# Patient Record
Sex: FEMALE | Race: WHITE | NOT HISPANIC OR LATINO | Employment: OTHER | URBAN - METROPOLITAN AREA
[De-identification: names, ages, dates, MRNs, and addresses within clinical notes are randomized per-mention and may not be internally consistent; named-entity substitution may affect disease eponyms.]

---

## 2023-03-31 PROBLEM — C50.919 MALIGNANT NEOPLASM OF BREAST (MULTI): Status: ACTIVE | Noted: 2023-03-31

## 2023-03-31 PROBLEM — R00.0 TACHYCARDIA: Status: ACTIVE | Noted: 2023-03-31

## 2023-03-31 PROBLEM — H72.91 PERFORATION OF RIGHT TYMPANIC MEMBRANE: Status: ACTIVE | Noted: 2023-03-31

## 2023-03-31 PROBLEM — I10 ESSENTIAL HYPERTENSION: Status: ACTIVE | Noted: 2023-03-31

## 2023-03-31 PROBLEM — F41.1 GENERALIZED ANXIETY DISORDER: Status: ACTIVE | Noted: 2023-03-31

## 2023-03-31 PROBLEM — I73.9 INTERMITTENT CLAUDICATION (CMS-HCC): Status: ACTIVE | Noted: 2023-03-31

## 2023-03-31 PROBLEM — I77.1: Status: ACTIVE | Noted: 2023-03-31

## 2023-03-31 PROBLEM — Z99.81 DEPENDENCE ON SUPPLEMENTAL OXYGEN: Status: ACTIVE | Noted: 2023-03-31

## 2023-03-31 PROBLEM — I25.10 CORONARY ARTERY CALCIFICATION SEEN ON CAT SCAN: Status: ACTIVE | Noted: 2023-03-31

## 2023-03-31 PROBLEM — J44.9 COPD (CHRONIC OBSTRUCTIVE PULMONARY DISEASE) (MULTI): Status: ACTIVE | Noted: 2023-03-31

## 2023-03-31 PROBLEM — R53.83 FATIGUE: Status: RESOLVED | Noted: 2023-03-31 | Resolved: 2023-03-31

## 2023-03-31 PROBLEM — I70.0 AORTOILIAC STENOSIS (CMS-HCC): Status: ACTIVE | Noted: 2023-03-31

## 2023-03-31 PROBLEM — I73.9 PAD (PERIPHERAL ARTERY DISEASE) (CMS-HCC): Status: ACTIVE | Noted: 2023-03-31

## 2023-03-31 PROBLEM — R42 VERTIGO: Status: ACTIVE | Noted: 2023-03-31

## 2023-03-31 PROBLEM — R09.02 HYPOXIA: Status: ACTIVE | Noted: 2023-03-31

## 2023-03-31 PROBLEM — I10 SEVERE UNCONTROLLED HYPERTENSION: Status: ACTIVE | Noted: 2023-03-31

## 2023-03-31 PROBLEM — E78.5 DYSLIPIDEMIA: Status: ACTIVE | Noted: 2023-03-31

## 2023-04-26 LAB
ALANINE AMINOTRANSFERASE (SGPT) (U/L) IN SER/PLAS: 15 U/L (ref 7–45)
ALBUMIN (G/DL) IN SER/PLAS: 4.1 G/DL (ref 3.4–5)
ALKALINE PHOSPHATASE (U/L) IN SER/PLAS: 95 U/L (ref 33–136)
ANION GAP IN SER/PLAS: 9 MMOL/L (ref 10–20)
ASPARTATE AMINOTRANSFERASE (SGOT) (U/L) IN SER/PLAS: 18 U/L (ref 9–39)
BILIRUBIN TOTAL (MG/DL) IN SER/PLAS: 0.4 MG/DL (ref 0–1.2)
CALCIUM (MG/DL) IN SER/PLAS: 10.6 MG/DL (ref 8.6–10.3)
CARBON DIOXIDE, TOTAL (MMOL/L) IN SER/PLAS: 26 MMOL/L (ref 21–32)
CHLORIDE (MMOL/L) IN SER/PLAS: 106 MMOL/L (ref 98–107)
CHOLESTEROL (MG/DL) IN SER/PLAS: 287 MG/DL (ref 0–199)
CHOLESTEROL IN HDL (MG/DL) IN SER/PLAS: 63.9 MG/DL
CHOLESTEROL/HDL RATIO: 4.5
CREATININE (MG/DL) IN SER/PLAS: 0.85 MG/DL (ref 0.5–1.05)
ERYTHROCYTE DISTRIBUTION WIDTH (RATIO) BY AUTOMATED COUNT: 13.7 % (ref 11.5–14.5)
ERYTHROCYTE MEAN CORPUSCULAR HEMOGLOBIN CONCENTRATION (G/DL) BY AUTOMATED: 32.7 G/DL (ref 32–36)
ERYTHROCYTE MEAN CORPUSCULAR VOLUME (FL) BY AUTOMATED COUNT: 89 FL (ref 80–100)
ERYTHROCYTES (10*6/UL) IN BLOOD BY AUTOMATED COUNT: 5.43 X10E12/L (ref 4–5.2)
GFR FEMALE: 72 ML/MIN/1.73M2
GLUCOSE (MG/DL) IN SER/PLAS: 83 MG/DL (ref 74–99)
HEMATOCRIT (%) IN BLOOD BY AUTOMATED COUNT: 48.3 % (ref 36–46)
HEMOGLOBIN (G/DL) IN BLOOD: 15.8 G/DL (ref 12–16)
LDL: 201 MG/DL (ref 0–99)
LEUKOCYTES (10*3/UL) IN BLOOD BY AUTOMATED COUNT: 7.3 X10E9/L (ref 4.4–11.3)
MAGNESIUM (MG/DL) IN SER/PLAS: 2.25 MG/DL (ref 1.6–2.4)
PLATELETS (10*3/UL) IN BLOOD AUTOMATED COUNT: 194 X10E9/L (ref 150–450)
POTASSIUM (MMOL/L) IN SER/PLAS: 4.2 MMOL/L (ref 3.5–5.3)
PROTEIN TOTAL: 6.7 G/DL (ref 6.4–8.2)
SODIUM (MMOL/L) IN SER/PLAS: 137 MMOL/L (ref 136–145)
TRIGLYCERIDE (MG/DL) IN SER/PLAS: 110 MG/DL (ref 0–149)
UREA NITROGEN (MG/DL) IN SER/PLAS: 17 MG/DL (ref 6–23)
VLDL: 22 MG/DL (ref 0–40)

## 2023-09-21 PROBLEM — Z79.82 ASPIRIN LONG-TERM USE: Status: ACTIVE | Noted: 2023-09-21

## 2023-09-21 PROBLEM — R06.02 SHORTNESS OF BREATH ON EXERTION: Status: ACTIVE | Noted: 2023-09-21

## 2023-09-21 PROBLEM — I73.9 PVD (PERIPHERAL VASCULAR DISEASE) (CMS-HCC): Status: ACTIVE | Noted: 2023-09-21

## 2023-09-21 PROBLEM — R07.9 CHEST PAIN: Status: ACTIVE | Noted: 2023-09-21

## 2023-09-21 PROBLEM — E78.2 MIXED HYPERLIPIDEMIA: Status: ACTIVE | Noted: 2023-09-21

## 2023-09-21 PROBLEM — H91.90 HEARING LOSS: Status: ACTIVE | Noted: 2023-09-21

## 2023-09-21 RX ORDER — EZETIMIBE 10 MG/1
10 TABLET ORAL DAILY
COMMUNITY

## 2023-10-15 PROBLEM — Z72.0 TOBACCO USE: Status: ACTIVE | Noted: 2023-10-15

## 2023-10-15 PROBLEM — R00.2 PALPITATIONS: Status: ACTIVE | Noted: 2023-10-15

## 2024-05-30 ENCOUNTER — APPOINTMENT (OUTPATIENT)
Dept: RADIOLOGY | Facility: HOSPITAL | Age: 74
End: 2024-05-30
Payer: MEDICARE

## 2024-05-30 ENCOUNTER — HOSPITAL ENCOUNTER (EMERGENCY)
Facility: HOSPITAL | Age: 74
Discharge: HOME | End: 2024-05-30
Attending: EMERGENCY MEDICINE
Payer: MEDICARE

## 2024-05-30 ENCOUNTER — APPOINTMENT (OUTPATIENT)
Dept: CARDIOLOGY | Facility: HOSPITAL | Age: 74
End: 2024-05-30
Payer: MEDICARE

## 2024-05-30 VITALS
RESPIRATION RATE: 20 BRPM | BODY MASS INDEX: 22.15 KG/M2 | HEART RATE: 74 BPM | WEIGHT: 125 LBS | TEMPERATURE: 98.6 F | DIASTOLIC BLOOD PRESSURE: 80 MMHG | SYSTOLIC BLOOD PRESSURE: 159 MMHG | HEIGHT: 63 IN | OXYGEN SATURATION: 98 %

## 2024-05-30 DIAGNOSIS — M79.605 LEFT LEG PAIN: ICD-10-CM

## 2024-05-30 DIAGNOSIS — R07.9 CHEST PAIN, UNSPECIFIED TYPE: Primary | ICD-10-CM

## 2024-05-30 LAB
ALBUMIN SERPL BCP-MCNC: 4.5 G/DL (ref 3.4–5)
ALP SERPL-CCNC: 95 U/L (ref 33–136)
ALT SERPL W P-5'-P-CCNC: 12 U/L (ref 7–45)
ANION GAP SERPL CALC-SCNC: 9 MMOL/L (ref 10–20)
AST SERPL W P-5'-P-CCNC: 16 U/L (ref 9–39)
BASOPHILS # BLD AUTO: 0.05 X10*3/UL (ref 0–0.1)
BASOPHILS NFR BLD AUTO: 0.6 %
BILIRUB SERPL-MCNC: 0.3 MG/DL (ref 0–1.2)
BUN SERPL-MCNC: 30 MG/DL (ref 6–23)
CALCIUM SERPL-MCNC: 10.8 MG/DL (ref 8.6–10.3)
CARDIAC TROPONIN I PNL SERPL HS: 7 NG/L (ref 0–13)
CARDIAC TROPONIN I PNL SERPL HS: 8 NG/L (ref 0–13)
CHLORIDE SERPL-SCNC: 106 MMOL/L (ref 98–107)
CO2 SERPL-SCNC: 27 MMOL/L (ref 21–32)
CREAT SERPL-MCNC: 0.96 MG/DL (ref 0.5–1.05)
D DIMER PPP FEU-MCNC: 1201 NG/ML FEU
EGFRCR SERPLBLD CKD-EPI 2021: 62 ML/MIN/1.73M*2
EOSINOPHIL # BLD AUTO: 0.26 X10*3/UL (ref 0–0.4)
EOSINOPHIL NFR BLD AUTO: 3.4 %
ERYTHROCYTE [DISTWIDTH] IN BLOOD BY AUTOMATED COUNT: 13.5 % (ref 11.5–14.5)
GLUCOSE SERPL-MCNC: 88 MG/DL (ref 74–99)
HCT VFR BLD AUTO: 48.7 % (ref 36–46)
HGB BLD-MCNC: 16.4 G/DL (ref 12–16)
IMM GRANULOCYTES # BLD AUTO: 0.01 X10*3/UL (ref 0–0.5)
IMM GRANULOCYTES NFR BLD AUTO: 0.1 % (ref 0–0.9)
LYMPHOCYTES # BLD AUTO: 3.04 X10*3/UL (ref 0.8–3)
LYMPHOCYTES NFR BLD AUTO: 39.3 %
MCH RBC QN AUTO: 29.4 PG (ref 26–34)
MCHC RBC AUTO-ENTMCNC: 33.7 G/DL (ref 32–36)
MCV RBC AUTO: 87 FL (ref 80–100)
MONOCYTES # BLD AUTO: 0.49 X10*3/UL (ref 0.05–0.8)
MONOCYTES NFR BLD AUTO: 6.3 %
NEUTROPHILS # BLD AUTO: 3.89 X10*3/UL (ref 1.6–5.5)
NEUTROPHILS NFR BLD AUTO: 50.3 %
NRBC BLD-RTO: 0 /100 WBCS (ref 0–0)
PLATELET # BLD AUTO: 204 X10*3/UL (ref 150–450)
POTASSIUM SERPL-SCNC: 4.3 MMOL/L (ref 3.5–5.3)
PROT SERPL-MCNC: 7.1 G/DL (ref 6.4–8.2)
RBC # BLD AUTO: 5.57 X10*6/UL (ref 4–5.2)
SODIUM SERPL-SCNC: 138 MMOL/L (ref 136–145)
WBC # BLD AUTO: 7.7 X10*3/UL (ref 4.4–11.3)

## 2024-05-30 PROCEDURE — 93005 ELECTROCARDIOGRAM TRACING: CPT

## 2024-05-30 PROCEDURE — 84484 ASSAY OF TROPONIN QUANT: CPT | Performed by: EMERGENCY MEDICINE

## 2024-05-30 PROCEDURE — 93971 EXTREMITY STUDY: CPT

## 2024-05-30 PROCEDURE — 2500000004 HC RX 250 GENERAL PHARMACY W/ HCPCS (ALT 636 FOR OP/ED): Performed by: EMERGENCY MEDICINE

## 2024-05-30 PROCEDURE — 71045 X-RAY EXAM CHEST 1 VIEW: CPT | Performed by: RADIOLOGY

## 2024-05-30 PROCEDURE — 71045 X-RAY EXAM CHEST 1 VIEW: CPT

## 2024-05-30 PROCEDURE — 71275 CT ANGIOGRAPHY CHEST: CPT

## 2024-05-30 PROCEDURE — 71275 CT ANGIOGRAPHY CHEST: CPT | Performed by: RADIOLOGY

## 2024-05-30 PROCEDURE — 93971 EXTREMITY STUDY: CPT | Performed by: RADIOLOGY

## 2024-05-30 PROCEDURE — 96360 HYDRATION IV INFUSION INIT: CPT | Mod: 59

## 2024-05-30 PROCEDURE — 99285 EMERGENCY DEPT VISIT HI MDM: CPT | Mod: 25

## 2024-05-30 PROCEDURE — 84075 ASSAY ALKALINE PHOSPHATASE: CPT | Performed by: EMERGENCY MEDICINE

## 2024-05-30 PROCEDURE — 2550000001 HC RX 255 CONTRASTS: Performed by: EMERGENCY MEDICINE

## 2024-05-30 PROCEDURE — 96361 HYDRATE IV INFUSION ADD-ON: CPT

## 2024-05-30 PROCEDURE — 36415 COLL VENOUS BLD VENIPUNCTURE: CPT | Performed by: EMERGENCY MEDICINE

## 2024-05-30 PROCEDURE — 85379 FIBRIN DEGRADATION QUANT: CPT | Performed by: EMERGENCY MEDICINE

## 2024-05-30 PROCEDURE — 85025 COMPLETE CBC W/AUTO DIFF WBC: CPT | Performed by: EMERGENCY MEDICINE

## 2024-05-30 RX ORDER — SODIUM CHLORIDE 9 MG/ML
100 INJECTION, SOLUTION INTRAVENOUS CONTINUOUS
Status: DISCONTINUED | OUTPATIENT
Start: 2024-05-30 | End: 2024-05-30 | Stop reason: HOSPADM

## 2024-05-30 RX ORDER — SODIUM CHLORIDE 9 MG/ML
3 INJECTION, SOLUTION INTRAMUSCULAR; INTRAVENOUS; SUBCUTANEOUS AS NEEDED
Status: DISCONTINUED | OUTPATIENT
Start: 2024-05-30 | End: 2024-05-30 | Stop reason: HOSPADM

## 2024-05-30 RX ADMIN — SODIUM CHLORIDE 100 ML/HR: 9 INJECTION, SOLUTION INTRAVENOUS at 12:26

## 2024-05-30 RX ADMIN — IOHEXOL 75 ML: 350 INJECTION, SOLUTION INTRAVENOUS at 13:17

## 2024-05-30 ASSESSMENT — PAIN DESCRIPTION - PAIN TYPE: TYPE: ACUTE PAIN

## 2024-05-30 ASSESSMENT — LIFESTYLE VARIABLES
TOTAL SCORE: 0
HAVE YOU EVER FELT YOU SHOULD CUT DOWN ON YOUR DRINKING: NO
EVER HAD A DRINK FIRST THING IN THE MORNING TO STEADY YOUR NERVES TO GET RID OF A HANGOVER: NO
HAVE PEOPLE ANNOYED YOU BY CRITICIZING YOUR DRINKING: NO
EVER FELT BAD OR GUILTY ABOUT YOUR DRINKING: NO

## 2024-05-30 ASSESSMENT — PAIN SCALES - GENERAL
PAINLEVEL_OUTOF10: 0 - NO PAIN
PAINLEVEL_OUTOF10: 0 - NO PAIN
PAINLEVEL_OUTOF10: 7
PAINLEVEL_OUTOF10: 0 - NO PAIN

## 2024-05-30 ASSESSMENT — PAIN - FUNCTIONAL ASSESSMENT: PAIN_FUNCTIONAL_ASSESSMENT: 0-10

## 2024-05-30 ASSESSMENT — PAIN DESCRIPTION - DESCRIPTORS: DESCRIPTORS: ACHING

## 2024-05-30 ASSESSMENT — PAIN DESCRIPTION - LOCATION: LOCATION: CHEST

## 2024-05-30 NOTE — ED PROVIDER NOTES
HPI   Chief Complaint   Patient presents with   • chest pain/ HTN       Patient presents with chest pain, left leg pain.  She has had this chest pain for a couple of days.  She describes a constant pain in her chest.  It does not radiate.  No shortness of breath associated with this.  She also has pain in her left thigh.  She has a history of multiple stents in her legs.  She is on Plavix and has been compliant with this.  She is been taking all of her medications at home.  She denies any recent fevers or cough.  She states that she had a small heart attack about 15 years ago but has no stents in her heart.  She denies any falls or trauma.  She denies any trouble ambulating.                          [place] Coma Scale Score: 15                  Patient History   Past Medical History:   Diagnosis Date   • Fatigue 2023   • Personal history of malignant neoplasm of other parts of uterus     History of cancer of uterus   • Personal history of other diseases of the circulatory system     History of hypertension   • Personal history of other endocrine, nutritional and metabolic disease     History of hyperlipidemia   • Personal history of other specified conditions     History of vertigo     Past Surgical History:   Procedure Laterality Date   • ADENOIDECTOMY  2017    Adenoidectomy   • BREAST BIOPSY  2017    Biopsy Breast Open   •  SECTION, CLASSIC  2017     Section   • CHOLECYSTECTOMY  10/12/2017    Cholecystectomy   • CT AORTA AND BILATERAL ILIOFEMORAL RUNOFF ANGIOGRAM W AND/OR WO IV CONTRAST  3/9/2022    CT AORTA AND BILATERAL ILIOFEMORAL RUNOFF ANGIOGRAM W AND/OR WO IV CONTRAST 3/9/2022 POR ANCILLARY LEGACY   • MASTECTOMY  2017    Breast Surgery Mastectomy   • SEPTOPLASTY  2017    Septoplasty   • TONSILLECTOMY  2017    Tonsillectomy   • TOTAL ABDOMINAL HYSTERECTOMY  2017    Total Abdominal Hysterectomy     Family History   Problem Relation Name Age of  Onset   • Breast cancer Mother     • Hypertension Mother     • Hyperlipidemia Mother     • Esophageal cancer Mother     • Other (Peripheral artery disease) Mother     • Coronary artery disease Father       Social History     Tobacco Use   • Smoking status: Not on file   • Smokeless tobacco: Not on file   Substance Use Topics   • Alcohol use: Not on file   • Drug use: Not on file       Physical Exam   ED Triage Vitals   Temperature Heart Rate Respirations BP   05/30/24 1133 05/30/24 1220 05/30/24 1133 05/30/24 1133   36.6 °C (97.8 °F) 84 20 (!) 193/73      Pulse Ox Temp Source Heart Rate Source Patient Position   05/30/24 1133 05/30/24 1133 05/30/24 1133 05/30/24 1133   97 % Temporal Monitor Sitting      BP Location FiO2 (%)     05/30/24 1133 05/30/24 1220     Left arm 21 %       Physical Exam  Vitals and nursing note reviewed.   Constitutional:       Appearance: Normal appearance.   HENT:      Head: Normocephalic and atraumatic.      Mouth/Throat:      Mouth: Mucous membranes are moist.   Eyes:      Extraocular Movements: Extraocular movements intact.      Pupils: Pupils are equal, round, and reactive to light.   Cardiovascular:      Rate and Rhythm: Normal rate and regular rhythm.      Heart sounds: No murmur heard.  Pulmonary:      Effort: Pulmonary effort is normal. No respiratory distress.      Breath sounds: Normal breath sounds.   Abdominal:      General: There is no distension.      Palpations: Abdomen is soft.      Tenderness: There is no abdominal tenderness.   Musculoskeletal:         General: Tenderness (Left thigh region) present. No deformity. Normal range of motion.      Cervical back: Neck supple.      Right lower leg: No edema.      Left lower leg: No edema.      Comments: DP pulses are palpable on the left leg.  Minimal on the right leg but both legs are equal temperature.  No cyanosis.   Skin:     General: Skin is warm and dry.      Capillary Refill: Capillary refill takes less than 2 seconds.       Findings: No lesion or rash.   Neurological:      General: No focal deficit present.      Mental Status: She is alert and oriented to person, place, and time.      Sensory: No sensory deficit.      Motor: No weakness.   Psychiatric:         Behavior: Behavior normal.       Labs Reviewed   CBC WITH AUTO DIFFERENTIAL - Abnormal       Result Value    WBC 7.7      nRBC 0.0      RBC 5.57 (*)     Hemoglobin 16.4 (*)     Hematocrit 48.7 (*)     MCV 87      MCH 29.4      MCHC 33.7      RDW 13.5      Platelets 204      Neutrophils % 50.3      Immature Granulocytes %, Automated 0.1      Lymphocytes % 39.3      Monocytes % 6.3      Eosinophils % 3.4      Basophils % 0.6      Neutrophils Absolute 3.89      Immature Granulocytes Absolute, Automated 0.01      Lymphocytes Absolute 3.04 (*)     Monocytes Absolute 0.49      Eosinophils Absolute 0.26      Basophils Absolute 0.05     TROPONIN SERIES- (INITIAL, 1 HR)    Narrative:     The following orders were created for panel order Troponin I Series, High Sensitivity (0, 1 HR).  Procedure                               Abnormality         Status                     ---------                               -----------         ------                     Troponin I, High Sensiti...[[id number]]                      In process                 Troponin, High Sensitivi...[[id number]]                                                   Please view results for these tests on the individual orders.   COMPREHENSIVE METABOLIC PANEL   SERIAL TROPONIN-INITIAL   SERIAL TROPONIN, 1 HOUR   D-DIMER, NON VTE     XR chest 1 view    (Results Pending)   Lower extremity venous duplex left    (Results Pending)     ED Course & MDM   Diagnoses as of 05/30/24 1437   Chest pain, unspecified type   Left leg pain       Medical Decision Making  Differentials include ACS, musculoskeletal, DVT, arterial occlusion.  Imaging studies, if performed, were independently reviewed and interpreted by myself and confirmed by  radiologist. EKG(s), if performed, were interpreted by myself. Patient had an EKG that was sinus rhythm at a rate of 97.  No acute ST changes.  QTc 447, AR interval 200.  Repeat EKG is also sinus rhythm at 86.  Again no ischemic changes.  QTc 412, AR interval 219.  Laboratory studies were obtained and were unremarkable except for D-dimer of 1201.  Troponin is 7 with a repeat of 8.  Chest x-ray shows nothing acute.  Duplex ultrasound of the left lower extremity shows no evidence of DVT.  CTA of the chest was performed due to the elevated D-dimer.  Shows worsening emphysematous changes.  There is no pneumonia or pulmonary emboli noted.  She does have a lung nodule which the patient was notified of.  I see no signs of acute coronary syndrome.  No signs of DVT or PE.  This could be musculoskeletal in nature.  At this time patient feels improved and would like to go home.  I do not see any acute causes for her pain or need for any admission at this time.  Patient will be discharged to continue her home medications and she will follow-up with her physicians.        Procedure  Procedures     Saul Qiu MD  05/30/24 8924

## 2024-06-01 LAB
ATRIAL RATE: 87 BPM
ATRIAL RATE: 97 BPM
P AXIS: 27 DEGREES
P AXIS: 64 DEGREES
PR INTERVAL: 200 MS
PR INTERVAL: 219 MS
Q ONSET: 249 MS
Q ONSET: 252 MS
QRS COUNT: 14 BEATS
QRS COUNT: 16 BEATS
QRS DURATION: 90 MS
QRS DURATION: 92 MS
QT INTERVAL: 344 MS
QT INTERVAL: 352 MS
QTC CALCULATION(BAZETT): 412 MS
QTC CALCULATION(BAZETT): 447 MS
QTC FREDERICIA: 388 MS
QTC FREDERICIA: 413 MS
R AXIS: -19 DEGREES
R AXIS: -2 DEGREES
T AXIS: 78 DEGREES
T AXIS: 91 DEGREES
T OFFSET: 424 MS
T OFFSET: 425 MS
VENTRICULAR RATE: 86 BPM
VENTRICULAR RATE: 97 BPM

## 2024-12-10 ENCOUNTER — TELEPHONE (OUTPATIENT)
Dept: CARDIOLOGY | Facility: HOSPITAL | Age: 74
End: 2024-12-10
Payer: MEDICARE

## 2024-12-10 PROBLEM — R07.9 CHEST PAIN ON EXERTION: Status: ACTIVE | Noted: 2024-12-10

## 2024-12-10 PROBLEM — H91.93 BILATERAL HEARING LOSS: Status: ACTIVE | Noted: 2024-12-10

## 2024-12-10 PROBLEM — R05.9 COUGH: Status: ACTIVE | Noted: 2024-12-10

## 2024-12-10 PROBLEM — R63.0 DECREASE IN APPETITE: Status: ACTIVE | Noted: 2024-12-10

## 2024-12-10 PROBLEM — H72.90 PERFORATION OF TYMPANIC MEMBRANE: Status: ACTIVE | Noted: 2023-03-31

## 2024-12-10 PROBLEM — R06.89 DECREASED BREATH SOUNDS: Status: ACTIVE | Noted: 2024-12-10

## 2024-12-10 PROBLEM — U07.1 DISEASE DUE TO SEVERE ACUTE RESPIRATORY SYNDROME CORONAVIRUS 2 (SARS-COV-2): Status: ACTIVE | Noted: 2024-12-10

## 2024-12-10 PROBLEM — Z86.79 HISTORY OF HYPERTENSION: Status: ACTIVE | Noted: 2024-12-10

## 2024-12-10 PROBLEM — E78.00 PURE HYPERCHOLESTEROLEMIA: Status: ACTIVE | Noted: 2024-12-10

## 2024-12-10 PROBLEM — H90.8 MIXED CONDUCTIVE AND SENSORINEURAL HEARING LOSS: Status: ACTIVE | Noted: 2023-03-20

## 2024-12-10 PROBLEM — I25.10 CALCIFICATION OF CORONARY ARTERY: Status: ACTIVE | Noted: 2022-09-30

## 2024-12-10 PROBLEM — R06.09 DYSPNEA ON EXERTION: Status: ACTIVE | Noted: 2023-09-21

## 2024-12-10 PROBLEM — I10 PRIMARY HYPERTENSION: Status: ACTIVE | Noted: 2022-09-30

## 2024-12-10 PROBLEM — Z86.39 HISTORY OF ELEVATED LIPIDS: Status: ACTIVE | Noted: 2024-12-10

## 2024-12-10 PROBLEM — H90.3 ASYMMETRICAL SENSORINEURAL HEARING LOSS: Status: ACTIVE | Noted: 2024-12-10

## 2024-12-10 PROBLEM — Z85.42 HISTORY OF MALIGNANT NEOPLASM OF UTERINE BODY: Status: ACTIVE | Noted: 2024-12-10

## 2024-12-10 PROBLEM — C55 MALIGNANT NEOPLASM OF UTERUS, PART UNSPECIFIED (MULTI): Status: ACTIVE | Noted: 2023-03-20

## 2024-12-10 PROBLEM — J44.9 CHRONIC OBSTRUCTIVE PULMONARY DISEASE (MULTI): Status: ACTIVE | Noted: 2022-04-06

## 2024-12-10 PROBLEM — Z85.3 HISTORY OF MALIGNANT NEOPLASM OF BREAST: Status: ACTIVE | Noted: 2023-03-20

## 2024-12-10 PROBLEM — R79.81 LOW OXYGEN SATURATION: Status: ACTIVE | Noted: 2022-04-06

## 2024-12-10 PROBLEM — J20.9 ACUTE BRONCHITIS: Status: ACTIVE | Noted: 2024-12-10

## 2024-12-10 PROBLEM — M79.604 PAIN OF RIGHT LOWER EXTREMITY: Status: ACTIVE | Noted: 2024-12-10

## 2024-12-10 PROBLEM — F17.210 CIGARETTE SMOKER: Status: ACTIVE | Noted: 2022-09-19

## 2024-12-10 PROBLEM — R53.1 WEAKNESS: Status: ACTIVE | Noted: 2023-03-31

## 2024-12-10 RX ORDER — ATORVASTATIN CALCIUM 40 MG/1
40 TABLET, FILM COATED ORAL DAILY
COMMUNITY
End: 2024-12-11 | Stop reason: WASHOUT

## 2024-12-10 RX ORDER — WARFARIN SODIUM 5 MG/1
5 TABLET ORAL
COMMUNITY
End: 2024-12-11 | Stop reason: WASHOUT

## 2024-12-10 NOTE — TELEPHONE ENCOUNTER
Spoke with patients daughter regarding scheduled appt in January. She has some concerns of a high heart rate and SOB. Triaged with BP and got patient scheduled to come in and see JV tomorrow 12/11/24

## 2024-12-11 ENCOUNTER — OFFICE VISIT (OUTPATIENT)
Dept: CARDIOLOGY | Facility: HOSPITAL | Age: 74
End: 2024-12-11
Payer: MEDICARE

## 2024-12-11 VITALS
SYSTOLIC BLOOD PRESSURE: 142 MMHG | DIASTOLIC BLOOD PRESSURE: 62 MMHG | WEIGHT: 132 LBS | HEART RATE: 70 BPM | BODY MASS INDEX: 23.39 KG/M2 | HEIGHT: 63 IN | OXYGEN SATURATION: 100 %

## 2024-12-11 DIAGNOSIS — Z86.79 HISTORY OF HYPERTENSION: ICD-10-CM

## 2024-12-11 DIAGNOSIS — I73.9 PAD (PERIPHERAL ARTERY DISEASE) (CMS-HCC): ICD-10-CM

## 2024-12-11 DIAGNOSIS — R06.09 DYSPNEA ON EXERTION: Primary | ICD-10-CM

## 2024-12-11 LAB
ATRIAL RATE: 98 BPM
P AXIS: 63 DEGREES
P OFFSET: 183 MS
P ONSET: 131 MS
PR INTERVAL: 186 MS
Q ONSET: 224 MS
QRS COUNT: 16 BEATS
QRS DURATION: 80 MS
QT INTERVAL: 344 MS
QTC CALCULATION(BAZETT): 439 MS
QTC FREDERICIA: 405 MS
R AXIS: 15 DEGREES
T AXIS: 90 DEGREES
T OFFSET: 396 MS
VENTRICULAR RATE: 98 BPM

## 2024-12-11 PROCEDURE — 3077F SYST BP >= 140 MM HG: CPT | Performed by: PHYSICIAN ASSISTANT

## 2024-12-11 PROCEDURE — 1160F RVW MEDS BY RX/DR IN RCRD: CPT | Performed by: PHYSICIAN ASSISTANT

## 2024-12-11 PROCEDURE — 3078F DIAST BP <80 MM HG: CPT | Performed by: PHYSICIAN ASSISTANT

## 2024-12-11 PROCEDURE — 93005 ELECTROCARDIOGRAM TRACING: CPT | Performed by: PHYSICIAN ASSISTANT

## 2024-12-11 PROCEDURE — 1159F MED LIST DOCD IN RCRD: CPT | Performed by: PHYSICIAN ASSISTANT

## 2024-12-11 PROCEDURE — 99214 OFFICE O/P EST MOD 30 MIN: CPT | Performed by: PHYSICIAN ASSISTANT

## 2024-12-11 PROCEDURE — 93010 ELECTROCARDIOGRAM REPORT: CPT | Performed by: INTERNAL MEDICINE

## 2024-12-11 PROCEDURE — 4004F PT TOBACCO SCREEN RCVD TLK: CPT | Performed by: PHYSICIAN ASSISTANT

## 2024-12-11 RX ORDER — AMINOPHYLLINE 25 MG/ML
125 INJECTION, SOLUTION INTRAVENOUS ONCE AS NEEDED
OUTPATIENT
Start: 2024-12-11

## 2024-12-11 RX ORDER — ROSUVASTATIN CALCIUM 40 MG/1
40 TABLET, COATED ORAL DAILY
Qty: 90 TABLET | Refills: 3 | Status: SHIPPED | OUTPATIENT
Start: 2024-12-11 | End: 2025-12-11

## 2024-12-11 RX ORDER — LISINOPRIL AND HYDROCHLOROTHIAZIDE 12.5; 2 MG/1; MG/1
1 TABLET ORAL 2 TIMES DAILY
Qty: 180 TABLET | Refills: 3 | Status: SHIPPED | OUTPATIENT
Start: 2024-12-11 | End: 2025-12-11

## 2024-12-11 RX ORDER — ALBUTEROL SULFATE 90 UG/1
2 INHALANT RESPIRATORY (INHALATION) EVERY 4 HOURS PRN
Qty: 18 G | Refills: 1 | Status: SHIPPED | OUTPATIENT
Start: 2024-12-11 | End: 2025-02-09

## 2024-12-11 RX ORDER — ASPIRIN 81 MG/1
81 TABLET ORAL DAILY
Qty: 90 TABLET | Refills: 3 | Status: SHIPPED | OUTPATIENT
Start: 2024-12-11 | End: 2025-12-11

## 2024-12-11 RX ORDER — REGADENOSON 0.08 MG/ML
0.4 INJECTION, SOLUTION INTRAVENOUS
OUTPATIENT
Start: 2024-12-11

## 2024-12-11 NOTE — PATIENT INSTRUCTIONS
Please restart/continue your current medications.  If you have any change in your cardiorespiratory status please call the office right away.  Cedric will see you back after the testing.

## 2024-12-11 NOTE — PROGRESS NOTES
Cardiology Follow Up  Chief Complaint:   Here with complaints of SOB     History Of Present Illness:    This is a 72-year-old female here for follow-up regarding her history of coronary artery disease as seen on cardiac catheterization done in October 2017, peripheral vascular disease followed by vascular surgery, hypertension, dyslipidemia, COPD/tobacco use, and complaints of palpitations/dizziness. The patient was last evaluated by me in October 2022 which time we reviewed results of her nuclear stress, echocardiogram, and cardiac monitor. Patient was asked to follow-up in 6 months and sooner if necessary. ECG done today showed sinus rhythm with a heart rate of 94 bpm, 1st degree AV block and possible anteroseptal infarct age undetermined. The patient reports persistence of dizziness and dimming of vision which lasts for about 2-3 minutes. She denies associated chest pain, shortness of breath, syncope or palpitations. She states that she takes all of her medications as prescribed. During my exam, she was resting comfortably on the exam table.   12-11-24: This is a 74-year-old female patient who presents with her daughter for overdue follow-up.  Patient's last visit April 2023.  Patient has stopped all of her medications and been lost to follow-up but now having multiple issues including fatigue chest pain shortness of breath leg pain.  She is previously known to Dr. Figueredo and has stents in her legs.  Again she has stopped all of her medications.  Last lipid profile that we see she was very high total and LDL cholesterols.  She unfortunately continues to smoke fairly heavily and continues to work full-time at [employer].  Patient has had 2 events in the last week where she thought she might need to call 911 or have daughter take her to the ER and she is strongly cautioned that if she has further episodes like this for longer lasting episodes to call 911 or go to the emergency department.  Her heart catheterization in  "2017 showed 40% narrowings to the LAD circumflex and RCA.  Patient has not had follow-up with the PCP either.              Last Recorded Vitals:  Vitals:    24 1256   BP: 142/62   BP Location: Right arm   Patient Position: Sitting   BP Cuff Size: Adult   Pulse: 70   SpO2: 100%   Weight: 59.9 kg (132 lb)   Height: 1.6 m (5' 3\")       Past Medical History:  She has a past medical history of Fatigue (2023), Personal history of malignant neoplasm of other parts of uterus, Personal history of other diseases of the circulatory system, Personal history of other endocrine, nutritional and metabolic disease, and Personal history of other specified conditions.    Past Surgical History:  She has a past surgical history that includes Mastectomy (2017); Total abdominal hysterectomy (2017); Breast biopsy (2017);  section, classic (2017); Septoplasty (2017); Adenoidectomy (2017); Tonsillectomy (2017); Cholecystectomy (10/12/2017); and CT angio aorta and bilateral iliofemoral runoff including without contrast if performed (3/9/2022).      Social History:  She has no history on file for tobacco use, alcohol use, and drug use.    Family History:  Family History   Problem Relation Name Age of Onset    Breast cancer Mother      Hypertension Mother      Hyperlipidemia Mother      Esophageal cancer Mother      Other (Peripheral artery disease) Mother      Coronary artery disease Father          Allergies:  Codeine    Outpatient Medications:  Current Outpatient Medications   Medication Instructions    albuterol (Ventolin HFA) 90 mcg/actuation inhaler 2 puffs, Every 4 hours PRN    aspirin 81 mg EC tablet 1 tablet, Daily    atorvastatin (LIPITOR) 40 mg, Daily    budesonide-formoteroL (Symbicort) 160-4.5 mcg/actuation inhaler 2 puffs, 2 times daily    clopidogrel (Plavix) 75 mg tablet 1 tablet, Daily    ezetimibe (ZETIA) 10 mg, Daily    lisinopriL-hydrochlorothiazide 20-12.5 mg " "tablet 1 tablet, 2 times daily    meclizine (ANTIVERT) 25 mg, 3 times daily PRN    rosuvastatin (Crestor) 40 mg tablet 1 tablet, Daily    warfarin (COUMADIN) 5 mg, Daily RT     ROS   Physical Exam      Last Labs:  CBC -  Lab Results   Component Value Date    WBC 7.7 05/30/2024    HGB 16.4 (H) 05/30/2024    HCT 48.7 (H) 05/30/2024    MCV 87 05/30/2024     05/30/2024       CMP -  Lab Results   Component Value Date    CALCIUM 10.8 (H) 05/30/2024    PROT 7.1 05/30/2024    ALBUMIN 4.5 05/30/2024    AST 16 05/30/2024    ALT 12 05/30/2024    ALKPHOS 95 05/30/2024    BILITOT 0.3 05/30/2024       LIPID PANEL -   Lab Results   Component Value Date    CHOL 287 (H) 04/26/2023    TRIG 110 04/26/2023    HDL 63.9 04/26/2023    CHHDL 4.5 04/26/2023    LDLF 201 (H) 04/26/2023    VLDL 22 04/26/2023       RENAL FUNCTION PANEL -   Lab Results   Component Value Date    GLUCOSE 88 05/30/2024     05/30/2024    K 4.3 05/30/2024     05/30/2024    CO2 27 05/30/2024    ANIONGAP 9 (L) 05/30/2024    BUN 30 (H) 05/30/2024    CREATININE 0.96 05/30/2024    CALCIUM 10.8 (H) 05/30/2024    ALBUMIN 4.5 05/30/2024        No results found for: \"BNP\", \"HGBA1C\"      Echo:  2022-- CONCLUSIONS:   1. Left ventricular systolic function is normal with a 60-65% estimated ejection fraction.   2. Spectral Doppler shows a restrictive pattern of left ventricular diastolic filling.    Cath: 2017--CONCLUSIONS:   1. Left main: no significant CAD.   2. LAD: 30% mid-vessel disease.   3. LCx: luminal irregularities.   4. RCA: 40% diffuse prox-mid disease.   5. LVEDP 17mmHg, no aortic stenosis, EF 60-65%, 1+ MR.   6. 80% ostial right common iliac stenosis, 100% proximal left common iliac stenosis.    Stress Test:  Nuclear Stress Test 09/30/2022--IMPRESSION:  1. Normal stress myocardial perfusion imaging in response to  pharmacologic stress.  2. Well-maintained left ventricular function.         No recent results to review    Assessment/Plan   Problem " List Items Addressed This Visit             ICD-10-CM       Cardiac and Vasculature    PAD (peripheral artery disease) (CMS-[facility]) I73.9    Dyspnea on exertion - Primary R06.09    Relevant Orders    ECG 12 lead (Clinic Performed)    History of hypertension Z86.79    Relevant Orders    ECG 12 lead (Clinic Performed)   History of coronary artery disease--EKG today shows sinus rhythm with no acute ST-T wave changes when compared to her last EKG in the system.  At this time I am concerned that she could have progression of coronary disease.  Will resume aspirin, rosuvastatin and her lisinopril hydrochlorothiazide as her blood pressures are elevated.  Will arrange for echocardiogram and stress test.  Patient again is strongly cautioned if further episodes or worsening episodes lasting longer to call 911 and get to the hospital and she verbalizes understanding.  Shortness of breath--see above.  She continues to smoke.  Will refill her albuterol only she was also on a Symbicort inhaler but at least she will have the albuterol inhaler should she have wheezing or shortness of breath.  Peripheral arterial disease--see #1 and again is having worsening leg symptoms so we will get her reestablished with Dr. Serrato.  Hypertension--daughter states that approximately 1 year ago she had a syncopal episode we do not know what her blood pressures were but again she stopped all of her medications.  Will resume her lisinopril hydrochlorothiazide.  Previously LV systolic function was noted to be normal.  Overall patient has been lost to follow-up and had stopped taking all of her medications.  At this time we will refill albuterol, aspirin, rosuvastatin and her lisinopril hydrochlorothiazide.  Will arrange for echo and stress test to soon as possible but they also understand that if she has recurrent symptoms or worsening symptoms to immediately go to the hospital and again she verbalized understanding.  Again there is nothing acute on her  EKG today.  Will also get her reestablished with vascular surgery and I will see her back to reassess blood pressures and go over all the testing with the understanding that if there is any abnormality noted to the stress test or the echocardiogram she will likely need repeat heart catheterization.  Patient and daughter verbalized that they will do their best to be compliant with medications and further follow-up appointments.    Addendum:  asked to provide surgical risk assessment as she has pelvic mass and may need surgery.  After review of this note/history and recent testing (negative stress test for ischemia/echo with EF 38%) her RCRI score is only 1 but with other significant co-morbidities (functional decline, COPD and ongoing tobacco use and significant PAD) she should be considered at higher but not prohibitive risk for concern regarding MACE.  I am seeing the patient back on 1-31-25 and will review with cardiologist to update patient tomorrow.      Harish Lazaro PA-C  12/11/2024  1:04 PM

## 2024-12-24 ENCOUNTER — HOSPITAL ENCOUNTER (EMERGENCY)
Facility: HOSPITAL | Age: 74
Discharge: HOME | End: 2024-12-25
Attending: STUDENT IN AN ORGANIZED HEALTH CARE EDUCATION/TRAINING PROGRAM
Payer: MEDICARE

## 2024-12-24 ENCOUNTER — APPOINTMENT (OUTPATIENT)
Dept: RADIOLOGY | Facility: HOSPITAL | Age: 74
End: 2024-12-24
Payer: MEDICARE

## 2024-12-24 ENCOUNTER — APPOINTMENT (OUTPATIENT)
Dept: CARDIOLOGY | Facility: HOSPITAL | Age: 74
End: 2024-12-24
Payer: MEDICARE

## 2024-12-24 DIAGNOSIS — I10 PRIMARY HYPERTENSION: ICD-10-CM

## 2024-12-24 DIAGNOSIS — J96.02 ACUTE RESPIRATORY FAILURE WITH HYPERCAPNIA (MULTI): ICD-10-CM

## 2024-12-24 DIAGNOSIS — J18.9 PNEUMONIA OF BOTH LOWER LOBES DUE TO INFECTIOUS ORGANISM: ICD-10-CM

## 2024-12-24 DIAGNOSIS — J44.1 COPD EXACERBATION (MULTI): Primary | ICD-10-CM

## 2024-12-24 DIAGNOSIS — R79.89 ELEVATED BRAIN NATRIURETIC PEPTIDE (BNP) LEVEL: ICD-10-CM

## 2024-12-24 LAB
ALBUMIN SERPL BCP-MCNC: 4.3 G/DL (ref 3.4–5)
ALP SERPL-CCNC: 93 U/L (ref 33–136)
ALT SERPL W P-5'-P-CCNC: 12 U/L (ref 7–45)
ANION GAP SERPL CALC-SCNC: 11 MMOL/L (ref 10–20)
AST SERPL W P-5'-P-CCNC: 17 U/L (ref 9–39)
BASE EXCESS BLDV CALC-SCNC: -3.3 MMOL/L (ref -2–3)
BASE EXCESS BLDV CALC-SCNC: -3.4 MMOL/L (ref -2–3)
BASOPHILS # BLD AUTO: 0.08 X10*3/UL (ref 0–0.1)
BASOPHILS NFR BLD AUTO: 1 %
BILIRUB SERPL-MCNC: 0.5 MG/DL (ref 0–1.2)
BNP SERPL-MCNC: 692 PG/ML (ref 0–99)
BODY TEMPERATURE: 37 DEGREES CELSIUS
BODY TEMPERATURE: 37 DEGREES CELSIUS
BUN SERPL-MCNC: 24 MG/DL (ref 6–23)
CALCIUM SERPL-MCNC: 10.3 MG/DL (ref 8.6–10.3)
CARDIAC TROPONIN I PNL SERPL HS: 16 NG/L (ref 0–13)
CARDIAC TROPONIN I PNL SERPL HS: 19 NG/L (ref 0–13)
CHLORIDE SERPL-SCNC: 105 MMOL/L (ref 98–107)
CO2 SERPL-SCNC: 26 MMOL/L (ref 21–32)
CREAT SERPL-MCNC: 0.92 MG/DL (ref 0.5–1.05)
EGFRCR SERPLBLD CKD-EPI 2021: 65 ML/MIN/1.73M*2
EOSINOPHIL # BLD AUTO: 0.25 X10*3/UL (ref 0–0.4)
EOSINOPHIL NFR BLD AUTO: 3.2 %
ERYTHROCYTE [DISTWIDTH] IN BLOOD BY AUTOMATED COUNT: 13.8 % (ref 11.5–14.5)
FLUAV RNA RESP QL NAA+PROBE: NOT DETECTED
FLUBV RNA RESP QL NAA+PROBE: NOT DETECTED
GLUCOSE SERPL-MCNC: 208 MG/DL (ref 74–99)
HCO3 BLDV-SCNC: 23.6 MMOL/L (ref 22–26)
HCO3 BLDV-SCNC: 26.4 MMOL/L (ref 22–26)
HCT VFR BLD AUTO: 49.4 % (ref 36–46)
HGB BLD-MCNC: 16.1 G/DL (ref 12–16)
IMM GRANULOCYTES # BLD AUTO: 0.01 X10*3/UL (ref 0–0.5)
IMM GRANULOCYTES NFR BLD AUTO: 0.1 % (ref 0–0.9)
INHALED O2 CONCENTRATION: 36 %
INHALED O2 CONCENTRATION: 40 %
LACTATE SERPL-SCNC: 1.5 MMOL/L (ref 0.4–2)
LYMPHOCYTES # BLD AUTO: 4.31 X10*3/UL (ref 0.8–3)
LYMPHOCYTES NFR BLD AUTO: 54.7 %
MAGNESIUM SERPL-MCNC: 2.12 MG/DL (ref 1.6–2.4)
MCH RBC QN AUTO: 29 PG (ref 26–34)
MCHC RBC AUTO-ENTMCNC: 32.6 G/DL (ref 32–36)
MCV RBC AUTO: 89 FL (ref 80–100)
MONOCYTES # BLD AUTO: 0.48 X10*3/UL (ref 0.05–0.8)
MONOCYTES NFR BLD AUTO: 6.1 %
NEUTROPHILS # BLD AUTO: 2.75 X10*3/UL (ref 1.6–5.5)
NEUTROPHILS NFR BLD AUTO: 34.9 %
NRBC BLD-RTO: 0 /100 WBCS (ref 0–0)
OXYHGB MFR BLDV: 52.8 % (ref 45–75)
OXYHGB MFR BLDV: 78.2 % (ref 45–75)
PCO2 BLDV: 49 MM HG (ref 41–51)
PCO2 BLDV: 69 MM HG (ref 41–51)
PH BLDV: 7.19 PH (ref 7.33–7.43)
PH BLDV: 7.29 PH (ref 7.33–7.43)
PLATELET # BLD AUTO: 234 X10*3/UL (ref 150–450)
PO2 BLDV: 36 MM HG (ref 35–45)
PO2 BLDV: 53 MM HG (ref 35–45)
POTASSIUM SERPL-SCNC: 3.4 MMOL/L (ref 3.5–5.3)
PROT SERPL-MCNC: 7.1 G/DL (ref 6.4–8.2)
RBC # BLD AUTO: 5.55 X10*6/UL (ref 4–5.2)
SAO2 % BLDV: 55 % (ref 45–75)
SAO2 % BLDV: 81 % (ref 45–75)
SARS-COV-2 RNA RESP QL NAA+PROBE: NOT DETECTED
SODIUM SERPL-SCNC: 139 MMOL/L (ref 136–145)
WBC # BLD AUTO: 7.9 X10*3/UL (ref 4.4–11.3)

## 2024-12-24 PROCEDURE — 83735 ASSAY OF MAGNESIUM: CPT | Performed by: STUDENT IN AN ORGANIZED HEALTH CARE EDUCATION/TRAINING PROGRAM

## 2024-12-24 PROCEDURE — 87636 SARSCOV2 & INF A&B AMP PRB: CPT | Performed by: STUDENT IN AN ORGANIZED HEALTH CARE EDUCATION/TRAINING PROGRAM

## 2024-12-24 PROCEDURE — 71045 X-RAY EXAM CHEST 1 VIEW: CPT | Performed by: RADIOLOGY

## 2024-12-24 PROCEDURE — 93005 ELECTROCARDIOGRAM TRACING: CPT

## 2024-12-24 PROCEDURE — 96365 THER/PROPH/DIAG IV INF INIT: CPT

## 2024-12-24 PROCEDURE — 36415 COLL VENOUS BLD VENIPUNCTURE: CPT | Performed by: STUDENT IN AN ORGANIZED HEALTH CARE EDUCATION/TRAINING PROGRAM

## 2024-12-24 PROCEDURE — 71045 X-RAY EXAM CHEST 1 VIEW: CPT

## 2024-12-24 PROCEDURE — 84484 ASSAY OF TROPONIN QUANT: CPT | Performed by: STUDENT IN AN ORGANIZED HEALTH CARE EDUCATION/TRAINING PROGRAM

## 2024-12-24 PROCEDURE — 83605 ASSAY OF LACTIC ACID: CPT | Performed by: STUDENT IN AN ORGANIZED HEALTH CARE EDUCATION/TRAINING PROGRAM

## 2024-12-24 PROCEDURE — 2500000004 HC RX 250 GENERAL PHARMACY W/ HCPCS (ALT 636 FOR OP/ED): Performed by: STUDENT IN AN ORGANIZED HEALTH CARE EDUCATION/TRAINING PROGRAM

## 2024-12-24 PROCEDURE — 99285 EMERGENCY DEPT VISIT HI MDM: CPT | Performed by: STUDENT IN AN ORGANIZED HEALTH CARE EDUCATION/TRAINING PROGRAM

## 2024-12-24 PROCEDURE — 2500000002 HC RX 250 W HCPCS SELF ADMINISTERED DRUGS (ALT 637 FOR MEDICARE OP, ALT 636 FOR OP/ED): Performed by: STUDENT IN AN ORGANIZED HEALTH CARE EDUCATION/TRAINING PROGRAM

## 2024-12-24 PROCEDURE — 85025 COMPLETE CBC W/AUTO DIFF WBC: CPT | Performed by: STUDENT IN AN ORGANIZED HEALTH CARE EDUCATION/TRAINING PROGRAM

## 2024-12-24 PROCEDURE — 96375 TX/PRO/DX INJ NEW DRUG ADDON: CPT

## 2024-12-24 PROCEDURE — 94660 CPAP INITIATION&MGMT: CPT

## 2024-12-24 PROCEDURE — 2500000005 HC RX 250 GENERAL PHARMACY W/O HCPCS: Performed by: STUDENT IN AN ORGANIZED HEALTH CARE EDUCATION/TRAINING PROGRAM

## 2024-12-24 PROCEDURE — 83880 ASSAY OF NATRIURETIC PEPTIDE: CPT | Performed by: STUDENT IN AN ORGANIZED HEALTH CARE EDUCATION/TRAINING PROGRAM

## 2024-12-24 PROCEDURE — 87040 BLOOD CULTURE FOR BACTERIA: CPT | Mod: PORLAB | Performed by: STUDENT IN AN ORGANIZED HEALTH CARE EDUCATION/TRAINING PROGRAM

## 2024-12-24 PROCEDURE — 80053 COMPREHEN METABOLIC PANEL: CPT | Performed by: STUDENT IN AN ORGANIZED HEALTH CARE EDUCATION/TRAINING PROGRAM

## 2024-12-24 PROCEDURE — 82805 BLOOD GASES W/O2 SATURATION: CPT | Performed by: STUDENT IN AN ORGANIZED HEALTH CARE EDUCATION/TRAINING PROGRAM

## 2024-12-24 PROCEDURE — 82805 BLOOD GASES W/O2 SATURATION: CPT | Performed by: EMERGENCY MEDICINE

## 2024-12-24 PROCEDURE — 94640 AIRWAY INHALATION TREATMENT: CPT | Mod: 59

## 2024-12-24 RX ORDER — AMOXICILLIN AND CLAVULANATE POTASSIUM 875; 125 MG/1; MG/1
1 TABLET, FILM COATED ORAL EVERY 12 HOURS
Qty: 14 TABLET | Refills: 0 | Status: SHIPPED | OUTPATIENT
Start: 2024-12-24 | End: 2024-12-31

## 2024-12-24 RX ORDER — PREDNISONE 20 MG/1
60 TABLET ORAL DAILY
Qty: 12 TABLET | Refills: 0 | Status: SHIPPED | OUTPATIENT
Start: 2024-12-24 | End: 2024-12-28

## 2024-12-24 RX ORDER — CEFTRIAXONE 2 G/50ML
2 INJECTION, SOLUTION INTRAVENOUS ONCE
Status: COMPLETED | OUTPATIENT
Start: 2024-12-24 | End: 2024-12-24

## 2024-12-24 RX ORDER — AZITHROMYCIN 250 MG/1
250 TABLET, FILM COATED ORAL DAILY
Qty: 4 TABLET | Refills: 0 | Status: SHIPPED | OUTPATIENT
Start: 2024-12-24 | End: 2024-12-28

## 2024-12-24 RX ORDER — IPRATROPIUM BROMIDE AND ALBUTEROL SULFATE 2.5; .5 MG/3ML; MG/3ML
3 SOLUTION RESPIRATORY (INHALATION) ONCE
Status: COMPLETED | OUTPATIENT
Start: 2024-12-24 | End: 2024-12-24

## 2024-12-24 RX ORDER — METOPROLOL TARTRATE 1 MG/ML
5 INJECTION, SOLUTION INTRAVENOUS ONCE
Status: COMPLETED | OUTPATIENT
Start: 2024-12-24 | End: 2024-12-24

## 2024-12-24 RX ADMIN — CEFTRIAXONE SODIUM 2 G: 2 INJECTION, SOLUTION INTRAVENOUS at 20:25

## 2024-12-24 RX ADMIN — AZITHROMYCIN MONOHYDRATE 500 MG: 500 INJECTION, POWDER, LYOPHILIZED, FOR SOLUTION INTRAVENOUS at 20:20

## 2024-12-24 RX ADMIN — METOPROLOL TARTRATE 5 MG: 5 INJECTION INTRAVENOUS at 20:17

## 2024-12-24 RX ADMIN — Medication 40 PERCENT: at 20:50

## 2024-12-24 RX ADMIN — IPRATROPIUM BROMIDE AND ALBUTEROL SULFATE 3 ML: 2.5; .5 SOLUTION RESPIRATORY (INHALATION) at 20:14

## 2024-12-24 ASSESSMENT — COLUMBIA-SUICIDE SEVERITY RATING SCALE - C-SSRS
6. HAVE YOU EVER DONE ANYTHING, STARTED TO DO ANYTHING, OR PREPARED TO DO ANYTHING TO END YOUR LIFE?: NO
2. HAVE YOU ACTUALLY HAD ANY THOUGHTS OF KILLING YOURSELF?: NO
1. IN THE PAST MONTH, HAVE YOU WISHED YOU WERE DEAD OR WISHED YOU COULD GO TO SLEEP AND NOT WAKE UP?: NO

## 2024-12-24 ASSESSMENT — PAIN DESCRIPTION - LOCATION: LOCATION: CHEST

## 2024-12-24 ASSESSMENT — PAIN - FUNCTIONAL ASSESSMENT: PAIN_FUNCTIONAL_ASSESSMENT: 0-10

## 2024-12-24 ASSESSMENT — PAIN SCALES - GENERAL: PAINLEVEL_OUTOF10: 8

## 2024-12-24 ASSESSMENT — PAIN DESCRIPTION - PAIN TYPE: TYPE: ACUTE PAIN

## 2024-12-25 VITALS
DIASTOLIC BLOOD PRESSURE: 63 MMHG | SYSTOLIC BLOOD PRESSURE: 143 MMHG | RESPIRATION RATE: 20 BRPM | HEIGHT: 66 IN | BODY MASS INDEX: 20.89 KG/M2 | WEIGHT: 130 LBS | OXYGEN SATURATION: 99 % | HEART RATE: 100 BPM

## 2024-12-25 NOTE — PROGRESS NOTES
Patient care signed out to me by Dr. Melgoza.  Plan to follow-up chest x-ray and need for likely admission.    MDM/ED Course  ED Course as of 12/24/24 2352   Tue Dec 24, 2024   2030 POCT pH, Venous(!!): 7.19 [JG]   2030 POCT pCO2, Venous(!): 69  On BiPap [JG]   2047 EKG, interpreted me: Sinus rhythm, rate 89 bpm.  Normal axis.  Low voltage QRS.  No acute ST elevation or depression.  QTc 497.  No evidence of STEMI this time. [JG]   2058 Patient tolerating BiPap well, resting comfortably. [JG]   2333 BLOOD GAS VENOUS(!)  Repeat venous blood gas shows improved pH to 7.29, normal CO2 [JH]   2337 IMPRESSION:  Pulmonary vascular congestion and edema and asymmetric opacities in the right greater than left lower lungs compatible with superimposed pneumonia. Short-term progress imaging recommended to assure resolution and exclude other underlying pathology.   [JH]   2348 Recommended admission to the patient.  Because of the Christmas holiday patient does not want to be admitted to the hospital.  I explained the risks and benefits.  She is adamant about not going home.  Given this will discharge home with prescriptions for Augmentin and azithromycin for her pneumonia as well as prednisone.  Patient to wear her home O2 as well.  Patient given strict instructions to return.    Discussed findings and diagnosis with the patient, follow-up and return to ED precautions given, patient voiced understanding, agrees with plan, questions answered, patient was discharged in stable condition. [JH]      ED Course User Index  [JG] Randa Melgoza MD  [] João Macias MD         Diagnoses as of 12/24/24 2352   COPD exacerbation (Multi)   Acute respiratory failure with hypercapnia (Multi)   Primary hypertension   Pneumonia of both lower lobes due to infectious organism   Elevated brain natriuretic peptide (BNP) level

## 2024-12-25 NOTE — ED TRIAGE NOTES
Pt to ED via EMS c/o difficulty breathing. Pt talking in one word sentences. Pt states started approx 3-4 days. Productive cough with yellow sputum. Pt has harsh cough with audible crackles.    21-May-2017

## 2024-12-25 NOTE — ED PROVIDER NOTES
HPI   Chief Complaint   Patient presents with    Shortness of Breath       HPI  74-year-old female with history of uterine cancer s/p JASS, hypertension, aortoiliac stenosis s/p stent placement in bilateral lower extremities COPD not on home O2 presents with worsening shortness of breath and chest tightness over the past few days.  Reports she has had a cough productive of yellow mucus worsening over the past day.  Patient was given 2 DuoNeb treatments and Solu-Medrol by EMS prior to arrival.  Placed on 2 L O2 prior to arrival.  She denies fevers, chills, abdominal pain, nausea, vomiting, diarrhea, dysuria, flank pain, neck or back pain, history of DVT or PE.      Patient History   Past Medical History:   Diagnosis Date    Fatigue 2023    Personal history of malignant neoplasm of other parts of uterus     History of cancer of uterus    Personal history of other diseases of the circulatory system     History of hypertension    Personal history of other endocrine, nutritional and metabolic disease     History of hyperlipidemia    Personal history of other specified conditions     History of vertigo     Past Surgical History:   Procedure Laterality Date    ADENOIDECTOMY  2017    Adenoidectomy    BREAST BIOPSY  2017    Biopsy Breast Open     SECTION, CLASSIC  2017     Section    CHOLECYSTECTOMY  10/12/2017    Cholecystectomy    CT ANGIO AORTA AND BILATERAL ILIOFEMORAL RUN OFF INCLUDING WITHOUT CONTRAST IF PERFORMED  3/9/2022    CT AORTA AND BILATERAL ILIOFEMORAL RUNOFF ANGIOGRAM W AND/OR WO IV CONTRAST 3/9/2022 POR ANCILLARY LEGACY    MASTECTOMY  2017    Breast Surgery Mastectomy    SEPTOPLASTY  2017    Septoplasty    TONSILLECTOMY  2017    Tonsillectomy    TOTAL ABDOMINAL HYSTERECTOMY  2017    Total Abdominal Hysterectomy     Family History   Problem Relation Name Age of Onset    Breast cancer Mother      Hypertension Mother      Hyperlipidemia Mother       Esophageal cancer Mother      Other (Peripheral artery disease) Mother      Coronary artery disease Father       Social History     Tobacco Use    Smoking status: Every Day     Current packs/day: 1.00     Types: Cigarettes    Smokeless tobacco: Never   Vaping Use    Vaping status: Never Used   Substance Use Topics    Alcohol use: Not Currently    Drug use: Never       Physical Exam   ED Triage Vitals [12/24/24 1926]   Temp Heart Rate Respirations BP   -- (!) 133 (!) 22 (!) 207/118      Pulse Ox Temp src Heart Rate Source Patient Position   96 % -- Monitor --      BP Location FiO2 (%)     -- --       Physical Exam  Eyes:      Pupils: Pupils are equal, round, and reactive to light.   Cardiovascular:      Rate and Rhythm: Regular rhythm. Tachycardia present.   Pulmonary:      Effort: Tachypnea present.      Breath sounds: Examination of the right-middle field reveals wheezing and rales. Examination of the left-middle field reveals wheezing and rales. Examination of the right-lower field reveals wheezing and rales. Examination of the left-lower field reveals wheezing and rales. Decreased breath sounds, wheezing and rales present. No rhonchi.   Abdominal:      Palpations: Abdomen is soft.      Tenderness: There is no abdominal tenderness. There is no guarding or rebound.   Musculoskeletal:         General: Normal range of motion.      Cervical back: Normal range of motion.      Right lower leg: No edema.      Left lower leg: No edema.   Skin:     General: Skin is warm and dry.   Neurological:      General: No focal deficit present.      Mental Status: She is alert.           ED Course & MDM   ED Course as of 12/24/24 2114 Tue Dec 24, 2024   2030 POCT pH, Venous(!!): 7.19 [JG]   2030 POCT pCO2, Venous(!): 69  On BiPap [JG]   2047 EKG, interpreted me: Sinus rhythm, rate 89 bpm.  Normal axis.  Low voltage QRS.  No acute ST elevation or depression.  QTc 497.  No evidence of STEMI this time. [JG]   2058 Patient tolerating  BiPap well, resting comfortably. [JG]      ED Course User Index  [JG] Randa Melgoza MD         Diagnoses as of 12/24/24 2114   COPD exacerbation (Multi)   Acute respiratory failure with hypercapnia (Multi)   Primary hypertension                 No data recorded     [place] Coma Scale Score: 15 (12/24/24 1945 : Benigno Barr, RN)                     Medical Decision Making  74-year-old female with history of uterine cancer s/p JASS, hypertension, aortoiliac stenosis s/p stent placement in bilateral lower extremities COPD not on home O2 presents with worsening shortness of breath and chest tightness over the past few days.  Reports she has had a cough productive of yellow mucus worsening over the past day.  Patient was given 2 DuoNeb treatments and Solu-Medrol by EMS prior to arrival.  Placed on 4 L O2 prior to arrival.  Patient initially hypertensive with systolic of 207, tachycardic to 133 with respirations 22.  Vital signs of subsequently improved.  Initial pH 7.19 with pCO2 69.  Patient placed on BiPAP, which she tolerated well.  Elevation in BNP at 692.  No peripheral edema.  On auscultation, decreased breath sounds bilaterally with significant inspiratory and end expiratory wheezes with mild rales at bilateral lung bases.  Patient meeting septic history of for COPD exacerbation, started on ceftriaxone and azithromycin.  Given DuoNeb.  Significant improvement in respiratory status after BiPAP and nebulizer treatments.  Imaging pending.  Repeat troponin pending.  Patient signed out to Dr. Macias pending imaging and remaining lab workup as well as disposition.    Procedure  Procedures     Randa Melgoza MD  12/24/24 2120

## 2024-12-27 ENCOUNTER — TELEPHONE (OUTPATIENT)
Dept: CARDIOLOGY | Facility: HOSPITAL | Age: 74
End: 2024-12-27
Payer: MEDICARE

## 2024-12-27 NOTE — TELEPHONE ENCOUNTER
12/27 - Pt daughter Randa called office with c/o pt not feeling well, SOB, fatigue, loss of appetite and would like her to be seen sooner by Cedric Lazaro. Pt presented to ER via EMS on 12/24 with c/o difficulty breathing and was discharged later that evening with instructions to use her breathing machine at home - scheduled for echo/stress test 1/8. Per daughter Randa, pt has not felt better since then and they are concerned that 1/8 is too long to wait. Advised that pt go directly to ER if SOB should worsen or if she should develop any other concerning symptoms - will route message to care team per daughter request and add to cancel list for sooner appt- pt daughter verbalized understanding and agreeable to plan.

## 2024-12-29 LAB
BACTERIA BLD CULT: NORMAL
BACTERIA BLD CULT: NORMAL

## 2024-12-30 ENCOUNTER — TELEPHONE (OUTPATIENT)
Dept: CARDIOLOGY | Facility: HOSPITAL | Age: 74
End: 2024-12-30
Payer: MEDICARE

## 2024-12-30 LAB
ATRIAL RATE: 99 BPM
P AXIS: 48 DEGREES
PR INTERVAL: 211 MS
Q ONSET: 253 MS
QRS COUNT: 16 BEATS
QRS DURATION: 90 MS
QT INTERVAL: 387 MS
QTC CALCULATION(BAZETT): 497 MS
QTC FREDERICIA: 457 MS
R AXIS: -2 DEGREES
T AXIS: 127 DEGREES
T OFFSET: 446 MS
VENTRICULAR RATE: 99 BPM

## 2024-12-30 NOTE — TELEPHONE ENCOUNTER
12/30/24  1017  Nurse reviewed chart.    Returned call to daughter of patient who called in to report patient still with SOB; especially at night and fatigue.    Reviewed with daughter patient's underlying health conditions that may be contributing to her SOB such as COPD, pneumonia, and increased BNP which signifies extra fluid the heart has to pump.    Recommended to daughter that patient watch her salt intake and try sleeping upright in recliner; also instructed daughter that if conditions worsens and she is concerned patient is deteriorating to bring patient to ED.  Nurse also reported he would look into moving up follow up with ALBINA Lazaro.    Daughter verbalized understanding.    Appt with ALBINA Lazaro moved up one week.

## 2024-12-31 ENCOUNTER — APPOINTMENT (OUTPATIENT)
Dept: PRIMARY CARE | Facility: CLINIC | Age: 74
End: 2024-12-31
Payer: MEDICARE

## 2025-01-02 ENCOUNTER — APPOINTMENT (OUTPATIENT)
Dept: CARDIOLOGY | Facility: HOSPITAL | Age: 75
End: 2025-01-02
Payer: MEDICARE

## 2025-01-02 ENCOUNTER — OFFICE VISIT (OUTPATIENT)
Dept: URGENT CARE | Age: 75
End: 2025-01-02
Payer: MEDICARE

## 2025-01-02 VITALS
HEART RATE: 104 BPM | DIASTOLIC BLOOD PRESSURE: 74 MMHG | SYSTOLIC BLOOD PRESSURE: 132 MMHG | TEMPERATURE: 98.6 F | OXYGEN SATURATION: 93 %

## 2025-01-02 DIAGNOSIS — B37.0 THRUSH: ICD-10-CM

## 2025-01-02 DIAGNOSIS — R52 BURNING PAIN: ICD-10-CM

## 2025-01-02 DIAGNOSIS — N30.00 ACUTE CYSTITIS WITHOUT HEMATURIA: Primary | ICD-10-CM

## 2025-01-02 LAB
POC BILIRUBIN, URINE: NEGATIVE
POC BLOOD, URINE: NEGATIVE
POC GLUCOSE, URINE: NEGATIVE MG/DL
POC KETONES, URINE: NEGATIVE MG/DL
POC LEUKOCYTES, URINE: ABNORMAL
POC NITRITE,URINE: NEGATIVE
POC PH, URINE: 6 PH
POC PROTEIN, URINE: ABNORMAL MG/DL
POC SPECIFIC GRAVITY, URINE: >=1.03
POC UROBILINOGEN, URINE: 0.2 EU/DL

## 2025-01-02 PROCEDURE — 87086 URINE CULTURE/COLONY COUNT: CPT

## 2025-01-02 RX ORDER — NITROFURANTOIN 25; 75 MG/1; MG/1
100 CAPSULE ORAL 2 TIMES DAILY
Qty: 14 CAPSULE | Refills: 0 | Status: SHIPPED | OUTPATIENT
Start: 2025-01-02 | End: 2025-01-09

## 2025-01-02 RX ORDER — NYSTATIN 100000 [USP'U]/ML
10 SUSPENSION ORAL 2 TIMES DAILY
Qty: 473 ML | Refills: 0 | Status: SHIPPED | OUTPATIENT
Start: 2025-01-02 | End: 2025-01-12

## 2025-01-02 ASSESSMENT — ENCOUNTER SYMPTOMS
NAUSEA: 0
DIARRHEA: 0
ABDOMINAL DISTENTION: 0
BACK PAIN: 0
VOMITING: 0
FEVER: 0
HEMATURIA: 0
CHILLS: 0
FACIAL SWELLING: 0
FLANK PAIN: 0
SHORTNESS OF BREATH: 0
DYSURIA: 1
SORE THROAT: 0

## 2025-01-02 NOTE — PROGRESS NOTES
Subjective   Patient ID: Yadira Davis is a 74 y.o. female. They present today with a chief complaint of Urinary Problem (Burning. ).    History of Present Illness  Patient presents with over 2 weeks of urinary urgency, abdominal cramping and burning. Denies hematuria. Claims right flank pain. Denies n/v/d. Denies fever, chills, sweats  Patient also explains that she feels some tongue discomfort. Explains that she was recently on 2 antibiotic and started on new inhaler for recent pneumonia and new diagnosis of copd. Patient is on oxygen at home.           Past Medical History  Allergies as of 2025 - Reviewed 2024   Allergen Reaction Noted    Codeine Nausea Only, Other, and Nausea/vomiting 2015       (Not in a hospital admission)       Past Medical History:   Diagnosis Date    Fatigue 2023    Personal history of malignant neoplasm of other parts of uterus     History of cancer of uterus    Personal history of other diseases of the circulatory system     History of hypertension    Personal history of other endocrine, nutritional and metabolic disease     History of hyperlipidemia    Personal history of other specified conditions     History of vertigo       Past Surgical History:   Procedure Laterality Date    ADENOIDECTOMY  2017    Adenoidectomy    BREAST BIOPSY  2017    Biopsy Breast Open     SECTION, CLASSIC  2017     Section    CHOLECYSTECTOMY  10/12/2017    Cholecystectomy    CT ANGIO AORTA AND BILATERAL ILIOFEMORAL RUN OFF INCLUDING WITHOUT CONTRAST IF PERFORMED  3/9/2022    CT AORTA AND BILATERAL ILIOFEMORAL RUNOFF ANGIOGRAM W AND/OR WO IV CONTRAST 3/9/2022 POR ANCILLARY LEGACY    MASTECTOMY  2017    Breast Surgery Mastectomy    SEPTOPLASTY  2017    Septoplasty    TONSILLECTOMY  2017    Tonsillectomy    TOTAL ABDOMINAL HYSTERECTOMY  2017    Total Abdominal Hysterectomy        reports that she has been smoking cigarettes. She has  never used smokeless tobacco. She reports that she does not currently use alcohol. She reports that she does not use drugs.    Review of Systems  Review of Systems   Constitutional:  Negative for chills and fever.   HENT:  Negative for congestion, facial swelling and sore throat.    Respiratory:  Negative for shortness of breath.    Cardiovascular:  Negative for chest pain.   Gastrointestinal:  Negative for abdominal distention, diarrhea, nausea and vomiting.   Genitourinary:  Positive for dysuria and urgency. Negative for flank pain, hematuria, vaginal bleeding, vaginal discharge and vaginal pain.   Musculoskeletal:  Negative for back pain.                                  Objective    Vitals:    01/02/25 1759   BP: 132/74   Pulse: 104   Temp: 37 °C (98.6 °F)   SpO2: 93%     No LMP recorded. Patient is postmenopausal.    Physical Exam  Constitutional:       General: She is not in acute distress.     Appearance: She is not toxic-appearing.   HENT:      Mouth/Throat:      Tongue: No lesions.      Comments: Erythematous tongue with some white patching noted.   Abdominal:      General: Abdomen is flat. There is no distension.      Palpations: Abdomen is soft.      Tenderness: There is abdominal tenderness in the suprapubic area. There is no right CVA tenderness, left CVA tenderness, guarding or rebound.   Neurological:      Mental Status: She is alert.         Procedures    Point of Care Test & Imaging Results from this visit  Results for orders placed or performed in visit on 01/02/25   POCT UA Automated manually resulted   Result Value Ref Range    POC Glucose, Urine NEGATIVE NEGATIVE mg/dl    POC Bilirubin, Urine NEGATIVE NEGATIVE    POC Ketones, Urine NEGATIVE NEGATIVE mg/dl    POC Specific Gravity, Urine >=1.030 1.005 - 1.035    POC Blood, Urine NEGATIVE NEGATIVE    POC PH, Urine 6.0 No Reference Range Established PH    POC Protein, Urine TRACE (A) NEGATIVE mg/dl    POC Urobilinogen, Urine 0.2 0.2, 1.0 EU/DL     Poc Nitrite, Urine NEGATIVE NEGATIVE    POC Leukocytes, Urine TRACE (A) NEGATIVE      No results found.    Diagnostic study results (if any) were reviewed by Giuliana Griffith PA-C.    Assessment/Plan   Allergies, medications, history, and pertinent labs/EKGs/Imaging reviewed by Giuliana Griffith PA-C.     Medical Decision Making  MDM- History, examination, and urine dipstick result are consistent with uncomplicated UTI without evidence of pyelonephritis or sepsis. Patient will be given antibiotic therapy and cultures pending. Signs and symptoms are also consistent with thrush following antibiotic and inhaler use.  Plan is for topical antifungal medication, nystatin swish and spit. Supportive measures otherwise. Return to clinic or present to ED if symptoms change or worsen. Otherwise follow with PCP. Patient verbalized understanding and agrees with plan.       Orders and Diagnoses  Diagnoses and all orders for this visit:  Acute cystitis without hematuria  -     nitrofurantoin, macrocrystal-monohydrate, (Macrobid) 100 mg capsule; Take 1 capsule (100 mg) by mouth 2 times a day for 7 days.  -     Urine Culture  Burning pain  -     POCT UA Automated manually resulted  Thrush  -     nystatin (Mycostatin) 100,000 unit/mL suspension; Take 10 mL (1,000,000 Units) by mouth 2 times a day for 10 days.      Medical Admin Record      Patient disposition: Home    Electronically signed by Giuliana Griffith PA-C  6:40 PM

## 2025-01-02 NOTE — PATIENT INSTRUCTIONS
If you develop nausea, vomiting, back pain and fever go to ER    Drink plenty of fluids    Results will be in mychart from culture    Follow up with pcp.

## 2025-01-04 LAB — BACTERIA UR CULT: NO GROWTH

## 2025-01-07 ENCOUNTER — APPOINTMENT (OUTPATIENT)
Dept: RADIOLOGY | Facility: HOSPITAL | Age: 75
End: 2025-01-07
Payer: MEDICARE

## 2025-01-07 ENCOUNTER — HOSPITAL ENCOUNTER (EMERGENCY)
Facility: HOSPITAL | Age: 75
Discharge: HOME | End: 2025-01-07
Attending: STUDENT IN AN ORGANIZED HEALTH CARE EDUCATION/TRAINING PROGRAM
Payer: MEDICARE

## 2025-01-07 VITALS
SYSTOLIC BLOOD PRESSURE: 98 MMHG | HEART RATE: 76 BPM | WEIGHT: 125 LBS | BODY MASS INDEX: 22.15 KG/M2 | OXYGEN SATURATION: 97 % | TEMPERATURE: 97.8 F | HEIGHT: 63 IN | DIASTOLIC BLOOD PRESSURE: 55 MMHG | RESPIRATION RATE: 16 BRPM

## 2025-01-07 DIAGNOSIS — N83.8 OVARIAN MASS, RIGHT: ICD-10-CM

## 2025-01-07 DIAGNOSIS — K57.92 DIVERTICULITIS: Primary | ICD-10-CM

## 2025-01-07 LAB
ALBUMIN SERPL BCP-MCNC: 3.8 G/DL (ref 3.4–5)
ALP SERPL-CCNC: 97 U/L (ref 33–136)
ALT SERPL W P-5'-P-CCNC: 39 U/L (ref 7–45)
ANION GAP SERPL CALC-SCNC: 10 MMOL/L (ref 10–20)
APPEARANCE UR: ABNORMAL
AST SERPL W P-5'-P-CCNC: 26 U/L (ref 9–39)
BASOPHILS # BLD AUTO: 0.05 X10*3/UL (ref 0–0.1)
BASOPHILS NFR BLD AUTO: 0.5 %
BILIRUB SERPL-MCNC: 0.4 MG/DL (ref 0–1.2)
BILIRUB UR STRIP.AUTO-MCNC: NEGATIVE MG/DL
BUN SERPL-MCNC: 20 MG/DL (ref 6–23)
CALCIUM SERPL-MCNC: 10.3 MG/DL (ref 8.6–10.3)
CANCER AG125 SERPL-ACNC: 26.5 U/ML (ref 0–30.2)
CHLORIDE SERPL-SCNC: 107 MMOL/L (ref 98–107)
CO2 SERPL-SCNC: 24 MMOL/L (ref 21–32)
COLOR UR: YELLOW
CREAT SERPL-MCNC: 0.72 MG/DL (ref 0.5–1.05)
EGFRCR SERPLBLD CKD-EPI 2021: 87 ML/MIN/1.73M*2
EOSINOPHIL # BLD AUTO: 0.32 X10*3/UL (ref 0–0.4)
EOSINOPHIL NFR BLD AUTO: 3.3 %
ERYTHROCYTE [DISTWIDTH] IN BLOOD BY AUTOMATED COUNT: 13.6 % (ref 11.5–14.5)
GLUCOSE SERPL-MCNC: 95 MG/DL (ref 74–99)
GLUCOSE UR STRIP.AUTO-MCNC: NORMAL MG/DL
HCT VFR BLD AUTO: 46 % (ref 36–46)
HGB BLD-MCNC: 15.4 G/DL (ref 12–16)
HOLD SPECIMEN: NORMAL
IMM GRANULOCYTES # BLD AUTO: 0.03 X10*3/UL (ref 0–0.5)
IMM GRANULOCYTES NFR BLD AUTO: 0.3 % (ref 0–0.9)
KETONES UR STRIP.AUTO-MCNC: NEGATIVE MG/DL
LACTATE SERPL-SCNC: 0.7 MMOL/L (ref 0.4–2)
LEUKOCYTE ESTERASE UR QL STRIP.AUTO: NEGATIVE
LIPASE SERPL-CCNC: 17 U/L (ref 9–82)
LYMPHOCYTES # BLD AUTO: 2.29 X10*3/UL (ref 0.8–3)
LYMPHOCYTES NFR BLD AUTO: 23.7 %
MCH RBC QN AUTO: 29.1 PG (ref 26–34)
MCHC RBC AUTO-ENTMCNC: 33.5 G/DL (ref 32–36)
MCV RBC AUTO: 87 FL (ref 80–100)
MONOCYTES # BLD AUTO: 0.47 X10*3/UL (ref 0.05–0.8)
MONOCYTES NFR BLD AUTO: 4.9 %
MUCOUS THREADS #/AREA URNS AUTO: ABNORMAL /LPF
NEUTROPHILS # BLD AUTO: 6.51 X10*3/UL (ref 1.6–5.5)
NEUTROPHILS NFR BLD AUTO: 67.3 %
NITRITE UR QL STRIP.AUTO: NEGATIVE
NRBC BLD-RTO: 0 /100 WBCS (ref 0–0)
PH UR STRIP.AUTO: 5.5 [PH]
PLATELET # BLD AUTO: 209 X10*3/UL (ref 150–450)
POTASSIUM SERPL-SCNC: 4 MMOL/L (ref 3.5–5.3)
PROT SERPL-MCNC: 6.4 G/DL (ref 6.4–8.2)
PROT UR STRIP.AUTO-MCNC: ABNORMAL MG/DL
RBC # BLD AUTO: 5.29 X10*6/UL (ref 4–5.2)
RBC # UR STRIP.AUTO: ABNORMAL /UL
RBC #/AREA URNS AUTO: ABNORMAL /HPF
SODIUM SERPL-SCNC: 137 MMOL/L (ref 136–145)
SP GR UR STRIP.AUTO: 1.03
SQUAMOUS #/AREA URNS AUTO: ABNORMAL /HPF
UROBILINOGEN UR STRIP.AUTO-MCNC: ABNORMAL MG/DL
WBC # BLD AUTO: 9.7 X10*3/UL (ref 4.4–11.3)
WBC #/AREA URNS AUTO: ABNORMAL /HPF

## 2025-01-07 PROCEDURE — 2500000004 HC RX 250 GENERAL PHARMACY W/ HCPCS (ALT 636 FOR OP/ED)

## 2025-01-07 PROCEDURE — 36415 COLL VENOUS BLD VENIPUNCTURE: CPT | Performed by: PHYSICIAN ASSISTANT

## 2025-01-07 PROCEDURE — 99285 EMERGENCY DEPT VISIT HI MDM: CPT | Mod: 25 | Performed by: STUDENT IN AN ORGANIZED HEALTH CARE EDUCATION/TRAINING PROGRAM

## 2025-01-07 PROCEDURE — 80053 COMPREHEN METABOLIC PANEL: CPT | Performed by: PHYSICIAN ASSISTANT

## 2025-01-07 PROCEDURE — 83605 ASSAY OF LACTIC ACID: CPT | Performed by: PHYSICIAN ASSISTANT

## 2025-01-07 PROCEDURE — 74177 CT ABD & PELVIS W/CONTRAST: CPT | Mod: FOREIGN READ | Performed by: RADIOLOGY

## 2025-01-07 PROCEDURE — 2500000004 HC RX 250 GENERAL PHARMACY W/ HCPCS (ALT 636 FOR OP/ED): Performed by: PHYSICIAN ASSISTANT

## 2025-01-07 PROCEDURE — 85025 COMPLETE CBC W/AUTO DIFF WBC: CPT | Performed by: PHYSICIAN ASSISTANT

## 2025-01-07 PROCEDURE — 96374 THER/PROPH/DIAG INJ IV PUSH: CPT | Mod: 59

## 2025-01-07 PROCEDURE — 83690 ASSAY OF LIPASE: CPT | Performed by: PHYSICIAN ASSISTANT

## 2025-01-07 PROCEDURE — 2550000001 HC RX 255 CONTRASTS: Performed by: PHYSICIAN ASSISTANT

## 2025-01-07 PROCEDURE — 96375 TX/PRO/DX INJ NEW DRUG ADDON: CPT

## 2025-01-07 PROCEDURE — 86304 IMMUNOASSAY TUMOR CA 125: CPT | Mod: PORLAB | Performed by: PHYSICIAN ASSISTANT

## 2025-01-07 PROCEDURE — 76857 US EXAM PELVIC LIMITED: CPT

## 2025-01-07 PROCEDURE — 74177 CT ABD & PELVIS W/CONTRAST: CPT

## 2025-01-07 PROCEDURE — 76856 US EXAM PELVIC COMPLETE: CPT | Mod: FOREIGN READ | Performed by: RADIOLOGY

## 2025-01-07 PROCEDURE — 81001 URINALYSIS AUTO W/SCOPE: CPT | Performed by: PHYSICIAN ASSISTANT

## 2025-01-07 RX ORDER — METRONIDAZOLE 500 MG/1
500 TABLET ORAL 3 TIMES DAILY
Qty: 21 TABLET | Refills: 0 | Status: SHIPPED | OUTPATIENT
Start: 2025-01-07 | End: 2025-01-14

## 2025-01-07 RX ORDER — ONDANSETRON HYDROCHLORIDE 2 MG/ML
INJECTION, SOLUTION INTRAVENOUS
Status: COMPLETED
Start: 2025-01-07 | End: 2025-01-07

## 2025-01-07 RX ORDER — MORPHINE SULFATE 4 MG/ML
4 INJECTION INTRAVENOUS ONCE
Status: COMPLETED | OUTPATIENT
Start: 2025-01-07 | End: 2025-01-07

## 2025-01-07 RX ORDER — CIPROFLOXACIN 500 MG/1
500 TABLET ORAL 2 TIMES DAILY
Qty: 14 TABLET | Refills: 0 | Status: SHIPPED | OUTPATIENT
Start: 2025-01-07 | End: 2025-01-14

## 2025-01-07 RX ORDER — ONDANSETRON HYDROCHLORIDE 2 MG/ML
4 INJECTION, SOLUTION INTRAVENOUS ONCE
Status: COMPLETED | OUTPATIENT
Start: 2025-01-07 | End: 2025-01-07

## 2025-01-07 RX ORDER — OXYCODONE AND ACETAMINOPHEN 5; 325 MG/1; MG/1
1 TABLET ORAL EVERY 6 HOURS PRN
Qty: 10 TABLET | Refills: 0 | Status: SHIPPED | OUTPATIENT
Start: 2025-01-07 | End: 2025-01-10

## 2025-01-07 RX ADMIN — IOHEXOL 75 ML: 350 INJECTION, SOLUTION INTRAVENOUS at 13:09

## 2025-01-07 RX ADMIN — ONDANSETRON 4 MG: 2 INJECTION INTRAMUSCULAR; INTRAVENOUS at 13:02

## 2025-01-07 RX ADMIN — ONDANSETRON HYDROCHLORIDE 4 MG: 2 INJECTION, SOLUTION INTRAVENOUS at 13:02

## 2025-01-07 RX ADMIN — MORPHINE SULFATE 4 MG: 4 INJECTION INTRAVENOUS at 11:57

## 2025-01-07 ASSESSMENT — PAIN DESCRIPTION - PAIN TYPE: TYPE: ACUTE PAIN

## 2025-01-07 ASSESSMENT — PAIN SCALES - GENERAL
PAINLEVEL_OUTOF10: 7
PAINLEVEL_OUTOF10: 6

## 2025-01-07 ASSESSMENT — COLUMBIA-SUICIDE SEVERITY RATING SCALE - C-SSRS
6. HAVE YOU EVER DONE ANYTHING, STARTED TO DO ANYTHING, OR PREPARED TO DO ANYTHING TO END YOUR LIFE?: NO
1. IN THE PAST MONTH, HAVE YOU WISHED YOU WERE DEAD OR WISHED YOU COULD GO TO SLEEP AND NOT WAKE UP?: NO
2. HAVE YOU ACTUALLY HAD ANY THOUGHTS OF KILLING YOURSELF?: NO

## 2025-01-07 ASSESSMENT — PAIN DESCRIPTION - DESCRIPTORS
DESCRIPTORS: STABBING;SHARP
DESCRIPTORS: CRAMPING

## 2025-01-07 ASSESSMENT — PAIN DESCRIPTION - LOCATION
LOCATION: ABDOMEN
LOCATION: ABDOMEN

## 2025-01-07 ASSESSMENT — LIFESTYLE VARIABLES
HAVE YOU EVER FELT YOU SHOULD CUT DOWN ON YOUR DRINKING: NO
HAVE PEOPLE ANNOYED YOU BY CRITICIZING YOUR DRINKING: NO
EVER FELT BAD OR GUILTY ABOUT YOUR DRINKING: NO
TOTAL SCORE: 0
EVER HAD A DRINK FIRST THING IN THE MORNING TO STEADY YOUR NERVES TO GET RID OF A HANGOVER: NO

## 2025-01-07 ASSESSMENT — PAIN DESCRIPTION - ORIENTATION
ORIENTATION: UPPER
ORIENTATION: LOWER

## 2025-01-07 ASSESSMENT — PAIN - FUNCTIONAL ASSESSMENT
PAIN_FUNCTIONAL_ASSESSMENT: 0-10
PAIN_FUNCTIONAL_ASSESSMENT: 0-10

## 2025-01-07 NOTE — ED PROVIDER NOTES
EMERGENCY MEDICINE EVALUATION NOTE    History of Present Illness     Chief Complaint:   Chief Complaint   Patient presents with    Abdominal Pain       HPI: Yadira Davis is a 74 y.o. female presents with a chief complaint of abdominal pain.  Patient ports recent abdominal pain now for about 3-4 days.  She states that the symptoms have gotten worse where she is going to the urgent care 2 days ago.  She states that while urgent care they told her she might potentially have UTI as there was white blood cells in her urine but they called her yesterday and told her to stop taking antibiotics secondary to her culture coming back negative.  At this time patient states he still having cramping lower abdominal pain but no change in her bowel habits.  Patient denies any nausea or vomiting.  Patient reports she has had multiple abdominal surgeries which included a hysterectomy, appendectomy, as well as cholecystectomy.  Patient denies any current urinary symptoms dysuria or frequency.    Previous History     Past Medical History:   Diagnosis Date    Fatigue 2023    Personal history of malignant neoplasm of other parts of uterus     History of cancer of uterus    Personal history of other diseases of the circulatory system     History of hypertension    Personal history of other endocrine, nutritional and metabolic disease     History of hyperlipidemia    Personal history of other specified conditions     History of vertigo     Past Surgical History:   Procedure Laterality Date    ADENOIDECTOMY  2017    Adenoidectomy    BREAST BIOPSY  2017    Biopsy Breast Open     SECTION, CLASSIC  2017     Section    CHOLECYSTECTOMY  10/12/2017    Cholecystectomy    CT ANGIO AORTA AND BILATERAL ILIOFEMORAL RUN OFF INCLUDING WITHOUT CONTRAST IF PERFORMED  3/9/2022    CT AORTA AND BILATERAL ILIOFEMORAL RUNOFF ANGIOGRAM W AND/OR WO IV CONTRAST 3/9/2022 POR ANCILLARY LEGACY    MASTECTOMY  2017     Breast Surgery Mastectomy    SEPTOPLASTY  09/12/2017    Septoplasty    TONSILLECTOMY  09/12/2017    Tonsillectomy    TOTAL ABDOMINAL HYSTERECTOMY  09/12/2017    Total Abdominal Hysterectomy     Social History     Tobacco Use    Smoking status: Every Day     Current packs/day: 1.00     Types: Cigarettes    Smokeless tobacco: Never   Vaping Use    Vaping status: Never Used   Substance Use Topics    Alcohol use: Not Currently    Drug use: Never     Family History   Problem Relation Name Age of Onset    Breast cancer Mother      Hypertension Mother      Hyperlipidemia Mother      Esophageal cancer Mother      Other (Peripheral artery disease) Mother      Coronary artery disease Father       Allergies   Allergen Reactions    Codeine Nausea Only, Other and Nausea/vomiting     Vomiting     Current Outpatient Medications   Medication Instructions    albuterol (Ventolin HFA) 90 mcg/actuation inhaler 2 puffs, inhalation, Every 4 hours PRN    aspirin 81 mg, oral, Daily    ciprofloxacin (CIPRO) 500 mg, oral, 2 times daily    lisinopriL-hydrochlorothiazide 20-12.5 mg tablet 1 tablet, oral, 2 times daily    metroNIDAZOLE (FLAGYL) 500 mg, oral, 3 times daily    nitrofurantoin, macrocrystal-monohydrate, (Macrobid) 100 mg capsule 100 mg, oral, 2 times daily    nystatin (MYCOSTATIN) 1,000,000 Units, oral, 2 times daily    oxyCODONE-acetaminophen (Percocet) 5-325 mg tablet 1 tablet, oral, Every 6 hours PRN    rosuvastatin (CRESTOR) 40 mg, oral, Daily       Physical Exam     Appearance: Alert, oriented , cooperative     Skin: Intact,  dry skin, no lesions, rash, petechiae or purpura.      Eyes: PERRLA, EOMs intact,  Conjunctiva pink      ENT: Hearing grossly intact. Pharynx clear     Neck: Supple. Trachea at midline.      Pulmonary: Clear bilaterally. No rales, rhonchi or wheezing. No accessory muscle use or stridor.     Cardiac: Normal rate and rhythm without murmur     Abdomen: Soft, active bowel sounds. Diffuse lower abdominal pain       Musculoskeletal: Full range of motion.      Neurological:Cranial nerves II through XII are grossly intact, normal sensation, no weakness, no focal findings identified.     Results     Labs Reviewed   CBC WITH AUTO DIFFERENTIAL - Abnormal       Result Value    WBC 9.7      nRBC 0.0      RBC 5.29 (*)     Hemoglobin 15.4      Hematocrit 46.0      MCV 87      MCH 29.1      MCHC 33.5      RDW 13.6      Platelets 209      Neutrophils % 67.3      Immature Granulocytes %, Automated 0.3      Lymphocytes % 23.7      Monocytes % 4.9      Eosinophils % 3.3      Basophils % 0.5      Neutrophils Absolute 6.51 (*)     Immature Granulocytes Absolute, Automated 0.03      Lymphocytes Absolute 2.29      Monocytes Absolute 0.47      Eosinophils Absolute 0.32      Basophils Absolute 0.05     URINALYSIS WITH REFLEX CULTURE AND MICROSCOPIC - Abnormal    Color, Urine Yellow      Appearance, Urine Turbid (*)     Specific Gravity, Urine 1.029      pH, Urine 5.5      Protein, Urine 30 (1+) (*)     Glucose, Urine Normal      Blood, Urine 0.06 (1+) (*)     Ketones, Urine NEGATIVE      Bilirubin, Urine NEGATIVE      Urobilinogen, Urine 2 (1+) (*)     Nitrite, Urine NEGATIVE      Leukocyte Esterase, Urine NEGATIVE     URINALYSIS MICROSCOPIC WITH REFLEX CULTURE - Abnormal    WBC, Urine 1-5      RBC, Urine 11-20 (*)     Squamous Epithelial Cells, Urine 10-25 (FEW)      Mucus, Urine 2+     COMPREHENSIVE METABOLIC PANEL - Normal    Glucose 95      Sodium 137      Potassium 4.0      Chloride 107      Bicarbonate 24      Anion Gap 10      Urea Nitrogen 20      Creatinine 0.72      eGFR 87      Calcium 10.3      Albumin 3.8      Alkaline Phosphatase 97      Total Protein 6.4      AST 26      Bilirubin, Total 0.4      ALT 39     LIPASE - Normal    Lipase 17      Narrative:     Venipuncture immediately after or during the administration of Metamizole may lead to falsely low results. Testing should be performed immediately prior to Metamizole dosing.    LACTATE - Normal    Lactate 0.7      Narrative:     Venipuncture immediately after or during the administration of Metamizole may lead to falsely low results. Testing should be performed immediately prior to Metamizole dosing.   URINALYSIS WITH REFLEX CULTURE AND MICROSCOPIC    Narrative:     The following orders were created for panel order Urinalysis with Reflex Culture and Microscopic.  Procedure                               Abnormality         Status                     ---------                               -----------         ------                     Urinalysis with Reflex C...[[id number]]  Abnormal            Final result               Extra Urine Gray Tube[[id number]]                            In process                   Please view results for these tests on the individual orders.   EXTRA URINE GRAY TUBE   CANCER ANTIGEN 125     US pelvis limited   Final Result   *Multiseptated cystic right ovarian mass with internal   vascularity measures 6.1 x 6.3 x 6.4 cm. This is concerning for   malignancy. Surgical consultation is recommended.   *Nonvisualization of the left ovary.   *Surgically absent uterus.   Signed by Angus Levine MD      CT abdomen pelvis w IV contrast   Final Result   1. Findings compatible with acute uncomplicated diverticulitis   involving the proximal to mid sigmoid colon. No extraluminal gas or   abscess.   2. Complex cystic versus possible necrotic right adnexal mass   measuring 6.5 cm x 6 cm x 6.7 cm. Findings concerning for a possible   cystic ovarian neoplasm. Recommend further evaluation with pelvic   ultrasound and correlation with  levels.   3. Areas of nodularity noted along the omentum measuring up to   approximately 7 mm to the right of midline. Findings concerning for   possible peritoneal carcinomatosis.   4. Small left pleural effusion with minimal left basilar atelectasis.   5. Mild hepatic steatosis.   Signed by Chauncey Hazel MD            ED Course & Medical  "Decision Making     Medications   morphine injection 4 mg (4 mg intravenous Given 1/7/25 1157)   ondansetron (Zofran) injection 4 mg (4 mg intravenous Given 1/7/25 1302)   iohexol (OMNIPaque) 350 mg iodine/mL solution 75 mL (75 mL intravenous Given 1/7/25 1309)     Heart Rate:  []   Temperature:  [36.6 °C (97.8 °F)-36.6 °C (97.9 °F)]   Respirations:  [16]   BP: ()/(55-75)   Height:  [160 cm (5' 3\")]   Weight:  [56.7 kg (125 lb)]   Pulse Ox:  [95 %-97 %]    ED Course as of 01/07/25 1542   Tue Jan 07, 2025   4370 Updated patient on the results of the CT scan and the reason for the ultrasound.  Informed her that there is a mass present likely on the right ovary and potential spread to the omentum with diverticulitis present.  Patient's plan care going forward.  Patient will receive the ultrasound we wait for the results.  Patient likely be treated with antibiotics as well as pain medication at home but we will speak to OB about the mass and refer to [facility] clinic. [CJ]   7265 Discussed case with the on-call OB/GYN provider Dr. Braswell.  She agrees that patient can be discharged home to follow-up as an outpatient.  She does request we add on a  level for follow-up appointment.  Will update patient on plan of care. [CJ]   0038 Updated the patient on the discussion with Dr. Braswell.  She is in agreement the plan of care of discharge.  I did inform her that there is concern that the mass is assess vascularity so that is primary concern for possible cancer.  Biopsy will need to be made to confirm.  I also discussed with her that there is some inflammation present on the omentum which is concern for potential metastasis..  Patient did have diverticulitis present on CT imaging as well which is likely the cause of her pain.  Patient be given antibiotics for this as well as pain medication for home.  She was encouraged to follow-up with primary care provider in 1 to 2 days or return here immediately with any " worsening symptoms.  Patient be given referral to Dr. Braswell for follow-up. [CJ]      ED Course User Index  [CJ] Real De Los Santos PA-C         Diagnoses as of 01/07/25 1542   Diverticulitis   Ovarian mass, right       Procedures   Procedures    Diagnosis     1. Diverticulitis    2. Ovarian mass, right        Disposition   Discharged    ED Prescriptions       Medication Sig Dispense Start Date End Date Auth. Provider    ciprofloxacin (Cipro) 500 mg tablet Take 1 tablet (500 mg) by mouth 2 times a day for 7 days. 14 tablet 1/7/2025 1/14/2025 Real De Los Santos PA-C    metroNIDAZOLE (Flagyl) 500 mg tablet Take 1 tablet (500 mg) by mouth 3 times a day for 7 days. 21 tablet 1/7/2025 1/14/2025 Real De Los Santos PA-C    oxyCODONE-acetaminophen (Percocet) 5-325 mg tablet Take 1 tablet by mouth every 6 hours if needed for severe pain (7 - 10) for up to 3 days. 10 tablet 1/7/2025 1/10/2025 Real De Los Santos PA-C            Disclaimer: This note was dictated by speech recognition. Minor errors in transcription may be present. Please call if questions.       Real De Los Santos PA-C  01/07/25 1543

## 2025-01-07 NOTE — ED TRIAGE NOTES
Pt. Arrived to the ED via Private car for c/o lower abdominal pain Pt went to [place] urgent care two days ago and they told her to go to the ED if she has any worsening symptoms. Pt. States that her abdominal pain is worsening. Pt denies any burning or pain with urination. Denies any nausea or vomiting. Pt. States that she has felt more constipated.

## 2025-01-08 ENCOUNTER — HOSPITAL ENCOUNTER (OUTPATIENT)
Dept: RADIOLOGY | Facility: HOSPITAL | Age: 75
Discharge: HOME | End: 2025-01-08
Payer: MEDICARE

## 2025-01-08 ENCOUNTER — TELEPHONE (OUTPATIENT)
Dept: OBSTETRICS AND GYNECOLOGY | Facility: CLINIC | Age: 75
End: 2025-01-08

## 2025-01-08 ENCOUNTER — HOSPITAL ENCOUNTER (OUTPATIENT)
Dept: CARDIOLOGY | Facility: HOSPITAL | Age: 75
Discharge: HOME | End: 2025-01-08
Payer: MEDICARE

## 2025-01-08 DIAGNOSIS — R06.09 DYSPNEA ON EXERTION: ICD-10-CM

## 2025-01-08 PROBLEM — N94.89 ADNEXAL MASS: Status: ACTIVE | Noted: 2025-01-08

## 2025-01-08 LAB
AORTIC VALVE MEAN GRADIENT: 4 MMHG
AORTIC VALVE PEAK VELOCITY: 1.26 M/S
AV PEAK GRADIENT: 6 MMHG
EJECTION FRACTION: 38 %
GLOBAL LONGITUDINAL STRAIN: 9.4 %
LEFT ATRIUM VOLUME AREA LENGTH INDEX BSA: 24 ML/M2
LEFT VENTRICLE INTERNAL DIMENSION DIASTOLE: 5.05 CM (ref 3.5–6)
LEFT VENTRICULAR OUTFLOW TRACT DIAMETER: 1.9 CM
MITRAL VALVE E/A RATIO: 1.01
MITRAL VALVE E/E' RATIO: 11.64
RIGHT VENTRICLE FREE WALL PEAK S': 10.99 CM/S
TRICUSPID ANNULAR PLANE SYSTOLIC EXCURSION: 2.1 CM

## 2025-01-08 PROCEDURE — 78452 HT MUSCLE IMAGE SPECT MULT: CPT

## 2025-01-08 PROCEDURE — 78452 HT MUSCLE IMAGE SPECT MULT: CPT | Performed by: INTERNAL MEDICINE

## 2025-01-08 PROCEDURE — A9502 TC99M TETROFOSMIN: HCPCS | Performed by: INTERNAL MEDICINE

## 2025-01-08 PROCEDURE — 2500000004 HC RX 250 GENERAL PHARMACY W/ HCPCS (ALT 636 FOR OP/ED): Performed by: PHYSICIAN ASSISTANT

## 2025-01-08 PROCEDURE — 93018 CV STRESS TEST I&R ONLY: CPT | Performed by: INTERNAL MEDICINE

## 2025-01-08 PROCEDURE — 3430000001 HC RX 343 DIAGNOSTIC RADIOPHARMACEUTICALS: Performed by: INTERNAL MEDICINE

## 2025-01-08 PROCEDURE — 93016 CV STRESS TEST SUPVJ ONLY: CPT | Performed by: INTERNAL MEDICINE

## 2025-01-08 PROCEDURE — 93306 TTE W/DOPPLER COMPLETE: CPT

## 2025-01-08 PROCEDURE — 93306 TTE W/DOPPLER COMPLETE: CPT | Performed by: INTERNAL MEDICINE

## 2025-01-08 PROCEDURE — 93017 CV STRESS TEST TRACING ONLY: CPT

## 2025-01-08 RX ORDER — REGADENOSON 0.08 MG/ML
0.4 INJECTION, SOLUTION INTRAVENOUS
Status: COMPLETED | OUTPATIENT
Start: 2025-01-08 | End: 2025-01-08

## 2025-01-08 RX ADMIN — TETROFOSMIN 10 MILLICURIE: 0.23 INJECTION, POWDER, LYOPHILIZED, FOR SOLUTION INTRAVENOUS at 11:10

## 2025-01-08 RX ADMIN — REGADENOSON 0.4 MG: 0.08 INJECTION, SOLUTION INTRAVENOUS at 12:16

## 2025-01-08 RX ADMIN — TETROFOSMIN 30 MILLICURIE: 0.23 INJECTION, POWDER, LYOPHILIZED, FOR SOLUTION INTRAVENOUS at 12:15

## 2025-01-08 NOTE — TELEPHONE ENCOUNTER
Patient's daughter called stating her mother (Yadira) was seen in the ER yesterday. They advised her to schedule an appointment with Dr. Braswell as they found a mass on her ovaries. Please advise on where to schedule patient.

## 2025-01-08 NOTE — TELEPHONE ENCOUNTER
Per Dr. Braswell needs gyn onc referral.  Discussed with daughter Randa, she is agreeable with plan.  Appointment made, daughter informed

## 2025-01-09 DIAGNOSIS — E78.5 DYSLIPIDEMIA: Primary | ICD-10-CM

## 2025-01-09 NOTE — RESULT ENCOUNTER NOTE
Stress testing reviewed status post office visit.  Again her echocardiogram was abnormal.  The stress test does confirm the LV dysfunction but no areas of ischemia were seen on the stress test.  Previously she was noted to have nonobstructive CAD.  Since being on the lisinopril hydrochlorothiazide blood pressures are better and although she has some degree of shortness of breath she continues to smoke heavily but she has not had chest pain or further syncopal events.  Of note recently in the ER due to abdominal pain and was found to have a greater than 6 cm ovarian mass with findings concerning for malignancy.  They are scheduled with gynecologic oncologist next week.  At this time this seems like a more pressing issue and we will defer on any further cardiac testing until we have a better idea of what is going on with her abdominal issues and the daughter will call me back with that information.

## 2025-01-14 ENCOUNTER — APPOINTMENT (OUTPATIENT)
Dept: CARDIOLOGY | Facility: HOSPITAL | Age: 75
End: 2025-01-14
Payer: MEDICARE

## 2025-01-22 ENCOUNTER — OFFICE VISIT (OUTPATIENT)
Dept: VASCULAR SURGERY | Facility: HOSPITAL | Age: 75
End: 2025-01-22
Payer: MEDICARE

## 2025-01-22 ENCOUNTER — APPOINTMENT (OUTPATIENT)
Dept: CARDIOLOGY | Facility: HOSPITAL | Age: 75
End: 2025-01-22
Payer: MEDICARE

## 2025-01-22 VITALS
BODY MASS INDEX: 22.5 KG/M2 | WEIGHT: 127 LBS | SYSTOLIC BLOOD PRESSURE: 116 MMHG | DIASTOLIC BLOOD PRESSURE: 73 MMHG | HEART RATE: 94 BPM

## 2025-01-22 DIAGNOSIS — I73.9 PAD (PERIPHERAL ARTERY DISEASE) (CMS-HCC): Primary | ICD-10-CM

## 2025-01-22 DIAGNOSIS — R06.09 DYSPNEA ON EXERTION: ICD-10-CM

## 2025-01-22 PROCEDURE — 1159F MED LIST DOCD IN RCRD: CPT | Performed by: PHYSICIAN ASSISTANT

## 2025-01-22 PROCEDURE — 3078F DIAST BP <80 MM HG: CPT | Performed by: PHYSICIAN ASSISTANT

## 2025-01-22 PROCEDURE — 99214 OFFICE O/P EST MOD 30 MIN: CPT | Performed by: PHYSICIAN ASSISTANT

## 2025-01-22 PROCEDURE — 3074F SYST BP LT 130 MM HG: CPT | Performed by: PHYSICIAN ASSISTANT

## 2025-01-22 ASSESSMENT — ENCOUNTER SYMPTOMS
ABDOMINAL DISTENTION: 1
FEVER: 0
CHILLS: 0
DIZZINESS: 0
FACIAL ASYMMETRY: 0
NECK PAIN: 0
SLEEP DISTURBANCE: 0
ABDOMINAL PAIN: 1
SEIZURES: 0
COLOR CHANGE: 0
ARTHRALGIAS: 0
CONFUSION: 0
SHORTNESS OF BREATH: 0
DIARRHEA: 0
FATIGUE: 0
NAUSEA: 0
WEAKNESS: 0
SPEECH DIFFICULTY: 0
WOUND: 0
COUGH: 0
NUMBNESS: 0
TROUBLE SWALLOWING: 0
VOMITING: 0
WHEEZING: 0
DIFFICULTY URINATING: 0
LIGHT-HEADEDNESS: 0
SORE THROAT: 0
ADENOPATHY: 0
PALPITATIONS: 0
CONSTIPATION: 0
JOINT SWELLING: 0
HEADACHES: 0

## 2025-01-22 NOTE — PROGRESS NOTES
Subjective   Patient ID: Yadira Davis is a 74 y.o. female who presents for Follow-up (PAD).  HPI  74 year old female history of CAD, HTN, HLD, copd, smoker, presents for follow up visit for PAD. History of bilateral iliac artery stenting. Patient complains of bilateral lower extremity claudication, worsening over the past 1 year. Able to ambulate to the mailbox before calf pain begins. Improves with rest. No rest pain no wounds or ulcerations. Most recent vascular testing 2023 with PVR mild arterial disease RLE arterial duplex with Right SFA occlusion, minial disease LLE >50% stenosis Left SFA. Patient currently undergoing workup of ovarian mass/cyst, has appointment with oncology tomorrow, also undergoing cardiac workup for abnormal echo though put on hold until oncology follow up and workup of ovarian mass. Presents with daughter at today's visit. Still continues to smoked 1ppd.   Review of Systems   Constitutional:  Negative for chills, fatigue and fever.   HENT:  Negative for congestion, sore throat and trouble swallowing.    Eyes:  Negative for visual disturbance.   Respiratory:  Negative for cough, shortness of breath and wheezing.    Cardiovascular:  Negative for chest pain, palpitations and leg swelling.        Short distance lower extremity claudication   Gastrointestinal:  Positive for abdominal distention and abdominal pain. Negative for constipation, diarrhea, nausea and vomiting.   Endocrine: Negative for cold intolerance and heat intolerance.   Genitourinary:  Negative for difficulty urinating.   Musculoskeletal:  Negative for arthralgias, joint swelling and neck pain.   Skin:  Negative for color change and wound.   Neurological:  Negative for dizziness, seizures, syncope, facial asymmetry, speech difficulty, weakness, light-headedness, numbness and headaches.   Hematological:  Negative for adenopathy.   Psychiatric/Behavioral:  Negative for behavioral problems, confusion and sleep disturbance.       Vitals:    01/22/25 0858   BP: 116/73   Pulse: 94   Weight: 57.6 kg (127 lb)      Objective   Physical Exam  Constitutional:       Appearance: Normal appearance.   HENT:      Head: Normocephalic.      Right Ear: External ear normal.      Left Ear: External ear normal.      Nose: Nose normal.      Mouth/Throat:      Mouth: Mucous membranes are moist.   Eyes:      Extraocular Movements: Extraocular movements intact.   Neck:      Vascular: No carotid bruit.   Cardiovascular:      Rate and Rhythm: Normal rate and regular rhythm.      Pulses: Decreased pulses.   Pulmonary:      Effort: Pulmonary effort is normal. No respiratory distress.      Breath sounds: Normal breath sounds.   Abdominal:      Palpations: Abdomen is soft.      Tenderness: There is no abdominal tenderness.   Musculoskeletal:         General: No swelling or tenderness.      Cervical back: Normal range of motion and neck supple.      Right lower leg: No edema.      Left lower leg: No edema.   Skin:     General: Skin is warm and dry.      Capillary Refill: Capillary refill takes less than 2 seconds.      Findings: No lesion.   Neurological:      General: No focal deficit present.      Mental Status: She is alert and oriented to person, place, and time. Mental status is at baseline.   Psychiatric:         Mood and Affect: Mood normal.         Behavior: Behavior normal.         Thought Content: Thought content normal.         Judgment: Judgment normal.         Assessment/Plan   Problem List Items Addressed This Visit             ICD-10-CM    PAD (peripheral artery disease) (CMS-HCC) - Primary I73.9    Relevant Orders    Vascular US PVR Without Exercise    Vascular US lower extremity arterial duplex bilateral    Dyspnea on exertion R06.09   74 year old female history of CAD, HTN, HLD, copd, smoker, presents for follow up visit for PAD. History of bilateral iliac artery stenting.   Short distance claudication. No rest pain no wounds or ulcerations. Most  recent vascular testing 2023 with PVR mild arterial disease RLE arterial duplex with Right SFA occlusion, minial disease LLE >50% stenosis Left SFA.   -repeat PVR/CARIDAD, bilateral arterial duplex, will likely need CTA aorta with bilateral run off  -Patient currently undergoing workup of ovarian mass/cyst suspicious for malignancy, this is the more pressing issue at this time  -will follow up after testing and GYN/oncology workup  -also seeing cardiology for abnormal echo though put on hold until oncology follow up and workup of ovarian mass.   -smoking cessation discussed and encouraged  -call with new or worsening symptoms  -call with questions or concerns          Samara Hay PA-C 01/22/25 10:14 AM

## 2025-01-23 ENCOUNTER — OFFICE VISIT (OUTPATIENT)
Dept: GYNECOLOGIC ONCOLOGY | Facility: HOSPITAL | Age: 75
End: 2025-01-23
Payer: MEDICARE

## 2025-01-23 VITALS
DIASTOLIC BLOOD PRESSURE: 78 MMHG | HEART RATE: 77 BPM | BODY MASS INDEX: 22.32 KG/M2 | SYSTOLIC BLOOD PRESSURE: 126 MMHG | WEIGHT: 126 LBS

## 2025-01-23 DIAGNOSIS — I25.10 CORONARY ARTERY DISEASE INVOLVING NATIVE CORONARY ARTERY WITHOUT ANGINA PECTORIS, UNSPECIFIED WHETHER NATIVE OR TRANSPLANTED HEART: ICD-10-CM

## 2025-01-23 DIAGNOSIS — R10.84 GENERALIZED ABDOMINAL PAIN: ICD-10-CM

## 2025-01-23 DIAGNOSIS — R97.8 OTHER ABNORMAL TUMOR MARKERS: ICD-10-CM

## 2025-01-23 DIAGNOSIS — N83.8 OVARIAN MASS, RIGHT: Primary | ICD-10-CM

## 2025-01-23 DIAGNOSIS — J44.9 CHRONIC OBSTRUCTIVE PULMONARY DISEASE, UNSPECIFIED COPD TYPE (MULTI): ICD-10-CM

## 2025-01-23 PROCEDURE — 1159F MED LIST DOCD IN RCRD: CPT | Performed by: STUDENT IN AN ORGANIZED HEALTH CARE EDUCATION/TRAINING PROGRAM

## 2025-01-23 PROCEDURE — 99205 OFFICE O/P NEW HI 60 MIN: CPT | Performed by: STUDENT IN AN ORGANIZED HEALTH CARE EDUCATION/TRAINING PROGRAM

## 2025-01-23 PROCEDURE — 3078F DIAST BP <80 MM HG: CPT | Performed by: STUDENT IN AN ORGANIZED HEALTH CARE EDUCATION/TRAINING PROGRAM

## 2025-01-23 PROCEDURE — 1160F RVW MEDS BY RX/DR IN RCRD: CPT | Performed by: STUDENT IN AN ORGANIZED HEALTH CARE EDUCATION/TRAINING PROGRAM

## 2025-01-23 PROCEDURE — 1125F AMNT PAIN NOTED PAIN PRSNT: CPT | Performed by: STUDENT IN AN ORGANIZED HEALTH CARE EDUCATION/TRAINING PROGRAM

## 2025-01-23 PROCEDURE — 99215 OFFICE O/P EST HI 40 MIN: CPT | Performed by: STUDENT IN AN ORGANIZED HEALTH CARE EDUCATION/TRAINING PROGRAM

## 2025-01-23 PROCEDURE — 3074F SYST BP LT 130 MM HG: CPT | Performed by: STUDENT IN AN ORGANIZED HEALTH CARE EDUCATION/TRAINING PROGRAM

## 2025-01-23 RX ORDER — OXYCODONE HYDROCHLORIDE 5 MG/1
5 TABLET ORAL EVERY 6 HOURS PRN
Qty: 20 TABLET | Refills: 0 | Status: SHIPPED | OUTPATIENT
Start: 2025-01-23 | End: 2025-01-30

## 2025-01-23 ASSESSMENT — PAIN SCALES - GENERAL: PAINLEVEL_OUTOF10: 7

## 2025-01-23 NOTE — PROGRESS NOTES
Gynecologic Oncology Initial Consultation  [facility]    Patient ID: Yadira Davis, 74 y.o.  Referring Physician: Real De Los Santos PA-C  [id number]  [address]  [place] [phone]  Primary Care Provider: No Assigned PCP Generic Provider, MD      Reason for Consultation: pelvic mass  Subjective    HPI 75 y/o F with complex medical history including HTN, HLD, nonobstructive CAD, HFrEF (EF 38% on 1/8/25), PAD s/p bilateral iliac artery stenting, COPD with chronic home oxygen use 3L overnight and tobacco use who is seen in consultation for a pelvic mass. Report of acute onset abdominal pain. Poorly localized. Constant. Treatment with tylenol tried without much relief. Denies NV and early satiety. Decreased appetite. She was seen in the ER on 12/24. Work up with CT revealed necrotic right adnexal mass measuring 6.5 cm x 6 cm x 6.7 cm as well as nodularity along the omentum measuring up to 7 mm. Pelvic US confirmed R ovarian mass with internal vascularity within the solid component. Ca125 was obtained and normal at 26. She was discharged home with outpatient follow up.     She was given PO antibiotics for diverticulitis. Minimal improvement in symptoms. Pain is ongoing. Strongly desires surgery. Denies fevers/chills, N/V, CP, SOB, abdominal pain, dysuria, hematuria, constipation, diarrhea.    A comprehensive review of systems was performed and otherwise negative.    Objective      Past Medical History:   Diagnosis Date    Breast cancer (Multi)     R and L breast s/p surgery, chemotherapy and RT    COPD (chronic obstructive pulmonary disease) (Multi)     Fatigue 03/31/2023    HLD (hyperlipidemia)     HTN (hypertension)     Personal history of malignant neoplasm of other parts of uterus     History of cancer of uterus versus precancer        Past Surgical History:   Procedure Laterality Date    ADENOIDECTOMY  09/12/2017    Adenoidectomy    APPENDECTOMY      abdominal    BREAST BIOPSY  09/12/2017    Biopsy Breast Open      SECTION, CLASSIC  2017     Section    CHOLECYSTECTOMY  10/12/2017    Cholecystectomy LS    CT ANGIO AORTA AND BILATERAL ILIOFEMORAL RUN OFF INCLUDING WITHOUT CONTRAST IF PERFORMED  2022    CT AORTA AND BILATERAL ILIOFEMORAL RUNOFF ANGIOGRAM W AND/OR WO IV CONTRAST 3/9/2022 POR ANCILLARY LEGACY    MASTECTOMY  2017    Breast Surgery Mastectomy    SEPTOPLASTY  2017    Septoplasty    TONSILLECTOMY  2017    Tonsillectomy    TOTAL ABDOMINAL HYSTERECTOMY  2017    Total Abdominal Hysterectomy reportedly due to uterine cancer though diagnosed at a young age        Family History   Problem Relation Name Age of Onset    Breast cancer Mother      Hypertension Mother      Hyperlipidemia Mother      Esophageal cancer Mother      Other (Peripheral artery disease) Mother      Coronary artery disease Father       Otherwise, denies history of endometrial, ovarian, breast, prostate, or colorectal cancers. She declines genetic testing. Has not previously completed genetic testing.     OBGYN Hx:  - ; C/S x3  - Menopause unknown; denies HRT use  - Last Pap unknown    Screening:  - Last Mammogram: unknown  - Last Colonoscopy: unknown    Social Hx:  Yadira Davis  reports that she has been smoking cigarettes. She started smoking about 55 years ago. She has a 82.5 pack-year smoking history. She has never used smokeless tobacco.  She  reports that she does not currently use alcohol.  She  reports no history of drug use.  No desire to quit smoking.   Lives at home alone with family nearby. . Currently on leave. Works in retail.     Physical Exam  BSA: 1.59 meters squared  /78 (BP Location: Right arm, Patient Position: Sitting, BP Cuff Size: Adult)   Pulse 77   Wt 57.2 kg (126 lb)   BMI 22.32 kg/m²   General:   alert and oriented, in no acute distress   Heart: regular rate and rhythm, S1, S2 normal, no murmur, click, rub or gallop   Lungs: wheezes and diffuse,  expiratory   Abdomen: soft, nondistended, and tenderness mild and no rebound or guarding in the lower abdomen   Vulva: normal   Vagina: normal mucosa, cuff intact without lesions or masses   Cervix: absent   Uterus: absent   Adnexa: No masses palpated   Rectal: deferred   Lymph Nodes:  Cervical, supraclavicular, and axillary nodes normal.   Extremities: warm, well-perfused without cyanosis, clubbing or edema   Skin: Normal   Lines/Access:      Pathology:  N/A    Imaging:  CT AP 1/7/25  IMPRESSION:  1. Findings compatible with acute uncomplicated diverticulitis  involving the proximal to mid sigmoid colon. No extraluminal gas or  abscess.  2. Complex cystic versus possible necrotic right adnexal mass  measuring 6.5 cm x 6 cm x 6.7 cm. Findings concerning for a possible  cystic ovarian neoplasm. Recommend further evaluation with pelvic  ultrasound and correlation with  levels.  3. Areas of nodularity noted along the omentum measuring up to  approximately 7 mm to the right of midline. Findings concerning for  possible peritoneal carcinomatosis.  4. Small left pleural effusion with minimal left basilar atelectasis.  5. Mild hepatic steatosis.      Performance Status:  Asymptomatic    Assessment/Plan    74 y.o. with complex pelvic mass, suspect ovarian in origin though normal tumor markers cannot rule out malignancy though could be torsion  Comorbidities: HTN, HLD, nonobstructive CAD, HFrEF (EF 38% on 1/8/25), PAD s/p bilateral iliac artery stenting, COPD noncompliant with inhalers with chronic home oxygen use 3L overnight and tobacco use     Diagnoses and all orders for this visit:  Ovarian mass, right  Generalized abdominal pain  Chronic obstructive pulmonary disease, unspecified COPD type (Multi)  Coronary artery disease     # Pelvic mass  - We discussed the possible etiologies of a pelvic mass including benign, borderline, and malignant.   - Imaging was reviewed and discussed with the patient  - Tumor markers  normal  but will be obtain (CA 19-9, CEA)  - Pelvic MRI ordered  - MDTB review  - Discussed biopsy as an alternative to surgery if surgery cannot be safely considered, will discuss with pulmonology and cardiology     # Acute on chronic pain   - Oxycodone 5 mg PRN q6 PO to the pharmacy     # COPD on home O2  - Encourage compliance with inhaler   - Referral to pulmonology for pre-operative optimization, establish     # CAD, HFrEF  - Follows with cardiology, will need optimization and clearance if surgery can be considered     # Tobacco use disorder  - Cessation counseling complete, pre contemplative phase  - Declines patch, gum, lozenge     RTC results review    Jaki Treviño MD MPH

## 2025-01-24 NOTE — PROGRESS NOTES
Patient: Yadira Davis    [id number]  : 1950 -- AGE 74 y.o.    Provider: Tanvi Allison CMA     Location [facility]   Service Date: 2025              [facility]  New Visit Note      HISTORY OF PRESENT ILLNESS     The patient's referring provider is: Jaki Treviño MD MPH    HISTORY OF PRESENT ILLNESS   Yadira Davis is a 74 y.o. female who presents to a [facility] for an evaluation with concerns of No chief complaint on file.. I have independently interviewed and examined the patient in the office and reviewed available records.    Current History    On today's visit, the patient reports ***    HPI:  At baseline,  has dyspnea on exertion, but none at rest. Symptoms started many years ago, but has mostly been stable.  Currently sits for most of the day, works inside the house, but does not carry loads and do strenuous exercise. They are active in her everyday life and carries loads and does strenuous exercise. He is only troubled by breathlessness except on strenuous exercise (mMRC 0).  They are s short of breath when hurrying on level ground or walking up a slight hill (mMRC 1).  They are  have  stop for breath when walking at her own pace on level ground at about 15 minutes (mMRC 2).  Has to stop for breath after walking about 100 meters or a few minutes (mMRC 3).  Too breathless to leave the house or breathless when dressing and undressing (mMRC 4).     CAT score is . Relates/Denies orthopnea, pnd, or cory.  Has gained/lost X pounds in the last X months. Weight has been mostly stable.  Relates/Denies  chronic cough, wheezing, and clear, green, blood streaks, but no sputum. No night cough. No hemoptysis. No fever or shivering chills. Has a runny nose, and a tingling sensation in the back of his throat. Has no runny nose, or a tingling sensation in the back of his throat. Also complains of heartburn. Denies chest  pain or heartburn. [place] score (ESS) is .    Previous pulmonary history:  no history of recurrent infections, or lung disease as a child.  No previous lung hx, never on oxygen or inhaler therapy.    previously was told they have  .  currently is on no supplemental oxygen.  Never been to pulmonary rehab. Does not recall having AECOPD requiring antibiotics or prednisone.    Inhalers/nebulized medications:     Hospitalization History: Not been hospitalized over the last year for breathing related problem.    Sleep history:  Denies snoring, apnea, feeling tired during the day or taking naps during the day.   Complains of snoring, apnea, feeling tired during the day, and taking naps during the day.   STOP-BANG score of     ALLERGIES AND MEDICATIONS     ALLERGIES  Allergies   Allergen Reactions    Codeine Nausea Only, Other and Nausea/vomiting     Vomiting       MEDICATIONS  Current Outpatient Medications   Medication Sig Dispense Refill    albuterol (Ventolin HFA) 90 mcg/actuation inhaler Inhale 2 puffs every 4 hours if needed for shortness of breath. 18 g 1    aspirin 81 mg EC tablet Take 1 tablet (81 mg) by mouth once daily. 90 tablet 3    lisinopriL-hydrochlorothiazide 20-12.5 mg tablet Take 1 tablet by mouth 2 times a day. 180 tablet 3    oxyCODONE (Roxicodone) 5 mg immediate release tablet Take 1 tablet (5 mg) by mouth every 6 hours if needed for severe pain (7 - 10) for up to 7 days. 20 tablet 0    rosuvastatin (Crestor) 40 mg tablet Take 1 tablet (40 mg) by mouth once daily. 90 tablet 3     No current facility-administered medications for this visit.         PAST HISTORY     PAST MEDICAL HISTORY  She  has a past medical history of Breast cancer (Multi), COPD (chronic obstructive pulmonary disease) (Multi), Fatigue (03/31/2023), HLD (hyperlipidemia), HTN (hypertension), and Personal history of malignant neoplasm of other parts of uterus.    PAST SURGICAL HISTORY  Past Surgical History:   Procedure Laterality Date     ADENOIDECTOMY  2017    Adenoidectomy    APPENDECTOMY      abdominal    BREAST BIOPSY  2017    Biopsy Breast Open     SECTION, CLASSIC  2017     Section    CHOLECYSTECTOMY  10/12/2017    Cholecystectomy LS    CT ANGIO AORTA AND BILATERAL ILIOFEMORAL RUN OFF INCLUDING WITHOUT CONTRAST IF PERFORMED  2022    CT AORTA AND BILATERAL ILIOFEMORAL RUNOFF ANGIOGRAM W AND/OR WO IV CONTRAST 3/9/2022 POR ANCILLARY LEGACY    MASTECTOMY  2017    Breast Surgery Mastectomy    SEPTOPLASTY  2017    Septoplasty    TONSILLECTOMY  2017    Tonsillectomy    TOTAL ABDOMINAL HYSTERECTOMY  2017    Total Abdominal Hysterectomy reportedly due to uterine cancer though diagnosed at a young age       IMMUNIZATION HISTORY  Immunization History   Administered Date(s) Administered    COVID-19, mRNA, LNP-S, PF, 30 mcg/0.3 mL dose 05/15/2021, 2021    Flu vaccine, quadrivalent, high-dose, preservative free, age 65y+ (FLUZONE) 2020, 12/15/2020    Pneumococcal polysaccharide vaccine, 23-valent, age 2 years and older (PNEUMOVAX 23) 2022       SOCIAL HISTORY  She  reports that she has been smoking cigarettes. She started smoking about 55 years ago. She has a 82.5 pack-year smoking history. She has never used smokeless tobacco. She reports that she does not currently use alcohol. She reports that she does not use drugs. She Patient {SOCIAL HISTORY (Optional):53748}    OCCUPATIONAL/ENVIRONMENTAL HISTORY  Previously worked as: ***  Currently works as: ***  {DOES/DOES NOT EC:59422:::1} have known exposure to asbestos, silica, beryllium or inhaled metals.  {DOES/DOES NOT EC:72100:::1} have exposure to birds or exotic animals.    FAMILY HISTORY  Family History   Problem Relation Name Age of Onset    Breast cancer Mother      Hypertension Mother      Hyperlipidemia Mother      Esophageal cancer Mother      Other (Peripheral artery disease) Mother      Coronary artery disease  Father       {DOES/DOES NOT EC:63487:::1} have a family history of pulmonary disease.  {DOES/DOES NOT EC:73487:::1} have a family history of cancer.  {DOES/DOES NOT EC:31405:::1} have a family history of autoimmune disorders.    RESULTS/DATA     Pulmonary Function Test Results     No testing done     Chest Radiograph     XR chest 1 view 12/24/2024    Narrative  Interpreted By:  Juan M Robins,  STUDY:  XR CHEST 1 VIEW;  12/24/2024 8:49 pm    INDICATION:  Signs/Symptoms:SOB, productive cough, COPD, r/o infiltrate, r/o pulm  edema or pleural effusion.    COMPARISON:  5/30/2024    ACCESSION NUMBER(S):  [id number]    ORDERING CLINICIAN:  LONNIE KEITH    FINDINGS:  The cardiac silhouette is stable in size. Atherosclerotic  calcification of the thoracic aorta. There is pulmonary vascular  congestive change and edema and asymmetric opacity in the right and  left lower lungs compatible with superimposed pneumonia. There are  likely bilateral pleural effusions. No pneumothorax.    Impression  Pulmonary vascular congestion and edema and asymmetric opacities in  the right greater than left lower lungs compatible with superimposed  pneumonia. Short-term progress imaging recommended to assure  resolution and exclude other underlying pathology.    MACRO:  None    Signed by: Juan M Robins 12/24/2024 9:18 PM  Dictation workstation:   [id number]      Chest CT Scan     CT angio chest for pulmonary embolism 05/30/2024    Narrative  Interpreted By:  Hipolito Davis,  STUDY:  CT ANGIO CHEST FOR PULMONARY EMBOLISM;  5/30/2024 1:29 pm    INDICATION:  Signs/Symptoms:chest pain.    COMPARISON:  04/06/2022    ACCESSION NUMBER(S):  [id number]    ORDERING CLINICIAN:  HUGH PISANO    TECHNIQUE:  Helical data acquisition of the chest was obtained with 75 mL  Omnipaque 350. Images were reformatted in coronal and sagittal  planes. Axial and coronal MIP images were created and reviewed.    FINDINGS:  POTENTIAL LIMITATIONS OF THE STUDY:  None    HEART AND VESSELS:  No discrete filling defects within the main pulmonary artery or its  branches.    Main pulmonary artery and its branches are normal in caliber.    The thoracic aorta is of normal course and caliber with marked  vascular calcifications.    Dense coronary artery calcifications are seen.The study is not  optimized for evaluation of coronary arteries.    The cardiac chambers are not enlarged.    No evidence of pericardial effusion.    MEDIASTINUM AND SURY, LOWER NECK AND AXILLA:  Bulky thyromegaly with substernal extension. Peripherally calcified  nodule seen within the substernal component.. Appearance is similar  to the prior.    No evidence of thoracic lymphadenopathy by CT criteria.    Esophagus appears within normal limits as seen.    LUNGS AND AIRWAYS:  The trachea and central airways are patent. No endobronchial lesion.    Worsening emphysematous changes bilaterally. 5 mm nodule within the  left upper lobe on image number 67 representing increase in size from  the prior study for which the lesion was barely perceivable. 6 mm  pleural-based nodule within the right lower lobe on image number 156  additional tiny nodule seen bilaterally.    UPPER ABDOMEN:  The visualized subdiaphragmatic structures demonstrate no remarkable  findings.    CHEST WALL AND OSSEOUS STRUCTURES:  There are no suspicious osseous lesions. Status post right  mastectomy. Surgical clips seen about the left axilla and inferior  breast.    Impression  1.  No pulmonary emboli or confluent airspace disease.  2. Bulky thyromegaly. Ultrasound may be offered for further  evaluation.  3. Worsening emphysematous changes bilaterally. Bilateral  subcentimeter nodules. Interval enlargement of the left upper lobe  nodule. *Follow-up recommendations are according to the revised 2017  Fleischner Society Criteria (Doreen H, et al. Guidelines for  Management of Incidental Pulmonary Nodules Detected on CT Images:  From the  "Fleischner Society 2017. Radiology. Doi:  10.1148/radiol.7575903833)    SOLID NODULES:    Multiple solid nodules <6 mm  - Low-risk patients: no routine follow-up required  - High-risk patients: optional CT at 12 months    Multiple solid nodules >6 mm  - Low-risk patients: CT at 3-6 months, then consider CT at 18-24  months - High-risk patients: CT at 3-6 months, then if persistent, CT  at 18-24 months    When multiple nodules are present, the dominant/most suspicious  nodule should guide further individualized management.    Measurements are determined by the average of short and long-axis,  rounded to the nearest whole millimeter. Guidelines recommend  considering \"high-risk\" has an estimated risk of cancer greater than  5% - please assess clinically.        MACRO:  None    Signed by: Hipolito Davis 5/30/2024 1:45 PM  Dictation workstation:   [id number]      Echocardiogram     TRANSTHORACIC ECHOCARDIOGRAM REPORT 1/8/2025     Patient Name:       VLADIMIR CROWELL     Reading Physician:    [id number] Ike Brooks DO  Study Date:         1/8/2025            Ordering Provider:    [id number] POONAM LYNNE  MRN/PID:            [id number]            Fellow:  Accession#:         [id number]        Nurse:  Date of Birth/Age:  1950 / 74      Sonographer:          Priti Betancur RDCS                      years  Gender Assigned at  F                   Additional Staff:  Birth:  Height:             160.02 cm           Admit Date:           1/8/2025  Weight:             58.06 kg            Admission Status:     Outpatient  BSA / BMI:          1.60 m2 / 22.67     Department Location:  [facility]                      kg/m2                                     Lab  Blood Pressure: 156 /60 mmHg     Study Type:    TRANSTHORACIC ECHO (TTE) COMPLETE  Diagnosis/ICD: Other forms of dyspnea-R06.09  Indication:    " Dyspnea  CPT Codes:     Echo Complete w Full Doppler-[id number]     Patient History:  Pertinent History: HTN and Hyperlipidemia.     Study Detail: The following Echo studies were performed: 2D, M-Mode, Doppler,                color flow and Strain.        PHYSICIAN INTERPRETATION:  Left Ventricle: The left ventricular systolic function is moderately decreased, with a calculated ejection fraction of 38% by auto EF. There is global hypokinesis of the left ventricle with minor regional variations. The left ventricular cavity size is normal. There is normal septal and normal posterior left ventricular wall thickness. Left Ventricular Global Longitudinal Strain - 9.4 %. Spectral Doppler shows a Grade I (impaired relaxation pattern) of left ventricular diastolic filling with normal left atrial filling pressure.  Left Atrium: The left atrium is normal in size.  Right Ventricle: The right ventricle is normal in size. There is normal right ventricular global systolic function.  Right Atrium: The right atrium is normal in size.  Aortic Valve: The aortic valve is trileaflet. There is no evidence of aortic valve regurgitation. The peak instantaneous gradient of the aortic valve is 6 mmHg. The mean gradient of the aortic valve is 4 mmHg.  Mitral Valve: The mitral valve is normal in structure. There is mild mitral annular calcification. There is mild mitral valve regurgitation. The mitral regurgitant orifice area is 4 mm2. The mitral regurgitant volume is 5.77 ml.  Tricuspid Valve: The tricuspid valve is structurally normal. There is trace tricuspid regurgitation.  Pulmonic Valve: The pulmonic valve is structurally normal. There is no indication of pulmonic valve regurgitation.  Pericardium: No pericardial effusion noted.  Aorta: The aortic root is normal.        CONCLUSIONS:   1. The left ventricular systolic function is moderately decreased, with a calculated ejection fraction of 38% by auto EF.   2. There is global hypokinesis of  the left ventricle with minor regional variations.   3. Spectral Doppler shows a Grade I (impaired relaxation pattern) of left ventricular diastolic filling with normal left atrial filling pressure.   4. There is normal right ventricular global systolic function.   5. Left Ventricular Global Longitudinal Strain - 9.4 %.     QUANTITATIVE DATA SUMMARY:     2D MEASUREMENTS:           Normal Ranges:  IVSd:            0.79 cm   (0.6-1.1cm)  LVPWd:           0.63 cm   (0.6-1.1cm)  LVIDd:           5.05 cm   (3.9-5.9cm)  LVIDs:           4.09 cm  LV Mass Index:   74.4 g/m2  LV % FS          19.1 %        LA VOLUME:                    Normal Ranges:  LA Vol A4C:        31.4 ml    (22+/-6mL/m2)  LA Vol A2C:        45.0 ml  LA Vol BP:         38.4 ml  LA Vol Index A4C:  19.7ml/m2  LA Vol Index A2C:  28.2 ml/m2  LA Vol Index BP:   24.0 ml/m2  LA Area A4C:       13.6 cm2  LA Area A2C:       16.6 cm2  LA Major Axis A4C: 5.0 cm  LA Major Axis A2C: 5.2 cm  LA Volume Index:   24.0 ml/m2  LA Vol A4C:        29.2 ml  LA Vol A2C:        43.1 ml  LA Vol Index BSA:  22.6 ml/m2        RA VOLUME BY A/L METHOD:         Normal Ranges:  RA Area A4C:             8.8 cm2        AORTA MEASUREMENTS:         Normal Ranges:  Ao Sinus, d:        2.90 cm (2.1-3.5cm)  Ao STJ, d:          2.40 cm (1.7-3.4cm)  Asc Ao, d:          2.40 cm (2.1-3.4cm)        LV SYSTOLIC FUNCTION BY 2D PLANIMETRY (MOD):                                         Normal Ranges:  EF-Auto:                          38 %  LV EF Reported:                   38 %  Global Longitudinal Strain (GLS): 9 %        LV DIASTOLIC FUNCTION:           Normal Ranges:  MV Peak E:             0.91 m/s  (0.7-1.2 m/s)  MV Peak A:             0.90 m/s  (0.42-0.7 m/s)  E/A Ratio:             1.01      (1.0-2.2)  MV e'                  0.078 m/s (>8.0)  MV lateral e'          0.11 m/s  MV medial e'           0.05 m/s  E/e' Ratio:            11.64     (<8.0)        MITRAL VALVE:         Normal  Ranges:  MV DT:        65 msec (150-240msec)        MITRAL INSUFFICIENCY:             Normal Ranges:  PISA Radius:          0.4 cm  MR VTI:               144.67 cm  MR Vmax:              532.87 cm/s  MR Alias Miguel Ángel:         27.3 cm/s  MR Volume:            5.77 ml  MR Flow Rt:           21.27 ml/s  MR EROA:              4 mm2        AORTIC VALVE:           Normal Ranges:  AoV Vmax:      1.26 m/s (<=1.7m/s)  AoV Peak P.4 mmHg (<20mmHg)  AoV Mean PG:   3.8 mmHg (1.7-11.5mmHg)  AoV VTI:       26.40 cm (18-25cm)  LVOT Diameter: 1.90 cm  (1.8-2.4cm)        RIGHT VENTRICLE:  RV Basal 3.24 cm  RV Mid   2.30 cm  RV Major 5.7 cm  TAPSE:   21.2 mm  RV s'    0.11 m/s        TRICUSPID VALVE/RVSP:         Normal Ranges:  IVC Diam:             1.39 cm  TV e'                 0.1 m/s        AORTA:  Asc Ao Diam 2.40 cm        [id number] Ike Brooks DO  Electronically signed on 2025 at 11:21:57 AM         REVIEW OF SYSTEMS     REVIEW OF SYSTEMS  Review of Systems      PHYSICAL EXAM     VITAL SIGNS: There were no vitals taken for this visit.     CURRENT WEIGHT: [unfilled]  BMI: [unfilled]  PREVIOUS WEIGHTS:  Wt Readings from Last 3 Encounters:   25 57.2 kg (126 lb)   25 57.6 kg (127 lb)   25 56.7 kg (125 lb)       Physical Exam    ASSESSMENT/PLAN     Ms. Davis is a 74 y.o. female and  has a past medical history of Breast cancer (Multi), COPD (chronic obstructive pulmonary disease) (Multi), Fatigue (2023), HLD (hyperlipidemia), HTN (hypertension), and Personal history of malignant neoplasm of other parts of uterus. She was referred to the [facility] for evaluation of ***    Problem List and Orders  {Assess/PlanSmartLinks:16911}    Assessment and Plan / Recommendations:  Problem List Items Addressed This Visit    None                    Thank you for visiting the Pulmonary clinic today!   Your breathing medications:   Tests:   Referrals:   Education in the clinic:   Vaccines:    Records   Return in    Return to clinic after *** weeks and after PFTs, CT scan complete  or sooner if needed   Oly Cronin CNP  My office number is [phone] -     Call to schedule  for radiology - CT scans/PFTs etc at  [phone]  General scheduling  [phone]     Best way to get a hold of me is to call my office --> Please do not send me follow my health messages  Any test results will be discussed at next visit -- please make sure to make a follow up appt after testing.

## 2025-01-28 ENCOUNTER — HOSPITAL ENCOUNTER (OUTPATIENT)
Dept: RADIOLOGY | Facility: CLINIC | Age: 75
Discharge: HOME | End: 2025-01-28
Payer: MEDICARE

## 2025-01-28 DIAGNOSIS — N83.8 OVARIAN MASS, RIGHT: ICD-10-CM

## 2025-01-28 LAB
CHOLEST SERPL-MCNC: 157 MG/DL
CHOLEST/HDLC SERPL: 3.9 (CALC)
HDLC SERPL-MCNC: 40 MG/DL
LDLC SERPL CALC-MCNC: 90 MG/DL (CALC)
NONHDLC SERPL-MCNC: 117 MG/DL (CALC)
TRIGL SERPL-MCNC: 168 MG/DL

## 2025-01-28 PROCEDURE — A9575 INJ GADOTERATE MEGLUMI 0.1ML: HCPCS | Performed by: STUDENT IN AN ORGANIZED HEALTH CARE EDUCATION/TRAINING PROGRAM

## 2025-01-28 PROCEDURE — 2550000001 HC RX 255 CONTRASTS: Performed by: STUDENT IN AN ORGANIZED HEALTH CARE EDUCATION/TRAINING PROGRAM

## 2025-01-28 PROCEDURE — 72197 MRI PELVIS W/O & W/DYE: CPT

## 2025-01-28 RX ORDER — GADOTERATE MEGLUMINE 376.9 MG/ML
0.2 INJECTION INTRAVENOUS
Status: COMPLETED | OUTPATIENT
Start: 2025-01-28 | End: 2025-01-28

## 2025-01-28 RX ADMIN — GADOTERATE MEGLUMINE 12 ML: 376.9 INJECTION INTRAVENOUS at 16:18

## 2025-01-30 ENCOUNTER — TELEMEDICINE (OUTPATIENT)
Dept: GYNECOLOGIC ONCOLOGY | Facility: HOSPITAL | Age: 75
End: 2025-01-30
Payer: MEDICARE

## 2025-01-30 DIAGNOSIS — R10.84 GENERALIZED ABDOMINAL PAIN: ICD-10-CM

## 2025-01-30 DIAGNOSIS — J44.9 CHRONIC OBSTRUCTIVE PULMONARY DISEASE, UNSPECIFIED COPD TYPE (MULTI): ICD-10-CM

## 2025-01-30 DIAGNOSIS — N83.8 OVARIAN MASS, RIGHT: Primary | ICD-10-CM

## 2025-01-30 DIAGNOSIS — F17.200 TOBACCO USE DISORDER: ICD-10-CM

## 2025-01-30 NOTE — PROGRESS NOTES
Patient ID: Yadira Davis is a 74 y.o. female.  Referring Physician: No referring provider defined for this encounter.  Primary Care Provider: No Assigned PCP Generic Provider, MD    Virtual or Telephone Consent    A telephone visit (audio only) between the patient (at the originating site) and the provider (at the distant site) was utilized to provide this telehealth service.   Verbal consent was requested and obtained from Yadira Davis on this date, 01/30/25 for a telehealth visit.     Subjective    Here on follow up to discuss pelvic MRI. Pain is ongoing. She is cutting back on smoking - down to 1 ppd. Intermittent nausea without emesis, voiding without difficulty.     Objective    BSA: There is no height or weight on file to calculate BSA.  There were no vitals taken for this visit.     Physical Exam  Constitutional:       General: She is not in acute distress.    Pelvic MRI 1/28/25  IMPRESSION:  1. Complex cystic and solid mass likely of ovarian origin.  Differential diagnosis includes an ovarian neoplasm such as mucinous  cystadenoma/cystadenocarcinoma and endometrioid carcinoma given the  patient's history of uterine cancer. Surgical consultation is  recommended. No lymphadenopathy.  2. The mass abuts distal loops of small bowel and the appendix as  well as the right external iliac vasculature and right ureter without  definite evidence of involvement as described above.    Performance Status:  Symptomatic; fully ambulatory    Assessment/Plan   75 y/o complex/solid pelvic mass, normal  here on follow up for imaging review  Diagnoses and all orders for this visit:  Ovarian mass, right  -     Tumor Board Request; Future  Chronic obstructive pulmonary disease, unspecified COPD type (Multi)  Generalized abdominal pain  Tobacco use disorder    # Pelvic mass  - We discussed the possible etiologies of a pelvic mass including benign, borderline, and malignant.   - Imaging was reviewed and discussed with the  patient, pelvic MRI with mild interval increase in size of the adnexal mass in close proximity to the appendix, small intestine and iliac vessels but no definitive invasion  -  normal,  and CEA ordered (per patient collected on Monday but no result for unclear reason)  - MDTB review  - Discussed surgery is the only way to know if this is cancer or not, but will need clearance from both pulmonology and cardiology before surgery can be scheduled  - Discussed biopsy as an alternative to surgery      # Acute on chronic pain   - continue Oxycodone 5 mg PRN q6 PO      # COPD on home O2  - Encourage compliance with inhaler   - Referral to pulmonology for pre-operative optimization, scheduled 2/4 and will need clearance for surgery before scheduling     # CAD, HFrEF  - Follows with cardiology, will need optimization and clearance for surgery before scheduling     # Tobacco use disorder  - Cessation counseling complete, active phase down to 1 ppd  - Declines patch, gum, lozenge     RTC 1 week for TB review, surgery planning    Jaki Treviño MD MPH    I spent 15 minutes virtually or in a telephone with this patient and/or family. More than 50% of the time was spent in counseling and/or coordination of care.

## 2025-01-30 NOTE — PROGRESS NOTES
Cardiology Follow Up  Chief Complaint:   Preoperative risk assessment.       History Of Present Illness:    This is a 72-year-old female here for follow-up regarding her history of coronary artery disease as seen on cardiac catheterization done in October 2017, peripheral vascular disease followed by vascular surgery, hypertension, dyslipidemia, COPD/tobacco use, and complaints of palpitations/dizziness. The patient was last evaluated by me in October 2022 which time we reviewed results of her nuclear stress, echocardiogram, and cardiac monitor. Patient was asked to follow-up in 6 months and sooner if necessary. ECG done today showed sinus rhythm with a heart rate of 94 bpm, 1st degree AV block and possible anteroseptal infarct age undetermined. The patient reports persistence of dizziness and dimming of vision which lasts for about 2-3 minutes. She denies associated chest pain, shortness of breath, syncope or palpitations. She states that she takes all of her medications as prescribed. During my exam, she was resting comfortably on the exam table.   12-11-24: This is a 74-year-old female patient who presents with her daughter for overdue follow-up.  Patient's last visit April 2023.  Patient has stopped all of her medications and been lost to follow-up but now having multiple issues including fatigue chest pain shortness of breath leg pain.  She is previously known to Dr. Figueredo and has stents in her legs.  Again she has stopped all of her medications.  Last lipid profile that we see she was very high total and LDL cholesterols.  She unfortunately continues to smoke fairly heavily and continues to work full-time at [employer].  Patient has had 2 events in the last week where she thought she might need to call 911 or have daughter take her to the ER and she is strongly cautioned that if she has further episodes like this for longer lasting episodes to call 911 or go to the emergency department.  Her heart catheterization  "in 2017 showed 40% narrowings to the LAD circumflex and RCA.  Patient has not had follow-up with the PCP either.  25:  RCRI 3 patient presents today for preoperative cardiovascular risk assessment.  She is here with her daughter.  I saw this patient in early December.  At that time she had had some functional decline and wanted to update her cardiac testing including echocardiogram that did show some LV dysfunction and date of stress test as she had had a heart catheterization remotely showing mild nonobstructive disease.  Stress testing showed no evidence of ischemia.  She has an expanding pelvic mass and needs surgery.  It is not clear whether this would be laparoscopic versus open laparotomy.  Based on the potential that this could be laparotomy along with coronary disease and LV dysfunction her RCRI score is 3 but along with other comorbidities including some degree of underlying lung disease and peripheral arterial disease she is considered higher risk.  Patient and daughter verbalized understanding of this risk but do want to proceed for definitive diagnosis of this mass.  No further cardiac testing would be necessary at this time.  Because of the LV dysfunction we will add low-dose beta-blockers to her regimen.              Last Recorded Vitals:  Vitals:    25 1431   BP: 140/60   BP Location: Right arm   Patient Position: Sitting   BP Cuff Size: Adult   Pulse: 89   SpO2: 97%   Weight: 57.6 kg (127 lb)   Height: 1.6 m (5' 3\")         Past Medical History:  She has a past medical history of Breast cancer (Multi), COPD (chronic obstructive pulmonary disease) (Multi), Fatigue (2023), HLD (hyperlipidemia), HTN (hypertension), and Personal history of malignant neoplasm of other parts of uterus.    Past Surgical History:  She has a past surgical history that includes Mastectomy (2017); Total abdominal hysterectomy (2017); Breast biopsy (2017);  section, classic " (09/12/2017); Septoplasty (09/12/2017); Adenoidectomy (09/12/2017); Tonsillectomy (09/12/2017); Cholecystectomy (10/12/2017); CT angio aorta and bilateral iliofemoral runoff including without contrast if performed (03/09/2022); and Appendectomy.      Social History:  She reports that she has been smoking cigarettes. She started smoking about 55 years ago. She has a 82.5 pack-year smoking history. She has never used smokeless tobacco. She reports that she does not currently use alcohol. She reports that she does not use drugs.    Family History:  Family History   Problem Relation Name Age of Onset    Breast cancer Mother      Hypertension Mother      Hyperlipidemia Mother      Esophageal cancer Mother      Other (Peripheral artery disease) Mother      Coronary artery disease Father          Allergies:  Codeine    Outpatient Medications:  Current Outpatient Medications   Medication Instructions    albuterol (Ventolin HFA) 90 mcg/actuation inhaler 2 puffs, inhalation, Every 4 hours PRN    aspirin 81 mg, oral, Daily    lisinopriL-hydrochlorothiazide 20-12.5 mg tablet 1 tablet, oral, 2 times daily    oxyCODONE (ROXICODONE) 5 mg, oral, Every 6 hours PRN    rosuvastatin (CRESTOR) 40 mg, oral, Daily     Review of Systems   Constitutional: Negative for fever and malaise/fatigue.   Cardiovascular:  Negative for chest pain, orthopnea and palpitations.   Respiratory:  Negative for shortness of breath and wheezing.         Cut back on smoking   Skin:  Negative for itching and rash.   Gastrointestinal:  Negative for abdominal pain, diarrhea, nausea and vomiting.   Genitourinary:  Negative for dysuria.   Neurological:  Negative for weakness.      Physical Exam  Constitutional:       General: She is not in acute distress.     Appearance: Normal appearance.   HENT:      Mouth/Throat:      Mouth: Mucous membranes are moist.   Neck:      Comments: No JVD  Cardiovascular:      Rate and Rhythm: Normal rate and regular rhythm.      Heart  sounds: Normal heart sounds.      Comments: EKG SR  Pulmonary:      Effort: Pulmonary effort is normal.      Breath sounds: Normal breath sounds.   Abdominal:      General: Bowel sounds are normal.      Palpations: Abdomen is soft.      Tenderness: There is no abdominal tenderness.   Musculoskeletal:      Right lower leg: No edema.      Left lower leg: No edema.   Skin:     General: Skin is warm and dry.   Neurological:      Mental Status: She is alert and oriented to person, place, and time.   Psychiatric:         Behavior: Behavior is cooperative.           Last Labs:  CBC -  Lab Results   Component Value Date    WBC 9.7 01/07/2025    HGB 15.4 01/07/2025    HCT 46.0 01/07/2025    MCV 87 01/07/2025     01/07/2025       CMP -  Lab Results   Component Value Date    CALCIUM 10.3 01/07/2025    PROT 6.4 01/07/2025    ALBUMIN 3.8 01/07/2025    AST 26 01/07/2025    ALT 39 01/07/2025    ALKPHOS 97 01/07/2025    BILITOT 0.4 01/07/2025       LIPID PANEL -   Lab Results   Component Value Date    CHOL 157 01/27/2025    TRIG 168 (H) 01/27/2025    HDL 40 (L) 01/27/2025    CHHDL 3.9 01/27/2025    LDLF 201 (H) 04/26/2023    VLDL 22 04/26/2023    NHDL 117 01/27/2025       RENAL FUNCTION PANEL -   Lab Results   Component Value Date    GLUCOSE 95 01/07/2025     01/07/2025    K 4.0 01/07/2025     01/07/2025    CO2 24 01/07/2025    ANIONGAP 10 01/07/2025    BUN 20 01/07/2025    CREATININE 0.72 01/07/2025    CALCIUM 10.3 01/07/2025    ALBUMIN 3.8 01/07/2025        Lab Results   Component Value Date     (H) 12/24/2024         Echo:  2022-- CONCLUSIONS:   1. Left ventricular systolic function is normal with a 60-65% estimated ejection fraction.   2. Spectral Doppler shows a restrictive pattern of left ventricular diastolic filling.    1/25:  CONCLUSIONS:   1. The left ventricular systolic function is moderately decreased, with a calculated ejection fraction of 38% by auto EF.   2. There is global hypokinesis of  the left ventricle with minor regional variations.   3. Spectral Doppler shows a Grade I (impaired relaxation pattern) of left ventricular diastolic filling with normal left atrial filling pressure.   4. There is normal right ventricular global systolic function.   5. Left Ventricular Global Longitudinal Strain - 9.4 %.    Cath: 2017--CONCLUSIONS:   1. Left main: no significant CAD.   2. LAD: 30% mid-vessel disease.   3. LCx: luminal irregularities.   4. RCA: 40% diffuse prox-mid disease.   5. LVEDP 17mmHg, no aortic stenosis, EF 60-65%, 1+ MR.   6. 80% ostial right common iliac stenosis, 100% proximal left common iliac stenosis.    Stress Test:  Nuclear Stress Test 09/30/2022--IMPRESSION:  1. Normal stress myocardial perfusion imaging in response to  pharmacologic stress.  2. Well-maintained left ventricular function.    1/25:  IMPRESSION:  1. Normal stress myocardial perfusion imaging in response to  pharmacologic stress. Dilated left ventricle with moderate left  ventricular systolic function on post stress gated imaging.  2. Low-dose non gated CT images done for attenuation correction shows  severe coronary artery calcification, severe calcification of the  ascending as well as descending thoracic aorta. .         No recent results to review    Assessment/Plan   Problem List Items Addressed This Visit    None  Visit Diagnoses         Codes    Pre-operative clearance    -  Primary Z01.818    Relevant Orders    ECG 12 lead (Clinic Performed)        Preoperative cardiovascular risk assessment--as per HPI.  Score is 3.  This indicates that she is at higher risk for major adverse cardiac events including MI, dysrhythmia, stroke or death.  Both patient and daughter verbalized understanding of higher risk but also want to proceed for purposes of appropriate diagnoses of the pelvic mass.  Per history of coronary artery disease--EKG today shows sinus rhythm with no acute ST-T wave changes.  Again recent stress testing  showed no evidence of ischemia.  She is on aspirin and statin with recent LDL cholesterol at 90.  Total cholesterol 157.    Shortness of breath/mild LV dysfunction--see above.  She continues to smoke but has cut back.  Will add low-dose carvedilol to her lisinopril hydrochlorothiazide which she has tolerated with no further hypotensive episodes.  On the day of her procedure she is advised to take her lisinopril hydrochlorothiazide and carvedilol with a couple sips of water.    Peripheral arterial disease--see #1 and again is having worsening leg symptoms so we will get her reestablished with Dr. Serrato.  Hypertension--patient tolerating the lisinopril hydrochlorothiazide and again we will be adding low-dose carvedilol to her regimen and monitor blood pressures.    Pelvic mass--see #1  Overall patient is doing fairly well from a cardiac standpoint although she still has some degree of shortness of breath but does not appear to be in CHF.  This is likely related to her underlying COPD.  I did review the case in its entirety with Dr. Brooks who agrees with the risk assessment that she is at higher risk.  No further cardiac testing is necessary at this time and we will send appropriate information to the gynecologic oncologist.    Harish Lazaro PA-C  1/31/2025  2:50 PM

## 2025-01-31 ENCOUNTER — OFFICE VISIT (OUTPATIENT)
Dept: CARDIOLOGY | Facility: HOSPITAL | Age: 75
End: 2025-01-31
Payer: MEDICARE

## 2025-01-31 VITALS
WEIGHT: 127 LBS | HEIGHT: 63 IN | HEART RATE: 89 BPM | DIASTOLIC BLOOD PRESSURE: 60 MMHG | BODY MASS INDEX: 22.5 KG/M2 | OXYGEN SATURATION: 97 % | SYSTOLIC BLOOD PRESSURE: 140 MMHG

## 2025-01-31 DIAGNOSIS — Z01.818 PRE-OPERATIVE CLEARANCE: Primary | ICD-10-CM

## 2025-01-31 LAB
ATRIAL RATE: 85 BPM
P AXIS: 59 DEGREES
P OFFSET: 167 MS
P ONSET: 119 MS
PR INTERVAL: 210 MS
Q ONSET: 224 MS
QRS COUNT: 14 BEATS
QRS DURATION: 80 MS
QT INTERVAL: 350 MS
QTC CALCULATION(BAZETT): 416 MS
QTC FREDERICIA: 393 MS
R AXIS: -4 DEGREES
T AXIS: 106 DEGREES
T OFFSET: 399 MS
VENTRICULAR RATE: 85 BPM

## 2025-01-31 PROCEDURE — 1159F MED LIST DOCD IN RCRD: CPT | Performed by: PHYSICIAN ASSISTANT

## 2025-01-31 PROCEDURE — 3078F DIAST BP <80 MM HG: CPT | Performed by: PHYSICIAN ASSISTANT

## 2025-01-31 PROCEDURE — 93010 ELECTROCARDIOGRAM REPORT: CPT | Performed by: INTERNAL MEDICINE

## 2025-01-31 PROCEDURE — 99214 OFFICE O/P EST MOD 30 MIN: CPT | Performed by: PHYSICIAN ASSISTANT

## 2025-01-31 PROCEDURE — 3077F SYST BP >= 140 MM HG: CPT | Performed by: PHYSICIAN ASSISTANT

## 2025-01-31 PROCEDURE — 93005 ELECTROCARDIOGRAM TRACING: CPT | Performed by: PHYSICIAN ASSISTANT

## 2025-01-31 RX ORDER — CARVEDILOL 3.12 MG/1
3.12 TABLET ORAL
Qty: 180 TABLET | Refills: 3 | Status: SHIPPED | OUTPATIENT
Start: 2025-01-31 | End: 2026-01-31

## 2025-01-31 ASSESSMENT — ENCOUNTER SYMPTOMS
SHORTNESS OF BREATH: 0
DYSURIA: 0
ORTHOPNEA: 0
DIARRHEA: 0
ABDOMINAL PAIN: 0
NAUSEA: 0
FEVER: 0
PALPITATIONS: 0
VOMITING: 0
WEAKNESS: 0
WHEEZING: 0

## 2025-01-31 NOTE — PATIENT INSTRUCTIONS
On the day of your procedure when they tell you not to eat or drink anything I do want you to take the carvedilol and the lisinopril with just a few sips of water.  Please continue your current medications.  If you have any change in your cardiorespiratory status please call the office right away.  We will arrange for your yearly visit with your cardiologist.

## 2025-02-04 ENCOUNTER — APPOINTMENT (OUTPATIENT)
Facility: CLINIC | Age: 75
End: 2025-02-04
Payer: MEDICARE

## 2025-02-04 ENCOUNTER — TELEPHONE (OUTPATIENT)
Dept: CARDIOLOGY | Facility: HOSPITAL | Age: 75
End: 2025-02-04

## 2025-02-04 VITALS
RESPIRATION RATE: 18 BRPM | OXYGEN SATURATION: 94 % | BODY MASS INDEX: 22.06 KG/M2 | HEIGHT: 64 IN | WEIGHT: 129.2 LBS | SYSTOLIC BLOOD PRESSURE: 132 MMHG | HEART RATE: 78 BPM | DIASTOLIC BLOOD PRESSURE: 82 MMHG | TEMPERATURE: 97.1 F

## 2025-02-04 DIAGNOSIS — Z72.0 TOBACCO ABUSE DISORDER: Primary | ICD-10-CM

## 2025-02-04 DIAGNOSIS — Z01.811 PREOP PULMONARY/RESPIRATORY EXAM: ICD-10-CM

## 2025-02-04 DIAGNOSIS — R91.1 PULMONARY NODULE: ICD-10-CM

## 2025-02-04 DIAGNOSIS — J44.9 CHRONIC OBSTRUCTIVE PULMONARY DISEASE, UNSPECIFIED COPD TYPE (MULTI): ICD-10-CM

## 2025-02-04 PROCEDURE — 3075F SYST BP GE 130 - 139MM HG: CPT | Performed by: NURSE PRACTITIONER

## 2025-02-04 PROCEDURE — 3079F DIAST BP 80-89 MM HG: CPT | Performed by: NURSE PRACTITIONER

## 2025-02-04 PROCEDURE — 99205 OFFICE O/P NEW HI 60 MIN: CPT | Performed by: NURSE PRACTITIONER

## 2025-02-04 PROCEDURE — 1159F MED LIST DOCD IN RCRD: CPT | Performed by: NURSE PRACTITIONER

## 2025-02-04 RX ORDER — ALBUTEROL SULFATE 90 UG/1
1 INHALANT RESPIRATORY (INHALATION) ONCE
OUTPATIENT
Start: 2025-02-04

## 2025-02-04 RX ORDER — ALBUTEROL SULFATE 0.83 MG/ML
3 SOLUTION RESPIRATORY (INHALATION) ONCE
OUTPATIENT
Start: 2025-02-04 | End: 2025-02-04

## 2025-02-04 ASSESSMENT — COLUMBIA-SUICIDE SEVERITY RATING SCALE - C-SSRS
2. HAVE YOU ACTUALLY HAD ANY THOUGHTS OF KILLING YOURSELF?: NO
6. HAVE YOU EVER DONE ANYTHING, STARTED TO DO ANYTHING, OR PREPARED TO DO ANYTHING TO END YOUR LIFE?: NO
1. IN THE PAST MONTH, HAVE YOU WISHED YOU WERE DEAD OR WISHED YOU COULD GO TO SLEEP AND NOT WAKE UP?: NO

## 2025-02-04 ASSESSMENT — COPD QUESTIONNAIRES
QUESTION5_HOMEACTIVITIES: 2
QUESTION4_WALKINCLINE: 2
QUESTION8_ENERGYLEVEL: 5 - I HAVE NO ENERGY AT ALL
QUESTION3_CHESTTIGHTNESS: 1
QUESTION2_CHESTPHLEGM: 1
QUESTION1_COUGHFREQUENCY: 3
QUESTION7_SLEEPQUALITY: 2
QUESTION6_LEAVINGHOUSE: 1
CAT_TOTALSCORE: 17

## 2025-02-04 ASSESSMENT — ENCOUNTER SYMPTOMS
DEPRESSION: 0
OCCASIONAL FEELINGS OF UNSTEADINESS: 0
ABDOMINAL PAIN: 1
SHORTNESS OF BREATH: 1
LOSS OF SENSATION IN FEET: 1

## 2025-02-04 ASSESSMENT — PATIENT HEALTH QUESTIONNAIRE - PHQ9
1. LITTLE INTEREST OR PLEASURE IN DOING THINGS: NOT AT ALL
2. FEELING DOWN, DEPRESSED OR HOPELESS: NOT AT ALL
SUM OF ALL RESPONSES TO PHQ9 QUESTIONS 1 AND 2: 0

## 2025-02-04 NOTE — PATIENT INSTRUCTIONS
Pulm nodule:   CT chest in 5/2024 -  5 mm nodule within the ANCA increasing 6mm pleural-based nodule within the right lower lobe   - repeat CT chest noncontrast since been 9 months       COPD MMRC 2-3  Last exacerbation 12/24 two episodes of exacerbation in 12 months  - add laba/lama - anoro 1 puff once a day  - albuterol hfa 2 puffs or albuterol nebs every 4-6 hours as needed  - Obtain pulmonary function test, FENO and 6 minute walk test   Smoking cessation encouraged - >5 min education - tapering currently at 1/2 ppd      # Preoperative evaluation:  Patient is at increased risk of postoperative pulmonary complications given  age >50 years, chronic obstructive pulmonary disease, current cigarette use, smoking in the last 8 weeks,   However this increased risk should not preclude the surgery.    - PFTs with pending    - To minimize the risk for complications we recommend the following: incentive spirometry to be started before surgery, smoking cessation for at least 8 weeks before surgery,  Use of CPAP machine , early ambulation, minimize sedation, DVT prophylaxis if appropriate per surgical team.   -  can call pulmonary consult while inpatient if needed     Preop risk assessment:  --- ARISCAT score 44  pts = Intermediete risk - 13.3  % risk of in- hospital post-op pulmonary complications.   --- Per arozullah respiratory failure index - Estimated risk probability for postoperative respiratory failure 5 %.       ASA I   - A normal healthy patient   - Healthy, non-smoking, no or minimal alcohol use Healthy (no acute or chronic disease)  - normal BMI percentile for age    ASA II   - A patient with mild systemic disease   - Mild diseases only without substantive functional limitations.   - Current smoker, mild lung disease       Thank you for visiting the Pulmonary clinic today!       Return to clinic after 4-6 weeks and after PFTs or sooner if needed   Oly Cronin CNP  My office number is [phone] -      Call to schedule  for radiology - CT scans/PFTs etc at  [phone]  General scheduling  [phone]     Best way to get a hold of me is to call my office --> Please do not send me follow my health messages  Any test results will be discussed at next visit -- please make sure to make a follow up appt after testing.

## 2025-02-04 NOTE — TELEPHONE ENCOUNTER
I refaxed the work forms for patient again and received a confirmation fax.  I called and spoke with Daughter to let her know that the forms were refaxed again to [employer].

## 2025-02-06 NOTE — TUMOR BOARD NOTE
Gynecologic Oncology Tumor Board 2025         Yadira Davis  74 y.o.    1950  MRN [id number]    Provider: Jaki Treviño MD  Disease Site/Stage: Ovarian  Pathology: NA  Prior Therapy:  NA  Impression: Pelvic mass of uncertain behavior    We reviewed previous medical and familial history, history of present illness, and recent lab results along with all available histopathologic and imaging studies. The tumor board considered available treatment options and made the following recommendations.    Recommendations:     Surgical management pending medical clearance     Clinical Trial Consideration: None Available    National site-specific guidelines were discussed with respect to the case. It is recognized that there may be additional factors not discussed in the Review Board discussion that can influence management decisions, and alternative management options which fall within the standard of care. Accordingly the final treatment disposition will be determined by the patient, in the context of an informed discussion with their providers. The actual prescribed management or treatment plan may or may not be consistent with the Review Board recommendations.

## 2025-02-07 ENCOUNTER — APPOINTMENT (OUTPATIENT)
Dept: HEMATOLOGY/ONCOLOGY | Facility: HOSPITAL | Age: 75
End: 2025-02-07
Payer: MEDICARE

## 2025-02-07 ENCOUNTER — TELEMEDICINE (OUTPATIENT)
Dept: GYNECOLOGIC ONCOLOGY | Facility: HOSPITAL | Age: 75
End: 2025-02-07
Payer: MEDICARE

## 2025-02-07 ENCOUNTER — PREP FOR PROCEDURE (OUTPATIENT)
Dept: OPERATING ROOM | Facility: HOSPITAL | Age: 75
End: 2025-02-07

## 2025-02-07 DIAGNOSIS — J44.9 CHRONIC OBSTRUCTIVE PULMONARY DISEASE, UNSPECIFIED COPD TYPE (MULTI): ICD-10-CM

## 2025-02-07 DIAGNOSIS — N83.8 OVARIAN MASS, RIGHT: Primary | ICD-10-CM

## 2025-02-07 DIAGNOSIS — N94.89 ADNEXAL MASS: Primary | ICD-10-CM

## 2025-02-07 DIAGNOSIS — I25.10 CORONARY ARTERY DISEASE INVOLVING NATIVE CORONARY ARTERY WITHOUT ANGINA PECTORIS, UNSPECIFIED WHETHER NATIVE OR TRANSPLANTED HEART: ICD-10-CM

## 2025-02-07 RX ORDER — CELECOXIB 200 MG/1
400 CAPSULE ORAL ONCE
OUTPATIENT
Start: 2025-02-07 | End: 2025-02-07

## 2025-02-07 RX ORDER — ACETAMINOPHEN 325 MG/1
975 TABLET ORAL ONCE
OUTPATIENT
Start: 2025-02-07 | End: 2025-02-07

## 2025-02-07 RX ORDER — HEPARIN SODIUM 5000 [USP'U]/ML
5000 INJECTION, SOLUTION INTRAVENOUS; SUBCUTANEOUS ONCE
OUTPATIENT
Start: 2025-02-07 | End: 2025-02-07

## 2025-02-07 RX ORDER — GABAPENTIN 600 MG/1
600 TABLET ORAL ONCE
OUTPATIENT
Start: 2025-02-07 | End: 2025-02-07

## 2025-02-07 NOTE — PROGRESS NOTES
Patient ID: Yadira Davis is a 74 y.o. female.  Referring Physician: No referring provider defined for this encounter.  Primary Care Provider: No Assigned PCP Generic Provider, MD    Virtual or Telephone Consent    A telephone visit (audio only) between the patient (at the originating site) and the provider (at the distant site) was utilized to provide this telehealth service.   Verbal consent was requested and obtained from Yadira Davis on this date, 02/07/25 for a telehealth visit.     Subjective    Here on follow up to discuss TB recommendation and surgical planning. Pain is ongoing though improved. Constipation treated with miralax and suppository with relief. Denies vaginal bleeding.     Objective    BSA: There is no height or weight on file to calculate BSA.  There were no vitals taken for this visit.     Physical Exam  Constitutional:       General: She is not in acute distress.    Pelvic MRI 1/28/25  IMPRESSION:  1. Complex cystic and solid mass likely of ovarian origin.  Differential diagnosis includes an ovarian neoplasm such as mucinous  cystadenoma/cystadenocarcinoma and endometrioid carcinoma given the  patient's history of uterine cancer. Surgical consultation is  recommended. No lymphadenopathy.  2. The mass abuts distal loops of small bowel and the appendix as  well as the right external iliac vasculature and right ureter without  definite evidence of involvement as described above.    Performance Status:  Symptomatic; fully ambulatory    Assessment/Plan   75 y/o complex/solid enlarging pelvic mass, normal  here on follow up for  TB recommendation  Diagnoses and all orders for this visit:  Ovarian mass, right  Chronic obstructive pulmonary disease, unspecified COPD type (Multi)  Generalized abdominal pain  Tobacco use disorder    # Pelvic mass  - We discussed the possible etiologies of a pelvic mass including benign, borderline, and malignant.   - Imaging was reviewed and discussed with the  patient, pelvic MRI with mild interval increase in size of the adnexal mass in close proximity to the appendix, small intestine and iliac vessels but no definitive invasion  -  normal,  and CEA ordered (per patient collected on Monday but no result for unclear reason)  - MDTB review, surgery with XL excision of pelvic mass  - Discussed surgery is the only way to know if this is cancer or not, but will need clearance from both pulmonology and cardiology before surgery can be scheduled  - Plan for XL removal of pelvic mass, possible staging any indicated procedures   - PAT needed   - Consent to be signed DOS     # Acute on chronic pain   - continue Oxycodone 5 mg PRN q6 PO      # COPD on home O2  - Encourage compliance with inhaler   - s/p pulmonology referral, optimized     # CAD, HFrEF  - Follows with cardiology, optimized     # Tobacco use disorder  - Cessation counseling complete, active phase down to 1/2 ppd  - Declines patch, gum, lozenge     Jaki Treviño MD MPH    I spent 15 minutes virtually or in a telephone with this patient and/or family. More than 50% of the time was spent in counseling and/or coordination of care.

## 2025-02-10 ENCOUNTER — TELEPHONE (OUTPATIENT)
Dept: GYNECOLOGIC ONCOLOGY | Facility: HOSPITAL | Age: 75
End: 2025-02-10
Payer: MEDICARE

## 2025-02-10 DIAGNOSIS — R10.2 PELVIC PAIN IN FEMALE: ICD-10-CM

## 2025-02-10 DIAGNOSIS — N83.8 OVARIAN MASS, RIGHT: Primary | ICD-10-CM

## 2025-02-10 RX ORDER — OXYCODONE HYDROCHLORIDE 5 MG/1
5 TABLET ORAL EVERY 6 HOURS PRN
Qty: 20 TABLET | Refills: 0 | Status: ON HOLD | OUTPATIENT
Start: 2025-02-10 | End: 2025-02-17

## 2025-02-10 NOTE — TELEPHONE ENCOUNTER
Phoned patient in follow up to [facility] message sent requesting oxycodone refill.   Spoke with patients daughter who states patient takes 1 tablet of oxycodone as needed for abdominal pain.   Daughter states patient there are some days that she does not take oxycodone and other days when she will take 1-2 tabs/day based on pain she is experiencing.     Daughter states oxycodone is effective in relieving abdominal pain.    Oxycodone refill request routed to Dr. Treviño and Dr. Burdick.

## 2025-02-16 ENCOUNTER — HOSPITAL ENCOUNTER (INPATIENT)
Facility: HOSPITAL | Age: 75
DRG: 291 | End: 2025-02-16
Attending: EMERGENCY MEDICINE | Admitting: INTERNAL MEDICINE
Payer: MEDICARE

## 2025-02-16 ENCOUNTER — APPOINTMENT (OUTPATIENT)
Dept: CARDIOLOGY | Facility: HOSPITAL | Age: 75
DRG: 291 | End: 2025-02-16
Payer: MEDICARE

## 2025-02-16 ENCOUNTER — APPOINTMENT (OUTPATIENT)
Dept: RADIOLOGY | Facility: HOSPITAL | Age: 75
DRG: 291 | End: 2025-02-16
Payer: MEDICARE

## 2025-02-16 VITALS
RESPIRATION RATE: 18 BRPM | SYSTOLIC BLOOD PRESSURE: 107 MMHG | WEIGHT: 135.4 LBS | HEIGHT: 65 IN | TEMPERATURE: 97.9 F | BODY MASS INDEX: 22.56 KG/M2 | HEART RATE: 81 BPM | DIASTOLIC BLOOD PRESSURE: 60 MMHG | OXYGEN SATURATION: 96 %

## 2025-02-16 DIAGNOSIS — R10.2 PELVIC PAIN IN FEMALE: ICD-10-CM

## 2025-02-16 DIAGNOSIS — J44.1 COPD EXACERBATION (MULTI): Primary | ICD-10-CM

## 2025-02-16 DIAGNOSIS — K59.00 CONSTIPATION, UNSPECIFIED CONSTIPATION TYPE: ICD-10-CM

## 2025-02-16 DIAGNOSIS — N83.8 OVARIAN MASS, RIGHT: ICD-10-CM

## 2025-02-16 DIAGNOSIS — R06.09 DYSPNEA ON EXERTION: ICD-10-CM

## 2025-02-16 DIAGNOSIS — R50.9 FEVER, UNSPECIFIED FEVER CAUSE: ICD-10-CM

## 2025-02-16 DIAGNOSIS — I50.9 ACUTE HEART FAILURE, UNSPECIFIED HEART FAILURE TYPE: ICD-10-CM

## 2025-02-16 LAB
ALBUMIN SERPL BCP-MCNC: 4.1 G/DL (ref 3.4–5)
ALP SERPL-CCNC: 77 U/L (ref 33–136)
ALT SERPL W P-5'-P-CCNC: 9 U/L (ref 7–45)
ANION GAP BLDV CALCULATED.4IONS-SCNC: 5 MMOL/L (ref 10–25)
ANION GAP BLDV CALCULATED.4IONS-SCNC: 7 MMOL/L (ref 10–25)
ANION GAP SERPL CALC-SCNC: 11 MMOL/L (ref 10–20)
AST SERPL W P-5'-P-CCNC: 14 U/L (ref 9–39)
BASE EXCESS BLDV CALC-SCNC: -1.5 MMOL/L (ref -2–3)
BASE EXCESS BLDV CALC-SCNC: -3.8 MMOL/L (ref -2–3)
BASE EXCESS BLDV CALC-SCNC: 1.8 MMOL/L (ref -2–3)
BASOPHILS # BLD AUTO: 0.05 X10*3/UL (ref 0–0.1)
BASOPHILS NFR BLD AUTO: 0.6 %
BILIRUB SERPL-MCNC: 0.4 MG/DL (ref 0–1.2)
BNP SERPL-MCNC: 1008 PG/ML (ref 0–99)
BODY TEMPERATURE: 37 DEGREES CELSIUS
BUN SERPL-MCNC: 21 MG/DL (ref 6–23)
CA-I BLDV-SCNC: 1.41 MMOL/L (ref 1.1–1.33)
CA-I BLDV-SCNC: 1.42 MMOL/L (ref 1.1–1.33)
CALCIUM SERPL-MCNC: 10.1 MG/DL (ref 8.6–10.3)
CARDIAC TROPONIN I PNL SERPL HS: 19 NG/L (ref 0–13)
CARDIAC TROPONIN I PNL SERPL HS: 29 NG/L (ref 0–13)
CARDIAC TROPONIN I PNL SERPL HS: 51 NG/L (ref 0–13)
CARDIAC TROPONIN I PNL SERPL HS: 53 NG/L (ref 0–13)
CHLORIDE BLDV-SCNC: 105 MMOL/L (ref 98–107)
CHLORIDE BLDV-SCNC: 106 MMOL/L (ref 98–107)
CHLORIDE SERPL-SCNC: 106 MMOL/L (ref 98–107)
CO2 SERPL-SCNC: 23 MMOL/L (ref 21–32)
CREAT SERPL-MCNC: 1.02 MG/DL (ref 0.5–1.05)
EGFRCR SERPLBLD CKD-EPI 2021: 57 ML/MIN/1.73M*2
EOSINOPHIL # BLD AUTO: 0.35 X10*3/UL (ref 0–0.4)
EOSINOPHIL NFR BLD AUTO: 4.5 %
ERYTHROCYTE [DISTWIDTH] IN BLOOD BY AUTOMATED COUNT: 13.2 % (ref 11.5–14.5)
FLUAV RNA RESP QL NAA+PROBE: NOT DETECTED
FLUBV RNA RESP QL NAA+PROBE: NOT DETECTED
GLUCOSE BLDV-MCNC: 267 MG/DL (ref 74–99)
GLUCOSE BLDV-MCNC: 78 MG/DL (ref 74–99)
GLUCOSE SERPL-MCNC: 247 MG/DL (ref 74–99)
HCO3 BLDV-SCNC: 26.3 MMOL/L (ref 22–26)
HCO3 BLDV-SCNC: 27.2 MMOL/L (ref 22–26)
HCO3 BLDV-SCNC: 28.6 MMOL/L (ref 22–26)
HCT VFR BLD AUTO: 46 % (ref 36–46)
HCT VFR BLD EST: 42 % (ref 36–46)
HCT VFR BLD EST: 45 % (ref 36–46)
HGB BLD-MCNC: 14.7 G/DL (ref 12–16)
HGB BLDV-MCNC: 14 G/DL (ref 12–16)
HGB BLDV-MCNC: 14.9 G/DL (ref 12–16)
IMM GRANULOCYTES # BLD AUTO: 0.01 X10*3/UL (ref 0–0.5)
IMM GRANULOCYTES NFR BLD AUTO: 0.1 % (ref 0–0.9)
INHALED O2 CONCENTRATION: 100 %
INHALED O2 CONCENTRATION: 50 %
INHALED O2 CONCENTRATION: 50 %
LACTATE BLDV-SCNC: 0.8 MMOL/L (ref 0.4–2)
LACTATE BLDV-SCNC: 1.5 MMOL/L (ref 0.4–2)
LYMPHOCYTES # BLD AUTO: 3.43 X10*3/UL (ref 0.8–3)
LYMPHOCYTES NFR BLD AUTO: 44.4 %
MAGNESIUM SERPL-MCNC: 1.94 MG/DL (ref 1.6–2.4)
MCH RBC QN AUTO: 28.4 PG (ref 26–34)
MCHC RBC AUTO-ENTMCNC: 32 G/DL (ref 32–36)
MCV RBC AUTO: 89 FL (ref 80–100)
MONOCYTES # BLD AUTO: 0.3 X10*3/UL (ref 0.05–0.8)
MONOCYTES NFR BLD AUTO: 3.9 %
NEUTROPHILS # BLD AUTO: 3.59 X10*3/UL (ref 1.6–5.5)
NEUTROPHILS NFR BLD AUTO: 46.5 %
NRBC BLD-RTO: 0 /100 WBCS (ref 0–0)
OXYHGB MFR BLDV: 30.1 % (ref 45–75)
OXYHGB MFR BLDV: 60.2 % (ref 45–75)
OXYHGB MFR BLDV: 69.2 % (ref 45–75)
PCO2 BLDV: 53 MM HG (ref 41–51)
PCO2 BLDV: 56 MM HG (ref 41–51)
PCO2 BLDV: 78 MM HG (ref 41–51)
PH BLDV: 7.15 PH (ref 7.33–7.43)
PH BLDV: 7.28 PH (ref 7.33–7.43)
PH BLDV: 7.34 PH (ref 7.33–7.43)
PLATELET # BLD AUTO: 184 X10*3/UL (ref 150–450)
PO2 BLDV: 27 MM HG (ref 35–45)
PO2 BLDV: 38 MM HG (ref 35–45)
PO2 BLDV: 46 MM HG (ref 35–45)
POTASSIUM BLDV-SCNC: 4 MMOL/L (ref 3.5–5.3)
POTASSIUM BLDV-SCNC: 4.3 MMOL/L (ref 3.5–5.3)
POTASSIUM SERPL-SCNC: 4.3 MMOL/L (ref 3.5–5.3)
PROT SERPL-MCNC: 6.4 G/DL (ref 6.4–8.2)
RBC # BLD AUTO: 5.17 X10*6/UL (ref 4–5.2)
RSV RNA RESP QL NAA+PROBE: NOT DETECTED
SAO2 % BLDV: 31 % (ref 45–75)
SAO2 % BLDV: 61 % (ref 45–75)
SAO2 % BLDV: 71 % (ref 45–75)
SARS-COV-2 RNA RESP QL NAA+PROBE: NOT DETECTED
SODIUM BLDV-SCNC: 133 MMOL/L (ref 136–145)
SODIUM BLDV-SCNC: 135 MMOL/L (ref 136–145)
SODIUM SERPL-SCNC: 136 MMOL/L (ref 136–145)
WBC # BLD AUTO: 7.7 X10*3/UL (ref 4.4–11.3)

## 2025-02-16 PROCEDURE — 99285 EMERGENCY DEPT VISIT HI MDM: CPT | Performed by: EMERGENCY MEDICINE

## 2025-02-16 PROCEDURE — 5A09357 ASSISTANCE WITH RESPIRATORY VENTILATION, LESS THAN 24 CONSECUTIVE HOURS, CONTINUOUS POSITIVE AIRWAY PRESSURE: ICD-10-PCS | Performed by: EMERGENCY MEDICINE

## 2025-02-16 PROCEDURE — 2500000002 HC RX 250 W HCPCS SELF ADMINISTERED DRUGS (ALT 637 FOR MEDICARE OP, ALT 636 FOR OP/ED): Performed by: INTERNAL MEDICINE

## 2025-02-16 PROCEDURE — 2500000002 HC RX 250 W HCPCS SELF ADMINISTERED DRUGS (ALT 637 FOR MEDICARE OP, ALT 636 FOR OP/ED): Performed by: NURSE PRACTITIONER

## 2025-02-16 PROCEDURE — 93005 ELECTROCARDIOGRAM TRACING: CPT

## 2025-02-16 PROCEDURE — 84484 ASSAY OF TROPONIN QUANT: CPT | Performed by: NURSE PRACTITIONER

## 2025-02-16 PROCEDURE — 99222 1ST HOSP IP/OBS MODERATE 55: CPT | Performed by: NURSE PRACTITIONER

## 2025-02-16 PROCEDURE — 94660 CPAP INITIATION&MGMT: CPT

## 2025-02-16 PROCEDURE — 84484 ASSAY OF TROPONIN QUANT: CPT | Performed by: EMERGENCY MEDICINE

## 2025-02-16 PROCEDURE — 82435 ASSAY OF BLOOD CHLORIDE: CPT | Performed by: EMERGENCY MEDICINE

## 2025-02-16 PROCEDURE — 2500000002 HC RX 250 W HCPCS SELF ADMINISTERED DRUGS (ALT 637 FOR MEDICARE OP, ALT 636 FOR OP/ED): Performed by: EMERGENCY MEDICINE

## 2025-02-16 PROCEDURE — 1200000002 HC GENERAL ROOM WITH TELEMETRY DAILY

## 2025-02-16 PROCEDURE — 83735 ASSAY OF MAGNESIUM: CPT | Performed by: EMERGENCY MEDICINE

## 2025-02-16 PROCEDURE — 82805 BLOOD GASES W/O2 SATURATION: CPT | Performed by: EMERGENCY MEDICINE

## 2025-02-16 PROCEDURE — 87634 RSV DNA/RNA AMP PROBE: CPT | Performed by: NURSE PRACTITIONER

## 2025-02-16 PROCEDURE — 83880 ASSAY OF NATRIURETIC PEPTIDE: CPT | Performed by: EMERGENCY MEDICINE

## 2025-02-16 PROCEDURE — 96375 TX/PRO/DX INJ NEW DRUG ADDON: CPT

## 2025-02-16 PROCEDURE — 36415 COLL VENOUS BLD VENIPUNCTURE: CPT | Performed by: EMERGENCY MEDICINE

## 2025-02-16 PROCEDURE — 94640 AIRWAY INHALATION TREATMENT: CPT

## 2025-02-16 PROCEDURE — 87636 SARSCOV2 & INF A&B AMP PRB: CPT | Performed by: EMERGENCY MEDICINE

## 2025-02-16 PROCEDURE — 85025 COMPLETE CBC W/AUTO DIFF WBC: CPT | Performed by: EMERGENCY MEDICINE

## 2025-02-16 PROCEDURE — 94640 AIRWAY INHALATION TREATMENT: CPT | Mod: 59

## 2025-02-16 PROCEDURE — 2500000004 HC RX 250 GENERAL PHARMACY W/ HCPCS (ALT 636 FOR OP/ED): Performed by: EMERGENCY MEDICINE

## 2025-02-16 PROCEDURE — 2500000004 HC RX 250 GENERAL PHARMACY W/ HCPCS (ALT 636 FOR OP/ED): Performed by: NURSE PRACTITIONER

## 2025-02-16 PROCEDURE — 71045 X-RAY EXAM CHEST 1 VIEW: CPT

## 2025-02-16 PROCEDURE — 71045 X-RAY EXAM CHEST 1 VIEW: CPT | Performed by: RADIOLOGY

## 2025-02-16 PROCEDURE — 2500000001 HC RX 250 WO HCPCS SELF ADMINISTERED DRUGS (ALT 637 FOR MEDICARE OP): Performed by: NURSE PRACTITIONER

## 2025-02-16 PROCEDURE — 99291 CRITICAL CARE FIRST HOUR: CPT | Performed by: EMERGENCY MEDICINE

## 2025-02-16 PROCEDURE — 96365 THER/PROPH/DIAG IV INF INIT: CPT

## 2025-02-16 RX ORDER — IPRATROPIUM BROMIDE AND ALBUTEROL SULFATE 2.5; .5 MG/3ML; MG/3ML
3 SOLUTION RESPIRATORY (INHALATION)
Status: DISCONTINUED | OUTPATIENT
Start: 2025-02-16 | End: 2025-02-18 | Stop reason: HOSPADM

## 2025-02-16 RX ORDER — ROSUVASTATIN CALCIUM 20 MG/1
40 TABLET, COATED ORAL NIGHTLY
Status: DISCONTINUED | OUTPATIENT
Start: 2025-02-16 | End: 2025-02-18 | Stop reason: HOSPADM

## 2025-02-16 RX ORDER — FORMOTEROL FUMARATE 20 UG/2ML
20 SOLUTION RESPIRATORY (INHALATION)
Status: DISCONTINUED | OUTPATIENT
Start: 2025-02-16 | End: 2025-02-18 | Stop reason: HOSPADM

## 2025-02-16 RX ORDER — HEPARIN SODIUM 5000 [USP'U]/ML
5000 INJECTION, SOLUTION INTRAVENOUS; SUBCUTANEOUS EVERY 8 HOURS SCHEDULED
Status: DISCONTINUED | OUTPATIENT
Start: 2025-02-16 | End: 2025-02-18 | Stop reason: HOSPADM

## 2025-02-16 RX ORDER — FUROSEMIDE 10 MG/ML
40 INJECTION INTRAMUSCULAR; INTRAVENOUS ONCE
Status: COMPLETED | OUTPATIENT
Start: 2025-02-16 | End: 2025-02-16

## 2025-02-16 RX ORDER — LISINOPRIL 20 MG/1
20 TABLET ORAL DAILY
Status: DISCONTINUED | OUTPATIENT
Start: 2025-02-16 | End: 2025-02-18

## 2025-02-16 RX ORDER — IPRATROPIUM BROMIDE AND ALBUTEROL SULFATE 2.5; .5 MG/3ML; MG/3ML
3 SOLUTION RESPIRATORY (INHALATION) ONCE
Status: COMPLETED | OUTPATIENT
Start: 2025-02-16 | End: 2025-02-16

## 2025-02-16 RX ORDER — CARVEDILOL 3.12 MG/1
3.12 TABLET ORAL 2 TIMES DAILY
Status: DISCONTINUED | OUTPATIENT
Start: 2025-02-16 | End: 2025-02-18 | Stop reason: HOSPADM

## 2025-02-16 RX ORDER — SENNOSIDES 8.6 MG/1
2 TABLET ORAL 2 TIMES DAILY
Status: DISCONTINUED | OUTPATIENT
Start: 2025-02-16 | End: 2025-02-18 | Stop reason: HOSPADM

## 2025-02-16 RX ORDER — GUAIFENESIN 600 MG/1
600 TABLET, EXTENDED RELEASE ORAL 2 TIMES DAILY
Status: DISCONTINUED | OUTPATIENT
Start: 2025-02-16 | End: 2025-02-18 | Stop reason: HOSPADM

## 2025-02-16 RX ORDER — FUROSEMIDE 10 MG/ML
40 INJECTION INTRAMUSCULAR; INTRAVENOUS DAILY
Status: DISCONTINUED | OUTPATIENT
Start: 2025-02-17 | End: 2025-02-17

## 2025-02-16 RX ORDER — MAGNESIUM SULFATE HEPTAHYDRATE 40 MG/ML
2 INJECTION, SOLUTION INTRAVENOUS ONCE
Status: COMPLETED | OUTPATIENT
Start: 2025-02-16 | End: 2025-02-16

## 2025-02-16 RX ORDER — IPRATROPIUM BROMIDE AND ALBUTEROL SULFATE 2.5; .5 MG/3ML; MG/3ML
3 SOLUTION RESPIRATORY (INHALATION)
Status: DISCONTINUED | OUTPATIENT
Start: 2025-02-16 | End: 2025-02-16

## 2025-02-16 RX ORDER — ASPIRIN 81 MG/1
81 TABLET ORAL DAILY
Status: DISCONTINUED | OUTPATIENT
Start: 2025-02-16 | End: 2025-02-18 | Stop reason: HOSPADM

## 2025-02-16 RX ORDER — ACETAMINOPHEN 325 MG/1
650 TABLET ORAL EVERY 4 HOURS PRN
Status: DISCONTINUED | OUTPATIENT
Start: 2025-02-16 | End: 2025-02-18 | Stop reason: HOSPADM

## 2025-02-16 RX ADMIN — TIOTROPIUM BROMIDE INHALATION SPRAY 2 PUFF: 3.12 SPRAY, METERED RESPIRATORY (INHALATION) at 12:36

## 2025-02-16 RX ADMIN — ASPIRIN 81 MG: 81 TABLET, COATED ORAL at 10:02

## 2025-02-16 RX ADMIN — ROSUVASTATIN 40 MG: 20 TABLET, FILM COATED ORAL at 20:56

## 2025-02-16 RX ADMIN — MAGNESIUM SULFATE HEPTAHYDRATE 2 G: 40 INJECTION, SOLUTION INTRAVENOUS at 02:58

## 2025-02-16 RX ADMIN — GUAIFENESIN 600 MG: 600 TABLET ORAL at 10:02

## 2025-02-16 RX ADMIN — IPRATROPIUM BROMIDE AND ALBUTEROL SULFATE 3 ML: 2.5; .5 SOLUTION RESPIRATORY (INHALATION) at 20:05

## 2025-02-16 RX ADMIN — FORMOTEROL FUMARATE DIHYDRATE 20 MCG: 20 SOLUTION RESPIRATORY (INHALATION) at 20:07

## 2025-02-16 RX ADMIN — IPRATROPIUM BROMIDE AND ALBUTEROL SULFATE 3 ML: 2.5; .5 SOLUTION RESPIRATORY (INHALATION) at 11:44

## 2025-02-16 RX ADMIN — FUROSEMIDE 40 MG: 10 INJECTION, SOLUTION INTRAMUSCULAR; INTRAVENOUS at 05:36

## 2025-02-16 RX ADMIN — GUAIFENESIN 600 MG: 600 TABLET ORAL at 20:56

## 2025-02-16 RX ADMIN — HEPARIN SODIUM 5000 UNITS: 5000 INJECTION, SOLUTION INTRAVENOUS; SUBCUTANEOUS at 10:18

## 2025-02-16 RX ADMIN — LISINOPRIL 20 MG: 20 TABLET ORAL at 10:02

## 2025-02-16 RX ADMIN — SENNOSIDES 17.2 MG: 8.6 TABLET ORAL at 20:56

## 2025-02-16 RX ADMIN — HEPARIN SODIUM 5000 UNITS: 5000 INJECTION, SOLUTION INTRAVENOUS; SUBCUTANEOUS at 15:39

## 2025-02-16 RX ADMIN — CARVEDILOL 3.12 MG: 3.12 TABLET, FILM COATED ORAL at 10:02

## 2025-02-16 RX ADMIN — IPRATROPIUM BROMIDE AND ALBUTEROL SULFATE 3 ML: 2.5; .5 SOLUTION RESPIRATORY (INHALATION) at 02:59

## 2025-02-16 RX ADMIN — HEPARIN SODIUM 5000 UNITS: 5000 INJECTION, SOLUTION INTRAVENOUS; SUBCUTANEOUS at 21:04

## 2025-02-16 RX ADMIN — SENNOSIDES 17.2 MG: 8.6 TABLET ORAL at 10:02

## 2025-02-16 RX ADMIN — IPRATROPIUM BROMIDE AND ALBUTEROL SULFATE 3 ML: 2.5; .5 SOLUTION RESPIRATORY (INHALATION) at 03:04

## 2025-02-16 SDOH — SOCIAL STABILITY: SOCIAL INSECURITY: WITHIN THE LAST YEAR, HAVE YOU BEEN AFRAID OF YOUR PARTNER OR EX-PARTNER?: NO

## 2025-02-16 SDOH — ECONOMIC STABILITY: FOOD INSECURITY: WITHIN THE PAST 12 MONTHS, THE FOOD YOU BOUGHT JUST DIDN'T LAST AND YOU DIDN'T HAVE MONEY TO GET MORE.: NEVER TRUE

## 2025-02-16 SDOH — SOCIAL STABILITY: SOCIAL INSECURITY: HAVE YOU HAD ANY THOUGHTS OF HARMING ANYONE ELSE?: NO

## 2025-02-16 SDOH — SOCIAL STABILITY: SOCIAL INSECURITY
WITHIN THE LAST YEAR, HAVE YOU BEEN RAPED OR FORCED TO HAVE ANY KIND OF SEXUAL ACTIVITY BY YOUR PARTNER OR EX-PARTNER?: NO

## 2025-02-16 SDOH — ECONOMIC STABILITY: HOUSING INSECURITY: IN THE LAST 12 MONTHS, WAS THERE A TIME WHEN YOU WERE NOT ABLE TO PAY THE MORTGAGE OR RENT ON TIME?: NO

## 2025-02-16 SDOH — ECONOMIC STABILITY: INCOME INSECURITY: IN THE PAST 12 MONTHS HAS THE ELECTRIC, GAS, OIL, OR WATER COMPANY THREATENED TO SHUT OFF SERVICES IN YOUR HOME?: NO

## 2025-02-16 SDOH — ECONOMIC STABILITY: HOUSING INSECURITY: AT ANY TIME IN THE PAST 12 MONTHS, WERE YOU HOMELESS OR LIVING IN A SHELTER (INCLUDING NOW)?: NO

## 2025-02-16 SDOH — SOCIAL STABILITY: SOCIAL INSECURITY: ABUSE: ADULT

## 2025-02-16 SDOH — SOCIAL STABILITY: SOCIAL INSECURITY: HAVE YOU HAD THOUGHTS OF HARMING ANYONE ELSE?: NO

## 2025-02-16 SDOH — SOCIAL STABILITY: SOCIAL INSECURITY
WITHIN THE LAST YEAR, HAVE YOU BEEN KICKED, HIT, SLAPPED, OR OTHERWISE PHYSICALLY HURT BY YOUR PARTNER OR EX-PARTNER?: NO

## 2025-02-16 SDOH — ECONOMIC STABILITY: TRANSPORTATION INSECURITY: IN THE PAST 12 MONTHS, HAS LACK OF TRANSPORTATION KEPT YOU FROM MEDICAL APPOINTMENTS OR FROM GETTING MEDICATIONS?: NO

## 2025-02-16 SDOH — SOCIAL STABILITY: SOCIAL INSECURITY: WERE YOU ABLE TO COMPLETE ALL THE BEHAVIORAL HEALTH SCREENINGS?: YES

## 2025-02-16 SDOH — ECONOMIC STABILITY: FOOD INSECURITY: WITHIN THE PAST 12 MONTHS, YOU WORRIED THAT YOUR FOOD WOULD RUN OUT BEFORE YOU GOT THE MONEY TO BUY MORE.: NEVER TRUE

## 2025-02-16 SDOH — ECONOMIC STABILITY: FOOD INSECURITY: HOW HARD IS IT FOR YOU TO PAY FOR THE VERY BASICS LIKE FOOD, HOUSING, MEDICAL CARE, AND HEATING?: NOT VERY HARD

## 2025-02-16 SDOH — SOCIAL STABILITY: SOCIAL INSECURITY: WITHIN THE LAST YEAR, HAVE YOU BEEN HUMILIATED OR EMOTIONALLY ABUSED IN OTHER WAYS BY YOUR PARTNER OR EX-PARTNER?: NO

## 2025-02-16 SDOH — SOCIAL STABILITY: SOCIAL INSECURITY: DOES ANYONE TRY TO KEEP YOU FROM HAVING/CONTACTING OTHER FRIENDS OR DOING THINGS OUTSIDE YOUR HOME?: NO

## 2025-02-16 SDOH — SOCIAL STABILITY: SOCIAL INSECURITY: ARE THERE ANY APPARENT SIGNS OF INJURIES/BEHAVIORS THAT COULD BE RELATED TO ABUSE/NEGLECT?: NO

## 2025-02-16 SDOH — SOCIAL STABILITY: SOCIAL INSECURITY: HAS ANYONE EVER THREATENED TO HURT YOUR FAMILY OR YOUR PETS?: NO

## 2025-02-16 SDOH — SOCIAL STABILITY: SOCIAL INSECURITY: DO YOU FEEL ANYONE HAS EXPLOITED OR TAKEN ADVANTAGE OF YOU FINANCIALLY OR OF YOUR PERSONAL PROPERTY?: NO

## 2025-02-16 SDOH — SOCIAL STABILITY: SOCIAL INSECURITY: DO YOU FEEL UNSAFE GOING BACK TO THE PLACE WHERE YOU ARE LIVING?: NO

## 2025-02-16 SDOH — SOCIAL STABILITY: SOCIAL INSECURITY: ARE YOU OR HAVE YOU BEEN THREATENED OR ABUSED PHYSICALLY, EMOTIONALLY, OR SEXUALLY BY ANYONE?: NO

## 2025-02-16 SDOH — ECONOMIC STABILITY: HOUSING INSECURITY: IN THE PAST 12 MONTHS, HOW MANY TIMES HAVE YOU MOVED WHERE YOU WERE LIVING?: 0

## 2025-02-16 ASSESSMENT — COGNITIVE AND FUNCTIONAL STATUS - GENERAL
CLIMB 3 TO 5 STEPS WITH RAILING: A LITTLE
DAILY ACTIVITIY SCORE: 24
MOVING TO AND FROM BED TO CHAIR: A LITTLE
DRESSING REGULAR LOWER BODY CLOTHING: A LITTLE
DAILY ACTIVITIY SCORE: 24
DRESSING REGULAR UPPER BODY CLOTHING: A LITTLE
DAILY ACTIVITIY SCORE: 18
MOVING FROM LYING ON BACK TO SITTING ON SIDE OF FLAT BED WITH BEDRAILS: A LITTLE
TOILETING: A LITTLE
PERSONAL GROOMING: A LITTLE
STANDING UP FROM CHAIR USING ARMS: A LITTLE
MOBILITY SCORE: 24
TURNING FROM BACK TO SIDE WHILE IN FLAT BAD: A LITTLE
WALKING IN HOSPITAL ROOM: A LITTLE
PATIENT BASELINE BEDBOUND: NO
EATING MEALS: A LITTLE
MOBILITY SCORE: 24
MOBILITY SCORE: 18
HELP NEEDED FOR BATHING: A LITTLE

## 2025-02-16 ASSESSMENT — PAIN SCALES - GENERAL
PAINLEVEL_OUTOF10: 0 - NO PAIN

## 2025-02-16 ASSESSMENT — PATIENT HEALTH QUESTIONNAIRE - PHQ9
1. LITTLE INTEREST OR PLEASURE IN DOING THINGS: NOT AT ALL
SUM OF ALL RESPONSES TO PHQ9 QUESTIONS 1 & 2: 0
2. FEELING DOWN, DEPRESSED OR HOPELESS: NOT AT ALL

## 2025-02-16 ASSESSMENT — ACTIVITIES OF DAILY LIVING (ADL)
JUDGMENT_ADEQUATE_SAFELY_COMPLETE_DAILY_ACTIVITIES: YES
WALKS IN HOME: INDEPENDENT
ADEQUATE_TO_COMPLETE_ADL: YES
FEEDING YOURSELF: INDEPENDENT
HEARING - LEFT EAR: FUNCTIONAL
PATIENT'S MEMORY ADEQUATE TO SAFELY COMPLETE DAILY ACTIVITIES?: YES
BATHING: INDEPENDENT
LACK_OF_TRANSPORTATION: NO
DRESSING YOURSELF: INDEPENDENT
HEARING - RIGHT EAR: FUNCTIONAL
LACK_OF_TRANSPORTATION: NO
GROOMING: INDEPENDENT
TOILETING: INDEPENDENT

## 2025-02-16 ASSESSMENT — LIFESTYLE VARIABLES
EVER FELT BAD OR GUILTY ABOUT YOUR DRINKING: NO
TOTAL SCORE: 0
EVER HAD A DRINK FIRST THING IN THE MORNING TO STEADY YOUR NERVES TO GET RID OF A HANGOVER: NO
HAVE PEOPLE ANNOYED YOU BY CRITICIZING YOUR DRINKING: NO
HOW OFTEN DO YOU HAVE 6 OR MORE DRINKS ON ONE OCCASION: NEVER
AUDIT-C TOTAL SCORE: 0
AUDIT-C TOTAL SCORE: 0
SKIP TO QUESTIONS 9-10: 1
HOW OFTEN DO YOU HAVE A DRINK CONTAINING ALCOHOL: NEVER
HOW MANY STANDARD DRINKS CONTAINING ALCOHOL DO YOU HAVE ON A TYPICAL DAY: PATIENT DOES NOT DRINK
HAVE YOU EVER FELT YOU SHOULD CUT DOWN ON YOUR DRINKING: NO

## 2025-02-16 ASSESSMENT — PAIN - FUNCTIONAL ASSESSMENT
PAIN_FUNCTIONAL_ASSESSMENT: 0-10

## 2025-02-16 ASSESSMENT — COLUMBIA-SUICIDE SEVERITY RATING SCALE - C-SSRS
2. HAVE YOU ACTUALLY HAD ANY THOUGHTS OF KILLING YOURSELF?: NO
1. IN THE PAST MONTH, HAVE YOU WISHED YOU WERE DEAD OR WISHED YOU COULD GO TO SLEEP AND NOT WAKE UP?: NO
6. HAVE YOU EVER DONE ANYTHING, STARTED TO DO ANYTHING, OR PREPARED TO DO ANYTHING TO END YOUR LIFE?: NO

## 2025-02-16 NOTE — ED NOTES
Pt name and birthdate verified by Alexa Buitrago and Carlos Manuel Buitrago, PADMINI  02/16/25 0984

## 2025-02-16 NOTE — H&P
[facility] MEDICINE HISTORY AND PHYSICAL    History Of Present Illness     Yadira Davis is a 74 y.o. female with PMHx of HFrEF (EF 24 was 38% with global hypokinesis), COPD on 3L O2 PRN/HS, CAD/PVD, noncompliance and HTN who presented with SOB. She denies recent edema and cough but has PND. She went to a restaurant a few days ago but otherwise tries to avoid canned foods and eat home cooking. She says she is compliant with all her medications.   ED workup was significant for blood glucose 247, creatinine/GFR 1.0257, BNP 1008, troponins . COVID/influenza screens were negative, CXR revealed mild congestion. The pt was tachycardic to 120 bpm, tachypneic to 31, hypertensive to 168/92 mmHg and hypoxic. She was placed on BiPAP. The pt received IV Lasix, albuterol and IV magnesium.   Remainder of ROS reviewed and negative except as indicated in HPI.     Objective     Past Medical History  She has a past medical history of Breast cancer (Multi), CAD (coronary artery disease), COPD (chronic obstructive pulmonary disease) (Multi), HFrEF (heart failure with reduced ejection fraction), HLD (hyperlipidemia), HTN (hypertension), and PVD (peripheral vascular disease) (CMS-[facility]).    Surgical History  She has a past surgical history that includes Mastectomy; Total abdominal hysterectomy; Breast biopsy;  section, classic; Septoplasty; Adenoidectomy; Tonsillectomy; Cholecystectomy; CT angio aorta and bilateral iliofemoral runoff including without contrast if performed (2022); and Appendectomy.    Social History     Tobacco Use    Smoking status: Every Day     Current packs/day: 1.50     Average packs/day: 1.5 packs/day for 55.1 years (82.6 ttl pk-yrs)     Types: Cigarettes     Start date: 1970    Smokeless tobacco: Never    Tobacco comments:     Declines cessation counseling, no interest in quiting    Vaping Use    Vaping status: Never Used   Substance Use Topics    Alcohol use: Not Currently     Drug use: Never       Family History  Family History   Problem Relation Name Age of Onset    Breast cancer Mother      Hypertension Mother      Hyperlipidemia Mother      Esophageal cancer Mother      Other (Peripheral artery disease) Mother      Coronary artery disease Father         Allergies  Codeine    Vitals:    02/16/25 0530   BP: 131/79   Pulse: 88   Resp: (!) 22   Temp:    SpO2: 97%       Vitals:    02/16/25 0223   Weight: 61.4 kg (135 lb 6.4 oz)       Scheduled medications  carvedilol, 3.125 mg, oral, BID  tiotropium, 2 puff, inhalation, Daily   And  formoterol, 20 mcg, nebulization, q12h  [START ON 2/17/2025] furosemide, 40 mg, intravenous, Daily  guaiFENesin, 600 mg, oral, BID  heparin (porcine), 5,000 Units, subcutaneous, q8h ARNIE  ipratropium-albuteroL, 3 mL, nebulization, q6h  lisinopril, 20 mg, oral, Daily  rosuvastatin, 40 mg, oral, Nightly  sennosides, 2 tablet, oral, BID      Continuous medications     PRN medications  PRN medications: acetaminophen    Results for orders placed or performed during the hospital encounter of 02/16/25 (from the past 24 hours)   CBC and Auto Differential   Result Value Ref Range    WBC 7.7 4.4 - 11.3 x10*3/uL    nRBC 0.0 0.0 - 0.0 /100 WBCs    RBC 5.17 4.00 - 5.20 x10*6/uL    Hemoglobin 14.7 12.0 - 16.0 g/dL    Hematocrit 46.0 36.0 - 46.0 %    MCV 89 80 - 100 fL    MCH 28.4 26.0 - 34.0 pg    MCHC 32.0 32.0 - 36.0 g/dL    RDW 13.2 11.5 - 14.5 %    Platelets 184 150 - 450 x10*3/uL    Neutrophils % 46.5 40.0 - 80.0 %    Immature Granulocytes %, Automated 0.1 0.0 - 0.9 %    Lymphocytes % 44.4 13.0 - 44.0 %    Monocytes % 3.9 2.0 - 10.0 %    Eosinophils % 4.5 0.0 - 6.0 %    Basophils % 0.6 0.0 - 2.0 %    Neutrophils Absolute 3.59 1.60 - 5.50 x10*3/uL    Immature Granulocytes Absolute, Automated 0.01 0.00 - 0.50 x10*3/uL    Lymphocytes Absolute 3.43 (H) 0.80 - 3.00 x10*3/uL    Monocytes Absolute 0.30 0.05 - 0.80 x10*3/uL    Eosinophils Absolute 0.35 0.00 - 0.40 x10*3/uL     Basophils Absolute 0.05 0.00 - 0.10 x10*3/uL   Magnesium   Result Value Ref Range    Magnesium 1.94 1.60 - 2.40 mg/dL   Comprehensive metabolic panel   Result Value Ref Range    Glucose 247 (H) 74 - 99 mg/dL    Sodium 136 136 - 145 mmol/L    Potassium 4.3 3.5 - 5.3 mmol/L    Chloride 106 98 - 107 mmol/L    Bicarbonate 23 21 - 32 mmol/L    Anion Gap 11 10 - 20 mmol/L    Urea Nitrogen 21 6 - 23 mg/dL    Creatinine 1.02 0.50 - 1.05 mg/dL    eGFR 57 (L) >60 mL/min/1.73m*2    Calcium 10.1 8.6 - 10.3 mg/dL    Albumin 4.1 3.4 - 5.0 g/dL    Alkaline Phosphatase 77 33 - 136 U/L    Total Protein 6.4 6.4 - 8.2 g/dL    AST 14 9 - 39 U/L    Bilirubin, Total 0.4 0.0 - 1.2 mg/dL    ALT 9 7 - 45 U/L   B-Type Natriuretic Peptide   Result Value Ref Range    BNP 1,008 (H) 0 - 99 pg/mL   Blood Gas Venous Full Panel   Result Value Ref Range    POCT pH, Venous 7.15 (LL) 7.33 - 7.43 pH    POCT pCO2, Venous 78 (HH) 41 - 51 mm Hg    POCT pO2, Venous 27 (L) 35 - 45 mm Hg    POCT SO2, Venous 31 (L) 45 - 75 %    POCT Oxy Hemoglobin, Venous 30.1 (L) 45.0 - 75.0 %    POCT Hematocrit Calculated, Venous 45.0 36.0 - 46.0 %    POCT Sodium, Venous 133 (L) 136 - 145 mmol/L    POCT Potassium, Venous 4.3 3.5 - 5.3 mmol/L    POCT Chloride, Venous 105 98 - 107 mmol/L    POCT Ionized Calicum, Venous 1.42 (H) 1.10 - 1.33 mmol/L    POCT Glucose, Venous 267 (H) 74 - 99 mg/dL    POCT Lactate, Venous 1.5 0.4 - 2.0 mmol/L    POCT Base Excess, Venous -3.8 (L) -2.0 - 3.0 mmol/L    POCT HCO3 Calculated, Venous 27.2 (H) 22.0 - 26.0 mmol/L    POCT Hemoglobin, Venous 14.9 12.0 - 16.0 g/dL    POCT Anion Gap, Venous 5.0 (L) 10.0 - 25.0 mmol/L    Patient Temperature 37.0 degrees Celsius    FiO2 50 %   Troponin I, High Sensitivity, Initial   Result Value Ref Range    Troponin I, High Sensitivity 19 (H) 0 - 13 ng/L   Influenza A, and B PCR   Result Value Ref Range    Flu A Result Not Detected Not Detected    Flu B Result Not Detected Not Detected   Sars-CoV-2 PCR   Result  Value Ref Range    Coronavirus 2019, PCR Not Detected Not Detected   Troponin, High Sensitivity, 1 Hour   Result Value Ref Range    Troponin I, High Sensitivity 29 (H) 0 - 13 ng/L   Blood Gas Venous Full Panel   Result Value Ref Range    POCT pH, Venous 7.28 (L) 7.33 - 7.43 pH    POCT pCO2, Venous 56 (H) 41 - 51 mm Hg    POCT pO2, Venous 46 (H) 35 - 45 mm Hg    POCT SO2, Venous 71 45 - 75 %    POCT Oxy Hemoglobin, Venous 69.2 45.0 - 75.0 %    POCT Hematocrit Calculated, Venous 42.0 36.0 - 46.0 %    POCT Sodium, Venous 135 (L) 136 - 145 mmol/L    POCT Potassium, Venous 4.0 3.5 - 5.3 mmol/L    POCT Chloride, Venous 106 98 - 107 mmol/L    POCT Ionized Calicum, Venous 1.41 (H) 1.10 - 1.33 mmol/L    POCT Glucose, Venous 78 74 - 99 mg/dL    POCT Lactate, Venous 0.8 0.4 - 2.0 mmol/L    POCT Base Excess, Venous -1.5 -2.0 - 3.0 mmol/L    POCT HCO3 Calculated, Venous 26.3 (H) 22.0 - 26.0 mmol/L    POCT Hemoglobin, Venous 14.0 12.0 - 16.0 g/dL    POCT Anion Gap, Venous 7.0 (L) 10.0 - 25.0 mmol/L    Patient Temperature 37.0 degrees Celsius    FiO2 50 %         I personally reviewed all pertinent labwork, imaging and vital signs, as well as medications, nursing, therapy, discharge planning and consult notes.     Constitutional: Well developed, awake, alert, calm, oriented x4, no acute distress, cooperative   Eyes: EOMI, clear sclerae   ENMT: mucous membranes moist, no lesions seen   Head/Neck: Neck supple, no apparent injury, head atraumatic   Respiratory/Thorax: CTAB anteriorly, good chest expansion, respirations even and unlabored, pt is on BIPAP 12/5   Cardiovascular: Regular rate and rhythm, no murmurs/rubs/gallops, normal S1 and S 2   Gastrointestinal: Abdomen nondistended, soft, nontender, +BS, no bruits   Musculoskeletal: ROM intact, no joint swelling, normal  strength   Extremities: no cyanosis, contusions or clubbing, or edema   Neurological: no focal deficit, pt alert and oriented x4   Psychological: Appropriate affect  and behavior, pleasant   Skin: Warm and dry, no lesions, no rashes       Assessment/Plan     Dyspnea, multifactorial as below  Acute on chronic hypoxic respiratory failure    Acute exacerbation of COPD and HFrEF  EF by echo 12/11/24 was 38% with global hypokinesis  Continue IV Lasix and consult cardiology  Carvedilol was added 1/31/25 to lisinopril and hydrochlorothiazide by outpatient cardiology     Hx COPD on 3L O2 PRN/HS, now on BiPAP  Wean O2 as tolerated to maintain O2 sat >91%, pt may need home O2 eval   Continue Duoneb and Mucinex as well as home Anoro  Pt follows with pulmonology outpatient    Hx CAD/PVD  Resume ASA, carvedilol and rosuvastatin    HTN   BP controlled on home meds    Hyperglycemia  Blood glucose is 247, no hx diabetes  No A1c in the EMR and prior BMPs do not reflect hyperglycemia    Mild ESTEFANIA  No hx CKD, BMP in am, monitor for now    Elevated troponins  Troponin 19 > 29, suspect this is mild demand ischemia in the setting of above, will trend    Tobacco dependence  Pt has a 82.5 pack-year smoking history, she cut back 1/2 ppd, wants to cut back on her own    Ovarian mass  Pt is scheduled for laparoscopy 3/25/25 for ovarian mass     DVT ppx: SubQ heparin    Discharge disposition  Home when stable      Amaya Moyer CNP  [facility] Medicine

## 2025-02-16 NOTE — ED NOTES
Pt arrives to ED via ems from home    Code Status:  Full Code    HPI     Chief Complaint   Patient presents with    Shortness of Breath       /79 (BP Location: Left arm, Patient Position: Lying)   Pulse 88   Temp 35.9 °C (96.7 °F) (Rectal)   Resp (!) 35   Wt 61.4 kg (135 lb 6.4 oz)   SpO2 97%     No data recorded    LDA:   Peripheral IV 25 20 G Left Antecubital (Active)   Placement Date/Time: 25 0258   Placed by External Staff?: EMS  Size (Gauge): 20 G  Orientation: Left  Location: Antecubital   Number of days: 0        BACKGROUND  Past Medical History:   Diagnosis Date    Breast cancer (Multi)     R and L breast s/p surgery, chemotherapy and RT    CAD (coronary artery disease)     COPD (chronic obstructive pulmonary disease) (Multi)     HFrEF (heart failure with reduced ejection fraction)     HLD (hyperlipidemia)     HTN (hypertension)     PVD (peripheral vascular disease) (CMS-HCC)      Past Surgical History:   Procedure Laterality Date    ADENOIDECTOMY      APPENDECTOMY      BREAST BIOPSY       SECTION, CLASSIC      CHOLECYSTECTOMY      CT ANGIO AORTA AND BILATERAL ILIOFEMORAL RUN OFF INCLUDING WITHOUT CONTRAST IF PERFORMED  2022    MASTECTOMY      SEPTOPLASTY      TONSILLECTOMY      TOTAL ABDOMINAL HYSTERECTOMY       No current facility-administered medications on file prior to encounter.     Current Outpatient Medications on File Prior to Encounter   Medication Sig Dispense Refill    albuterol (Ventolin HFA) 90 mcg/actuation inhaler Inhale 2 puffs every 4 hours if needed for shortness of breath. 18 g 1    aspirin 81 mg EC tablet Take 1 tablet (81 mg) by mouth once daily. 90 tablet 3    carvedilol (Coreg) 3.125 mg tablet Take 1 tablet (3.125 mg) by mouth 2 times daily (morning and late afternoon). 180 tablet 3    lisinopriL-hydrochlorothiazide 20-12.5 mg tablet Take 1 tablet by mouth 2 times a day. 180 tablet 3    oxyCODONE (Roxicodone) 5 mg immediate release tablet Take 1  tablet (5 mg) by mouth every 6 hours if needed for severe pain (7 - 10) for up to 7 days. 20 tablet 0    rosuvastatin (Crestor) 40 mg tablet Take 1 tablet (40 mg) by mouth once daily. 90 tablet 3    umeclidinium-vilanteroL (Anoro Ellipta) 62.5-25 mcg/actuation blister with device Inhale 1 puff once daily. 1 each 3        ASSESSMENT  Diagnoses as of 02/16/25 0913   COPD exacerbation (Multi)       Medications Currently Running:        Medications Given:  ED Medication Administration from 02/16/2025 0219 to 02/16/2025 0913         Date/Time Order Dose Route Action Action by     02/16/2025 0258 EST magnesium sulfate 2 g in sterile water for injection 50 mL 2 g intravenous New Bag TOD Giron     02/16/2025 0259 EST ipratropium-albuteroL (Duo-Neb) 0.5-2.5 mg/3 mL nebulizer solution 3 mL 3 mL nebulization Given TOD Giron     02/16/2025 0304 EST ipratropium-albuteroL (Duo-Neb) 0.5-2.5 mg/3 mL nebulizer solution 3 mL 3 mL nebulization Given TOD Giron     02/16/2025 0328 EST magnesium sulfate 2 g in sterile water for injection 50 mL 0 g intravenous Stopped TOD Giron     02/16/2025 0536 EST furosemide (Lasix) injection 40 mg 40 mg intravenous Given TOD Clemens                 RESULTS    Imaging:  XR chest 1 view   Final Result   Mild congestion.        MACRO:   None        Signed by: Richard Phelps 2/16/2025 3:56 AM   Dictation workstation:   [id number]         }  Labs ::99  Abnormal Labs Reviewed   CBC WITH AUTO DIFFERENTIAL - Abnormal; Notable for the following components:       Result Value    Lymphocytes Absolute 3.43 (*)     All other components within normal limits   COMPREHENSIVE METABOLIC PANEL - Abnormal; Notable for the following components:    Glucose 247 (*)     eGFR 57 (*)     All other components within normal limits   B-TYPE NATRIURETIC PEPTIDE - Abnormal; Notable for the following components:    BNP 1,008 (*)     All other components within normal limits    Narrative:        <100 pg/mL - Heart failure  unlikely                  100-299 pg/mL - Intermediate probability of acute heart                                  failure exacerbation. Correlate with clinical                                  context and patient history.                    >=300 pg/mL - Heart Failure likely. Correlate with clinical                                  context and patient history.                                    BNP testing is performed using different testing methodology at [facility] than at other [facility]. Direct result comparisons should only be made within the same method.                      BLOOD GAS VENOUS FULL PANEL - Abnormal; Notable for the following components:    POCT pH, Venous 7.15 (*)     POCT pCO2, Venous 78 (*)     POCT pO2, Venous 27 (*)     POCT SO2, Venous 31 (*)     POCT Oxy Hemoglobin, Venous 30.1 (*)     POCT Sodium, Venous 133 (*)     POCT Ionized Calicum, Venous 1.42 (*)     POCT Glucose, Venous 267 (*)     POCT Base Excess, Venous -3.8 (*)     POCT HCO3 Calculated, Venous 27.2 (*)     POCT Anion Gap, Venous 5.0 (*)     All other components within normal limits   SERIAL TROPONIN-INITIAL - Abnormal; Notable for the following components:    Troponin I, High Sensitivity 19 (*)     All other components within normal limits    Narrative:     Less than 99th percentile of normal range cutoff-                  Female and children under 18 years old <14 ng/L; Male <21 ng/L: Negative                  Repeat testing should be performed if clinically indicated.                                     Female and children under 18 years old 14-50 ng/L; Male 21-50 ng/L:                  Consistent with possible cardiac damage and possible increased clinical                   risk. Serial measurements may help to assess extent of myocardial damage.                                     >50 ng/L: Consistent with cardiac damage, increased clinical risk and                  myocardial infarction. Serial  measurements may help assess extent of                   myocardial damage.                                      NOTE: Children less than 1 year old may have higher baseline troponin                   levels and results should be interpreted in conjunction with the overall                   clinical context.                                     NOTE: Troponin I testing is performed using a different                   testing methodology at [facility] than at other                   [facility]. Direct result comparisons should only                   be made within the same method.   SERIAL TROPONIN, 1 HOUR - Abnormal; Notable for the following components:    Troponin I, High Sensitivity 29 (*)     All other components within normal limits    Narrative:     Less than 99th percentile of normal range cutoff-                  Female and children under 18 years old <14 ng/L; Male <21 ng/L: Negative                  Repeat testing should be performed if clinically indicated.                                     Female and children under 18 years old 14-50 ng/L; Male 21-50 ng/L:                  Consistent with possible cardiac damage and possible increased clinical                   risk. Serial measurements may help to assess extent of myocardial damage.                                     >50 ng/L: Consistent with cardiac damage, increased clinical risk and                  myocardial infarction. Serial measurements may help assess extent of                   myocardial damage.                                      NOTE: Children less than 1 year old may have higher baseline troponin                   levels and results should be interpreted in conjunction with the overall                   clinical context.                                     NOTE: Troponin I testing is performed using a different                   testing methodology at [facility] than at other                   [facility]  [facility]. Direct result comparisons should only                   be made within the same method.   BLOOD GAS VENOUS FULL PANEL - Abnormal; Notable for the following components:    POCT pH, Venous 7.28 (*)     POCT pCO2, Venous 56 (*)     POCT pO2, Venous 46 (*)     POCT Sodium, Venous 135 (*)     POCT Ionized Calicum, Venous 1.41 (*)     POCT HCO3 Calculated, Venous 26.3 (*)     POCT Anion Gap, Venous 7.0 (*)     All other components within normal limits   BLOOD GAS VENOUS - Abnormal; Notable for the following components:    POCT pCO2, Venous 53 (*)     POCT HCO3 Calculated, Venous 28.6 (*)     All other components within normal limits                Alexa Buitrago RN  02/16/25 0962

## 2025-02-16 NOTE — ED PROVIDER NOTES
Yadira Davis is a 74 y.o. patient presenting to the ED for     Additional History Obtained from:   Limitations to History:  ------------------------------------------------------------------------------------------------------------------------------------------  Physical Exam:  Appearance: Alert, cooperative,   Skin: Warm, dry, appropriate color for ethnicity.  Eyes: Cornea clear. No scleral icterus or injection.   ENT: Mucous membranes moist.  Pulmonary: No accessory muscle use or stridor. Clear lung sounds bilaterally without rhonchi or wheezing.   Cardiac: Heart sounds regular without murmur. B/L radial pulses full and symmetric.   Abdomen: Soft, not tender.  No rebound or guarding.   Musculoskeletal: No gross deformities.   Neurological: Face symmetrical. Voice clear. Appropriately conversant.   Psychiatric: Appropriate mood and affect.    Medical Decision Making:  Chronic Medical Conditions Significantly Affecting Care:  has a past medical history of Breast cancer (Multi), COPD (chronic obstructive pulmonary disease) (Multi), Fatigue (03/31/2023), HLD (hyperlipidemia), HTN (hypertension), and Personal history of malignant neoplasm of other parts of uterus.    Social Determinants of Health Significantly Affecting Care:    Differential Diagnosis Considered but not limited to:       External Records Reviewed:   I reviewed recent and relevant outside records including:     Independent Interpretation of Studies: The following studies were ordered as part of the emergency department work up and independently interpreted by me. See ED Course for details.           Escalation of Care:        Discussion of Management with Other Providers:   I discussed the patient/results with:     ED Course for details.    Patient was placed on BiPAP and treated for both COPD and CHF given her history and wheezing with increased work of breathing.    CBC, CMP, magnesium are unremarkable.  Troponin is minimally elevated.  BNP is markedly elevated at approximately 8000.  COVID flu and RSV are negative.    Initial VBG shows respiratory acidosis with pH of 7.15 and pCO2 78.     Chest x-ray shows evidence of interstitial edema.    The patient did improve on BiPAP and respiratory acidosis improved as well.  I do recommend admission and the patient is agreeable.    [facility] was contacted and will admit the patient for further care.    Diagnoses as of 02/26/25 1301   COPD exacerbation (Multi)       Critical Care    Performed by: Gemma Stanley DO  Authorized by: Gemma Stanley DO    Critical care provider statement:     Critical care time (minutes):  40    Critical care was necessary to treat or prevent imminent or life-threatening deterioration of the following conditions:  Respiratory failure    Critical care was time spent personally by me on the following activities:  Development of treatment plan with patient or surrogate, evaluation of patient's response to treatment, examination of patient, obtaining history from patient or surrogate, ordering and performing treatments and interventions, ordering and review of laboratory studies, ordering and review of radiographic studies, pulse oximetry and re-evaluation of patient's condition    Care discussed with: admitting provider             Gemma Stanley DO  02/26/25 1307

## 2025-02-17 LAB
ANION GAP SERPL CALC-SCNC: 14 MMOL/L (ref 10–20)
ATRIAL RATE: 95 BPM
BUN SERPL-MCNC: 24 MG/DL (ref 6–23)
CALCIUM SERPL-MCNC: 10.2 MG/DL (ref 8.6–10.3)
CHLORIDE SERPL-SCNC: 104 MMOL/L (ref 98–107)
CO2 SERPL-SCNC: 25 MMOL/L (ref 21–32)
CREAT SERPL-MCNC: 0.9 MG/DL (ref 0.5–1.05)
EGFRCR SERPLBLD CKD-EPI 2021: 66 ML/MIN/1.73M*2
ERYTHROCYTE [DISTWIDTH] IN BLOOD BY AUTOMATED COUNT: 13.3 % (ref 11.5–14.5)
GLUCOSE SERPL-MCNC: 84 MG/DL (ref 74–99)
HCT VFR BLD AUTO: 40.9 % (ref 36–46)
HGB BLD-MCNC: 13.6 G/DL (ref 12–16)
MCH RBC QN AUTO: 29.1 PG (ref 26–34)
MCHC RBC AUTO-ENTMCNC: 33.3 G/DL (ref 32–36)
MCV RBC AUTO: 87 FL (ref 80–100)
NRBC BLD-RTO: 0 /100 WBCS (ref 0–0)
P AXIS: 57 DEGREES
PLATELET # BLD AUTO: 173 X10*3/UL (ref 150–450)
POTASSIUM SERPL-SCNC: 3.6 MMOL/L (ref 3.5–5.3)
PR INTERVAL: 233 MS
Q ONSET: 253 MS
QRS COUNT: 15 BEATS
QRS DURATION: 90 MS
QT INTERVAL: 353 MS
QTC CALCULATION(BAZETT): 442 MS
QTC FREDERICIA: 410 MS
R AXIS: 9 DEGREES
RBC # BLD AUTO: 4.68 X10*6/UL (ref 4–5.2)
SODIUM SERPL-SCNC: 139 MMOL/L (ref 136–145)
T AXIS: 107 DEGREES
T OFFSET: 429 MS
VENTRICULAR RATE: 94 BPM
WBC # BLD AUTO: 6.8 X10*3/UL (ref 4.4–11.3)

## 2025-02-17 PROCEDURE — 2500000002 HC RX 250 W HCPCS SELF ADMINISTERED DRUGS (ALT 637 FOR MEDICARE OP, ALT 636 FOR OP/ED): Performed by: NURSE PRACTITIONER

## 2025-02-17 PROCEDURE — 2500000004 HC RX 250 GENERAL PHARMACY W/ HCPCS (ALT 636 FOR OP/ED): Performed by: HOSPITALIST

## 2025-02-17 PROCEDURE — 99232 SBSQ HOSP IP/OBS MODERATE 35: CPT | Performed by: HOSPITALIST

## 2025-02-17 PROCEDURE — 2500000001 HC RX 250 WO HCPCS SELF ADMINISTERED DRUGS (ALT 637 FOR MEDICARE OP): Performed by: NURSE PRACTITIONER

## 2025-02-17 PROCEDURE — 2500000004 HC RX 250 GENERAL PHARMACY W/ HCPCS (ALT 636 FOR OP/ED): Performed by: NURSE PRACTITIONER

## 2025-02-17 PROCEDURE — 2500000001 HC RX 250 WO HCPCS SELF ADMINISTERED DRUGS (ALT 637 FOR MEDICARE OP): Performed by: HOSPITALIST

## 2025-02-17 PROCEDURE — 99223 1ST HOSP IP/OBS HIGH 75: CPT | Performed by: STUDENT IN AN ORGANIZED HEALTH CARE EDUCATION/TRAINING PROGRAM

## 2025-02-17 PROCEDURE — 80048 BASIC METABOLIC PNL TOTAL CA: CPT | Performed by: NURSE PRACTITIONER

## 2025-02-17 PROCEDURE — 85027 COMPLETE CBC AUTOMATED: CPT | Performed by: NURSE PRACTITIONER

## 2025-02-17 PROCEDURE — 1200000002 HC GENERAL ROOM WITH TELEMETRY DAILY

## 2025-02-17 PROCEDURE — 36415 COLL VENOUS BLD VENIPUNCTURE: CPT | Performed by: NURSE PRACTITIONER

## 2025-02-17 PROCEDURE — 2500000002 HC RX 250 W HCPCS SELF ADMINISTERED DRUGS (ALT 637 FOR MEDICARE OP, ALT 636 FOR OP/ED): Performed by: INTERNAL MEDICINE

## 2025-02-17 PROCEDURE — 94640 AIRWAY INHALATION TREATMENT: CPT

## 2025-02-17 RX ORDER — OXYCODONE HYDROCHLORIDE 5 MG/1
5 TABLET ORAL EVERY 6 HOURS PRN
Status: DISCONTINUED | OUTPATIENT
Start: 2025-02-17 | End: 2025-02-18 | Stop reason: HOSPADM

## 2025-02-17 RX ORDER — FUROSEMIDE 10 MG/ML
40 INJECTION INTRAMUSCULAR; INTRAVENOUS EVERY 12 HOURS
Status: DISCONTINUED | OUTPATIENT
Start: 2025-02-17 | End: 2025-02-18

## 2025-02-17 RX ORDER — NALOXONE HYDROCHLORIDE 0.4 MG/ML
0.2 INJECTION, SOLUTION INTRAMUSCULAR; INTRAVENOUS; SUBCUTANEOUS EVERY 5 MIN PRN
Status: DISCONTINUED | OUTPATIENT
Start: 2025-02-17 | End: 2025-02-18 | Stop reason: HOSPADM

## 2025-02-17 RX ADMIN — FORMOTEROL FUMARATE DIHYDRATE 20 MCG: 20 SOLUTION RESPIRATORY (INHALATION) at 19:51

## 2025-02-17 RX ADMIN — ASPIRIN 81 MG: 81 TABLET, COATED ORAL at 08:14

## 2025-02-17 RX ADMIN — FUROSEMIDE 40 MG: 10 INJECTION, SOLUTION INTRAVENOUS at 20:14

## 2025-02-17 RX ADMIN — CARVEDILOL 3.12 MG: 3.12 TABLET, FILM COATED ORAL at 22:15

## 2025-02-17 RX ADMIN — GUAIFENESIN 600 MG: 600 TABLET ORAL at 08:14

## 2025-02-17 RX ADMIN — FORMOTEROL FUMARATE DIHYDRATE 20 MCG: 20 SOLUTION RESPIRATORY (INHALATION) at 08:06

## 2025-02-17 RX ADMIN — EMPAGLIFLOZIN 10 MG: 10 TABLET, FILM COATED ORAL at 20:13

## 2025-02-17 RX ADMIN — GUAIFENESIN 600 MG: 600 TABLET ORAL at 20:11

## 2025-02-17 RX ADMIN — TIOTROPIUM BROMIDE INHALATION SPRAY 2 PUFF: 3.12 SPRAY, METERED RESPIRATORY (INHALATION) at 08:14

## 2025-02-17 RX ADMIN — ROSUVASTATIN 40 MG: 20 TABLET, FILM COATED ORAL at 20:13

## 2025-02-17 RX ADMIN — IPRATROPIUM BROMIDE AND ALBUTEROL SULFATE 3 ML: 2.5; .5 SOLUTION RESPIRATORY (INHALATION) at 08:06

## 2025-02-17 RX ADMIN — HEPARIN SODIUM 5000 UNITS: 5000 INJECTION, SOLUTION INTRAVENOUS; SUBCUTANEOUS at 22:17

## 2025-02-17 RX ADMIN — FUROSEMIDE 40 MG: 10 INJECTION, SOLUTION INTRAVENOUS at 08:14

## 2025-02-17 RX ADMIN — IPRATROPIUM BROMIDE AND ALBUTEROL SULFATE 3 ML: 2.5; .5 SOLUTION RESPIRATORY (INHALATION) at 11:43

## 2025-02-17 RX ADMIN — SENNOSIDES 17.2 MG: 8.6 TABLET ORAL at 20:12

## 2025-02-17 RX ADMIN — HEPARIN SODIUM 5000 UNITS: 5000 INJECTION, SOLUTION INTRAVENOUS; SUBCUTANEOUS at 05:22

## 2025-02-17 RX ADMIN — SENNOSIDES 17.2 MG: 8.6 TABLET ORAL at 08:14

## 2025-02-17 RX ADMIN — IPRATROPIUM BROMIDE AND ALBUTEROL SULFATE 3 ML: 2.5; .5 SOLUTION RESPIRATORY (INHALATION) at 19:48

## 2025-02-17 RX ADMIN — HEPARIN SODIUM 5000 UNITS: 5000 INJECTION, SOLUTION INTRAVENOUS; SUBCUTANEOUS at 15:02

## 2025-02-17 ASSESSMENT — COGNITIVE AND FUNCTIONAL STATUS - GENERAL
DAILY ACTIVITIY SCORE: 24
MOBILITY SCORE: 24
DAILY ACTIVITIY SCORE: 24
MOBILITY SCORE: 24

## 2025-02-17 ASSESSMENT — PAIN SCALES - GENERAL
PAINLEVEL_OUTOF10: 0 - NO PAIN

## 2025-02-17 ASSESSMENT — PAIN - FUNCTIONAL ASSESSMENT
PAIN_FUNCTIONAL_ASSESSMENT: 0-10

## 2025-02-17 NOTE — CONSULTS
Inpatient consult to Cardiology  Consult performed by: Lorenzo Moreno MD  Consult ordered by: MARIA ANTONIA Dawkins  Reason for consult: CHF exacerbration ?  Assessment/Recommendations: See full note        History Of Present Illness:    Yadira Davis is a 74 y.o. female with past medical history of HFrEF, COPD, CAD, essential hypertension, noncompliance who presents to the hospital due to shortness of breath.  Patient reports taking medications.  She continues to smoke.     EKG with normal sinus rhythm, first degree AV block, borderline repolarization abnormalities    BNP elevated at 1008    Troponin flatly elevated at 19> 29> 53>51    creatinine/GFR 1.02/57     Patient was hypoxic, tachypneic, hypertensive, tachycardic on arrival and started on BiPAP.  Patient received Lasix.  Currently on room air.  She reports feeling much better.  Patient has received breathing treatment.    Chest x-ray with borderline cardiomegaly, mild vascular congestion    Last Recorded Vitals:  Vitals:    02/16/25 2007 02/16/25 2335 02/17/25 0315 02/17/25 0700   BP: 103/56 107/60 111/64 100/58   BP Location: Right arm Right arm Right arm Right arm   Patient Position: Lying Lying Lying Lying   Pulse: 97 81 89 75   Resp: 18 18 18 18   Temp: 37.1 °C (98.8 °F) 36.6 °C (97.9 °F) 36.3 °C (97.3 °F) 36.6 °C (97.9 °F)   TempSrc: Temporal Temporal Temporal Temporal   SpO2: 95% 96% 94% 92%   Weight:   51.2 kg (112 lb 14 oz)    Height:           Last Labs:  CBC - 2/17/2025:  3:26 AM  6.8 13.6 173    40.9      CMP - 2/17/2025:  3:26 AM  10.2 6.4 14 --- 0.4   _ 4.1 9 77      PTT - No results in last year.  _   _ _     Troponin I, High Sensitivity   Date/Time Value Ref Range Status   02/16/2025 01:36 PM 51 (HH) 0 - 13 ng/L Final     Comment:     Previous result verified on 2/16/2025 0946 on specimen/case [id number] called with component [facility] for procedure Troponin I, High Sensitivity with value 53 ng/L.   02/16/2025 09:16 AM 53 (HH) 0 - 13  ng/L Final   02/16/2025 03:36 AM 29 (H) 0 - 13 ng/L Final     BNP   Date/Time Value Ref Range Status   02/16/2025 02:40 AM 1,008 (H) 0 - 99 pg/mL Final   12/24/2024 08:05  (H) 0 - 99 pg/mL Final     LDL-CHOLESTEROL   Date/Time Value Ref Range Status   01/27/2025 10:46 AM 90 mg/dL (calc) Final     Comment:     Reference range: <100     Desirable range <100 mg/dL for primary prevention;    <70 mg/dL for patients with CHD or diabetic patients   with > or = 2 CHD risk factors.     LDL-C is now calculated using the Adriana   calculation, which is a validated novel method providing   better accuracy than the Friedewald equation in the   estimation of LDL-C.   Prasad HURTADO et al. ANAY. 2013;310(19): 2906-8260   (http://education.Kiwup/faq/UVI160)       VLDL   Date/Time Value Ref Range Status   04/26/2023 10:47 AM 22 0 - 40 mg/dL Final   01/11/2022 12:12 PM 25 0 - 40 mg/dL Final   09/07/2021 08:18 AM 24 0 - 40 mg/dL Final      Last I/O:  I/O last 3 completed shifts:  In: 1100 (21.5 mL/kg) [P.O.:1050; I.V.:50 (1 mL/kg)]  Out: 850 (16.6 mL/kg) [Urine:850 (0.5 mL/kg/hr)]  Weight: 51.2 kg     Past Cardiology Tests (Last 3 Years):  EKG:  ECG 12 lead (Clinic Performed) 01/31/2025      ECG 12 lead 12/24/2024      ECG 12 lead (Clinic Performed) 12/11/2024      ECG 12 lead 05/30/2024      ECG 12 lead 05/30/2024    Echo:  Transthoracic echo (TTE) complete 01/08/2025    Ejection Fractions:  EF   Date/Time Value Ref Range Status   01/08/2025 11:17 AM 38 %      Cath:  No results found for this or any previous visit from the past 1095 days.    Stress Test:  Nuclear Stress Test 01/08/2025  Impression   1. Normal stress myocardial perfusion imaging in response to  pharmacologic stress. Dilated left ventricle with moderate left  ventricular systolic function on post stress gated imaging.  2. Low-dose non gated CT images done for attenuation correction shows  severe coronary artery calcification, severe calcification  of the  ascending as well as descending thoracic aorta. .       Nuclear Stress Test 2022    Cardiac Imaging:  No results found for this or any previous visit from the past 1095 days.      Past Medical History:  She has a past medical history of Breast cancer (Multi), CAD (coronary artery disease), COPD (chronic obstructive pulmonary disease) (Multi), HFrEF (heart failure with reduced ejection fraction), HLD (hyperlipidemia), HTN (hypertension), and PVD (peripheral vascular disease) (CMS-[facility]).    Past Surgical History:  She has a past surgical history that includes Mastectomy; Total abdominal hysterectomy; Breast biopsy;  section, classic; Septoplasty; Adenoidectomy; Tonsillectomy; Cholecystectomy; CT angio aorta and bilateral iliofemoral runoff including without contrast if performed (2022); and Appendectomy.      Social History:  She reports that she has been smoking cigarettes. She started smoking about 55 years ago. She has a 82.6 pack-year smoking history. She has never used smokeless tobacco. She reports that she does not currently use alcohol. She reports that she does not use drugs.    Family History:  Family History   Problem Relation Name Age of Onset    Breast cancer Mother      Hypertension Mother      Hyperlipidemia Mother      Esophageal cancer Mother      Other (Peripheral artery disease) Mother      Coronary artery disease Father          Allergies:  Codeine    Inpatient Medications:  Scheduled medications   Medication Dose Route Frequency    aspirin  81 mg oral Daily    carvedilol  3.125 mg oral BID    tiotropium  2 puff inhalation Daily    And    formoterol  20 mcg nebulization q12h    furosemide  40 mg intravenous Daily    guaiFENesin  600 mg oral BID    heparin (porcine)  5,000 Units subcutaneous q8h ARNIE    ipratropium-albuteroL  3 mL nebulization TID    lisinopril  20 mg oral Daily    rosuvastatin  40 mg oral Nightly    sennosides  2 tablet oral BID     PRN medications    Medication    acetaminophen     Continuous Medications   Medication Dose Last Rate     Outpatient Medications:  Current Outpatient Medications   Medication Instructions    albuterol (Ventolin HFA) 90 mcg/actuation inhaler 2 puffs, inhalation, Every 4 hours PRN    aspirin 81 mg, oral, Daily    carvedilol (COREG) 3.125 mg, oral, 2 times daily (morning and late afternoon)    lisinopriL-hydrochlorothiazide 20-12.5 mg tablet 1 tablet, oral, 2 times daily    oxyCODONE (ROXICODONE) 5 mg, oral, Every 6 hours PRN    rosuvastatin (CRESTOR) 40 mg, oral, Daily    umeclidinium-vilanteroL (Anoro Ellipta) 62.5-25 mcg/actuation blister with device 1 puff, inhalation, Daily       Physical Exam:  General: Alert and Oriented, No distress, cooperative  Head: Normocephalic without obvious abnormality, atraumatic  Eyes: Conjunctiva/corneas clear, EOM's grossly intact  Neck: Supple, trachea midline, No thyroid enlargement/tenderness/nodules; No JVD  Lungs: Mild expiratory wheeze, no rales   chest Wall: No tenderness or deformity  Heart: Regular rhythm, normal S1/S2, no murmur appreciated  Abdomen: Soft, non-tender, Non-distended, bowel sounds active  Extremities: No edema, no cyanosis, no clubbing  Skin: Skin color, texture, turgor normal.  No rashes or lesions noted  Neurologic: Alert and oriented x 3, grossly moving all extremities, speech intact       Assessment/Plan   Acute hypoxic respiratory failure  Acute decompensated heart failure  COPD with exacerbation  CAD  Hypertension  Smoking    Plan:  Suspect multifactorial dyspnea due to COPD exacerbation and heart failure exacerbation  Recent stress test was reviewed which did not show any reversible ischemia.  I will recommend Lasix 40 mg IV twice daily.  Patient will need to be discharged on torsemide.  Continue Coreg 3.125 mg twice daily  Will recommend to continue aspirin 81 mg daily.  Will recommend to continue lisinopril as taking.  Patient has had issues with low blood  pressures.  Consider switching to Entresto in near future as blood pressure allows.  Will recommend to start on Jardiance 10 mg daily.  Strict I's and O's.  Will recommend dietary/nutrition consult    Cardiology will follow              Peripheral IV 20 G Distal;Left;Dorsal Wrist (Active)   Site Assessment Clean;Dry;Intact 02/16/25 2100   Dressing Status Clean;Dry 02/16/25 2100   Number of days:        Code Status:  Full Code        Lorenzo Moreno MD

## 2025-02-17 NOTE — PROGRESS NOTES
02/17/25 1227   Discharge Planning   Living Arrangements Children   Support Systems Children   Assistance Needed none   Type of Residence Private residence   Expected Discharge Disposition Home   Does the patient need discharge transport arranged? No   Stroke Family Assessment   Stroke Family Assessment Needed No   Intensity of Service   Intensity of Service 0-30 min     Discharge planning assessment completed with patient and her daughter at bedside. Patient lives at home with her daughter and her family. She is independent with mobility and self-care needs. She is not currently established with a PCP but has an appointment scheduled for 2/20 to establish with new PCP Sylvie Medina with [facility]. She is planning for discharge home and denies needs at this time.

## 2025-02-17 NOTE — CARE PLAN
The clinical goals for the shift include maintain spo2 over 95% on RA      Problem: Pain - Adult  Goal: Verbalizes/displays adequate comfort level or baseline comfort level  Outcome: Progressing     Problem: Safety - Adult  Goal: Free from fall injury  Outcome: Progressing     Problem: Discharge Planning  Goal: Discharge to home or other facility with appropriate resources  Outcome: Progressing     Problem: Chronic Conditions and Co-morbidities  Goal: Patient's chronic conditions and co-morbidity symptoms are monitored and maintained or improved  Outcome: Progressing     Problem: Nutrition  Goal: Nutrient intake appropriate for maintaining nutritional needs  Outcome: Progressing     Problem: Fall/Injury  Goal: Not fall by end of shift  Outcome: Met

## 2025-02-17 NOTE — CARE PLAN
The patient's goals for the shift include      The clinical goals for the shift include SBP >90      Problem: Pain - Adult  Goal: Verbalizes/displays adequate comfort level or baseline comfort level  Outcome: Progressing     Problem: Safety - Adult  Goal: Free from fall injury  Outcome: Progressing     Problem: Discharge Planning  Goal: Discharge to home or other facility with appropriate resources  Outcome: Progressing     Problem: Chronic Conditions and Co-morbidities  Goal: Patient's chronic conditions and co-morbidity symptoms are monitored and maintained or improved  Outcome: Progressing     Problem: Nutrition  Goal: Nutrient intake appropriate for maintaining nutritional needs  Outcome: Progressing

## 2025-02-17 NOTE — PROGRESS NOTES
Yadira Davis  [id number]   Service: Internal Medicine / Hospitalist Date of service: 2/17/2025                          Full Code                  Subjective      History Of Present Illness      Yadira Davis is a 74 y.o. female with PMHx of HFrEF (EF 12/11/24 was 38% with global hypokinesis), COPD on 3L O2 PRN/HS, CAD/PVD, noncompliance and HTN who presented with SOB. She denies recent edema and cough but has PND. She went to a restaurant a few days ago but otherwise tries to avoid canned foods and eat home cooking. She says she is compliant with all her medications.   ED workup was significant for blood glucose 247, creatinine/GFR 1.02/57, BNP 1008, troponins 19/29. COVID/influenza screens were negative, CXR revealed mild congestion. The pt was tachycardic to 120 bpm, tachypneic to 31, hypertensive to 168/92 mmHg and hypoxic. She was placed on BiPAP. The pt received IV Lasix, albuterol and IV magnesium.   Remainder of ROS reviewed and negative except as indicated in HPItive    2/17...........No reported: Palpitations, syncope, presyncope, nausea, vomiting, fevers or chills.Patient seen and evaluated in presence of her daughter.  Overall patient felt that she was doing better felt that her breathing is improved.      Review of Systems:   Review of system otherwise negative if not aforementioned above in subjective.    Objective    Physical Exam     Constitutional:       Appearance: Patient appeared in no acute cardiopulmonary distress.     Comments: Patient alert and oriented to :person ,place ,time and situation.  HEENT:      Head: Normocephalic and atraumatic.Trachea midline      Nose:No observed congestion or rhinorrhea.     Mouth/Throat: Mucous membranes Moist, Trachea appeared  midline.  Eyes:      Extraocular Movements: Extraocular movements intact.      Pupils: Pupils are equal, round, and reactive to light.      Comments: No scleral icterus or conjunctival injection appreciated.   Cardiovascular:       Rate and Rhythm: Normal rate and regular rhythm. No clicks rubs or gallops, normal S1 and S2.No peripheral stigmata of endocarditis appreciated.     Pulmonary:      Lung fields appeared generally diminished on auscultation without appreciation of adventitious sounds.  Abdominal:      General: Abdomen soft, nontender, active bowel sounds, no involuntary guarding or rebound tenderness appreciated.     Comments: None   Musculoskeletal:       Patient appeared to have full active range of motion for upper and lower extremities, no acute apparent joint deformity appreciated on examination.   No pitting edema or cyanosis appreciated.       Lymphadenopathy:      No appreciable palpable lymphadenopathy  Skin:     General: Skin is warm.      Coloration:  No jaundice     Findings: No abnormal appearing skin rashes or lesions that appeared acute noted on unclothed area of the skin..   Neurological:      General: No focal sensory or motor deficits appreciated, no meningeal signs or dysmetria noted.      Cranial Nerves: Cranial nerves II to XII appearing grossly intact.     Genitals:  Deferred  Psychiatric:         The patient appears to be displaying normal mood and affect at the time of evaluation.    Labs:     Lab Results   Component Value Date    GLUCOSE 84 02/17/2025    CALCIUM 10.2 02/17/2025     02/17/2025    K 3.6 02/17/2025    CO2 25 02/17/2025     02/17/2025    BUN 24 (H) 02/17/2025    CREATININE 0.90 02/17/2025      Lab Results   Component Value Date    WBC 6.8 02/17/2025    HGB 13.6 02/17/2025    HCT 40.9 02/17/2025    MCV 87 02/17/2025     02/17/2025      [unfilled]   [unfilled]   No results found for the last 90 days.              X-rays/ Images    [unfilled]   Radiology Results (last 21 days)    Procedure Component Value Units Date/Time   XR chest 1 view [[id number]] Collected: 02/16/25 0357   Order Status: Completed Updated: 02/16/25 0358   Narrative:     Interpreted By:  Mattie  Richard,  STUDY:  XR CHEST 1 VIEW;  2/16/2025 3:41 am      INDICATION:  Signs/Symptoms:sob.      COMPARISON:  12/24/2024      ACCESSION NUMBER(S):  [id number]      ORDERING CLINICIAN:  VALENTINA DAMICO      FINDINGS:  Heart size borderline enlarged. There is some diffuse increased  interstitial markings in the lungs similar to previous which could  reflect mild congestion. No significant consolidation, effusion or  pneumothorax.       Impression:     Mild congestion.      MACRO:  None      Signed by: Richard Phelps 2/16/2025 3:56 AM  Dictation workstation:   [id number]             Medical Problems       Problem List       * (Principal) COPD exacerbation (Multi)    Aortoiliac stenosis (CMS-HCC)    Stenosis of iliac artery (CMS-HCC)    Chronic obstructive pulmonary disease (Multi)    Calcification of coronary artery    Dependence on supplemental oxygen    Dyslipidemia    Primary hypertension    Weakness    Generalized anxiety disorder    Hypoxia    Intermittent claudication (CMS-HCC)    Malignant neoplasm of breast    PAD (peripheral artery disease) (CMS-HCC)    Perforation of tympanic membrane    Severe uncontrolled hypertension    Tachycardia    Dizziness    Aspirin long-term use    Chest pain    Hearing loss    Mixed hyperlipidemia    PVD (peripheral vascular disease) (CMS-HCC)    Dyspnea on exertion    Tobacco use    Palpitations    Acute bronchitis    Bilateral hearing loss    Cigarette smoker    Cough    Decrease in appetite    Chest pain on exertion    Pure hypercholesterolemia    Pain of right lower extremity    Otitis media    Mixed conductive and sensorineural hearing loss    Asymmetrical sensorineural hearing loss    Malignant neoplasm of uterus, part unspecified (Multi)    Low oxygen saturation    History of malignant neoplasm of uterine body    History of malignant neoplasm of breast    History of hypertension    History of elevated lipids    Disease due to severe acute respiratory syndrome coronavirus 2  (SARS-CoV-2)    Overview Signed 12/10/2024  3:58 PM by Maria Teresa Stover MA     Comment on above: COVID + 1/1 & ABNORMAL BLOOD WORK         Falling episodes    Decreased breath sounds    Adnexal mass    Overview Signed 1/8/2025 11:40 AM by Randa Braswell MD     ER visit at [facility] 1/7/2025 shows right adnexal complex mass with solid component. 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 CA-125 returned in normal range.   Referral is placed to gyn oncology.         Ovarian mass, right               Above medical problems may be reflective of historical medical problems that may have resolved and may not related to acute clinical condition/medical problems.    Clinical impression/plan:      Dyspnea, multifactorial as below  Acute on chronic hypoxic respiratory failure     Acute exacerbation of COPD and HFrEF  EF by echo 12/11/24 was 38% with global hypokinesis  Continue IV Lasix and consult cardiology  Carvedilol was added 1/31/25 to lisinopril and hydrochlorothiazide by outpatient cardiology      Hx COPD on 3L O2 PRN/HS, now on BiPAP  Wean O2 as tolerated to maintain O2 sat >91%, pt may need home O2 eval   Continue Duoneb and Mucinex as well as home Anoro  Pt follows with pulmonology outpatient     Hx CAD/PVD  Resume ASA, carvedilol and rosuvastatin     HTN   BP controlled on home meds     Hyperglycemia  Blood glucose is 247, no hx diabetes  No A1c in the EMR and prior BMPs do not reflect hyperglycemia     Mild ESTEFANIA  No hx CKD, BMP in am, monitor for now     Elevated troponins  Troponin 19 > 29, suspect this is mild demand ischemia in the setting of above, will trend     Tobacco dependence  Pt has a 82.5 pack-year smoking history, she cut back 1/2 ppd, wants to cut back on her own     Ovarian mass  Pt is scheduled for laparoscopy 3/25/25 for ovarian mass      DVT ppx: SubQ heparin      The patient was informed of differential diagnosis , work up , plan of care and possible sequelae of clinical  disposition.Patient in agreement with plan of care. Further recommendations forthcoming in accordance with patient's clinical disposition and response to care.    Disposition/additional care plan/interventions: 2/17/2025    Diuretic dosage as per cardiology.    Continue Coreg    Given marginal blood pressures discussed with patient's nurse will hold ACE inhibitor today    Continue to monitor hemodynamic status/blood pressure closely    Jardiance 10 mg daily recommended by cardiology.    Tobacco cessation advised    Discharge planning:Anticipate likely discharge in next 48 hours    Care time: > 35 mins           Dictation performed with assistance of voice recognition device therefore transcription errors are possible.

## 2025-02-17 NOTE — PROGRESS NOTES
Yadira Davis is a 74 y.o. female admitted for COPD exacerbation (Multi). Pharmacy reviewed the patient's pqpys-va-owkwsffld medications and allergies for accuracy.    The list below reflects the PTA list prior to pharmacy medication history. A summary a changes to the PTA medication list has been listed below. Please review each medication in order reconciliation for additional clarification and justification.    Source of information:  Pharmacy  No changes!  Medications added:    Medications modified:    Medications to be removed:    Medications of concern:      Prior to Admission Medications   Prescriptions Last Dose Informant Patient Reported? Taking?   albuterol (Ventolin HFA) 90 mcg/actuation inhaler   No No   Sig: Inhale 2 puffs every 4 hours if needed for shortness of breath.   aspirin 81 mg EC tablet 2/16/2025 Morning  No Yes   Sig: Take 1 tablet (81 mg) by mouth once daily.   carvedilol (Coreg) 3.125 mg tablet 2/16/2025 Morning  No Yes   Sig: Take 1 tablet (3.125 mg) by mouth 2 times daily (morning and late afternoon).   lisinopriL-hydrochlorothiazide 20-12.5 mg tablet 2/16/2025 Morning  No Yes   Sig: Take 1 tablet by mouth 2 times a day.   oxyCODONE (Roxicodone) 5 mg immediate release tablet 2/16/2025 Morning  No Yes   Sig: Take 1 tablet (5 mg) by mouth every 6 hours if needed for severe pain (7 - 10) for up to 7 days.   rosuvastatin (Crestor) 40 mg tablet 2/16/2025 Morning  No Yes   Sig: Take 1 tablet (40 mg) by mouth once daily.   umeclidinium-vilanteroL (Anoro Ellipta) 62.5-25 mcg/actuation blister with device 2/16/2025 Morning  No Yes   Sig: Inhale 1 puff once daily.      Facility-Administered Medications: None       Adrianna Cox

## 2025-02-18 ENCOUNTER — PHARMACY VISIT (OUTPATIENT)
Dept: PHARMACY | Facility: CLINIC | Age: 75
End: 2025-02-18
Payer: COMMERCIAL

## 2025-02-18 ENCOUNTER — TELEPHONE (OUTPATIENT)
Dept: CARDIOLOGY | Facility: HOSPITAL | Age: 75
End: 2025-02-18
Payer: MEDICARE

## 2025-02-18 ENCOUNTER — TELEPHONE (OUTPATIENT)
Dept: GYNECOLOGIC ONCOLOGY | Facility: HOSPITAL | Age: 75
End: 2025-02-18
Payer: MEDICARE

## 2025-02-18 VITALS
RESPIRATION RATE: 16 BRPM | BODY MASS INDEX: 20.46 KG/M2 | SYSTOLIC BLOOD PRESSURE: 124 MMHG | HEIGHT: 65 IN | DIASTOLIC BLOOD PRESSURE: 71 MMHG | TEMPERATURE: 96.7 F | OXYGEN SATURATION: 95 % | HEART RATE: 83 BPM | WEIGHT: 122.8 LBS

## 2025-02-18 LAB
ANION GAP SERPL CALC-SCNC: 13 MMOL/L (ref 10–20)
BUN SERPL-MCNC: 27 MG/DL (ref 6–23)
CALCIUM SERPL-MCNC: 10.2 MG/DL (ref 8.6–10.3)
CHLORIDE SERPL-SCNC: 103 MMOL/L (ref 98–107)
CO2 SERPL-SCNC: 26 MMOL/L (ref 21–32)
CREAT SERPL-MCNC: 1.04 MG/DL (ref 0.5–1.05)
EGFRCR SERPLBLD CKD-EPI 2021: 56 ML/MIN/1.73M*2
ERYTHROCYTE [DISTWIDTH] IN BLOOD BY AUTOMATED COUNT: 13.4 % (ref 11.5–14.5)
GLUCOSE SERPL-MCNC: 103 MG/DL (ref 74–99)
HCT VFR BLD AUTO: 42.5 % (ref 36–46)
HGB BLD-MCNC: 13.8 G/DL (ref 12–16)
MAGNESIUM SERPL-MCNC: 1.87 MG/DL (ref 1.6–2.4)
MCH RBC QN AUTO: 28.1 PG (ref 26–34)
MCHC RBC AUTO-ENTMCNC: 32.5 G/DL (ref 32–36)
MCV RBC AUTO: 87 FL (ref 80–100)
NRBC BLD-RTO: 0 /100 WBCS (ref 0–0)
PLATELET # BLD AUTO: 171 X10*3/UL (ref 150–450)
POTASSIUM SERPL-SCNC: 3.5 MMOL/L (ref 3.5–5.3)
RBC # BLD AUTO: 4.91 X10*6/UL (ref 4–5.2)
SODIUM SERPL-SCNC: 138 MMOL/L (ref 136–145)
WBC # BLD AUTO: 6.9 X10*3/UL (ref 4.4–11.3)

## 2025-02-18 PROCEDURE — 83735 ASSAY OF MAGNESIUM: CPT | Performed by: HOSPITALIST

## 2025-02-18 PROCEDURE — 36415 COLL VENOUS BLD VENIPUNCTURE: CPT | Performed by: HOSPITALIST

## 2025-02-18 PROCEDURE — 2500000001 HC RX 250 WO HCPCS SELF ADMINISTERED DRUGS (ALT 637 FOR MEDICARE OP): Performed by: HOSPITALIST

## 2025-02-18 PROCEDURE — 82374 ASSAY BLOOD CARBON DIOXIDE: CPT | Performed by: HOSPITALIST

## 2025-02-18 PROCEDURE — 2500000004 HC RX 250 GENERAL PHARMACY W/ HCPCS (ALT 636 FOR OP/ED): Performed by: HOSPITALIST

## 2025-02-18 PROCEDURE — RXMED WILLOW AMBULATORY MEDICATION CHARGE

## 2025-02-18 PROCEDURE — 85027 COMPLETE CBC AUTOMATED: CPT | Performed by: HOSPITALIST

## 2025-02-18 PROCEDURE — 2500000001 HC RX 250 WO HCPCS SELF ADMINISTERED DRUGS (ALT 637 FOR MEDICARE OP): Performed by: NURSE PRACTITIONER

## 2025-02-18 PROCEDURE — 2500000002 HC RX 250 W HCPCS SELF ADMINISTERED DRUGS (ALT 637 FOR MEDICARE OP, ALT 636 FOR OP/ED): Performed by: PHYSICIAN ASSISTANT

## 2025-02-18 PROCEDURE — 2500000002 HC RX 250 W HCPCS SELF ADMINISTERED DRUGS (ALT 637 FOR MEDICARE OP, ALT 636 FOR OP/ED): Performed by: NURSE PRACTITIONER

## 2025-02-18 PROCEDURE — 94640 AIRWAY INHALATION TREATMENT: CPT

## 2025-02-18 PROCEDURE — 99232 SBSQ HOSP IP/OBS MODERATE 35: CPT | Performed by: PHYSICIAN ASSISTANT

## 2025-02-18 PROCEDURE — 2500000002 HC RX 250 W HCPCS SELF ADMINISTERED DRUGS (ALT 637 FOR MEDICARE OP, ALT 636 FOR OP/ED): Performed by: INTERNAL MEDICINE

## 2025-02-18 PROCEDURE — 99239 HOSP IP/OBS DSCHRG MGMT >30: CPT | Performed by: HOSPITALIST

## 2025-02-18 RX ORDER — OXYCODONE HYDROCHLORIDE 5 MG/1
5 TABLET ORAL EVERY 6 HOURS PRN
Start: 2025-02-18 | End: 2025-02-25 | Stop reason: SDUPTHER

## 2025-02-18 RX ORDER — FUROSEMIDE 20 MG/1
20 TABLET ORAL DAILY
Qty: 30 TABLET | Refills: 0 | Status: SHIPPED | OUTPATIENT
Start: 2025-02-19 | End: 2025-03-21

## 2025-02-18 RX ORDER — DOCUSATE SODIUM 100 MG/1
100 CAPSULE, LIQUID FILLED ORAL 2 TIMES DAILY
Qty: 60 CAPSULE | Refills: 0 | Status: SHIPPED | OUTPATIENT
Start: 2025-02-18 | End: 2025-03-20

## 2025-02-18 RX ORDER — ACETAMINOPHEN 325 MG/1
650 TABLET ORAL EVERY 4 HOURS PRN
Start: 2025-02-18

## 2025-02-18 RX ORDER — POTASSIUM CHLORIDE 20 MEQ/1
20 TABLET, EXTENDED RELEASE ORAL ONCE
Status: COMPLETED | OUTPATIENT
Start: 2025-02-18 | End: 2025-02-18

## 2025-02-18 RX ORDER — POTASSIUM CHLORIDE 20 MEQ/1
20 TABLET, EXTENDED RELEASE ORAL ONCE
Status: DISCONTINUED | OUTPATIENT
Start: 2025-02-18 | End: 2025-02-18

## 2025-02-18 RX ORDER — FUROSEMIDE 20 MG/1
20 TABLET ORAL DAILY
Status: DISCONTINUED | OUTPATIENT
Start: 2025-02-19 | End: 2025-02-18 | Stop reason: HOSPADM

## 2025-02-18 RX ORDER — DOCUSATE SODIUM 100 MG/1
100 CAPSULE, LIQUID FILLED ORAL 2 TIMES DAILY
Status: DISCONTINUED | OUTPATIENT
Start: 2025-02-18 | End: 2025-02-18 | Stop reason: HOSPADM

## 2025-02-18 RX ORDER — LANOLIN ALCOHOL/MO/W.PET/CERES
800 CREAM (GRAM) TOPICAL ONCE
Status: COMPLETED | OUTPATIENT
Start: 2025-02-18 | End: 2025-02-18

## 2025-02-18 RX ORDER — IPRATROPIUM BROMIDE AND ALBUTEROL SULFATE 2.5; .5 MG/3ML; MG/3ML
3 SOLUTION RESPIRATORY (INHALATION) 3 TIMES DAILY PRN
Qty: 90 ML | Refills: 0 | Status: SHIPPED | OUTPATIENT
Start: 2025-02-18 | End: 2025-02-27 | Stop reason: SDUPTHER

## 2025-02-18 RX ORDER — FUROSEMIDE 20 MG/1
20 TABLET ORAL DAILY
Status: DISCONTINUED | OUTPATIENT
Start: 2025-02-18 | End: 2025-02-18 | Stop reason: WASHOUT

## 2025-02-18 RX ADMIN — EMPAGLIFLOZIN 10 MG: 10 TABLET, FILM COATED ORAL at 08:26

## 2025-02-18 RX ADMIN — POTASSIUM CHLORIDE 20 MEQ: 1500 TABLET, EXTENDED RELEASE ORAL at 10:19

## 2025-02-18 RX ADMIN — CARVEDILOL 3.12 MG: 3.12 TABLET, FILM COATED ORAL at 08:26

## 2025-02-18 RX ADMIN — Medication 800 MG: at 11:31

## 2025-02-18 RX ADMIN — HEPARIN SODIUM 5000 UNITS: 5000 INJECTION, SOLUTION INTRAVENOUS; SUBCUTANEOUS at 05:58

## 2025-02-18 RX ADMIN — TIOTROPIUM BROMIDE INHALATION SPRAY 2 PUFF: 3.12 SPRAY, METERED RESPIRATORY (INHALATION) at 08:26

## 2025-02-18 RX ADMIN — IPRATROPIUM BROMIDE AND ALBUTEROL SULFATE 3 ML: 2.5; .5 SOLUTION RESPIRATORY (INHALATION) at 07:25

## 2025-02-18 RX ADMIN — GUAIFENESIN 600 MG: 600 TABLET ORAL at 08:26

## 2025-02-18 RX ADMIN — FORMOTEROL FUMARATE DIHYDRATE 20 MCG: 20 SOLUTION RESPIRATORY (INHALATION) at 07:24

## 2025-02-18 RX ADMIN — IPRATROPIUM BROMIDE AND ALBUTEROL SULFATE 3 ML: 2.5; .5 SOLUTION RESPIRATORY (INHALATION) at 11:20

## 2025-02-18 RX ADMIN — FUROSEMIDE 40 MG: 10 INJECTION, SOLUTION INTRAVENOUS at 05:59

## 2025-02-18 RX ADMIN — ASPIRIN 81 MG: 81 TABLET, COATED ORAL at 08:26

## 2025-02-18 ASSESSMENT — COGNITIVE AND FUNCTIONAL STATUS - GENERAL
MOBILITY SCORE: 24
DAILY ACTIVITIY SCORE: 24

## 2025-02-18 ASSESSMENT — ENCOUNTER SYMPTOMS
SHORTNESS OF BREATH: 0
ABDOMINAL PAIN: 0
ORTHOPNEA: 0
VOMITING: 0
PALPITATIONS: 0
NAUSEA: 0
DIARRHEA: 0
WHEEZING: 0
DYSURIA: 0
WEAKNESS: 0
FEVER: 0

## 2025-02-18 ASSESSMENT — PAIN SCALES - GENERAL
PAINLEVEL_OUTOF10: 0 - NO PAIN

## 2025-02-18 ASSESSMENT — PAIN - FUNCTIONAL ASSESSMENT
PAIN_FUNCTIONAL_ASSESSMENT: 0-10

## 2025-02-18 NOTE — DISCHARGE SUMMARY
Discharge Summary    Yadira Davis    [id number]     2/16/2025  2:19 AM , admit date    2/18/2025, discharge date      .      Discharge Diagnosis:          1.  Heart failure with reduced ejection fraction decompensation    2.  COPD with acute exacerbation    3.  Acute on chronic hypoxic respiratory failure requiring noninvasive positive ventilation therapy.    4.  Tobacco abuse, tobacco cessation advised    5.  Noncompliance, patient admits to medical noncompliance, medical compliance encouraged.    6.  Hypertension    7.  Ovarian mass        Problem List Items Addressed This Visit             ICD-10-CM       Pulmonary and Pneumonias    * (Principal) COPD exacerbation (Multi) - Primary J44.1          Hospital Course/Progress note on the date of  discharge.    Yadira Davis  [id number]   Service: Internal Medicine / Hospitalist Date of service: 2/17/2025                                  Full Code                        Subjective        History Of Present Illness      Yadira Davis is a 74 y.o. female with PMHx of HFrEF (EF 12/11/24 was 38% with global hypokinesis), COPD on 3L O2 PRN/HS, CAD/PVD, noncompliance and HTN who presented with SOB. She denies recent edema and cough but has PND. She went to a restaurant a few days ago but otherwise tries to avoid canned foods and eat home cooking. She says she is compliant with all her medications.   ED workup was significant for blood glucose 247, creatinine/GFR 1.02/57, BNP 1008, troponins 19/29. COVID/influenza screens were negative, CXR revealed mild congestion. The pt was tachycardic to 120 bpm, tachypneic to 31, hypertensive to 168/92 mmHg and hypoxic. She was placed on BiPAP. The pt received IV Lasix, albuterol and IV magnesium.   Remainder of ROS reviewed and negative except as indicated in HPItive     2/17...........No reported: Palpitations, syncope, presyncope, nausea, vomiting, fevers or chills.Patient seen and evaluated in presence of her daughter.  Overall  patient felt that she was doing better felt that her breathing is improved.     2/18..............No acute complaints voiced by patient.  No reported : palpitations, headaches, chest pains, nausea, vomiting, orthopnea, dyspnea at rest or paroxysmal nocturnal dyspnea.        Review of Systems:   Review of system otherwise negative if not aforementioned above in subjective.     Objective     Physical Exam      Constitutional:       Appearance: Patient appeared in no acute cardiopulmonary distress.     Comments: Patient alert and oriented to :person ,place ,time and situation.  HEENT:      Head: Normocephalic and atraumatic.Trachea midline      Nose:No observed congestion or rhinorrhea.     Mouth/Throat: Mucous membranes Moist, Trachea appeared  midline.  Eyes:      Extraocular Movements: Extraocular movements intact.      Pupils: Pupils are equal, round, and reactive to light.      Comments: No scleral icterus or conjunctival injection appreciated.   Cardiovascular:      Rate and Rhythm: Normal rate and regular rhythm. No clicks rubs or gallops, normal S1 and S2.No peripheral stigmata of endocarditis appreciated.     Pulmonary:      Lung fields appeared clear to auscultation without appreciation of adventitious sounds.  Abdominal:      General: Abdomen soft, nontender, active bowel sounds, no involuntary guarding or rebound tenderness appreciated.     Comments: None   Musculoskeletal:       Patient appeared to have full active range of motion for upper and lower extremities, no acute apparent joint deformity appreciated on examination.   No pitting edema or cyanosis appreciated.       Lymphadenopathy:      No appreciable palpable lymphadenopathy  Skin:     General: Skin is warm.      Coloration:  No jaundice     Findings: No abnormal appearing skin rashes or lesions that appeared acute noted on unclothed area of the skin..   Neurological:      General: No focal sensory or motor deficits appreciated, no meningeal signs  or dysmetria noted.      Cranial Nerves: Cranial nerves II to XII appearing grossly intact.     Genitals:  Deferred  Psychiatric:         The patient appears to be displaying normal mood and affect at the time of evaluation.     Labs:               Lab Results   Component Value Date     GLUCOSE 84 02/17/2025     CALCIUM 10.2 02/17/2025      02/17/2025     K 3.6 02/17/2025     CO2 25 02/17/2025      02/17/2025     BUN 24 (H) 02/17/2025     CREATININE 0.90 02/17/2025                Lab Results   Component Value Date     WBC 6.8 02/17/2025     HGB 13.6 02/17/2025     HCT 40.9 02/17/2025     MCV 87 02/17/2025      02/17/2025            Lab Results   Component Value Date     GLUCOSE 103 (H) 02/18/2025     CALCIUM 10.2 02/18/2025      02/18/2025     K 3.5 02/18/2025     CO2 26 02/18/2025      02/18/2025     BUN 27 (H) 02/18/2025     CREATININE 1.04 02/18/2025            Lab Results   Component Value Date     WBC 6.9 02/18/2025     HGB 13.8 02/18/2025     HCT 42.5 02/18/2025     MCV 87 02/18/2025      02/18/2025      [unfilled]   [unfilled]   No results found for the last 90 days.                  X-rays/ Images     [unfilled]   Radiology Results (last 21 days)     Procedure Component Value Units Date/Time   XR chest 1 view [[id number]] Collected: 02/16/25 0357   Order Status: Completed Updated: 02/16/25 0358   Narrative:     Interpreted By:  Richard Phelps,  STUDY:  XR CHEST 1 VIEW;  2/16/2025 3:41 am      INDICATION:  Signs/Symptoms:sob.      COMPARISON:  12/24/2024      ACCESSION NUMBER(S):  [id number]      ORDERING CLINICIAN:  VALENTINA DAMICO      FINDINGS:  Heart size borderline enlarged. There is some diffuse increased  interstitial markings in the lungs similar to previous which could  reflect mild congestion. No significant consolidation, effusion or  pneumothorax.       Impression:     Mild congestion.      MACRO:  None      Signed by: Richard Phelps 2/16/2025 3:56  AM  Dictation workstation:   [id number]                  Medical Problems         Problem List         * (Principal) COPD exacerbation (Multi)     Aortoiliac stenosis (CMS-HCC)     Stenosis of iliac artery (CMS-HCC)     Chronic obstructive pulmonary disease (Multi)     Calcification of coronary artery     Dependence on supplemental oxygen     Dyslipidemia     Primary hypertension     Weakness     Generalized anxiety disorder     Hypoxia     Intermittent claudication (CMS-HCC)     Malignant neoplasm of breast     PAD (peripheral artery disease) (CMS-HCC)     Perforation of tympanic membrane     Severe uncontrolled hypertension     Tachycardia     Dizziness     Aspirin long-term use     Chest pain     Hearing loss     Mixed hyperlipidemia     PVD (peripheral vascular disease) (CMS-HCC)     Dyspnea on exertion     Tobacco use     Palpitations     Acute bronchitis     Bilateral hearing loss     Cigarette smoker     Cough     Decrease in appetite     Chest pain on exertion     Pure hypercholesterolemia     Pain of right lower extremity     Otitis media     Mixed conductive and sensorineural hearing loss     Asymmetrical sensorineural hearing loss     Malignant neoplasm of uterus, part unspecified (Multi)     Low oxygen saturation     History of malignant neoplasm of uterine body     History of malignant neoplasm of breast     History of hypertension     History of elevated lipids     Disease due to severe acute respiratory syndrome coronavirus 2 (SARS-CoV-2)     Overview Signed 12/10/2024  3:58 PM by Maria Teresa Stover MA       Comment on above: COVID + 1/1 & ABNORMAL BLOOD WORK           Falling episodes     Decreased breath sounds     Adnexal mass     Overview Signed 1/8/2025 11:40 AM by Randa Braswell MD       ER visit at [facility] 1/7/2025 shows right adnexal complex mass with solid component. 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 CA-125 returned in normal range.   Referral is placed to gyn  oncology.           Ovarian mass, right                  Above medical problems may be reflective of historical medical problems that may have resolved and may not related to acute clinical condition/medical problems.     Clinical impression/plan:       Dyspnea, multifactorial as below  Acute on chronic hypoxic respiratory failure     Acute exacerbation of COPD and HFrEF  EF by echo 12/11/24 was 38% with global hypokinesis  Continue IV Lasix and consult cardiology  Carvedilol was added 1/31/25 to lisinopril and hydrochlorothiazide by outpatient cardiology      Hx COPD on 3L O2 PRN/HS, now on BiPAP  Wean O2 as tolerated to maintain O2 sat >91%, pt may need home O2 eval   Continue Duoneb and Mucinex as well as home Anoro  Pt follows with pulmonology outpatient     Hx CAD/PVD  Resume ASA, carvedilol and rosuvastatin     HTN   BP controlled on home meds     Hyperglycemia  Blood glucose is 247, no hx diabetes  No A1c in the EMR and prior BMPs do not reflect hyperglycemia     Mild ESTEFANIA  No hx CKD, BMP in am, monitor for now     Elevated troponins  Troponin 19 > 29, suspect this is mild demand ischemia in the setting of above, will trend     Tobacco dependence  Pt has a 82.5 pack-year smoking history, she cut back 1/2 ppd, wants to cut back on her own     Ovarian mass  Pt is scheduled for laparoscopy 3/25/25 for ovarian mass      DVT ppx: SubQ heparin        The patient was informed of differential diagnosis , work up , plan of care and possible sequelae of clinical disposition.Patient in agreement with plan of care. Further recommendations forthcoming in accordance with patient's clinical disposition and response to care.     Disposition/additional care plan/interventions: 2/17/2025     Diuretic dosage as per cardiology.     Continue Coreg     Given marginal blood pressures discussed with patient's nurse will hold ACE inhibitor today     Continue to monitor hemodynamic status/blood pressure closely     Jardiance 10 mg  daily recommended by cardiology.     Tobacco cessation advised     Discharge plannin2025         Plan discharge home today.     The patient requested discharge home today.     Patient appeared hemodynamic stable and clinically fit for discharge.     Cardiology in agreement with discharge.     Patient should seek medical attention immediately if condition should worsen, new symptoms develop , no further improvement or recurrence of presenting symptomatology, patient warned.     Dictation performed with assistance of voice recognition device therefore transcription errors are possible.         Consultants    Cardiology           Surgeries/Procedures:    None               Your medication list        ASK your doctor about these medications        Instructions Last Dose Given Next Dose Due   albuterol 90 mcg/actuation inhaler  Commonly known as: Ventolin HFA      Inhale 2 puffs every 4 hours if needed for shortness of breath.       aspirin 81 mg EC tablet      Take 1 tablet (81 mg) by mouth once daily.       carvedilol 3.125 mg tablet  Commonly known as: Coreg      Take 1 tablet (3.125 mg) by mouth 2 times daily (morning and late afternoon).       lisinopriL-hydrochlorothiazide 20-12.5 mg tablet      Take 1 tablet by mouth 2 times a day.       oxyCODONE 5 mg immediate release tablet  Commonly known as: Roxicodone  Ask about: Should I take this medication?      Take 1 tablet (5 mg) by mouth every 6 hours if needed for severe pain (7 - 10) for up to 7 days.       rosuvastatin 40 mg tablet  Commonly known as: Crestor      Take 1 tablet (40 mg) by mouth once daily.       umeclidinium-vilanteroL 62.5-25 mcg/actuation blister with device  Commonly known as: Anoro Ellipta      Inhale 1 puff once daily.                   Disposition/Events/additional Hospital course;    74-year-old  female notable history of heart failure reduced ejection fraction, COPD with occasional need for supplemental oxygen was admitted  and treated for decompensated congestive heart failure with reduced ejection fraction.  Hospital course entails but not limited to intravenous diuretics, patient appeared to respond well to such interventions.  She was seen and evaluated as well by the cardiovascular service.  She is showing marked clinical improvement today with recommendation by cardiology for discharge home today.  Patient agreement with discharge.  Overall her hospital course appeared to have reflected no major clinical events.  With patient responding well supportive interventions implemented.  Plan discharge to home today with transition to oral diuretic therapy.  Patient and daughter was counseled concerning care plan and all her questions were answered prior to discharge.  Patient nurse was also present during evaluation today.Tobacco cessation advised.    Reported hyperglycemia during hospital course.  Other blood glucose levels appeared within population reference range outpatient follow-up with family physician monitoring of blood glucose levels recommended.  No reported history of diabetes mellitus.      The patient appeared hemodynamically stable and clinically fit for discharge.  The patient was in agreement with discharge.  Patient should seek medical attention immediately if condition should worsen, new symptoms develop , no further improvement or recurrence of presenting symptomatology, patient warned.    Follow-up:    Follow -up with family physician within one week. Follow-up with cardiology as scheduled.  In addition compliance with follow-up with OB/GYN/surgery concerning ovarian mass was stressed.,  Reported laparoscopic surgery plan for 3/25/2025.      Discharge Time : 35 mins      Patient should seek medical attention immediately if condition should worsen , presenting symptoms/conditions do not resolve or new symptoms should developed,patient warned.     Dictation performed with assistance of voice recognition device  therefore transcription errors are possible.

## 2025-02-18 NOTE — CARE PLAN
The patient's goals for the shift include      The clinical goals for the shift include stable for discharge      Problem: Pain - Adult  Goal: Verbalizes/displays adequate comfort level or baseline comfort level  Outcome: Adequate for Discharge     Problem: Safety - Adult  Goal: Free from fall injury  Outcome: Adequate for Discharge     Problem: Discharge Planning  Goal: Discharge to home or other facility with appropriate resources  Outcome: Adequate for Discharge     Problem: Chronic Conditions and Co-morbidities  Goal: Patient's chronic conditions and co-morbidity symptoms are monitored and maintained or improved  Outcome: Adequate for Discharge     Problem: Nutrition  Goal: Nutrient intake appropriate for maintaining nutritional needs  Outcome: Adequate for Discharge

## 2025-02-18 NOTE — PROGRESS NOTES
Yadira Davis  [id number]   Service: Internal Medicine / Hospitalist Date of service: 2/17/2025                                  Full Code                        Subjective        History Of Present Illness      Yadira aDvis is a 74 y.o. female with PMHx of HFrEF (EF 12/11/24 was 38% with global hypokinesis), COPD on 3L O2 PRN/HS, CAD/PVD, noncompliance and HTN who presented with SOB. She denies recent edema and cough but has PND. She went to a restaurant a few days ago but otherwise tries to avoid canned foods and eat home cooking. She says she is compliant with all her medications.   ED workup was significant for blood glucose 247, creatinine/GFR 1.02/57, BNP 1008, troponins 19/29. COVID/influenza screens were negative, CXR revealed mild congestion. The pt was tachycardic to 120 bpm, tachypneic to 31, hypertensive to 168/92 mmHg and hypoxic. She was placed on BiPAP. The pt received IV Lasix, albuterol and IV magnesium.   Remainder of ROS reviewed and negative except as indicated in HPItive     2/17...........No reported: Palpitations, syncope, presyncope, nausea, vomiting, fevers or chills.Patient seen and evaluated in presence of her daughter.  Overall patient felt that she was doing better felt that her breathing is improved.    2/18..............No acute complaints voiced by patient.  No reported : palpitations, headaches, chest pains, nausea, vomiting, orthopnea, dyspnea at rest or paroxysmal nocturnal dyspnea.        Review of Systems:   Review of system otherwise negative if not aforementioned above in subjective.     Objective     Physical Exam      Constitutional:       Appearance: Patient appeared in no acute cardiopulmonary distress.     Comments: Patient alert and oriented to :person ,place ,time and situation.  HEENT:      Head: Normocephalic and atraumatic.Trachea midline      Nose:No observed congestion or rhinorrhea.     Mouth/Throat: Mucous membranes Moist, Trachea appeared  midline.  Eyes:       Extraocular Movements: Extraocular movements intact.      Pupils: Pupils are equal, round, and reactive to light.      Comments: No scleral icterus or conjunctival injection appreciated.   Cardiovascular:      Rate and Rhythm: Normal rate and regular rhythm. No clicks rubs or gallops, normal S1 and S2.No peripheral stigmata of endocarditis appreciated.     Pulmonary:      Lung fields appeared clear to auscultation without appreciation of adventitious sounds.  Abdominal:      General: Abdomen soft, nontender, active bowel sounds, no involuntary guarding or rebound tenderness appreciated.     Comments: None   Musculoskeletal:       Patient appeared to have full active range of motion for upper and lower extremities, no acute apparent joint deformity appreciated on examination.   No pitting edema or cyanosis appreciated.       Lymphadenopathy:      No appreciable palpable lymphadenopathy  Skin:     General: Skin is warm.      Coloration:  No jaundice     Findings: No abnormal appearing skin rashes or lesions that appeared acute noted on unclothed area of the skin..   Neurological:      General: No focal sensory or motor deficits appreciated, no meningeal signs or dysmetria noted.      Cranial Nerves: Cranial nerves II to XII appearing grossly intact.     Genitals:  Deferred  Psychiatric:         The patient appears to be displaying normal mood and affect at the time of evaluation.     Labs:           Lab Results   Component Value Date     GLUCOSE 84 02/17/2025     CALCIUM 10.2 02/17/2025      02/17/2025     K 3.6 02/17/2025     CO2 25 02/17/2025      02/17/2025     BUN 24 (H) 02/17/2025     CREATININE 0.90 02/17/2025            Lab Results   Component Value Date     WBC 6.8 02/17/2025     HGB 13.6 02/17/2025     HCT 40.9 02/17/2025     MCV 87 02/17/2025      02/17/2025      Lab Results   Component Value Date    GLUCOSE 103 (H) 02/18/2025    CALCIUM 10.2 02/18/2025     02/18/2025    K 3.5  02/18/2025    CO2 26 02/18/2025     02/18/2025    BUN 27 (H) 02/18/2025    CREATININE 1.04 02/18/2025      Lab Results   Component Value Date    WBC 6.9 02/18/2025    HGB 13.8 02/18/2025    HCT 42.5 02/18/2025    MCV 87 02/18/2025     02/18/2025      [unfilled]   [unfilled]   No results found for the last 90 days.                  X-rays/ Images     [unfilled]   Radiology Results (last 21 days)     Procedure Component Value Units Date/Time   XR chest 1 view [[id number]] Collected: 02/16/25 0357   Order Status: Completed Updated: 02/16/25 0358   Narrative:     Interpreted By:  Richard Phelps,  STUDY:  XR CHEST 1 VIEW;  2/16/2025 3:41 am      INDICATION:  Signs/Symptoms:sob.      COMPARISON:  12/24/2024      ACCESSION NUMBER(S):  [id number]      ORDERING CLINICIAN:  VALENTINA DAMICO      FINDINGS:  Heart size borderline enlarged. There is some diffuse increased  interstitial markings in the lungs similar to previous which could  reflect mild congestion. No significant consolidation, effusion or  pneumothorax.       Impression:     Mild congestion.      MACRO:  None      Signed by: Richard Phelps 2/16/2025 3:56 AM  Dictation workstation:   [id number]                  Medical Problems         Problem List         * (Principal) COPD exacerbation (Multi)     Aortoiliac stenosis (CMS-HCC)     Stenosis of iliac artery (CMS-HCC)     Chronic obstructive pulmonary disease (Multi)     Calcification of coronary artery     Dependence on supplemental oxygen     Dyslipidemia     Primary hypertension     Weakness     Generalized anxiety disorder     Hypoxia     Intermittent claudication (CMS-HCC)     Malignant neoplasm of breast     PAD (peripheral artery disease) (CMS-HCC)     Perforation of tympanic membrane     Severe uncontrolled hypertension     Tachycardia     Dizziness     Aspirin long-term use     Chest pain     Hearing loss     Mixed hyperlipidemia     PVD (peripheral vascular disease) (CMS-HCC)     Dyspnea  on exertion     Tobacco use     Palpitations     Acute bronchitis     Bilateral hearing loss     Cigarette smoker     Cough     Decrease in appetite     Chest pain on exertion     Pure hypercholesterolemia     Pain of right lower extremity     Otitis media     Mixed conductive and sensorineural hearing loss     Asymmetrical sensorineural hearing loss     Malignant neoplasm of uterus, part unspecified (Multi)     Low oxygen saturation     History of malignant neoplasm of uterine body     History of malignant neoplasm of breast     History of hypertension     History of elevated lipids     Disease due to severe acute respiratory syndrome coronavirus 2 (SARS-CoV-2)     Overview Signed 12/10/2024  3:58 PM by Maria Teresa Stover MA       Comment on above: COVID + 1/1 & ABNORMAL BLOOD WORK           Falling episodes     Decreased breath sounds     Adnexal mass     Overview Signed 1/8/2025 11:40 AM by Randa Braswell MD       ER visit at [facility] 1/7/2025 shows right adnexal complex mass with solid component. 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 CA-125 returned in normal range.   Referral is placed to gyn oncology.           Ovarian mass, right                  Above medical problems may be reflective of historical medical problems that may have resolved and may not related to acute clinical condition/medical problems.     Clinical impression/plan:       Dyspnea, multifactorial as below  Acute on chronic hypoxic respiratory failure     Acute exacerbation of COPD and HFrEF  EF by echo 12/11/24 was 38% with global hypokinesis  Continue IV Lasix and consult cardiology  Carvedilol was added 1/31/25 to lisinopril and hydrochlorothiazide by outpatient cardiology      Hx COPD on 3L O2 PRN/HS, now on BiPAP  Wean O2 as tolerated to maintain O2 sat >91%, pt may need home O2 eval   Continue Duoneb and Mucinex as well as home Anoro  Pt follows with pulmonology outpatient     Hx CAD/PVD  Resume ASA, carvedilol and  rosuvastatin     HTN   BP controlled on home meds     Hyperglycemia  Blood glucose is 247, no hx diabetes  No A1c in the EMR and prior BMPs do not reflect hyperglycemia     Mild ESTEFANIA  No hx CKD, BMP in am, monitor for now     Elevated troponins  Troponin 19 > 29, suspect this is mild demand ischemia in the setting of above, will trend     Tobacco dependence  Pt has a 82.5 pack-year smoking history, she cut back 1/2 ppd, wants to cut back on her own     Ovarian mass  Pt is scheduled for laparoscopy 3/25/25 for ovarian mass      DVT ppx: SubQ heparin        The patient was informed of differential diagnosis , work up , plan of care and possible sequelae of clinical disposition.Patient in agreement with plan of care. Further recommendations forthcoming in accordance with patient's clinical disposition and response to care.     Disposition/additional care plan/interventions: 2025     Diuretic dosage as per cardiology.     Continue Coreg     Given marginal blood pressures discussed with patient's nurse will hold ACE inhibitor today     Continue to monitor hemodynamic status/blood pressure closely     Jardiance 10 mg daily recommended by cardiology.     Tobacco cessation advised     Discharge plannin2025        Plan discharge home today.    The patient requested discharge home today.    Patient appeared hemodynamic stable and clinically fit for discharge.    Cardiology in agreement with discharge.    Patient should seek medical attention immediately if condition should worsen, new symptoms develop , no further improvement or recurrence of presenting symptomatology, patient warned.     Dictation performed with assistance of voice recognition device therefore transcription errors are possible.

## 2025-02-18 NOTE — TELEPHONE ENCOUNTER
----- Message from Harish Lazaro sent at 2/18/2025  8:41 AM EST -----  Regarding: follow up  Please schedule back with me in like 2 weeks.  Thanks

## 2025-02-18 NOTE — TELEPHONE ENCOUNTER
I scheduled patient for an upcoming follow up appt. with the office on 03/05/2025.  The appt date and time will print out on discharge summary from the hospital.

## 2025-02-18 NOTE — PROGRESS NOTES
PROGRESS NOTE    HPI:  Yadira Davis is a 74 y.o. female with past medical history of HFrEF, COPD, CAD, essential hypertension, noncompliance who presents to the hospital due to shortness of breath.  Patient reports taking medications.  She continues to smoke.     EKG with normal sinus rhythm, first degree AV block, borderline repolarization abnormalities     BNP elevated at 1008     Troponin flatly elevated at 19> 29> 53>51     creatinine/GFR 1.02/57      Patient was hypoxic, tachypneic, hypertensive, tachycardic on arrival and started on BiPAP.  Patient received Lasix.  Currently on room air.  She reports feeling much better.  Patient has received breathing treatment.     Chest x-ray with borderline cardiomegaly, mild vascular congestion       Subjective Data:  Patient feels significantly improved and has compensated very nicely with just a few doses of IV Lasix.  She no longer feels short of breath and her breathing is back to baseline without crackling or wheezing.  She had no edema.  We do need to be careful with her blood pressures as with higher dose of blood pressure medication she was having issues with near syncope.  At this time we will discontinue the lisinopril, continue the low-dose carvedilol and start on low-dose maintenance dose of Lasix.  The patient has pelvic mass concerning for cancer and is scheduled for surgery in March.  Telemetry has been sinus rhythm.    Overnight Events:    None--feels back to baseline and wants to go home     Objective Data:  Last Recorded Vitals:  Vitals:    02/17/25 2346 02/18/25 0341 02/18/25 0558 02/18/25 0724   BP: 98/56 91/54 111/66 123/65   BP Location: Right arm Right arm  Right arm   Patient Position: Lying Lying  Lying   Pulse: 79 76  92   Resp: 16 16  18   Temp: 36.7 °C (98.1 °F) 36.6 °C (97.8 °F)  36 °C (96.8 °F)   TempSrc: Temporal Temporal  Temporal   SpO2: 98% 95%  97%   Weight:  55.7 kg (122 lb 12.8 oz)     Height:           Last Labs:  CBC - 2/18/2025:   4:32 AM  6.9 13.8 171    42.5      [facility] - 2/18/2025:  4:32 AM  10.2 6.4 14 --- 0.4   _ 4.1 9 77      PTT - No results in last year.  _   _ _     Last I/O:  I/O last 3 completed shifts:  In: 560 (10.1 mL/kg) [P.O.:560]  Out: 300 (5.4 mL/kg) [Urine:300 (0.1 mL/kg/hr)]  Weight: 55.7 kg     Past Cardiology Tests (Last 3 Years):  Echo:  CONCLUSIONS:   1. The left ventricular systolic function is moderately decreased, with a calculated ejection fraction of 38% by auto EF.   2. There is global hypokinesis of the left ventricle with minor regional variations.   3. Spectral Doppler shows a Grade I (impaired relaxation pattern) of left ventricular diastolic filling with normal left atrial filling pressure.   4. There is normal right ventricular global systolic function.   5. Left Ventricular Global Longitudinal Strain - 9.4 %.     Stress Test:  IMPRESSION:  1. Normal stress myocardial perfusion imaging in response to  pharmacologic stress. Dilated left ventricle with moderate left  ventricular systolic function on post stress gated imaging.  2. Low-dose non gated CT images done for attenuation correction shows  severe coronary artery calcification, severe calcification of the  ascending as well as descending thoracic aorta. .      Inpatient Medications:  Scheduled medications   Medication Dose Route Frequency    aspirin  81 mg oral Daily    carvedilol  3.125 mg oral BID    empagliflozin  10 mg oral Daily    tiotropium  2 puff inhalation Daily    And    formoterol  20 mcg nebulization q12h    furosemide  40 mg intravenous q12h    guaiFENesin  600 mg oral BID    heparin (porcine)  5,000 Units subcutaneous q8h ARNIE    ipratropium-albuteroL  3 mL nebulization TID    [Held by provider] lisinopril  20 mg oral Daily    rosuvastatin  40 mg oral Nightly    sennosides  2 tablet oral BID       Review of Systems   Constitutional: Negative for fever and malaise/fatigue.   Cardiovascular:  Negative for chest pain, orthopnea and palpitations.    Respiratory:  Negative for shortness of breath and wheezing.         Feels that breathing is back to baseline   Skin:  Negative for itching and rash.   Gastrointestinal:  Negative for abdominal pain, diarrhea, nausea and vomiting.        No GI issues   Genitourinary:  Negative for dysuria.   Neurological:  Negative for weakness.        Physical Exam  Constitutional:       General: She is not in acute distress.     Appearance: Normal appearance.   HENT:      Mouth/Throat:      Mouth: Mucous membranes are moist.   Neck:      Comments: No JVD or HJR  Cardiovascular:      Rate and Rhythm: Normal rate and regular rhythm.      Heart sounds: Normal heart sounds.      Comments: Tele is SR  Pulmonary:      Effort: Pulmonary effort is normal.      Breath sounds: Normal breath sounds. No wheezing or rales.   Abdominal:      General: Bowel sounds are normal.      Palpations: Abdomen is soft.      Tenderness: There is no abdominal tenderness.   Musculoskeletal:      Right lower leg: No edema.      Left lower leg: No edema.   Skin:     General: Skin is warm and dry.   Neurological:      Mental Status: She is alert and oriented to person, place, and time.   Psychiatric:         Behavior: Behavior is cooperative.            ASSESSMENT/PLAN  Acute hypoxic respiratory failure  Acute decompensated heart failure  COPD with exacerbation  CAD  Hypertension  Smoking     Plan:  Suspect multifactorial dyspnea due to COPD exacerbation and heart failure exacerbation  Recent stress test was reviewed which did not show any reversible ischemia.  I will recommend Lasix 40 mg IV twice daily.  Patient will need to be discharged on torsemide.  Continue Coreg 3.125 mg twice daily  Will recommend to continue aspirin 81 mg daily.  Will recommend to continue lisinopril as taking.  Patient has had issues with low blood pressures.  Consider switching to Entresto in near future as blood pressure allows.  Will recommend to start on Jardiance 10 mg  daily.  Strict I's and O's.  Will recommend dietary/nutrition consult  2-18-25: Patient has compensated quite nicely.  Will discontinue IV Lasix and transition to low-dose oral diuretic.  We do have to be concerned about her blood pressures as with lower blood pressures she has had multiple near syncopal events.  Will discontinue lisinopril continue low-dose carvedilol with the diuretic and have close follow-up in the office.  She has been started on Jardiance.  If blood pressures are better at follow-up office visit can start Entresto at that time.  Will supplement potassium.  No further cardiac testing as recent stress testing showed no evidence of ischemia.  Patient would like to go home today.  Again we will arrange close outpatient follow-up.      Harish Lazaro PA-C  2/18/2025  8:26 AM

## 2025-02-19 ENCOUNTER — TELEPHONE (OUTPATIENT)
Dept: CARDIOLOGY | Facility: HOSPITAL | Age: 75
End: 2025-02-19
Payer: MEDICARE

## 2025-02-19 NOTE — TELEPHONE ENCOUNTER
2/19/25  1550  Returned call to daughter of patient. Informed her that another provider will need to do short term disability paperwork; asked she knew of another provider who can do it.    Daughter reported that he gynecological oncologist may be able.    Nurse reported he would have the paperwork faxed to her office.    Nurse gave short term disability paperwork and fax number to Gm; Gm to fax.    2/19/25  1507  Received short term disability paperwork.  Called patient/daughter to discuss another provider filling out paperwork; no answer.     Left voice message for patient or daughter to return call and give another provider who can do the paperwork.

## 2025-02-20 ENCOUNTER — APPOINTMENT (OUTPATIENT)
Dept: PRIMARY CARE | Facility: CLINIC | Age: 75
End: 2025-02-20
Payer: MEDICARE

## 2025-02-20 VITALS
WEIGHT: 127.3 LBS | DIASTOLIC BLOOD PRESSURE: 58 MMHG | SYSTOLIC BLOOD PRESSURE: 110 MMHG | TEMPERATURE: 97.8 F | HEIGHT: 63 IN | HEART RATE: 88 BPM | OXYGEN SATURATION: 97 % | BODY MASS INDEX: 22.55 KG/M2

## 2025-02-20 DIAGNOSIS — N94.89 ADNEXAL MASS: ICD-10-CM

## 2025-02-20 DIAGNOSIS — R19.00 PELVIC MASS: Primary | ICD-10-CM

## 2025-02-20 PROCEDURE — 1111F DSCHRG MED/CURRENT MED MERGE: CPT | Performed by: FAMILY MEDICINE

## 2025-02-20 PROCEDURE — G2211 COMPLEX E/M VISIT ADD ON: HCPCS | Performed by: FAMILY MEDICINE

## 2025-02-20 PROCEDURE — 99214 OFFICE O/P EST MOD 30 MIN: CPT | Performed by: FAMILY MEDICINE

## 2025-02-20 PROCEDURE — 3078F DIAST BP <80 MM HG: CPT | Performed by: FAMILY MEDICINE

## 2025-02-20 PROCEDURE — 1124F ACP DISCUSS-NO DSCNMKR DOCD: CPT | Performed by: FAMILY MEDICINE

## 2025-02-20 PROCEDURE — 1159F MED LIST DOCD IN RCRD: CPT | Performed by: FAMILY MEDICINE

## 2025-02-20 PROCEDURE — 3074F SYST BP LT 130 MM HG: CPT | Performed by: FAMILY MEDICINE

## 2025-02-20 ASSESSMENT — PATIENT HEALTH QUESTIONNAIRE - PHQ9
2. FEELING DOWN, DEPRESSED OR HOPELESS: NOT AT ALL
SUM OF ALL RESPONSES TO PHQ9 QUESTIONS 1 AND 2: 0
1. LITTLE INTEREST OR PLEASURE IN DOING THINGS: NOT AT ALL

## 2025-02-20 ASSESSMENT — ENCOUNTER SYMPTOMS
OCCASIONAL FEELINGS OF UNSTEADINESS: 0
LOSS OF SENSATION IN FEET: 0
DEPRESSION: 0

## 2025-02-20 NOTE — PROGRESS NOTES
Assessment/Plan     Had a long discussion with patient and daughter regarding recent diagnosis of pelvic mass; she has upcoming exploratory laparatomy on 3/25/25. She is in need of short term disability for work because the pelvic mass causes significant pain and difficulty w/ walking for long periods of time. She will then need a minimum of 8 weeks to recover from the ex lap and pending results / biopsy will determine further follow-up.     She will follow up here in the office on 5/12/25 to re-evaluate the ability to work. At that appointment we will determine if a return to work date of 5/25/25 is still appropriate.     STD paperwork completed today in the office.     Problem List Items Addressed This Visit       Adnexal mass    Overview     ER visit at [facility] 1/7/2025 shows right adnexal complex mass with solid component. 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 CA-125 returned in normal range.   Referral is placed to gyn oncology.          Other Visit Diagnoses       Pelvic mass    -  Primary                  Subjective   Patient ID: Yadira Davis is a 74 y.o. female who presents for Hospital Follow-up.    Currently working at [email or web address]; she works as [employer]       On 1CLICK she went into the hospital   She has been off work     Has surgery scheduled for pelvic mass on 3/25/25   Exploratory lapartomy      Hxof uterine cancer; s/p hysterectomy happened at age 23   Hx of breast cancer s/p mastectomy in 38   *Multiseptated cystic right ovarian mass with internal  vascularity measures 6.1 x 6.3 x 6.4 cm. This is concerning for  malignancy.     In the right adnexa, there is  a cystic and solid mass measuring 7.2 x 7.6 x 8.0 cm    Social History     Tobacco Use   Smoking Status Every Day    Current packs/day: 1.50    Average packs/day: 1.5 packs/day for 55.1 years (82.6 ttl pk-yrs)    Types: Cigarettes    Start date: 1/23/1970    Passive exposure: Current   Smokeless Tobacco Never   Tobacco Comments     "Declines cessation counseling, no interest in quiting           Objective   /58 (BP Location: Right arm, Patient Position: Sitting, BP Cuff Size: Adult)   Pulse 88   Temp 36.6 °C (97.8 °F) (Skin)   Ht 1.6 m (5' 3\")   Wt 57.7 kg (127 lb 4.8 oz)   SpO2 97%   BMI 22.55 kg/m²     Physical Exam:     Constitutional:   General: not in acute distress  Appearance: normal appearance, non-toxic, not ill-appearing or diaphoretic.   HENT:   Head: Normocephalic and atraumatic  Eyes: EOMI, PERRL        Sylvie Medina,      I spent a total of 32 minutes on the date of the service which included preparing to see the patient, face-to-face patient care, completing clinical documentation, performing a medically appropriate examination, counseling and educating the patient/family/caregiver and ordering medications, tests, or procedures.   "

## 2025-02-25 ENCOUNTER — TELEPHONE (OUTPATIENT)
Dept: GYNECOLOGIC ONCOLOGY | Facility: HOSPITAL | Age: 75
End: 2025-02-25
Payer: MEDICARE

## 2025-02-25 DIAGNOSIS — R10.2 PELVIC PAIN IN FEMALE: ICD-10-CM

## 2025-02-25 DIAGNOSIS — N83.8 OVARIAN MASS, RIGHT: ICD-10-CM

## 2025-02-25 RX ORDER — OXYCODONE HYDROCHLORIDE 5 MG/1
5 TABLET ORAL EVERY 6 HOURS PRN
Qty: 15 TABLET | Refills: 0 | Status: SHIPPED | OUTPATIENT
Start: 2025-02-25

## 2025-02-25 NOTE — TELEPHONE ENCOUNTER
The patient sent a [employer] message requesting a refill on the oxycodone 5 mg immediate release tablets. I sent her request to her physician.

## 2025-02-27 DIAGNOSIS — J44.1 COPD EXACERBATION (MULTI): ICD-10-CM

## 2025-02-27 DIAGNOSIS — R06.09 DYSPNEA ON EXERTION: ICD-10-CM

## 2025-02-27 RX ORDER — ALBUTEROL SULFATE 90 UG/1
2 INHALANT RESPIRATORY (INHALATION) EVERY 4 HOURS PRN
Qty: 18 G | Refills: 3 | Status: SHIPPED | OUTPATIENT
Start: 2025-02-27 | End: 2025-04-28

## 2025-02-27 RX ORDER — IPRATROPIUM BROMIDE AND ALBUTEROL SULFATE 2.5; .5 MG/3ML; MG/3ML
3 SOLUTION RESPIRATORY (INHALATION) 3 TIMES DAILY PRN
Qty: 90 ML | Refills: 1 | Status: SHIPPED | OUTPATIENT
Start: 2025-02-27

## 2025-02-27 ASSESSMENT — ENCOUNTER SYMPTOMS
SHORTNESS OF BREATH: 1
ABDOMINAL PAIN: 1

## 2025-02-27 NOTE — PROGRESS NOTES
Patient: Yadira Davis    [id number]  : 1950 -- AGE 74 y.o.    Provider: MARIA ANTONIA Bishop     Location [facility]   Service Date: 3/5/2025              [facility]  Followup Visit Note    Virtual or Telephone Consent  An interactive audio and video telecommunication system which permits real time communications between the patient (at the originating site) and provider (at the distant site) was utilized to provide this telehealth service.   Verbal consent was requested and obtained from Yadira Davis on this date, 25 for a telehealth visit and the patient's location was confirmed at the time of the visit.        HISTORY OF PRESENT ILLNESS     The patient's referring provider is: No ref. provider found    HISTORY OF PRESENT ILLNESS   Yadira Davis is a 74 y.o. female who presents to a [facility] for an evaluation with concerns of Breathing Problem. I have independently interviewed and examined the patient in the office and reviewed available records.    Current History    On today's visit, the patient reports She has COPD for quite some time, only on albuterol.  She had two exacerbations in the past 12 months, most recently PNA 24  She has O2 and uses intermittently, she needed when she had Pneumonia. She checks her pulse ox and mostly in 90's. She was working at [employer] as a  for 40hrs andable to tolerate working. At baseline,  has dyspnea on exertion, but none at rest. Symptoms started many years ago, but has mostly been stable. Currently sits for most of the day, works inside the house, but does not carry loads and do strenuous exercise.  She is active in her everyday life and carries loads and does strenuous exercise.   Has to stop for breath after walking about 100 meters or a few minutes (mMRC 3).  She does not have to walk stairs at home  CAT score is 17. Sleeps with 2 pillows but  Denies  orthopnea, pnd, or cory.  Has lost X 7 pounds in the last X 3 months.  Has intermittent  chronic cough, intermittent when she is she wheezing, and occ clear phlegm, but denies , green, blood streaks  sputum. No night cough. No hemoptysis. No fever or shivering chills.  Has no runny nose, or a tingling sensation in the back of his throat. Occ chest tightness. Denies chest pain or heartburn.     Previous pulmonary history:     previously was told they have  COPD.  currently is on no supplemental oxygen.  Never been to pulmonary rehab. Does not recall having AECOPD requiring antibiotics or prednisone - last needed Dec 2024 - had twice within 12 months.  Had long haul COVID 2 years ago and needed steriods    Inhalers/nebulized medications: albuterol - only when sick     Hospitalization History: Has been hospitalized over the last year for breathing related problem.    Sleep history:  Complains of snoring, apnea, feeling tired during the day, and taking naps during the day.     Interval History 3/5/2025    Patient recently admitted for for heart failure with reduced ejection fraction with decompensation COPD with acute exacerbation acute on chronic respiratory failure requiring noninvasive positive ventilation therapy admitted between 2/16-2/18    on today's visit, the patient reports she woke up and could not breathe and she presented to the ER. She is tapering her smoking now reduced to 6 cigs per day.  She was started on nebulizer treatment- she is using 3 times a day, she feels like her airways are open after using.  Denies cough or wheezing.  Occ chest tightness. Denies rhinnorhea or sore throat.  Denies shortness of at rest.  C/o MATHUR after 10minutes or strenuous activity.    Denies heartburn. She is feeling constipated, last 2 days. She is on a fluid restriction. She is using miralax and stool softener   Her venous blood gas showed CO2 78 then improved 53 - she was on bipap on the da y of discharge he did  not have any hypercapnia   She has O2 concentrator at home but not using  Denies GERD  ED visits - been 2-3 admits in last 12 months       ALLERGIES AND MEDICATIONS     ALLERGIES  Allergies   Allergen Reactions    Codeine Nausea/vomiting       MEDICATIONS  Current Outpatient Medications   Medication Sig Dispense Refill    acetaminophen (Tylenol) 325 mg tablet Take 2 tablets (650 mg) by mouth every 4 hours if needed for headaches, mild pain (1 - 3) or fever (temp greater than 38.0 C).      albuterol (Ventolin HFA) 90 mcg/actuation inhaler Inhale 2 puffs every 4 hours if needed for shortness of breath. 18 g 3    aspirin 81 mg EC tablet Take 1 tablet (81 mg) by mouth once daily. 90 tablet 3    carvedilol (Coreg) 3.125 mg tablet Take 1 tablet (3.125 mg) by mouth 2 times daily (morning and late afternoon). 180 tablet 3    docusate sodium (Colace) 100 mg capsule Take 1 capsule (100 mg) by mouth 2 times a day. 60 capsule 0    empagliflozin (Jardiance) 10 mg Take 1 tablet (10 mg) by mouth once daily. 30 tablet 0    furosemide (Lasix) 20 mg tablet Take 1 tablet (20 mg) by mouth once daily. 30 tablet 0    ipratropium-albuteroL (Duo-Neb) 0.5-2.5 mg/3 mL nebulizer solution Take 3 mL by nebulization 3 times a day as needed for wheezing. 90 mL 1    oxyCODONE (Roxicodone) 5 mg immediate release tablet Take 1 tablet (5 mg) by mouth every 6 hours if needed for severe pain (7 - 10). 15 tablet 0    rosuvastatin (Crestor) 40 mg tablet Take 1 tablet (40 mg) by mouth once daily. 90 tablet 3    umeclidinium-vilanteroL (Anoro Ellipta) 62.5-25 mcg/actuation blister with device Inhale 1 puff once daily. 1 each 3     No current facility-administered medications for this visit.         PAST HISTORY     PAST MEDICAL HISTORY  She  has a past medical history of Breast cancer (Multi), CAD (coronary artery disease), COPD (chronic obstructive pulmonary disease) (Multi), HFrEF (heart failure with reduced ejection fraction), HLD (hyperlipidemia), HTN  (hypertension), and PVD (peripheral vascular disease) (CMS-HCC). Bilateral breast cancer     PAST SURGICAL HISTORY  Past Surgical History:   Procedure Laterality Date    ADENOIDECTOMY      APPENDECTOMY      BREAST BIOPSY       SECTION, CLASSIC      CHOLECYSTECTOMY      CT ANGIO AORTA AND BILATERAL ILIOFEMORAL RUN OFF INCLUDING WITHOUT CONTRAST IF PERFORMED  2022    MASTECTOMY      SEPTOPLASTY      TONSILLECTOMY      TOTAL ABDOMINAL HYSTERECTOMY         IMMUNIZATION HISTORY  Immunization History   Administered Date(s) Administered    COVID-19, mRNA, LNP-S, PF, 30 mcg/0.3 mL dose 05/15/2021, 2021    Flu vaccine, quadrivalent, high-dose, preservative free, age 65y+ (FLUZONE) 2020, 12/15/2020    Pneumococcal polysaccharide vaccine, 23-valent, age 2 years and older (PNEUMOVAX 23) 2022       SOCIAL HISTORY  She  reports that she has been smoking cigarettes. She started smoking about 55 years ago. She has a 82.7 pack-year smoking history. She has been exposed to tobacco smoke. She has never used smokeless tobacco. She reports that she does not currently use alcohol. She reports that she does not use drugs. She Patient  1.5 ppd xsince she was 16, she cut back, 1/2 ppd     OCCUPATIONAL/ENVIRONMENTAL HISTORY  Previously worked as: [employer] for many years and [employer] at [employer]   DOES/DOES NOT EC: does not have known exposure to asbestos, silica, beryllium or inhaled metals.  DOES/DOES NOT EC: does not have exposure to birds or exotic animals. Had dog and cat     FAMILY HISTORY  Family History   Problem Relation Name Age of Onset    Breast cancer Mother      Hypertension Mother      Hyperlipidemia Mother      Esophageal cancer Mother      Other (Peripheral artery disease) Mother      Coronary artery disease Father       DOES/DOES NOT EC: does have a family history of pulmonary disease. Parents smoked  DOES/DOES NOT EC: does have a family history of cancer. Mother had esophageal ca   DOES/DOES NOT  EC: does have a family history of autoimmune disorders.    RESULTS/DATA     Pulmonary Function Test Results     No testing done     Chest Radiograph     XR chest 1 view 12/24/2024      FINDINGS:  The cardiac silhouette is stable in size. Atherosclerotic  calcification of the thoracic aorta. There is pulmonary vascular  congestive change and edema and asymmetric opacity in the right and  left lower lungs compatible with superimposed pneumonia. There are  likely bilateral pleural effusions. No pneumothorax.    Impression  Pulmonary vascular congestion and edema and asymmetric opacities in  the right greater than left lower lungs compatible with superimposed  pneumonia. Short-term progress imaging recommended to assure  resolution and exclude other underlying pathology.    Chest CT Scan     CT angio chest for pulmonary embolism 05/30/2024      Impression  1.  No pulmonary emboli or confluent airspace disease.  2. Bulky thyromegaly. Ultrasound may be offered for further  evaluation.  3. Worsening emphysematous changes bilaterally. Bilateral  subcentimeter nodules. Interval enlargement of the left upper lobe  nodule. *Follow-up recommendations are according to the revised 2017  Fleischner Society Criteria (Doreen H, et al. Guidelines for  Management of Incidental Pulmonary Nodules Detected on CT Images:  From the Fleischner Society 2017. Radiology. Doi:  10.1148/radiol.8523243062)    SOLID NODULES:    Multiple solid nodules <6 mm  - Low-risk patients: no routine follow-up required  - High-risk patients: optional CT at 12 months    Multiple solid nodules >6 mm  - Low-risk patients: CT at 3-6 months, then consider CT at 18-24  months - High-risk patients: CT at 3-6 months, then if persistent, CT  at 18-24 months    When multiple nodules are present, the dominant/most suspicious  nodule should guide further individualized management.    Measurements are determined by the average of short and long-axis,  rounded to the  "nearest whole millimeter. Guidelines recommend  considering \"high-risk\" has an estimated risk of cancer greater than  5% - please assess clinically.      Echocardiogram     TRANSTHORACIC ECHOCARDIOGRAM REPORT 1/8/2025     Patient Name:       VLADIMIR CROWELL     Reading Physician:    [id number] Ike Brooks DO  Study Date:         1/8/2025            Ordering Provider:    [id number] POONAM LYNNE  PHYSICIAN INTERPRETATION:  Left Ventricle: The left ventricular systolic function is moderately decreased, with a calculated ejection fraction of 38% by auto EF. There is global hypokinesis of the left ventricle with minor regional variations. The left ventricular cavity size is normal. There is normal septal and normal posterior left ventricular wall thickness. Left Ventricular Global Longitudinal Strain - 9.4 %. Spectral Doppler shows a Grade I (impaired relaxation pattern) of left ventricular diastolic filling with normal left atrial filling pressure.  Left Atrium: The left atrium is normal in size.  Right Ventricle: The right ventricle is normal in size. There is normal right ventricular global systolic function.  Right Atrium: The right atrium is normal in size.  Aortic Valve: The aortic valve is trileaflet. There is no evidence of aortic valve regurgitation. The peak instantaneous gradient of the aortic valve is 6 mmHg. The mean gradient of the aortic valve is 4 mmHg.  Mitral Valve: The mitral valve is normal in structure. There is mild mitral annular calcification. There is mild mitral valve regurgitation. The mitral regurgitant orifice area is 4 mm2. The mitral regurgitant volume is 5.77 ml.  Tricuspid Valve: The tricuspid valve is structurally normal. There is trace tricuspid regurgitation.  Pulmonic Valve: The pulmonic valve is structurally normal. There is no indication of pulmonic valve " "regurgitation.  Pericardium: No pericardial effusion noted.  Aorta: The aortic root is normal.        CONCLUSIONS:   1. The left ventricular systolic function is moderately decreased, with a calculated ejection fraction of 38% by auto EF.   2. There is global hypokinesis of the left ventricle with minor regional variations.   3. Spectral Doppler shows a Grade I (impaired relaxation pattern) of left ventricular diastolic filling with normal left atrial filling pressure.   4. There is normal right ventricular global systolic function.   5. Left Ventricular Global Longitudinal Strain - 9.4 %.           REVIEW OF SYSTEMS     REVIEW OF SYSTEMS  Review of Systems   Respiratory:  Positive for shortness of breath.    Gastrointestinal:  Positive for abdominal pain.   All other systems reviewed and are negative.        PHYSICAL EXAM     VITAL SIGNS: Ht 1.6 m (5' 3\")   Wt 55.8 kg (123 lb)   BMI 21.79 kg/m²  Ht 1.6 m (5' 3\")   Wt 55.8 kg (123 lb)   BMI 21.79 kg/m²      CURRENT WEIGHT: [unfilled]  BMI: [unfilled]  PREVIOUS WEIGHTS:  Wt Readings from Last 3 Encounters:   03/05/25 55.8 kg (123 lb)   02/20/25 57.7 kg (127 lb 4.8 oz)   02/18/25 55.7 kg (122 lb 12.8 oz)       Physical Exam  Constitutional:       Appearance: Normal appearance.   HENT:      Head: Normocephalic and atraumatic.      Nose: Nose normal.   Eyes:      Extraocular Movements: Extraocular movements intact.      Pupils: Pupils are equal, round, and reactive to light.   Pulmonary:      Effort: Pulmonary effort is normal.   Musculoskeletal:         General: Normal range of motion.      Cervical back: Normal range of motion and neck supple.   Neurological:      General: No focal deficit present.      Mental Status: She is alert and oriented to person, place, and time.   Psychiatric:         Mood and Affect: Mood normal.         Behavior: Behavior normal.         Thought Content: Thought content normal.         Judgment: Judgment normal.         ASSESSMENT/PLAN "     Ms. Davis is a 74 y.o. female and  has a past medical history of Breast cancer (Multi), CAD (coronary artery disease), COPD (chronic obstructive pulmonary disease) (Multi), HFrEF (heart failure with reduced ejection fraction), HLD (hyperlipidemia), HTN (hypertension), and PVD (peripheral vascular disease) (CMS-[facility]). She was referred to the [facility] for evaluation of COPD, pulm nodule and tobacco abuse disorder who needs surgical clearance for abdominal surgery for ovarian  mass.       Problem List and Orders      Assessment and Plan / Recommendations:  Problem List Items Addressed This Visit    None          Pulm nodule:   CT chest in 5/2024 -  5 mm nodule within the ANCA increasing 6mm pleural-based nodule within the right lower lobe   - repeat CT chest noncontrast since been 9 months       COPD MMRC 2-3  Last exacerbation 12/24 two episodes of exacerbation in 12 months, most recent 12/24/24 and in Feb   - add daliresp  500mg by mouth once a day   - add laba/lama - anoro 1 puff once a day  - albuterol hfa 2 puffs or albuterol nebs every 4-6 hours as needed  - Obtain pulmonary function test, FENO and 6 minute walk test - scheduled 3/6   Smoking cessation encouraged - >5 min education - tapering currently at 10 cigs   - consider     Will re-do clearance with PFT results  Check ABG with PFT to review if patient needs BiPAP - as hospitalized with acute respiratory failure       # Preoperative evaluation:  Patient is at increased risk of postoperative pulmonary complications  Patient is at increased risk of postoperative pulmonary complications given  age >50 years, chronic obstructive pulmonary disease, current cigarette use, smoking in the last 8 weeks,   However this increased risk should not preclude the surgery.    - PFTs with pending    - To minimize the risk for complications we recommend the following: incentive spirometry to be started before surgery, smoking cessation  for at least 8 weeks before surgery,  Use of CPAP machine , early ambulation, minimize sedation, DVT prophylaxis if appropriate per surgical team.   -  can call pulmonary consult while inpatient if needed     Preop risk assessment:  --- ARISCAT score 44  pts = Intermediete risk - 13.3  % risk of in- hospital post-op pulmonary complications.   --- Per arozullah respiratory failure index - Estimated risk probability for postoperative respiratory failure 5 %.         Thank you for visiting the Pulmonary clinic today!     Will call patient with PFT results and ABG  Return to clinic after 4-6 weeks and after PFTs or sooner if needed , if needed can switch to virtual   Oly Cronin CNP  My office number is [phone] -     Call to schedule  for radiology - CT scans/PFTs etc at  [phone]  General scheduling  [phone]     Best way to get a hold of me is to call my office --> Please do not send me follow my health messages  Any test results will be discussed at next visit -- please make sure to make a follow up appt after testing.    Patient's visit was converted to a virtual visit. Spoke with the patient via video for 23 minutes.    Prep: 10 minutes  Video: 23minutes  Total: 33 minutes

## 2025-02-28 LAB
ATRIAL RATE: 95 BPM
P AXIS: 57 DEGREES
PR INTERVAL: 233 MS
Q ONSET: 253 MS
QRS COUNT: 15 BEATS
QRS DURATION: 90 MS
QT INTERVAL: 353 MS
QTC CALCULATION(BAZETT): 442 MS
QTC FREDERICIA: 410 MS
R AXIS: 9 DEGREES
T AXIS: 107 DEGREES
T OFFSET: 429 MS
VENTRICULAR RATE: 94 BPM

## 2025-03-05 ENCOUNTER — APPOINTMENT (OUTPATIENT)
Dept: CARDIOLOGY | Facility: HOSPITAL | Age: 75
End: 2025-03-05
Payer: MEDICARE

## 2025-03-05 ENCOUNTER — APPOINTMENT (OUTPATIENT)
Facility: CLINIC | Age: 75
End: 2025-03-05
Payer: MEDICARE

## 2025-03-05 ENCOUNTER — OFFICE VISIT (OUTPATIENT)
Dept: CARDIOLOGY | Facility: HOSPITAL | Age: 75
End: 2025-03-05
Payer: MEDICARE

## 2025-03-05 VITALS
WEIGHT: 129 LBS | OXYGEN SATURATION: 97 % | BODY MASS INDEX: 21.49 KG/M2 | HEART RATE: 88 BPM | SYSTOLIC BLOOD PRESSURE: 112 MMHG | DIASTOLIC BLOOD PRESSURE: 60 MMHG | HEIGHT: 65 IN

## 2025-03-05 VITALS — HEIGHT: 63 IN | WEIGHT: 123 LBS | BODY MASS INDEX: 21.79 KG/M2

## 2025-03-05 DIAGNOSIS — J44.1 COPD EXACERBATION (MULTI): ICD-10-CM

## 2025-03-05 DIAGNOSIS — I50.9 ACUTE HEART FAILURE, UNSPECIFIED HEART FAILURE TYPE: ICD-10-CM

## 2025-03-05 DIAGNOSIS — E78.5 DYSLIPIDEMIA: ICD-10-CM

## 2025-03-05 DIAGNOSIS — R91.1 PULMONARY NODULE: ICD-10-CM

## 2025-03-05 DIAGNOSIS — J44.9 CHRONIC OBSTRUCTIVE PULMONARY DISEASE, UNSPECIFIED COPD TYPE (MULTI): Primary | ICD-10-CM

## 2025-03-05 DIAGNOSIS — I25.10 CALCIFICATION OF CORONARY ARTERY: Primary | ICD-10-CM

## 2025-03-05 DIAGNOSIS — Z72.0 TOBACCO ABUSE DISORDER: ICD-10-CM

## 2025-03-05 DIAGNOSIS — R06.09 DYSPNEA ON EXERTION: ICD-10-CM

## 2025-03-05 DIAGNOSIS — I10 PRIMARY HYPERTENSION: ICD-10-CM

## 2025-03-05 DIAGNOSIS — I50.20 HFREF (HEART FAILURE WITH REDUCED EJECTION FRACTION): ICD-10-CM

## 2025-03-05 PROCEDURE — 1159F MED LIST DOCD IN RCRD: CPT | Performed by: PHYSICIAN ASSISTANT

## 2025-03-05 PROCEDURE — 1126F AMNT PAIN NOTED NONE PRSNT: CPT | Performed by: NURSE PRACTITIONER

## 2025-03-05 PROCEDURE — 1111F DSCHRG MED/CURRENT MED MERGE: CPT | Performed by: NURSE PRACTITIONER

## 2025-03-05 PROCEDURE — 99213 OFFICE O/P EST LOW 20 MIN: CPT | Performed by: PHYSICIAN ASSISTANT

## 2025-03-05 PROCEDURE — 1111F DSCHRG MED/CURRENT MED MERGE: CPT | Performed by: PHYSICIAN ASSISTANT

## 2025-03-05 PROCEDURE — 4004F PT TOBACCO SCREEN RCVD TLK: CPT | Performed by: NURSE PRACTITIONER

## 2025-03-05 PROCEDURE — 3078F DIAST BP <80 MM HG: CPT | Performed by: PHYSICIAN ASSISTANT

## 2025-03-05 PROCEDURE — 1159F MED LIST DOCD IN RCRD: CPT | Performed by: NURSE PRACTITIONER

## 2025-03-05 PROCEDURE — 4004F PT TOBACCO SCREEN RCVD TLK: CPT | Performed by: PHYSICIAN ASSISTANT

## 2025-03-05 PROCEDURE — 99213 OFFICE O/P EST LOW 20 MIN: CPT | Performed by: NURSE PRACTITIONER

## 2025-03-05 PROCEDURE — 3074F SYST BP LT 130 MM HG: CPT | Performed by: PHYSICIAN ASSISTANT

## 2025-03-05 RX ORDER — IPRATROPIUM BROMIDE AND ALBUTEROL SULFATE 2.5; .5 MG/3ML; MG/3ML
3 SOLUTION RESPIRATORY (INHALATION) 3 TIMES DAILY PRN
Qty: 90 ML | Refills: 1 | Status: SHIPPED | OUTPATIENT
Start: 2025-03-05

## 2025-03-05 RX ORDER — FUROSEMIDE 20 MG/1
20 TABLET ORAL EVERY OTHER DAY
COMMUNITY
Start: 2025-03-05

## 2025-03-05 RX ORDER — ROFLUMILAST 500 UG/1
500 TABLET ORAL DAILY
Qty: 21 TABLET | Refills: 3 | Status: SHIPPED | OUTPATIENT
Start: 2025-03-05

## 2025-03-05 ASSESSMENT — ENCOUNTER SYMPTOMS
SHORTNESS OF BREATH: 0
WEAKNESS: 0
DYSURIA: 0
FEVER: 0
WHEEZING: 0
CONSTIPATION: 1
DIARRHEA: 0
ORTHOPNEA: 0
PALPITATIONS: 0
ABDOMINAL PAIN: 0
NAUSEA: 0
VOMITING: 0

## 2025-03-05 ASSESSMENT — PATIENT HEALTH QUESTIONNAIRE - PHQ9
2. FEELING DOWN, DEPRESSED OR HOPELESS: NOT AT ALL
1. LITTLE INTEREST OR PLEASURE IN DOING THINGS: NOT AT ALL
SUM OF ALL RESPONSES TO PHQ9 QUESTIONS 1 AND 2: 0

## 2025-03-05 ASSESSMENT — PAIN SCALES - GENERAL: PAINLEVEL_OUTOF10: 0-NO PAIN

## 2025-03-05 NOTE — PROGRESS NOTES
Cardiology Follow Up  Chief Complaint:   Hospital Follow-up     History Of Present Illness:    Yadira Davis is a 74 y.o. female with past medical history of HFrEF, COPD, CAD, essential hypertension, noncompliance who presents to the hospital due to shortness of breath.  Patient reports taking medications.  She continues to smoke.     EKG with normal sinus rhythm, first degree AV block, borderline repolarization abnormalities     BNP elevated at 1008     Troponin flatly elevated at 19> 29> 53>51     creatinine/GFR 1.02/57      Patient was hypoxic, tachypneic, hypertensive, tachycardic on arrival and started on BiPAP.  Patient received Lasix.  Currently on room air.  She reports feeling much better.  Patient has received breathing treatment.     Chest x-ray with borderline cardiomegaly, mild vascular congestion        Subjective Data:  Patient feels significantly improved and has compensated very nicely with just a few doses of IV Lasix.  She no longer feels short of breath and her breathing is back to baseline without crackling or wheezing.  She had no edema.  We do need to be careful with her blood pressures as with higher dose of blood pressure medication she was having issues with near syncope.  At this time we will discontinue the lisinopril, continue the low-dose carvedilol and start on low-dose maintenance dose of Lasix.  The patient has pelvic mass concerning for cancer and is scheduled for surgery in March.  Telemetry has been sinus rhythm.   3-5-25: This is 74-year-old female patient with above history as noted.  Presents today for follow-up after recent admission for decompensated heart failure.  Patient on low-dose Lasix doing very well.  Her weight is down and she is having no shortness of breath orthopnea PND or peripheral edema.  The Lasix is causing a little bit of constipation and since she is well compensated we will change the Lasix to q. OD dosing.  Patient is no longer salting her food and  "monitoring her fluid intake along with daily weights.  She is scheduled for gynecologic surgery later this month.     Last Recorded Vitals:  Vitals:    25 1326   BP: 112/60   BP Location: Left arm   Patient Position: Sitting   BP Cuff Size: Adult   Pulse: 88   SpO2: 97%   Weight: 58.5 kg (129 lb)   Height: 1.651 m (5' 5\")       Past Medical History:  She has a past medical history of Breast cancer (Multi), CAD (coronary artery disease), COPD (chronic obstructive pulmonary disease) (Multi), HFrEF (heart failure with reduced ejection fraction), HLD (hyperlipidemia), HTN (hypertension), and PVD (peripheral vascular disease) (CMS-[facility]).    Past Surgical History:  She has a past surgical history that includes Mastectomy; Total abdominal hysterectomy; Breast biopsy;  section, classic; Septoplasty; Adenoidectomy; Tonsillectomy; Cholecystectomy; CT angio aorta and bilateral iliofemoral runoff including without contrast if performed (2022); and Appendectomy.      Social History:  She reports that she has been smoking cigarettes. She started smoking about 55 years ago. She has a 82.7 pack-year smoking history. She has been exposed to tobacco smoke. She has never used smokeless tobacco. She reports that she does not currently use alcohol. She reports that she does not use drugs.    Family History:  Family History   Problem Relation Name Age of Onset    Breast cancer Mother      Hypertension Mother      Hyperlipidemia Mother      Esophageal cancer Mother      Other (Peripheral artery disease) Mother      Coronary artery disease Father          Allergies:  Codeine    Outpatient Medications:  Current Outpatient Medications   Medication Instructions    acetaminophen (TYLENOL) 650 mg, oral, Every 4 hours PRN    albuterol (Ventolin HFA) 90 mcg/actuation inhaler 2 puffs, inhalation, Every 4 hours PRN    aspirin 81 mg, oral, Daily    carvedilol (COREG) 3.125 mg, oral, 2 times daily (morning and late afternoon)    " docusate sodium (COLACE) 100 mg, oral, 2 times daily    furosemide (Lasix) 20 mg tablet Take 1 tablet (20 mg) by mouth once daily.    ipratropium-albuteroL (Duo-Neb) 0.5-2.5 mg/3 mL nebulizer solution 3 mL, nebulization, 3 times daily PRN    Jardiance 10 mg, oral, Daily    oxyCODONE (ROXICODONE) 5 mg, oral, Every 6 hours PRN    roflumilast (DALIRESP) 500 mcg, oral, Daily    rosuvastatin (CRESTOR) 40 mg, oral, Daily    umeclidinium-vilanteroL (Anoro Ellipta) 62.5-25 mcg/actuation blister with device 1 puff, inhalation, Daily     Review of Systems   Constitutional: Negative for fever and malaise/fatigue.   Cardiovascular:  Negative for chest pain, orthopnea and palpitations.   Respiratory:  Negative for shortness of breath and wheezing.         Down to 4 cigs daily   Skin:  Negative for itching and rash.   Gastrointestinal:  Positive for constipation. Negative for abdominal pain, diarrhea, nausea and vomiting.   Genitourinary:  Negative for dysuria.   Neurological:  Negative for weakness.      Physical Exam  Constitutional:       General: She is not in acute distress.     Appearance: Normal appearance.   HENT:      Mouth/Throat:      Mouth: Mucous membranes are moist.   Neck:      Comments: No JVD  Cardiovascular:      Rate and Rhythm: Normal rate and regular rhythm.      Heart sounds: Normal heart sounds.   Pulmonary:      Effort: Pulmonary effort is normal.      Breath sounds: Normal breath sounds.   Abdominal:      General: Bowel sounds are normal.      Palpations: Abdomen is soft.      Tenderness: There is no abdominal tenderness.   Musculoskeletal:      Right lower leg: No edema.      Left lower leg: No edema.   Skin:     General: Skin is warm and dry.   Neurological:      Mental Status: She is alert and oriented to person, place, and time.   Psychiatric:         Behavior: Behavior is cooperative.           Last Labs:  CBC -  Lab Results   Component Value Date    WBC 6.9 02/18/2025    HGB 13.8 02/18/2025    HCT  42.5 02/18/2025    MCV 87 02/18/2025     02/18/2025       CMP -  Lab Results   Component Value Date    CALCIUM 10.2 02/18/2025    PROT 6.4 02/16/2025    ALBUMIN 4.1 02/16/2025    AST 14 02/16/2025    ALT 9 02/16/2025    ALKPHOS 77 02/16/2025    BILITOT 0.4 02/16/2025       LIPID PANEL -   Lab Results   Component Value Date    CHOL 157 01/27/2025    TRIG 168 (H) 01/27/2025    HDL 40 (L) 01/27/2025    CHHDL 3.9 01/27/2025    LDLF 201 (H) 04/26/2023    VLDL 22 04/26/2023    NHDL 117 01/27/2025       RENAL FUNCTION PANEL -   Lab Results   Component Value Date    GLUCOSE 103 (H) 02/18/2025     02/18/2025    K 3.5 02/18/2025     02/18/2025    CO2 26 02/18/2025    ANIONGAP 13 02/18/2025    BUN 27 (H) 02/18/2025    CREATININE 1.04 02/18/2025    CALCIUM 10.2 02/18/2025    ALBUMIN 4.1 02/16/2025        Lab Results   Component Value Date    BNP 1,008 (H) 02/16/2025       Last Cardiology Tests:    Echo:  Transthoracic echo (TTE) complete 01/08/2025--CONCLUSIONS:   1. The left ventricular systolic function is moderately decreased, with a calculated ejection fraction of 38% by auto EF.   2. There is global hypokinesis of the left ventricle with minor regional variations.   3. Spectral Doppler shows a Grade I (impaired relaxation pattern) of left ventricular diastolic filling with normal left atrial filling pressure.   4. There is normal right ventricular global systolic function.   5. Left Ventricular Global Longitudinal Strain - 9.4 %.    Ejection Fractions:  EF   Date/Time Value Ref Range Status   01/08/2025 11:17 AM 38 %        Stress Test:  Nuclear Stress Test 01/08/2025--IMPRESSION:  1. Normal stress myocardial perfusion imaging in response to  pharmacologic stress. Dilated left ventricle with moderate left  ventricular systolic function on post stress gated imaging.  2. Low-dose non gated CT images done for attenuation correction shows  severe coronary artery calcification, severe calcification of  the  ascending as well as descending thoracic aorta. .      Lab review: I have personally reviewed the laboratory result(s)     Assessment/Plan   Problem List Items Addressed This Visit             ICD-10-CM       Cardiac and Vasculature    Calcification of coronary artery - Primary I25.10    Dyslipidemia E78.5    Primary hypertension I10    HFrEF (heart failure with reduced ejection fraction) I50.20   Recent exacerbation of heart failure with reduced ejection fraction--patient's blood pressures are very well-controlled and she is compliant with salt and fluid restriction.  She is remaining very well compensated on 20 mg of Lasix daily but having some constipation so we will transition to every other day dosing and see how she does from a constipation standpoint make sure she stays well compensated.  She is to continue monitoring her weights and for any symptoms such as shortness of breath or edema.  Continue other medications and she states that her Jardiance was just approved.  History of hypertension--due to lower blood pressures her lisinopril HCTZ was discontinued as she is very symptomatic with lower blood pressures.  Today blood pressures are good and she feels excellent.  History of coronary artery calcification--patient stress testing was negative for ischemia.  Hyperlipidemia--last lipid showed total cholesterol of 157 with LDL cholesterol at 90.  Continue current meds.  Tobacco abuse--has cut back her smoking to just 4 cigarettes daily  Pelvic mass--concerning for ovarian malignancy and again is due for surgery by the end of the month.  Overall patient is stable from cardiac standpoint.  Continue current medical therapy and if any change she will contact the office otherwise she is scheduled back with Dr. Brooks in July.      Harish Lazaro PA-C  3/5/2025  1:41 PM

## 2025-03-06 ENCOUNTER — HOSPITAL ENCOUNTER (OUTPATIENT)
Dept: RESPIRATORY THERAPY | Facility: HOSPITAL | Age: 75
Discharge: HOME | End: 2025-03-06
Payer: MEDICARE

## 2025-03-06 ENCOUNTER — APPOINTMENT (OUTPATIENT)
Dept: PREADMISSION TESTING | Facility: HOSPITAL | Age: 75
End: 2025-03-06
Payer: MEDICARE

## 2025-03-06 ENCOUNTER — APPOINTMENT (OUTPATIENT)
Dept: RESPIRATORY THERAPY | Facility: HOSPITAL | Age: 75
End: 2025-03-06
Payer: MEDICARE

## 2025-03-06 DIAGNOSIS — J44.9 CHRONIC OBSTRUCTIVE PULMONARY DISEASE, UNSPECIFIED COPD TYPE (MULTI): ICD-10-CM

## 2025-03-06 DIAGNOSIS — J44.1 COPD EXACERBATION (MULTI): ICD-10-CM

## 2025-03-06 DIAGNOSIS — R06.09 DYSPNEA ON EXERTION: ICD-10-CM

## 2025-03-06 LAB
BASE EXCESS BLDA CALC-SCNC: 0.5 MMOL/L (ref -2–3)
BODY TEMPERATURE: 37 DEGREES CELSIUS
COHGB MFR BLDA: 6.1 %
DO-HGB MFR BLDA: 0.9 % (ref 0–5)
HCO3 BLDA-SCNC: 25.4 MMOL/L (ref 22–26)
HGB BLDA-MCNC: 15.1 G/DL (ref 12–16)
METHGB MFR BLDA: 0.5 % (ref 0–1.5)
OXYHGB MFR BLDA: 92.5 % (ref 94–98)
PCO2 BLDA: 41 MM HG (ref 38–42)
PH BLDA: 7.4 PH (ref 7.38–7.42)
PO2 BLDA: 91 MM HG (ref 85–95)
SAO2 % BLDA: 99 % (ref 94–100)

## 2025-03-06 PROCEDURE — 82375 ASSAY CARBOXYHB QUANT: CPT

## 2025-03-06 PROCEDURE — 94060 EVALUATION OF WHEEZING: CPT | Performed by: STUDENT IN AN ORGANIZED HEALTH CARE EDUCATION/TRAINING PROGRAM

## 2025-03-06 PROCEDURE — 94729 DIFFUSING CAPACITY: CPT | Performed by: STUDENT IN AN ORGANIZED HEALTH CARE EDUCATION/TRAINING PROGRAM

## 2025-03-06 PROCEDURE — 94726 PLETHYSMOGRAPHY LUNG VOLUMES: CPT

## 2025-03-06 PROCEDURE — 36600 WITHDRAWAL OF ARTERIAL BLOOD: CPT

## 2025-03-06 PROCEDURE — 94726 PLETHYSMOGRAPHY LUNG VOLUMES: CPT | Performed by: STUDENT IN AN ORGANIZED HEALTH CARE EDUCATION/TRAINING PROGRAM

## 2025-03-06 PROCEDURE — 94618 PULMONARY STRESS TESTING: CPT | Performed by: STUDENT IN AN ORGANIZED HEALTH CARE EDUCATION/TRAINING PROGRAM

## 2025-03-06 RX ORDER — ALBUTEROL SULFATE 0.83 MG/ML
3 SOLUTION RESPIRATORY (INHALATION) ONCE
Status: DISCONTINUED | OUTPATIENT
Start: 2025-03-06 | End: 2025-03-07 | Stop reason: HOSPADM

## 2025-03-06 RX ORDER — ALBUTEROL SULFATE 90 UG/1
1 INHALANT RESPIRATORY (INHALATION) ONCE
Status: DISCONTINUED | OUTPATIENT
Start: 2025-03-06 | End: 2025-03-07 | Stop reason: HOSPADM

## 2025-03-07 ENCOUNTER — TELEPHONE (OUTPATIENT)
Dept: GYNECOLOGIC ONCOLOGY | Facility: HOSPITAL | Age: 75
End: 2025-03-07
Payer: MEDICARE

## 2025-03-07 DIAGNOSIS — N83.8 OVARIAN MASS, RIGHT: ICD-10-CM

## 2025-03-07 DIAGNOSIS — R10.2 PELVIC PAIN IN FEMALE: ICD-10-CM

## 2025-03-07 LAB
MGC ASCENT PFT - FEV1 - POST: 1.55
MGC ASCENT PFT - FEV1 - PRE: 1.52
MGC ASCENT PFT - FEV1 - PREDICTED: 1.9
MGC ASCENT PFT - FVC - POST: 2.64
MGC ASCENT PFT - FVC - PRE: 2.55
MGC ASCENT PFT - FVC - PREDICTED: 2.46

## 2025-03-07 RX ORDER — OXYCODONE HYDROCHLORIDE 5 MG/1
5 TABLET ORAL EVERY 6 HOURS PRN
Qty: 15 TABLET | Refills: 0 | Status: SHIPPED | OUTPATIENT
Start: 2025-03-07

## 2025-03-07 NOTE — TELEPHONE ENCOUNTER
The patient sent a [employer] message to request a refill on the oxycodone. I called the patient to ask about her pain but got her voicemail.

## 2025-03-13 ENCOUNTER — APPOINTMENT (OUTPATIENT)
Dept: VASCULAR MEDICINE | Facility: HOSPITAL | Age: 75
End: 2025-03-13
Payer: MEDICARE

## 2025-03-13 ENCOUNTER — ANESTHESIA EVENT (OUTPATIENT)
Dept: OPERATING ROOM | Facility: HOSPITAL | Age: 75
End: 2025-03-13
Payer: MEDICARE

## 2025-03-13 ENCOUNTER — PRE-ADMISSION TESTING (OUTPATIENT)
Dept: PREADMISSION TESTING | Facility: HOSPITAL | Age: 75
End: 2025-03-13
Payer: MEDICARE

## 2025-03-13 VITALS
HEIGHT: 63 IN | WEIGHT: 128.1 LBS | SYSTOLIC BLOOD PRESSURE: 151 MMHG | OXYGEN SATURATION: 99 % | DIASTOLIC BLOOD PRESSURE: 82 MMHG | HEART RATE: 77 BPM | TEMPERATURE: 98.2 F | BODY MASS INDEX: 22.7 KG/M2

## 2025-03-13 DIAGNOSIS — Z01.818 PREOPERATIVE TESTING: Primary | ICD-10-CM

## 2025-03-13 LAB
ABO GROUP (TYPE) IN BLOOD: NORMAL
ANTIBODY SCREEN: NORMAL
RH FACTOR (ANTIGEN D): NORMAL

## 2025-03-13 PROCEDURE — 36415 COLL VENOUS BLD VENIPUNCTURE: CPT

## 2025-03-13 PROCEDURE — 99406 BEHAV CHNG SMOKING 3-10 MIN: CPT | Performed by: ANESTHESIOLOGY

## 2025-03-13 PROCEDURE — 99204 OFFICE O/P NEW MOD 45 MIN: CPT | Performed by: ANESTHESIOLOGY

## 2025-03-13 PROCEDURE — 87081 CULTURE SCREEN ONLY: CPT

## 2025-03-13 PROCEDURE — 86850 RBC ANTIBODY SCREEN: CPT

## 2025-03-13 RX ORDER — CHLORHEXIDINE GLUCONATE 40 MG/ML
1 SOLUTION TOPICAL DAILY
Qty: 25 ML | Refills: 0 | Status: SHIPPED | OUTPATIENT
Start: 2025-03-13 | End: 2025-03-18

## 2025-03-13 RX ORDER — CHLORHEXIDINE GLUCONATE ORAL RINSE 1.2 MG/ML
15 SOLUTION DENTAL DAILY
Qty: 30 ML | Refills: 0 | Status: SHIPPED | OUTPATIENT
Start: 2025-03-13 | End: 2025-03-15

## 2025-03-13 ASSESSMENT — DUKE ACTIVITY SCORE INDEX (DASI)
CAN YOU CLIMB A FLIGHT OF STAIRS OR WALK UP A HILL: NO
CAN YOU RUN A SHORT DISTANCE: NO
CAN YOU TAKE CARE OF YOURSELF (EAT, DRESS, BATHE, OR USE TOILET): YES
DASI METS SCORE: 5
CAN YOU PARTICIPATE IN MODERATE RECREATIONAL ACTIVITIES LIKE GOLF, BOWLING, DANCING, DOUBLES TENNIS OR THROWING A BASEBALL OR FOOTBALL: NO
CAN YOU PARTICIPATE IN STRENOUS SPORTS LIKE SWIMMING, SINGLES TENNIS, FOOTBALL, BASKETBALL, OR SKIING: NO
CAN YOU WALK INDOORS, SUCH AS AROUND YOUR HOUSE: YES
TOTAL_SCORE: 18.7
CAN YOU DO HEAVY WORK AROUND THE HOUSE LIKE SCRUBBING FLOORS OR LIFTING AND MOVING HEAVY FURNITURE: NO
CAN YOU DO LIGHT WORK AROUND THE HOUSE LIKE DUSTING OR WASHING DISHES: YES
CAN YOU DO YARD WORK LIKE RAKING LEAVES, WEEDING OR PUSHING A MOWER: NO
CAN YOU WALK A BLOCK OR TWO ON LEVEL GROUND: YES
CAN YOU DO MODERATE WORK AROUND THE HOUSE LIKE VACUUMING, SWEEPING FLOORS OR CARRYING GROCERIES: YES
CAN YOU HAVE SEXUAL RELATIONS: YES

## 2025-03-13 ASSESSMENT — ENCOUNTER SYMPTOMS
COUGH: 0
CONSTITUTIONAL NEGATIVE: 1
CONSTIPATION: 1
SHORTNESS OF BREATH: 1
NEUROLOGICAL NEGATIVE: 1
WHEEZING: 0
CARDIOVASCULAR NEGATIVE: 1

## 2025-03-13 ASSESSMENT — LIFESTYLE VARIABLES: SMOKING_STATUS: SMOKER

## 2025-03-13 NOTE — CPM/PAT H&P
CPM/PAT Evaluation       Name: Yadira Davis (Yadira Davis)  /Age: 1950/ y.o.     In-Person       Chief Complaint: Ex lap with pelvic mass removal on 3/25    HPI  74-year-old female with past medical history of HFrEF, COPD, CAD, hypertension, breast cancer status postmastectomy, uterine cancer status post hysterectomy with a new onset pelvic mass pending exploratory laparotomy with removal.      Of note patient was recently hospitalized for heart failure exacerbation.     Past Medical History:   Diagnosis Date    Breast cancer (Multi)     R and L breast s/p surgery, chemotherapy and RT    CAD (coronary artery disease)     COPD (chronic obstructive pulmonary disease) (Multi)     HFrEF (heart failure with reduced ejection fraction)     HLD (hyperlipidemia)     HTN (hypertension)     PVD (peripheral vascular disease) (CMS-HCC)        Past Surgical History:   Procedure Laterality Date    ADENOIDECTOMY      APPENDECTOMY      BREAST BIOPSY       SECTION, CLASSIC      CHOLECYSTECTOMY      CT ANGIO AORTA AND BILATERAL ILIOFEMORAL RUN OFF INCLUDING WITHOUT CONTRAST IF PERFORMED  2022    MASTECTOMY      SEPTOPLASTY      TONSILLECTOMY      TOTAL ABDOMINAL HYSTERECTOMY         Patient  has no history on file for sexual activity.    Family History   Problem Relation Name Age of Onset    Breast cancer Mother      Hypertension Mother      Hyperlipidemia Mother      Esophageal cancer Mother      Other (Peripheral artery disease) Mother      Coronary artery disease Father         Allergies   Allergen Reactions    Codeine Nausea/vomiting       Prior to Admission medications    Medication Sig Start Date End Date Taking? Authorizing Provider   acetaminophen (Tylenol) 325 mg tablet Take 2 tablets (650 mg) by mouth every 4 hours if needed for headaches, mild pain (1 - 3) or fever (temp greater than 38.0 C). 25  Yes Galdino Ayala DO   albuterol (Ventolin HFA) 90 mcg/actuation inhaler Inhale 2 puffs  every 4 hours if needed for shortness of breath. 2/27/25 4/28/25 Yes Sylvie Medina, DO   aspirin 81 mg EC tablet Take 1 tablet (81 mg) by mouth once daily. 12/11/24 12/11/25 Yes Harish Lazaro PA-C   carvedilol (Coreg) 3.125 mg tablet Take 1 tablet (3.125 mg) by mouth 2 times daily (morning and late afternoon). 1/31/25 1/31/26 Yes Harish Lazaro PA-C   docusate sodium (Colace) 100 mg capsule Take 1 capsule (100 mg) by mouth 2 times a day. 2/18/25 3/20/25 Yes Galdino Ayala,    empagliflozin (Jardiance) 10 mg Take 1 tablet (10 mg) by mouth once daily. 2/19/25 3/21/25 Yes Galdino Ayala DO   furosemide (Lasix) 20 mg tablet Take 1 tablet (20 mg) by mouth every other day. 3/5/25  Yes Harish Lazaro PA-C   ipratropium-albuteroL (Duo-Neb) 0.5-2.5 mg/3 mL nebulizer solution Take 3 mL by nebulization 3 times a day as needed for wheezing. 3/5/25  Yes MARIA ANTONIA Bishop   roflumilast (Daliresp) 500 mcg tablet Take 1 tablet (500 mcg) by mouth once daily. 3/5/25  Yes MARIA ANTONIA Bishop   rosuvastatin (Crestor) 40 mg tablet Take 1 tablet (40 mg) by mouth once daily. 12/11/24 12/11/25 Yes Harish Lazaro PA-C   umeclidinium-vilanteroL (Anoro Ellipta) 62.5-25 mcg/actuation blister with device Inhale 1 puff once daily. 2/4/25  Yes MARIA ANTONIA Bishop   oxyCODONE (Roxicodone) 5 mg immediate release tablet Take 1 tablet (5 mg) by mouth every 6 hours if needed for severe pain (7 - 10). 3/7/25   Claudia Brink MD   oxyCODONE (Roxicodone) 5 mg immediate release tablet Take 1 tablet (5 mg) by mouth every 6 hours if needed for severe pain (7 - 10). 2/25/25 3/7/25  Jaki Treviño MD MPH        PAT ROS:   Constitutional:   neg    Neuro/Psych:   neg    Eyes:   Ears:   Nose:   Mouth:   Throat:   Neck:   Cardio:   neg    Respiratory:    no cough   no wheezing   shortness of breath  Endocrine:   GI:    constipation  :   neg    Musculoskeletal:   Hematologic:   neg    Skin:      Physical Exam  Vitals  "reviewed.   Constitutional:       General: She is not in acute distress.     Appearance: Normal appearance. She is normal weight. She is not ill-appearing.   HENT:      Head: Normocephalic and atraumatic.      Mouth/Throat:      Mouth: Mucous membranes are moist.      Pharynx: Oropharynx is clear.   Eyes:      Conjunctiva/sclera: Conjunctivae normal.   Cardiovascular:      Rate and Rhythm: Normal rate and regular rhythm.      Pulses: Normal pulses.      Heart sounds: Normal heart sounds.   Pulmonary:      Effort: Pulmonary effort is normal.      Breath sounds: Normal breath sounds.   Abdominal:      General: Abdomen is flat. There is no distension.      Palpations: Abdomen is soft.      Tenderness: There is no abdominal tenderness.   Musculoskeletal:      Cervical back: Neck supple.      Right lower leg: No edema.      Left lower leg: No edema.   Skin:     General: Skin is warm and dry.      Capillary Refill: Capillary refill takes less than 2 seconds.   Neurological:      General: No focal deficit present.      Mental Status: She is alert and oriented to person, place, and time. Mental status is at baseline.   Psychiatric:         Mood and Affect: Mood normal.         Behavior: Behavior normal.         Thought Content: Thought content normal.          PAT AIRWAY:   Airway:     Mallampati::  III    TM distance::  >3 FB    Neck ROM::  Full   upper dentures      Testing/Diagnostic:     Patient Specialist/PCP:     Visit Vitals  /82   Pulse 77   Temp 36.8 °C (98.2 °F) (Temporal)   Ht 1.6 m (5' 3\")   Wt 58.1 kg (128 lb 1.6 oz)   SpO2 99%   BMI 22.69 kg/m²   OB Status Hysterectomy   Smoking Status Every Day   BSA 1.61 m²       DASI Risk Score      Flowsheet Row Pre-Admission Testing from 3/13/2025 in [facility]   Can you take care of yourself (eat, dress, bathe, or use toilet)?  2.75 filed at 03/13/2025 1023   Can you walk indoors, such as around your house? 1.75 filed at 03/13/2025 1023   Can you " walk a block or two on level ground?  2.75 filed at 03/13/2025 1023   Can you climb a flight of stairs or walk up a hill? 0 filed at 03/13/2025 1023   Can you run a short distance? 0 filed at 03/13/2025 1023   Can you do light work around the house like dusting or washing dishes? 2.7 filed at 03/13/2025 1023   Can you do moderate work around the house like vacuuming, sweeping floors or carrying groceries? 3.5 filed at 03/13/2025 1023   Can you do heavy work around the house like scrubbing floors or lifting and moving heavy furniture?  0 filed at 03/13/2025 1023   Can you do yard work like raking leaves, weeding or pushing a mower? 0 filed at 03/13/2025 1023   Can you have sexual relations? 5.25 filed at 03/13/2025 1023   Can you participate in moderate recreational activities like golf, bowling, dancing, doubles tennis or throwing a baseball or football? 0 filed at 03/13/2025 1023   Can you participate in strenous sports like swimming, singles tennis, football, basketball, or skiing? 0 filed at 03/13/2025 1023   DASI SCORE 18.7 filed at 03/13/2025 1023   METS Score (Will be calculated only when all the questions are answered) 5 filed at 03/13/2025 1023          Caprini DVT Assessment      Flowsheet Row Pre-Admission Testing from 3/13/2025 in [facility] ED to Hosp-Admission (Discharged) from 2/16/2025 in [facility] with Galdino Ayala, DO   DVT Score (IF A SCORE IS NOT CALCULATING, MUST SELECT A BMI TO COMPLETE) 13 filed at 03/13/2025 1451 4 filed at 02/16/2025 0729   Medical Factors Present cancer, chemotherapy, or previous malignancy, COPD, Congestive Heart Failure < 1 month filed at 03/13/2025 1451 --   Surgical Factors Major surgery planned, lasting over 3 hours filed at 03/13/2025 1451 --   BMI (BMI MUST BE CHOSEN) 30 or less filed at 03/13/2025 1451 30 or less filed at 02/16/2025 0729          Modified Frailty Index    No data to display       [id number] Stroke Risk  Points  Current as of just now        N/A 0 to 9 Points:      Last Change: N/A          The [id number] risk score (Lip TRESA, et al. 2009. © 2010 [employer]) quantifies the risk of stroke for a patient with atrial fibrillation. For patients without atrial fibrillation or under the age of 18 this score appears as N/A. Higher score values generally indicate higher risk of stroke.        This score is not applicable to this patient. Components are not calculated.          Revised Cardiac Risk Index      Flowsheet Row Pre-Admission Testing from 3/13/2025 in [facility]   High-Risk Surgery (Intraperitoneal, Intrathoracic,Suprainguinal vascular) 1 filed at 03/13/2025 1451   History of ischemic heart disease (History of MI, History of positive exercuse test, Current chest paint considered due to myocardial ischemia, Use of nitrate therapy, ECG with pathological Q Waves) 0 filed at 03/13/2025 1451   History of congestive heart failure (pulmonary edemia, bilateral rales or S3 gallop, Paroxysmal nocturnal dyspnea, CXR showing pulmonary vascular redistribution) 1 filed at 03/13/2025 1451   History of cerebrovascular disease (Prior TIA or stroke) 0 filed at 03/13/2025 1451   Pre-operative insulin treatment 0 filed at 03/13/2025 1451   Pre-operative creatinine>2 mg/dl 0 filed at 03/13/2025 1451   Revised Cardiac Risk Calculator 2 filed at 03/13/2025 1451          Apfel Simplified Score      Flowsheet Row Pre-Admission Testing from 3/13/2025 in [facility]   Smoking status 0 filed at 03/13/2025 1451   History of motion sickness or PONV  0 filed at 03/13/2025 1451   Use of postoperative opioids 1 filed at 03/13/2025 1451   Gender - Female 1=Yes filed at 03/13/2025 1451   Apfel Simplified Score Calculator 2 filed at 03/13/2025 1451          Risk Analysis Index Results This Encounter    No data found in the last 10 encounters.       Stop Bang Score      Flowsheet Row  Pre-Admission Testing from 3/13/2025 in [facility]   Do you snore loudly? 0 filed at 03/13/2025 1024   Do you often feel tired or fatigued after your sleep? 0 filed at 03/13/2025 1024   Has anyone ever observed you stop breathing in your sleep? 0 filed at 03/13/2025 1024   Do you have or are you being treated for high blood pressure? 1 filed at 03/13/2025 1024   Recent BMI (Calculated) 21.5 filed at 03/13/2025 1024   Is BMI greater than 35 kg/m2? 0=No filed at 03/13/2025 1024   Age older than 50 years old? 1=Yes filed at 03/13/2025 1024   Is your neck circumference greater than 17 inches (Male) or 16 inches (Female)? 0 filed at 03/13/2025 1024   Gender - Male 0=No filed at 03/13/2025 1024   STOP-BANG Total Score 2 filed at 03/13/2025 1024          Prodigy: High Risk  Total Score: 15              Prodigy Age Score      Prodigy Previous Opioid Use Score           ARISCAT Score for Postoperative Pulmonary Complications      Flowsheet Row Pre-Admission Testing from 3/13/2025 in [facility]   Age Calculated Score 3 filed at 03/13/2025 1451   Preoperative SpO2 0 filed at 03/13/2025 1451   Respiratory infection in the last month Either upper or lower (i.e., URI, bronchitis, pneumonia), with fever and antibiotic treatment 0 filed at 03/13/2025 1451   Preoperative anemia (Hgb less than 10 g/dl) 0 filed at 03/13/2025 1451   Surgical incision  15 filed at 03/13/2025 1451   Duration of surgery  23 filed at 03/13/2025 1451   Emergency Procedure  0 filed at 03/13/2025 1451   ARISCAT Total Score  41 filed at 03/13/2025 1451          [place] Perioperative Risk for Myocardial Infarction or Cardiac Arrest (YOVANY)      Flowsheet Row Pre-Admission Testing from 3/13/2025 in [facility]   Calculated Age Score 1.52 filed at 03/13/2025 1452   Functional Status  0 filed at 03/13/2025 1452   ASA Class  -1.92 filed at 03/13/2025 1452   Creatinine 0 filed at 03/13/2025 1452   Type of Procedure   0.76 filed at 03/13/2025 1452   YOVANY Total Score  -4.89 filed at 03/13/2025 1452   YOVANY % 0.75 filed at 03/13/2025 1452          Results for orders placed or performed in visit on 03/13/25 (from the past 24 hours)   Type And Screen Is this order related to pregnancy or an upcoming surgery? Yes; Where will this surgery/delivery be performed? [facility]; What is the date of the surgery? 3/25/2025; Has this patient ever had a transfusion? No; Has t...   Result Value Ref Range    ABO TYPE A     Rh TYPE POS     ANTIBODY SCREEN NEG         Assessment and Plan:    74-year-old female with past medical history of HFrEF, COPD, CAD, hypertension, breast cancer status post mastectomy, uterine cancer status post hysterectomy with a new onset pelvic mass pending exploratory laparotomy with removal.  Presents to [facility] today for perioperative risk stratification and optimization    Neuro:  No neurologic diagnosis, however, the patient is at increased risk for perioperative delirium secondary to  age, type and duration of surgery, Patient instructed on and provided cognitive exercises  Patient is at increased risk for perioperative CVA secondary to  HTN, increased age    HEENT:  No HEENT diagnosis or significant findings on chart review or clinical presentation and evaluation. No further preoperative testing/intervention indicated at this time.    Cardiovascular:  History of heart failure with reduced ejection fraction.  Recently hospitalized for heart failure exacerbation likely secondary to lack of diet control.  Appears to be compensated this visit, following regularly with cardiology.  Per recent cardiology note, recommended Lasix every other day.   TTE 1/2025: LVEF moderately decreased EF 38%.  Global hypokinesis of the LV.  Normal RV function.  Nuclear stress test 1/2025: No signs of ischemia.  METS: 5  RCRI: 2 points, 10.1% risk for postoperative MACE   YOVANY: 0.75% risk for 30 day postoperative MACE  EKG - Sinus  with first deg AV block    Pulmonary:  Hx of COPD, tobacco use disorder (still smokes about 4 cigarettes a day). Previously only on albuterol, with 2 prior exacerbations this past year.  Does have an oxygen concentrator at home, however only uses it with bouts of pneumonia.  Follows with pulmonology, recently started on Daliresp and Anoro Ellipta. On physical exam, no wheezing.  PFTs 3/2025: FEV1 FVC ratio 60%  6-minute walk test 3/2025: No oxygen requirement, SpO2 greater than 90%.    Stop Bang score is 2 placing patient at low risk for ROSA M  ARISCAT: 26-44 points, 13.3% risk of in-hospital postoperative pulmonary complication  PRODIGY: High risk for opioid induced respiratory depression  Smoking cessation discussed with patient, patient also provided cessation education/hotline handout. Pulm toilet instructions and IS provided to patient.    Renal:   No renal diagnosis, however patient is at increase risk for perioperative renal complications secondary to  Age equal to or greater than 56, EF less than 40% , HTN, COPD, intraperitoneal surgery    Endocrine:  No endocrine diagnosis or significant findings on chart review or clinical presentation and evaluation. No further testing or intervention is indicated at this time.    Hematologic:  No hematologic diagnosis, however patient is at an increased risk for DVT 2/2 hx of malignancy  Caprini Score 4, patient at Low risk for perioperative DVT.  Patient provided with VTE education/handout.    Gastrointestinal:   No GI diagnosis or significant findings on chart review or clinical presentation and evaluation.   Eat-10 score 0  Apfel 2    Infectious disease:   No infectious diagnosis or significant findings on chart review or clinical presentation and evaluation. Recent diagnosis of PNA in December of 2024.  Prescription provided for CHG body wash and dental rinse. CHG use instructions reviewed and provided to patient.  Staph screen collected    Musculoskeletal:   No  diagnosis or significant findings on chart review or clinical presentation and evaluation.     Anesthesia/Airway:  No anesthesia complications      Medication instructions and NPO guidelines reviewed with the patient.  All questions or concerns discussed and addressed.      Patient seen and staffed with Dr. Michaels.

## 2025-03-13 NOTE — PREPROCEDURE INSTRUCTIONS
Thank you for visiting [facility] ([facility]) today for your pre-procedure evaluation, you were seen by Mayank Weinberg, DO     This summary includes instructions and information to aid you during your perioperative period.  Please read carefully. If you have any questions about your visit today, please call the number listed above.  If you become ill or have any changes to your health before your surgery, please contact your primary care provider and alert your surgeon.    Preparing for your Surgery       Exercises  Preoperative Deep Breathing Exercises  Why it is important to do deep breathing exercises before my surgery?  Deep breathing exercises strengthen your breathing muscles.  This helps you to recover after your surgery and decreases the chance of breathing complications.  How are the deep breathing exercises done?  Sit straight with your back supported.  Breathe in deeply and slowly through your nose. Your lower rib cage should expand and your abdomen may move forward.  Hold that breath for 3 to 5 seconds.  Breathe out through pursed lips, slowly and completely.  Rest and repeat 10 times every hour while awake.  Rest longer if you become dizzy or lightheaded.       Incentive Spirometer   You were provided with an incentive spirometer in [facility], please follow the below instructions.    What is an incentive spirometer?  An incentive spirometer is a device used before and after surgery to “exercise” your lungs.  It helps you to take deeper breaths to expand your lungs.  Below is an example of a basic incentive spirometer.  The device you receive may differ slightly but they all function the same.    Why do I need to use an incentive spirometer?  Using your incentive spirometer prepares your lungs for surgery and helps prevent lung problems after surgery.  How do I use my incentive spirometer?  When you're using your incentive spirometer, make sure to breathe through your mouth. If  you breathe through your nose, the incentive spirometer won't work properly. You can hold your nose if you have trouble.  If you feel dizzy at any time, stop and rest. Try again at a later time.  Follow the steps below:  Set up your incentive spirometer, expand the flexible tubing and connect to the outlet.  Sit upright in a chair or bed. Hold the incentive spirometer at eye level.   Put the mouthpiece in your mouth and close your lips tightly around it. Slowly breathe out (exhale) completely.  Breathe in (inhale) slowly through your mouth as deeply as you can. As you take a breath, you will see the piston rise inside the large column. While the piston rises, the indicator should move upwards. It should stay in between the 2 arrows (see Figure).  Try to get the piston as high as you can, while keeping the indicator between the arrows.   If the indicator doesn't stay between the arrows, you're breathing either too fast or too slow.  When you get it as high as you can, hold your breath for 10 seconds, or as long as possible. While you're holding your breath, the piston will slowly fall to the base of the spirometer.  Once the piston reaches the bottom of the spirometer, breathe out slowly through your mouth. Rest for a few seconds.  Repeat 10 times. Try to get the piston to the same level with each breath.  Repeat every hour while awake  You can carefully clean the outside of the mouthpiece with an alcohol wipe or soap and water.      Preoperative Brain Exercises    What are brain exercises?  A brain exercise is any activity that engages your thinking (cognitive) skills.    What types of activities are considered brain exercises?  Jigsaw puzzles, crossword puzzles, word jumble, memory games, word search, and many more.  Many can be found free online or on your phone via a mobile kannan.    Why should I do brain exercises before my surgery?  More recent research has shown brain exercise before surgery can lower the risk  of postoperative delirium (confusion) which can be especially important for older adults.  Patients who did brain exercises for 5 to 10 hours the days before surgery, cut their risk of postoperative delirium in half up to 1 week after surgery.    Sit-to-Stand Exercise    What is the sit-to-stand exercise?  The sit-to-stand exercise strengthens the muscles of your lower body and muscles in the center of your body (core muscles for stability) helping to maintain and improve your strength and mobility.  How do I do the sit-to-stand exercise?  The goal is to do this exercise without using your arms or hands.  If this is too difficult, use your arms and hands or a chair with armrests to help slowly push yourself to the standing position and lower yourself back to the sitting position. As the movement becomes easier use your arms and hands less.    Steps to the sit-to-stand exercise  Sit up tall in a sturdy chair, knees bent, feet flat on the floor shoulder-width apart.  Shift your hips/pelvis forward in the chair to correctly position yourself for the next movement.  Lean forward at your hips.  Stand up straight putting equal weight on both feet.  Check to be sure you are properly aligned with the chair, in a slow controlled movement sit back down.  Repeat this exercise 10-15 times.  If needed you can do it fewer times until your strength improves.  Rest for 1 minute.  Do another 10-15 sit-to-stand exercises.  Try to do this in the morning and evening.        Instructions    Preoperative Fasting Guidelines    Why must I stop eating and drinking near surgery time?  With sedation, food or liquid in your stomach can enter your lungs causing serious complications  Food can increase nausea and vomiting  When do I need to stop eating and drinking before my surgery?      Do not eat any food after midnight the night before your surgery/procedure. You may have up to 13.5 ounces of clear liquid until TWO hours before your  instructed arrival time to the hospital.  This includes water, black tea/coffee, (no milk or cream) apple juice, and electrolyte drinks (Gatorade). You may chew gum until TWO hours before your surgery/procedure            Simple things you can do to help prevent blood clots     Blood clots are blockages that can form in the body's veins. When a blood clot forms in your deep veins, it may be called a deep vein thrombosis, or DVT for short. Blood clots can happen in any part of the body where blood flows, but they are most common in the arms and legs. If a piece of a blood clot breaks free and travels to the lungs, it is called a pulmonary embolus (PE). A PE can be a very serious problem.         Being in the hospital or having surgery can raise your chances of getting a blood clot because you may not be well enough to move around as much as you normally do.         Ways you can help prevent blood clots in the hospital       Wearing SCDs  SCDs stands for Sequential Compression Devices.   SCDs are special sleeves that wrap around your legs. They attach to a pump that fills them with air to gently squeeze your legs every few minutes.  This helps return the blood in your legs to your heart.   SCDs should only be taken off when walking or bathing. SCDs may not be comfortable, but they can help save your life.              Pump SCD leg sleeves  Wearing compression stockings - if your doctor orders them. These special snug-fitting stockings gently squeeze your legs to help blood flow.       Walking. Walking helps move the blood in your legs.   If your doctor says it is ok, try walking the halls at least   5 times a day. Ask us to help you get up, so you don't fall.      Taking any blood-thinning medicines your doctor orders.              Ways you can help prevent blood clots at home         Wearing compression stockings - if your doctor orders them.   Walking - to help move the blood in your legs.    Taking any  blood-thinning medicines your doctor orders.      Signs of a blood clot or PE    Tell your doctor or nurse right away if you have any of the problems listed below.         If you are at home, seek medical care right away. Call 911 for chest pain or problems breathing.            Signs of a blood clot (DVT) - such as pain, swelling, redness, or warmth in your arm or legs.  Signs of a pulmonary embolism (PE) - such as chest pain or feeling short of breath      Tobacco and Alcohol;  Do not drink alcohol or smoke within 24 hours of surgery.  It is best to quit smoking for as long as possible before any surgery or procedure.    Quitting Smoking; Recognizing Dangerous Situations:  1-Alcohol use during the first month after quitting, 2-Being around smoke or someone who smokes 3-Times situation routinely smoked  4-Triggers-car, breaks, coffee, when awakening, social events  Coping Skills: 1-Learning new ways to manage stress 2-Exercising 3-Relaxation breathing   4-Change routines 5-Distraction techniques    Websites:  Smoke-Free - offers free text messages and an kannan to help you quit. Info includes eating and mood issues that may come with quitting. There is a Live Helpline to talk to an expert. Go to smokefree.gov; Become an Ex-Smoker - the free EX Plan is based on scientific research and useful advice from ex-smokers. It isn't just about quitting smoking.  It's about re-learning life without cigarettes using a 3-step program.  Go to becomeanex.org   Centers for Disease Control offer many suggestions for helping you quit. Includes a Quit Guide and real-life stories. There are sections for specific groups such as LGBT, , different ethnic groups, and pregnant women.  Go to cdc.gov/tobacco/campaign/tips    Other Resources:  [place] Tobacco Quit Line - call 1-800-QUIT-NOW or 5-208-542-5100.  [facility] 2-1-1 - to find local programs and resources. Call 211 or go to 211.org.  [facility] Tobacco Cessation Program  "- call 907-332-7730.  American Lung Association offers classes for quitting smoking. Some places may charge a fee. For a list of classes, go to lung.org or call 7[facility].     Some things to think about:; The health benefits of quitting smoking can help most of the major parts of your body. There is no safe amount of cigarette smoke. Quitting smoking can add years to your life. When you quit, you'll also protect your loved ones from dangerous secondhand smoke. Make a plan, join a support group, and talk to your physician to assist in quitting smoking.                                                                                                                   Other Instructions  Why did I have my nose, under my arms, and groin swabbed? The purpose of the swab is to identify Staphylococcus aureus inside your nose or on your skin.  The swab was sent to the laboratory for culture.  A positive swab/culture for Staphylococcus aureus is called colonization or carriage.   What is Staphylococcus aureus? Staphylococcus aureus, also known as \"staph\", is a germ found on the skin or in the nose of healthy people.  Sometimes Staphylococcus aureus can get into the body and cause an infection.  This can be minor (such as pimples, boils, or other skin problems).  It might also be serious (such as a blood infection, pneumonia, or a surgical site infection). What is Staphylococcus aureus colonization or carriage? Colonization or carriage means that a person has the germ but is not sick from it.  These bacteria can be spread on the hands or when breathing or sneezing. How is Staphylococcus aureus spread? It is most often spread by close contact with a person or item that carries it. What happens if my culture is positive for Staphylococcus aureus? Your doctor/medical team will use this information to guide any antibiotic treatment which may be necessary.  Regardless of the culture results, we will clean the inside of your " nose with a betadine swab just before you have your surgery. Will I get an infection if I have Staphylococcus aureus in my nose or on my skin? Anyone can get an infection with Staphylococcus aureus.  However, the best way to reduce your risk of infection is to follow the instructions provided to you for the use of your CHG soap and dental rinse.  , Body Wash; What is a home preoperative antibacterial shower? This shower is a way of cleaning the skin with a germ-killing solution before surgery.  The solution contains chlorhexidine, commonly known as CHG.  CHG is a skin cleanser with germ-killing ability.  Let your doctor know if you are allergic to chlorhexidine. Why do I need to take a preoperative antibacterial shower? Skin is not sterile.  It is best to try to make your skin as free of germs as possible before surgery.  Proper cleansing with a germ-killing soap before surgery can lower the number of germs on your skin.  This helps to reduce the risk of infection at the surgical site.  Following the instructions listed below will help you prepare your skin for surgery.   How do I use the solution? Steps:  Begin using your CHG soap 5 days before your scheduled surgery on ___________.   First, wash and rinse your hair using the CHG soap. Keep CHG soap away from ear canals and eyes.  Rinse completely, do not condition.  Hair extensions should be removed. , Oral/Dental Rinse: What is oral/dental rinse?  It is a mouthwash. It is a way of cleaning the mouth with a germ-killing solution before your surgery.  The solution contains chlorhexidine, commonly known as CHG. It is used inside the mouth to kill a bacteria known as Staphylococcus aureus.  Let your doctor know if you are allergic to Chlorhexidine. Why do I need to use CHG oral/dental rinse? The CHG oral/dental rinse helps to kill a bacteria in your mouth known as Staphylococcus aureus.  This reduces the risk of infection at the surgical site.  Using your CHG  oral/dental rinse STEPS: Use your CHG oral/dental rinse after you brush your teeth the night before (at bedtime) and the morning of your surgery.  Follow all directions on your prescription label.  Use the cap on the container to measure 15 ml.  Swish (gargle if you can) the mouthwash in your mouth for at least 30 seconds, (do not swallow) and spit out.  After you use your CHG rinse, do not rinse your mouth with water, drink or eat.  Please refer to the prescription label for the appropriate time to resume oral intake What side effects might I have using the CHG oral/dental rinse? CHG rinse will stick to plaque on the teeth.  Brush and floss just before use.  Teeth brushing will help avoid staining of plaque during use.         The Week before Surgery        Seven days before Surgery  Check your [facility] medication instructions  Do the exercises provided to you by [facility]   Arrange for a responsible, adult licensed  to take you home after surgery and stay with you for 24 hours.  You will not be permitted to drive yourself home if you have received any anesthetic/sedation  Six days before surgery  Check your [facility] medication instructions  Do the exercises provided to you by [facility]   Start using Chlorhexidene (CHG) body wash if prescribed  Five days before surgery  Check your [facility] medication instructions  Do the exercises provided to you by [facility]   Continue to use CHG body wash if prescribed  Three days before surgery  Check your [facility] medication instructions  Do the exercises provided to you by [facility]   Continue to use CHG body wash if prescribed  Two days before surgery  Check your [facility] medication instructions  Do the exercises provided to you by [facility]   Continue to use CHG body wash if prescribed    The Day before Surgery       Check your [facility] medication and all other [facility] instructions including when to stop eating and drinking  You will be called with your arrival time for surgery in the late afternoon.  If you do not receive a call  please reach out to your surgeon's office.  Do not smoke or drink 24 hours before surgery  Prepare items to bring with you to the hospital  Shower with your chlorhexidine wash if prescribed  Brush your teeth and use your chlorhexidine dental rinse if prescribed    The Day of Surgery       Check your [facility] medication instructions  Ensure you follow the instructions for when to stop eating and drinking  Shower, if prescribed use CHG.  Do not apply any lotions, creams, moisturizers, perfume or deodorant  Brush your teeth and use your CHG dental rinse if prescribed  Wear loose comfortable clothing  Avoid make-up  Remove  jewelry and piercings, consider professional piercing removal with a plastic spacer if needed  Bring photo ID and Insurance card  Bring an accurate medication list that includes medication dose, frequency and allergies  Bring a copy of your advanced directives (will, health care power of )  Bring any devices and controllers as well as medical devices you have been provided with for surgery (CPAP, slings, braces, etc.)  Dentures, eyeglasses, and contacts will be removed before surgery, please bring cases for contacts or glasses

## 2025-03-14 LAB — STAPHYLOCOCCUS SPEC CULT: NORMAL

## 2025-03-16 ENCOUNTER — HOSPITAL ENCOUNTER (OUTPATIENT)
Dept: RADIOLOGY | Facility: HOSPITAL | Age: 75
Discharge: HOME | End: 2025-03-16
Payer: MEDICARE

## 2025-03-16 DIAGNOSIS — Z72.0 TOBACCO ABUSE DISORDER: ICD-10-CM

## 2025-03-16 DIAGNOSIS — J44.9 CHRONIC OBSTRUCTIVE PULMONARY DISEASE, UNSPECIFIED COPD TYPE (MULTI): ICD-10-CM

## 2025-03-16 DIAGNOSIS — R91.1 PULMONARY NODULE: ICD-10-CM

## 2025-03-16 PROCEDURE — 71250 CT THORAX DX C-: CPT

## 2025-03-17 NOTE — HOSPITAL COURSE
Yadira Davis, 74 y.o. female with enlarging complex/solid pelvic mass was admitted on 3/25/2025 to the hospital. 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 EBL was 200 ml.  Postop day 1 Hgb was 11.7.     She met all her postop milestones.  PT recommended SNF, but patient would like to go home where she lives with her daughter and have home PT. She had chest tightness and GERD on postop day 2 which was evaluated. Troponin and EKG were negative. Her discomfort resolved with Simethicone, tums and Protonix .     She was discharged home in stable condition on 3/28/2025 with home PT and plan to follow up with Dr. Treviño on 4/16/2025.

## 2025-03-19 ENCOUNTER — TELEPHONE (OUTPATIENT)
Dept: GYNECOLOGIC ONCOLOGY | Facility: HOSPITAL | Age: 75
End: 2025-03-19
Payer: MEDICARE

## 2025-03-19 NOTE — TELEPHONE ENCOUNTER
Patients daughter called requesting clarification of body wash and mouth wash use prior to 3/25 surgery.     Instructed patient to use body wash nightly starting 5 nights before surgery and to use 15ml of prescribed mouth wash the night before and then again the morning of surgery.  Daughter verbalized her understanding of information given.

## 2025-03-24 RX ORDER — POLYETHYLENE GLYCOL 3350 17 G/17G
17 POWDER, FOR SOLUTION ORAL DAILY
COMMUNITY

## 2025-03-24 NOTE — PROGRESS NOTES
Pharmacy Medication History Review    Yadira Davis is a 74 y.o. female who is planned to be admitted for Ovarian mass, right. Pharmacy called the patient prior to their scheduled procedure and reviewed the patient's [id number] medications for accuracy.    Medications ADDED:  miralax  Medications CHANGED:  none  Medications REMOVED:   Colace 50mg    Please review updated prior to admission medication list and comments regarding how patient may be taking medications differently by going to Admission tab --> Admission Orders --> Admit Orders / Review prior to admission medications.     Preferred pharmacy, last doses of medications, and allergies to be confirmed with patient by nursing the day of procedure.     Sources used to complete the med history include:  [facility]  Pharmacy dispense history  Child Good historian  Chart Review  Care Everywhere     Below are additional concerns with the patient's PTA list.  Spoke with patient's daughter - westley  States patient is using miralax every day. Stopped colace due to not working.  States patient uses their albuterol inhaler as needed, NOT daily    Reny Gastelum [facility]  Please reach out via Secure Chat for questions

## 2025-03-25 ENCOUNTER — HOSPITAL ENCOUNTER (INPATIENT)
Facility: HOSPITAL | Age: 75
LOS: 3 days | Discharge: HOME | End: 2025-03-28
Attending: STUDENT IN AN ORGANIZED HEALTH CARE EDUCATION/TRAINING PROGRAM | Admitting: STUDENT IN AN ORGANIZED HEALTH CARE EDUCATION/TRAINING PROGRAM
Payer: MEDICARE

## 2025-03-25 ENCOUNTER — ANESTHESIA (OUTPATIENT)
Dept: OPERATING ROOM | Facility: HOSPITAL | Age: 75
End: 2025-03-25
Payer: MEDICARE

## 2025-03-25 DIAGNOSIS — Z90.721 S/P RIGHT OOPHORECTOMY: ICD-10-CM

## 2025-03-25 DIAGNOSIS — N83.8 OVARIAN MASS, RIGHT: ICD-10-CM

## 2025-03-25 DIAGNOSIS — R11.0 POSTOPERATIVE NAUSEA: ICD-10-CM

## 2025-03-25 DIAGNOSIS — Z90.710 STATUS POST HYSTERECTOMY: ICD-10-CM

## 2025-03-25 DIAGNOSIS — Z98.890 POSTOPERATIVE NAUSEA: ICD-10-CM

## 2025-03-25 DIAGNOSIS — N94.89 ADNEXAL MASS: Primary | ICD-10-CM

## 2025-03-25 LAB
ANION GAP BLDA CALCULATED.4IONS-SCNC: 12 MMO/L (ref 10–25)
BASE EXCESS BLDA CALC-SCNC: -2.8 MMOL/L (ref -2–3)
BODY TEMPERATURE: 37 DEGREES CELSIUS
CA-I BLDA-SCNC: 1.38 MMOL/L (ref 1.1–1.33)
CHLORIDE BLDA-SCNC: 106 MMOL/L (ref 98–107)
GLUCOSE BLD MANUAL STRIP-MCNC: 93 MG/DL (ref 74–99)
GLUCOSE BLDA-MCNC: 119 MG/DL (ref 74–99)
HCO3 BLDA-SCNC: 23.2 MMOL/L (ref 22–26)
HCT VFR BLD EST: 41 % (ref 36–46)
HGB BLDA-MCNC: 13.7 G/DL (ref 12–16)
INHALED O2 CONCENTRATION: 40 %
LACTATE BLDA-SCNC: 1 MMOL/L (ref 0.4–2)
OXYHGB MFR BLDA: 95.5 % (ref 94–98)
PCO2 BLDA: 44 MM HG (ref 38–42)
PH BLDA: 7.33 PH (ref 7.38–7.42)
PO2 BLDA: 137 MM HG (ref 85–95)
POTASSIUM BLDA-SCNC: 4.1 MMOL/L (ref 3.5–5.3)
SAO2 % BLDA: 100 % (ref 94–100)
SODIUM BLDA-SCNC: 137 MMOL/L (ref 136–145)

## 2025-03-25 PROCEDURE — 3700000002 HC GENERAL ANESTHESIA TIME - EACH INCREMENTAL 1 MINUTE: Performed by: STUDENT IN AN ORGANIZED HEALTH CARE EDUCATION/TRAINING PROGRAM

## 2025-03-25 PROCEDURE — 88360 TUMOR IMMUNOHISTOCHEM/MANUAL: CPT | Performed by: PATHOLOGY

## 2025-03-25 PROCEDURE — 99100 ANES PT EXTEME AGE<1 YR&>70: CPT | Performed by: ANESTHESIOLOGY

## 2025-03-25 PROCEDURE — 88305 TISSUE EXAM BY PATHOLOGIST: CPT | Performed by: PATHOLOGY

## 2025-03-25 PROCEDURE — 2780000003 HC OR 278 NO HCPCS: Performed by: STUDENT IN AN ORGANIZED HEALTH CARE EDUCATION/TRAINING PROGRAM

## 2025-03-25 PROCEDURE — 44950 APPENDECTOMY: CPT | Performed by: STUDENT IN AN ORGANIZED HEALTH CARE EDUCATION/TRAINING PROGRAM

## 2025-03-25 PROCEDURE — 2500000001 HC RX 250 WO HCPCS SELF ADMINISTERED DRUGS (ALT 637 FOR MEDICARE OP): Performed by: ANESTHESIOLOGY

## 2025-03-25 PROCEDURE — 3600000008 HC OR TIME - EACH INCREMENTAL 1 MINUTE - PROCEDURE LEVEL THREE: Performed by: STUDENT IN AN ORGANIZED HEALTH CARE EDUCATION/TRAINING PROGRAM

## 2025-03-25 PROCEDURE — 82947 ASSAY GLUCOSE BLOOD QUANT: CPT

## 2025-03-25 PROCEDURE — 58953 TAH RAD DISSECT FOR DEBULK: CPT | Performed by: STUDENT IN AN ORGANIZED HEALTH CARE EDUCATION/TRAINING PROGRAM

## 2025-03-25 PROCEDURE — 1170000001 HC PRIVATE ONCOLOGY ROOM DAILY

## 2025-03-25 PROCEDURE — 36415 COLL VENOUS BLD VENIPUNCTURE: CPT | Performed by: ANESTHESIOLOGY

## 2025-03-25 PROCEDURE — 2500000004 HC RX 250 GENERAL PHARMACY W/ HCPCS (ALT 636 FOR OP/ED): Performed by: ANESTHESIOLOGIST ASSISTANT

## 2025-03-25 PROCEDURE — 88304 TISSUE EXAM BY PATHOLOGIST: CPT | Performed by: PATHOLOGY

## 2025-03-25 PROCEDURE — 88307 TISSUE EXAM BY PATHOLOGIST: CPT | Performed by: PATHOLOGY

## 2025-03-25 PROCEDURE — P9045 ALBUMIN (HUMAN), 5%, 250 ML: HCPCS | Mod: JZ,TB

## 2025-03-25 PROCEDURE — 0DTU0ZZ RESECTION OF OMENTUM, OPEN APPROACH: ICD-10-PCS | Performed by: STUDENT IN AN ORGANIZED HEALTH CARE EDUCATION/TRAINING PROGRAM

## 2025-03-25 PROCEDURE — 86901 BLOOD TYPING SEROLOGIC RH(D): CPT | Performed by: ANESTHESIOLOGY

## 2025-03-25 PROCEDURE — 2500000001 HC RX 250 WO HCPCS SELF ADMINISTERED DRUGS (ALT 637 FOR MEDICARE OP): Performed by: STUDENT IN AN ORGANIZED HEALTH CARE EDUCATION/TRAINING PROGRAM

## 2025-03-25 PROCEDURE — 2500000004 HC RX 250 GENERAL PHARMACY W/ HCPCS (ALT 636 FOR OP/ED)

## 2025-03-25 PROCEDURE — 0UT00ZZ RESECTION OF RIGHT OVARY, OPEN APPROACH: ICD-10-PCS | Performed by: STUDENT IN AN ORGANIZED HEALTH CARE EDUCATION/TRAINING PROGRAM

## 2025-03-25 PROCEDURE — 7100000002 HC RECOVERY ROOM TIME - EACH INCREMENTAL 1 MINUTE: Performed by: STUDENT IN AN ORGANIZED HEALTH CARE EDUCATION/TRAINING PROGRAM

## 2025-03-25 PROCEDURE — 88331 PATH CONSLTJ SURG 1 BLK 1SPC: CPT | Performed by: PATHOLOGY

## 2025-03-25 PROCEDURE — 3700000001 HC GENERAL ANESTHESIA TIME - INITIAL BASE CHARGE: Performed by: STUDENT IN AN ORGANIZED HEALTH CARE EDUCATION/TRAINING PROGRAM

## 2025-03-25 PROCEDURE — 0DBW0ZZ EXCISION OF PERITONEUM, OPEN APPROACH: ICD-10-PCS | Performed by: STUDENT IN AN ORGANIZED HEALTH CARE EDUCATION/TRAINING PROGRAM

## 2025-03-25 PROCEDURE — 2500000004 HC RX 250 GENERAL PHARMACY W/ HCPCS (ALT 636 FOR OP/ED): Mod: TB

## 2025-03-25 PROCEDURE — 88341 IMHCHEM/IMCYTCHM EA ADD ANTB: CPT | Performed by: PATHOLOGY

## 2025-03-25 PROCEDURE — 7100000011 HC EXTENDED STAY RECOVERY HOURLY - NURSING UNIT

## 2025-03-25 PROCEDURE — 3600000003 HC OR TIME - INITIAL BASE CHARGE - PROCEDURE LEVEL THREE: Performed by: STUDENT IN AN ORGANIZED HEALTH CARE EDUCATION/TRAINING PROGRAM

## 2025-03-25 PROCEDURE — 0UT70ZZ RESECTION OF BILATERAL FALLOPIAN TUBES, OPEN APPROACH: ICD-10-PCS | Performed by: STUDENT IN AN ORGANIZED HEALTH CARE EDUCATION/TRAINING PROGRAM

## 2025-03-25 PROCEDURE — 2500000004 HC RX 250 GENERAL PHARMACY W/ HCPCS (ALT 636 FOR OP/ED): Performed by: STUDENT IN AN ORGANIZED HEALTH CARE EDUCATION/TRAINING PROGRAM

## 2025-03-25 PROCEDURE — 84132 ASSAY OF SERUM POTASSIUM: CPT

## 2025-03-25 PROCEDURE — A58720 PR REMOVAL OF OVARY/TUBE(S): Performed by: ANESTHESIOLOGY

## 2025-03-25 PROCEDURE — 2500000005 HC RX 250 GENERAL PHARMACY W/O HCPCS: Performed by: STUDENT IN AN ORGANIZED HEALTH CARE EDUCATION/TRAINING PROGRAM

## 2025-03-25 PROCEDURE — 36620 INSERTION CATHETER ARTERY: CPT | Performed by: ANESTHESIOLOGY

## 2025-03-25 PROCEDURE — 88342 IMHCHEM/IMCYTCHM 1ST ANTB: CPT | Performed by: PATHOLOGY

## 2025-03-25 PROCEDURE — 2500000004 HC RX 250 GENERAL PHARMACY W/ HCPCS (ALT 636 FOR OP/ED): Mod: JZ,TB | Performed by: ANESTHESIOLOGY

## 2025-03-25 PROCEDURE — 88332 PATH CONSLTJ SURG EA ADD BLK: CPT | Performed by: PATHOLOGY

## 2025-03-25 PROCEDURE — 2500000005 HC RX 250 GENERAL PHARMACY W/O HCPCS

## 2025-03-25 PROCEDURE — 0DTJ0ZZ RESECTION OF APPENDIX, OPEN APPROACH: ICD-10-PCS | Performed by: STUDENT IN AN ORGANIZED HEALTH CARE EDUCATION/TRAINING PROGRAM

## 2025-03-25 PROCEDURE — 2720000007 HC OR 272 NO HCPCS: Performed by: STUDENT IN AN ORGANIZED HEALTH CARE EDUCATION/TRAINING PROGRAM

## 2025-03-25 PROCEDURE — 96372 THER/PROPH/DIAG INJ SC/IM: CPT

## 2025-03-25 PROCEDURE — 99223 1ST HOSP IP/OBS HIGH 75: CPT

## 2025-03-25 PROCEDURE — A58720 PR REMOVAL OF OVARY/TUBE(S)

## 2025-03-25 PROCEDURE — 7100000001 HC RECOVERY ROOM TIME - INITIAL BASE CHARGE: Performed by: STUDENT IN AN ORGANIZED HEALTH CARE EDUCATION/TRAINING PROGRAM

## 2025-03-25 PROCEDURE — 88307 TISSUE EXAM BY PATHOLOGIST: CPT | Mod: TC,SUR,WESLAB | Performed by: STUDENT IN AN ORGANIZED HEALTH CARE EDUCATION/TRAINING PROGRAM

## 2025-03-25 PROCEDURE — 83605 ASSAY OF LACTIC ACID: CPT

## 2025-03-25 PROCEDURE — 2500000001 HC RX 250 WO HCPCS SELF ADMINISTERED DRUGS (ALT 637 FOR MEDICARE OP)

## 2025-03-25 RX ORDER — PROPOFOL 10 MG/ML
INJECTION, EMULSION INTRAVENOUS AS NEEDED
Status: DISCONTINUED | OUTPATIENT
Start: 2025-03-25 | End: 2025-03-25

## 2025-03-25 RX ORDER — PHENYLEPHRINE HYDROCHLORIDE 10 MG/ML
INJECTION INTRAVENOUS AS NEEDED
Status: DISCONTINUED | OUTPATIENT
Start: 2025-03-25 | End: 2025-03-25

## 2025-03-25 RX ORDER — ALBUMIN HUMAN 50 G/1000ML
SOLUTION INTRAVENOUS AS NEEDED
Status: DISCONTINUED | OUTPATIENT
Start: 2025-03-25 | End: 2025-03-25

## 2025-03-25 RX ORDER — OXYCODONE AND ACETAMINOPHEN 5; 325 MG/1; MG/1
1 TABLET ORAL EVERY 4 HOURS PRN
Status: DISCONTINUED | OUTPATIENT
Start: 2025-03-25 | End: 2025-03-25 | Stop reason: HOSPADM

## 2025-03-25 RX ORDER — SODIUM CHLORIDE 0.9 G/100ML
INJECTION, SOLUTION IRRIGATION AS NEEDED
Status: DISCONTINUED | OUTPATIENT
Start: 2025-03-25 | End: 2025-03-25 | Stop reason: HOSPADM

## 2025-03-25 RX ORDER — ACETAMINOPHEN 325 MG/1
650 TABLET ORAL EVERY 4 HOURS PRN
Status: DISCONTINUED | OUTPATIENT
Start: 2025-03-25 | End: 2025-03-25 | Stop reason: HOSPADM

## 2025-03-25 RX ORDER — NALOXONE HYDROCHLORIDE 0.4 MG/ML
0.1 INJECTION, SOLUTION INTRAMUSCULAR; INTRAVENOUS; SUBCUTANEOUS EVERY 5 MIN PRN
Status: DISCONTINUED | OUTPATIENT
Start: 2025-03-25 | End: 2025-03-28 | Stop reason: HOSPADM

## 2025-03-25 RX ORDER — DROPERIDOL 2.5 MG/ML
0.62 INJECTION, SOLUTION INTRAMUSCULAR; INTRAVENOUS ONCE AS NEEDED
Status: DISCONTINUED | OUTPATIENT
Start: 2025-03-25 | End: 2025-03-25 | Stop reason: HOSPADM

## 2025-03-25 RX ORDER — CELECOXIB 200 MG/1
400 CAPSULE ORAL ONCE
Status: COMPLETED | OUTPATIENT
Start: 2025-03-25 | End: 2025-03-25

## 2025-03-25 RX ORDER — MIDAZOLAM HYDROCHLORIDE 1 MG/ML
1 INJECTION INTRAMUSCULAR; INTRAVENOUS ONCE AS NEEDED
Status: DISCONTINUED | OUTPATIENT
Start: 2025-03-25 | End: 2025-03-25 | Stop reason: HOSPADM

## 2025-03-25 RX ORDER — HEPARIN SODIUM 5000 [USP'U]/ML
5000 INJECTION, SOLUTION INTRAVENOUS; SUBCUTANEOUS EVERY 8 HOURS
Status: DISCONTINUED | OUTPATIENT
Start: 2025-03-25 | End: 2025-03-26

## 2025-03-25 RX ORDER — METOCLOPRAMIDE HYDROCHLORIDE 5 MG/ML
10 INJECTION INTRAMUSCULAR; INTRAVENOUS ONCE AS NEEDED
Status: DISCONTINUED | OUTPATIENT
Start: 2025-03-25 | End: 2025-03-25 | Stop reason: HOSPADM

## 2025-03-25 RX ORDER — ROCURONIUM BROMIDE 10 MG/ML
INJECTION, SOLUTION INTRAVENOUS AS NEEDED
Status: DISCONTINUED | OUTPATIENT
Start: 2025-03-25 | End: 2025-03-25

## 2025-03-25 RX ORDER — KETOROLAC TROMETHAMINE 30 MG/ML
15 INJECTION, SOLUTION INTRAMUSCULAR; INTRAVENOUS EVERY 6 HOURS
Status: COMPLETED | OUTPATIENT
Start: 2025-03-25 | End: 2025-03-26

## 2025-03-25 RX ORDER — WATER 1 ML/ML
INJECTION IRRIGATION AS NEEDED
Status: DISCONTINUED | OUTPATIENT
Start: 2025-03-25 | End: 2025-03-25 | Stop reason: HOSPADM

## 2025-03-25 RX ORDER — HYDROMORPHONE HYDROCHLORIDE 0.2 MG/ML
0.2 INJECTION INTRAMUSCULAR; INTRAVENOUS; SUBCUTANEOUS EVERY 5 MIN PRN
Status: DISCONTINUED | OUTPATIENT
Start: 2025-03-25 | End: 2025-03-25 | Stop reason: HOSPADM

## 2025-03-25 RX ORDER — SODIUM CHLORIDE, SODIUM LACTATE, POTASSIUM CHLORIDE, CALCIUM CHLORIDE 600; 310; 30; 20 MG/100ML; MG/100ML; MG/100ML; MG/100ML
40 INJECTION, SOLUTION INTRAVENOUS CONTINUOUS
Status: DISCONTINUED | OUTPATIENT
Start: 2025-03-25 | End: 2025-03-25

## 2025-03-25 RX ORDER — ACETAMINOPHEN 325 MG/1
975 TABLET ORAL ONCE
Status: COMPLETED | OUTPATIENT
Start: 2025-03-25 | End: 2025-03-25

## 2025-03-25 RX ORDER — IBUPROFEN 600 MG/1
600 TABLET ORAL EVERY 6 HOURS
Status: DISCONTINUED | OUTPATIENT
Start: 2025-03-26 | End: 2025-03-28 | Stop reason: HOSPADM

## 2025-03-25 RX ORDER — WATER 1000 ML/1000ML
INJECTION, SOLUTION INTRAVENOUS CONTINUOUS PRN
Status: COMPLETED | OUTPATIENT
Start: 2025-03-25 | End: 2025-03-25

## 2025-03-25 RX ORDER — OXYCODONE HYDROCHLORIDE 5 MG/1
10 TABLET ORAL EVERY 4 HOURS PRN
Status: DISCONTINUED | OUTPATIENT
Start: 2025-03-25 | End: 2025-03-28 | Stop reason: HOSPADM

## 2025-03-25 RX ORDER — ACETAMINOPHEN 325 MG/1
975 TABLET ORAL EVERY 6 HOURS
Status: DISCONTINUED | OUTPATIENT
Start: 2025-03-25 | End: 2025-03-28 | Stop reason: HOSPADM

## 2025-03-25 RX ORDER — OXYCODONE HYDROCHLORIDE 5 MG/1
5 TABLET ORAL EVERY 4 HOURS PRN
Status: DISCONTINUED | OUTPATIENT
Start: 2025-03-25 | End: 2025-03-28 | Stop reason: HOSPADM

## 2025-03-25 RX ORDER — POLYETHYLENE GLYCOL 3350 17 G/17G
17 POWDER, FOR SOLUTION ORAL DAILY
Status: DISCONTINUED | OUTPATIENT
Start: 2025-03-25 | End: 2025-03-28 | Stop reason: HOSPADM

## 2025-03-25 RX ORDER — SODIUM CHLORIDE, SODIUM LACTATE, POTASSIUM CHLORIDE, CALCIUM CHLORIDE 600; 310; 30; 20 MG/100ML; MG/100ML; MG/100ML; MG/100ML
INJECTION, SOLUTION INTRAVENOUS CONTINUOUS PRN
Status: DISCONTINUED | OUTPATIENT
Start: 2025-03-25 | End: 2025-03-25

## 2025-03-25 RX ORDER — ONDANSETRON HYDROCHLORIDE 2 MG/ML
INJECTION, SOLUTION INTRAVENOUS AS NEEDED
Status: DISCONTINUED | OUTPATIENT
Start: 2025-03-25 | End: 2025-03-25

## 2025-03-25 RX ORDER — PHENYLEPHRINE 10 MG/250 ML(40 MCG/ML)IN 0.9 % SOD.CHLORIDE INTRAVENOUS
CONTINUOUS PRN
Status: DISCONTINUED | OUTPATIENT
Start: 2025-03-25 | End: 2025-03-25

## 2025-03-25 RX ORDER — SIMETHICONE 80 MG
80 TABLET,CHEWABLE ORAL 4 TIMES DAILY PRN
Status: DISCONTINUED | OUTPATIENT
Start: 2025-03-25 | End: 2025-03-28 | Stop reason: HOSPADM

## 2025-03-25 RX ORDER — LIDOCAINE HYDROCHLORIDE 10 MG/ML
0.1 INJECTION, SOLUTION EPIDURAL; INFILTRATION; INTRACAUDAL; PERINEURAL ONCE
Status: DISCONTINUED | OUTPATIENT
Start: 2025-03-25 | End: 2025-03-25 | Stop reason: HOSPADM

## 2025-03-25 RX ORDER — MIDAZOLAM HYDROCHLORIDE 1 MG/ML
INJECTION INTRAMUSCULAR; INTRAVENOUS AS NEEDED
Status: DISCONTINUED | OUTPATIENT
Start: 2025-03-25 | End: 2025-03-25

## 2025-03-25 RX ORDER — ONDANSETRON 4 MG/1
4 TABLET, ORALLY DISINTEGRATING ORAL EVERY 6 HOURS PRN
Status: DISCONTINUED | OUTPATIENT
Start: 2025-03-25 | End: 2025-03-28 | Stop reason: HOSPADM

## 2025-03-25 RX ORDER — SODIUM CHLORIDE, SODIUM LACTATE, POTASSIUM CHLORIDE, CALCIUM CHLORIDE 600; 310; 30; 20 MG/100ML; MG/100ML; MG/100ML; MG/100ML
100 INJECTION, SOLUTION INTRAVENOUS CONTINUOUS
Status: DISCONTINUED | OUTPATIENT
Start: 2025-03-25 | End: 2025-03-25 | Stop reason: HOSPADM

## 2025-03-25 RX ORDER — LIDOCAINE HYDROCHLORIDE 40 MG/ML
SOLUTION TOPICAL AS NEEDED
Status: DISCONTINUED | OUTPATIENT
Start: 2025-03-25 | End: 2025-03-25

## 2025-03-25 RX ORDER — ROPIVACAINE IN 0.9% SOD CHL/PF 0.2 %
14 PLASTIC BAG, INJECTION (ML) EPIDURAL CONTINUOUS
Status: DISCONTINUED | OUTPATIENT
Start: 2025-03-25 | End: 2025-03-26

## 2025-03-25 RX ORDER — OXYCODONE HYDROCHLORIDE 5 MG/1
10 TABLET ORAL EVERY 4 HOURS PRN
Status: DISCONTINUED | OUTPATIENT
Start: 2025-03-25 | End: 2025-03-25 | Stop reason: HOSPADM

## 2025-03-25 RX ORDER — CEFAZOLIN 1 G/1
INJECTION, POWDER, FOR SOLUTION INTRAVENOUS AS NEEDED
Status: DISCONTINUED | OUTPATIENT
Start: 2025-03-25 | End: 2025-03-25

## 2025-03-25 RX ORDER — ONDANSETRON HYDROCHLORIDE 2 MG/ML
4 INJECTION, SOLUTION INTRAVENOUS EVERY 6 HOURS PRN
Status: DISCONTINUED | OUTPATIENT
Start: 2025-03-25 | End: 2025-03-28 | Stop reason: HOSPADM

## 2025-03-25 RX ORDER — NORETHINDRONE AND ETHINYL ESTRADIOL 0.5-0.035
KIT ORAL AS NEEDED
Status: DISCONTINUED | OUTPATIENT
Start: 2025-03-25 | End: 2025-03-25

## 2025-03-25 RX ORDER — HEPARIN SODIUM 5000 [USP'U]/ML
5000 INJECTION, SOLUTION INTRAVENOUS; SUBCUTANEOUS ONCE
Status: COMPLETED | OUTPATIENT
Start: 2025-03-25 | End: 2025-03-25

## 2025-03-25 RX ORDER — GABAPENTIN 600 MG/1
600 TABLET ORAL ONCE
Status: COMPLETED | OUTPATIENT
Start: 2025-03-25 | End: 2025-03-25

## 2025-03-25 RX ORDER — FENTANYL CITRATE 50 UG/ML
INJECTION, SOLUTION INTRAMUSCULAR; INTRAVENOUS AS NEEDED
Status: DISCONTINUED | OUTPATIENT
Start: 2025-03-25 | End: 2025-03-25

## 2025-03-25 RX ORDER — LABETALOL HYDROCHLORIDE 5 MG/ML
5 INJECTION, SOLUTION INTRAVENOUS ONCE AS NEEDED
Status: DISCONTINUED | OUTPATIENT
Start: 2025-03-25 | End: 2025-03-25 | Stop reason: HOSPADM

## 2025-03-25 RX ORDER — LIDOCAINE HCL/PF 100 MG/5ML
SYRINGE (ML) INTRAVENOUS AS NEEDED
Status: DISCONTINUED | OUTPATIENT
Start: 2025-03-25 | End: 2025-03-25

## 2025-03-25 RX ORDER — HYDROMORPHONE HYDROCHLORIDE 1 MG/ML
INJECTION, SOLUTION INTRAMUSCULAR; INTRAVENOUS; SUBCUTANEOUS AS NEEDED
Status: DISCONTINUED | OUTPATIENT
Start: 2025-03-25 | End: 2025-03-25

## 2025-03-25 RX ADMIN — SODIUM CHLORIDE, SODIUM LACTATE, POTASSIUM CHLORIDE, AND CALCIUM CHLORIDE 40 ML/HR: .6; .31; .03; .02 INJECTION, SOLUTION INTRAVENOUS at 20:27

## 2025-03-25 RX ADMIN — PHENYLEPHRINE HYDROCHLORIDE 100 MCG: 10 INJECTION INTRAVENOUS at 12:25

## 2025-03-25 RX ADMIN — CEFAZOLIN 2 G: 330 INJECTION, POWDER, FOR SOLUTION INTRAMUSCULAR; INTRAVENOUS at 11:47

## 2025-03-25 RX ADMIN — ONDANSETRON 4 MG: 2 INJECTION INTRAMUSCULAR; INTRAVENOUS at 15:22

## 2025-03-25 RX ADMIN — SODIUM CHLORIDE, SODIUM LACTATE, POTASSIUM CHLORIDE, CALCIUM CHLORIDE: 600; 310; 30; 20 INJECTION, SOLUTION INTRAVENOUS at 12:06

## 2025-03-25 RX ADMIN — EPHEDRINE SULFATE 5 MG: 50 INJECTION, SOLUTION INTRAVENOUS at 15:48

## 2025-03-25 RX ADMIN — SUGAMMADEX 200 MG: 100 INJECTION, SOLUTION INTRAVENOUS at 15:58

## 2025-03-25 RX ADMIN — HEPARIN SODIUM 5000 UNITS: 5000 INJECTION, SOLUTION INTRAVENOUS; SUBCUTANEOUS at 11:20

## 2025-03-25 RX ADMIN — MIDAZOLAM HYDROCHLORIDE 1 MG: 2 INJECTION, SOLUTION INTRAMUSCULAR; INTRAVENOUS at 11:39

## 2025-03-25 RX ADMIN — DEXAMETHASONE SODIUM PHOSPHATE 4 MG: 4 INJECTION INTRA-ARTICULAR; INTRALESIONAL; INTRAMUSCULAR; INTRAVENOUS; SOFT TISSUE at 11:47

## 2025-03-25 RX ADMIN — EPHEDRINE SULFATE 5 MG: 50 INJECTION, SOLUTION INTRAVENOUS at 12:30

## 2025-03-25 RX ADMIN — HYDROMORPHONE HYDROCHLORIDE 0.2 MG: 0.2 INJECTION, SOLUTION INTRAMUSCULAR; INTRAVENOUS; SUBCUTANEOUS at 17:09

## 2025-03-25 RX ADMIN — PHENYLEPHRINE-NACL IV SOLUTION 10 MG/250ML-0.9% 0.2 MCG/KG/MIN: 10-0.9/25 SOLUTION at 11:56

## 2025-03-25 RX ADMIN — PHENYLEPHRINE HYDROCHLORIDE 80 MCG: 10 INJECTION INTRAVENOUS at 12:07

## 2025-03-25 RX ADMIN — PHENYLEPHRINE HYDROCHLORIDE 120 MCG: 10 INJECTION INTRAVENOUS at 11:44

## 2025-03-25 RX ADMIN — SODIUM CHLORIDE, SODIUM LACTATE, POTASSIUM CHLORIDE, AND CALCIUM CHLORIDE: .6; .31; .03; .02 INJECTION, SOLUTION INTRAVENOUS at 11:39

## 2025-03-25 RX ADMIN — ROCURONIUM BROMIDE 20 MG: 10 INJECTION INTRAVENOUS at 12:14

## 2025-03-25 RX ADMIN — ALBUMIN HUMAN 250 ML: 0.05 INJECTION, SOLUTION INTRAVENOUS at 13:32

## 2025-03-25 RX ADMIN — HYDROMORPHONE HYDROCHLORIDE 0.5 MG: 0.5 INJECTION, SOLUTION INTRAMUSCULAR; INTRAVENOUS; SUBCUTANEOUS at 16:50

## 2025-03-25 RX ADMIN — HEPARIN SODIUM 5000 UNITS: 5000 INJECTION, SOLUTION INTRAVENOUS; SUBCUTANEOUS at 20:26

## 2025-03-25 RX ADMIN — ACETAMINOPHEN 975 MG: 325 TABLET ORAL at 20:26

## 2025-03-25 RX ADMIN — HYDROMORPHONE HYDROCHLORIDE 0.2 MG: 1 INJECTION, SOLUTION INTRAMUSCULAR; INTRAVENOUS; SUBCUTANEOUS at 14:04

## 2025-03-25 RX ADMIN — ROCURONIUM BROMIDE 20 MG: 10 INJECTION INTRAVENOUS at 13:03

## 2025-03-25 RX ADMIN — FENTANYL CITRATE 100 MCG: 50 INJECTION, SOLUTION INTRAMUSCULAR; INTRAVENOUS at 11:41

## 2025-03-25 RX ADMIN — ROCURONIUM BROMIDE 60 MG: 10 INJECTION INTRAVENOUS at 11:42

## 2025-03-25 RX ADMIN — CELECOXIB 400 MG: 200 CAPSULE ORAL at 10:26

## 2025-03-25 RX ADMIN — ACETAMINOPHEN 975 MG: 325 TABLET, FILM COATED ORAL at 10:26

## 2025-03-25 RX ADMIN — ROCURONIUM BROMIDE 10 MG: 10 INJECTION INTRAVENOUS at 15:03

## 2025-03-25 RX ADMIN — OXYCODONE 10 MG: 5 TABLET ORAL at 22:42

## 2025-03-25 RX ADMIN — ROCURONIUM BROMIDE 20 MG: 10 INJECTION INTRAVENOUS at 13:41

## 2025-03-25 RX ADMIN — LIDOCAINE HYDROCHLORIDE 80 MG: 20 INJECTION INTRAVENOUS at 11:41

## 2025-03-25 RX ADMIN — ROCURONIUM BROMIDE 20 MG: 10 INJECTION INTRAVENOUS at 14:28

## 2025-03-25 RX ADMIN — PROPOFOL 70 MG: 10 INJECTION, EMULSION INTRAVENOUS at 11:41

## 2025-03-25 RX ADMIN — EPHEDRINE SULFATE 5 MG: 50 INJECTION, SOLUTION INTRAVENOUS at 12:40

## 2025-03-25 RX ADMIN — KETOROLAC TROMETHAMINE 15 MG: 30 INJECTION, SOLUTION INTRAMUSCULAR; INTRAVENOUS at 20:26

## 2025-03-25 RX ADMIN — LIDOCAINE HYDROCHLORIDE 4 ML: 40 SOLUTION TOPICAL at 11:43

## 2025-03-25 RX ADMIN — HYDROMORPHONE HYDROCHLORIDE 0.2 MG: 0.2 INJECTION, SOLUTION INTRAMUSCULAR; INTRAVENOUS; SUBCUTANEOUS at 16:36

## 2025-03-25 RX ADMIN — ALBUMIN HUMAN 250 ML: 0.05 INJECTION, SOLUTION INTRAVENOUS at 12:09

## 2025-03-25 RX ADMIN — EPHEDRINE SULFATE 7.5 MG: 50 INJECTION, SOLUTION INTRAVENOUS at 11:48

## 2025-03-25 RX ADMIN — EPHEDRINE SULFATE 7.5 MG: 50 INJECTION, SOLUTION INTRAVENOUS at 12:07

## 2025-03-25 RX ADMIN — HYDROMORPHONE HYDROCHLORIDE 0.2 MG: 1 INJECTION, SOLUTION INTRAMUSCULAR; INTRAVENOUS; SUBCUTANEOUS at 15:52

## 2025-03-25 RX ADMIN — GABAPENTIN 600 MG: 600 TABLET, FILM COATED ORAL at 10:26

## 2025-03-25 RX ADMIN — OXYCODONE HYDROCHLORIDE AND ACETAMINOPHEN 1 TABLET: 5; 325 TABLET ORAL at 18:40

## 2025-03-25 RX ADMIN — PHENYLEPHRINE HYDROCHLORIDE 120 MCG: 10 INJECTION INTRAVENOUS at 11:41

## 2025-03-25 SDOH — SOCIAL STABILITY: SOCIAL INSECURITY: HAVE YOU HAD THOUGHTS OF HARMING ANYONE ELSE?: NO

## 2025-03-25 SDOH — SOCIAL STABILITY: SOCIAL INSECURITY: WITHIN THE LAST YEAR, HAVE YOU BEEN AFRAID OF YOUR PARTNER OR EX-PARTNER?: PATIENT DECLINED

## 2025-03-25 SDOH — SOCIAL STABILITY: SOCIAL INSECURITY: WERE YOU ABLE TO COMPLETE ALL THE BEHAVIORAL HEALTH SCREENINGS?: YES

## 2025-03-25 SDOH — ECONOMIC STABILITY: FOOD INSECURITY: HOW HARD IS IT FOR YOU TO PAY FOR THE VERY BASICS LIKE FOOD, HOUSING, MEDICAL CARE, AND HEATING?: PATIENT DECLINED

## 2025-03-25 SDOH — ECONOMIC STABILITY: HOUSING INSECURITY: IN THE PAST 12 MONTHS, HOW MANY TIMES HAVE YOU MOVED WHERE YOU WERE LIVING?: 0

## 2025-03-25 SDOH — SOCIAL STABILITY: SOCIAL INSECURITY: ARE YOU OR HAVE YOU BEEN THREATENED OR ABUSED PHYSICALLY, EMOTIONALLY, OR SEXUALLY BY ANYONE?: NO

## 2025-03-25 SDOH — ECONOMIC STABILITY: HOUSING INSECURITY: AT ANY TIME IN THE PAST 12 MONTHS, WERE YOU HOMELESS OR LIVING IN A SHELTER (INCLUDING NOW)?: PATIENT DECLINED

## 2025-03-25 SDOH — ECONOMIC STABILITY: FOOD INSECURITY: WITHIN THE PAST 12 MONTHS, THE FOOD YOU BOUGHT JUST DIDN'T LAST AND YOU DIDN'T HAVE MONEY TO GET MORE.: PATIENT DECLINED

## 2025-03-25 SDOH — SOCIAL STABILITY: SOCIAL INSECURITY
WITHIN THE LAST YEAR, HAVE YOU BEEN HUMILIATED OR EMOTIONALLY ABUSED IN OTHER WAYS BY YOUR PARTNER OR EX-PARTNER?: PATIENT DECLINED

## 2025-03-25 SDOH — SOCIAL STABILITY: SOCIAL INSECURITY: ARE THERE ANY APPARENT SIGNS OF INJURIES/BEHAVIORS THAT COULD BE RELATED TO ABUSE/NEGLECT?: NO

## 2025-03-25 SDOH — SOCIAL STABILITY: SOCIAL INSECURITY
WITHIN THE LAST YEAR, HAVE YOU BEEN RAPED OR FORCED TO HAVE ANY KIND OF SEXUAL ACTIVITY BY YOUR PARTNER OR EX-PARTNER?: PATIENT DECLINED

## 2025-03-25 SDOH — SOCIAL STABILITY: SOCIAL INSECURITY: ABUSE: ADULT

## 2025-03-25 SDOH — SOCIAL STABILITY: SOCIAL INSECURITY
WITHIN THE LAST YEAR, HAVE YOU BEEN KICKED, HIT, SLAPPED, OR OTHERWISE PHYSICALLY HURT BY YOUR PARTNER OR EX-PARTNER?: PATIENT DECLINED

## 2025-03-25 SDOH — ECONOMIC STABILITY: TRANSPORTATION INSECURITY
IN THE PAST 12 MONTHS, HAS LACK OF TRANSPORTATION KEPT YOU FROM MEDICAL APPOINTMENTS OR FROM GETTING MEDICATIONS?: PATIENT DECLINED

## 2025-03-25 SDOH — ECONOMIC STABILITY: FOOD INSECURITY
WITHIN THE PAST 12 MONTHS, YOU WORRIED THAT YOUR FOOD WOULD RUN OUT BEFORE YOU GOT THE MONEY TO BUY MORE.: PATIENT DECLINED

## 2025-03-25 SDOH — HEALTH STABILITY: MENTAL HEALTH: CURRENT SMOKER: 1

## 2025-03-25 SDOH — ECONOMIC STABILITY: INCOME INSECURITY
IN THE PAST 12 MONTHS HAS THE ELECTRIC, GAS, OIL, OR WATER COMPANY THREATENED TO SHUT OFF SERVICES IN YOUR HOME?: PATIENT DECLINED

## 2025-03-25 SDOH — SOCIAL STABILITY: SOCIAL INSECURITY: HAS ANYONE EVER THREATENED TO HURT YOUR FAMILY OR YOUR PETS?: NO

## 2025-03-25 SDOH — SOCIAL STABILITY: SOCIAL INSECURITY: DO YOU FEEL ANYONE HAS EXPLOITED OR TAKEN ADVANTAGE OF YOU FINANCIALLY OR OF YOUR PERSONAL PROPERTY?: NO

## 2025-03-25 SDOH — ECONOMIC STABILITY: HOUSING INSECURITY: IN THE LAST 12 MONTHS, WAS THERE A TIME WHEN YOU WERE NOT ABLE TO PAY THE MORTGAGE OR RENT ON TIME?: PATIENT DECLINED

## 2025-03-25 SDOH — SOCIAL STABILITY: SOCIAL INSECURITY: DOES ANYONE TRY TO KEEP YOU FROM HAVING/CONTACTING OTHER FRIENDS OR DOING THINGS OUTSIDE YOUR HOME?: NO

## 2025-03-25 SDOH — SOCIAL STABILITY: SOCIAL INSECURITY: DO YOU FEEL UNSAFE GOING BACK TO THE PLACE WHERE YOU ARE LIVING?: NO

## 2025-03-25 ASSESSMENT — ACTIVITIES OF DAILY LIVING (ADL)
JUDGMENT_ADEQUATE_SAFELY_COMPLETE_DAILY_ACTIVITIES: YES
HEARING - LEFT EAR: FUNCTIONAL
LACK_OF_TRANSPORTATION: PATIENT DECLINED
LACK_OF_TRANSPORTATION: PATIENT DECLINED
TOILETING: INDEPENDENT
PATIENT'S MEMORY ADEQUATE TO SAFELY COMPLETE DAILY ACTIVITIES?: YES
ADEQUATE_TO_COMPLETE_ADL: YES
GROOMING: INDEPENDENT
BATHING: INDEPENDENT
FEEDING YOURSELF: INDEPENDENT
HEARING - RIGHT EAR: FUNCTIONAL
DRESSING YOURSELF: INDEPENDENT
WALKS IN HOME: INDEPENDENT

## 2025-03-25 ASSESSMENT — COGNITIVE AND FUNCTIONAL STATUS - GENERAL
PATIENT BASELINE BEDBOUND: NO
DRESSING REGULAR UPPER BODY CLOTHING: A LITTLE
MOBILITY SCORE: 18
MOVING FROM LYING ON BACK TO SITTING ON SIDE OF FLAT BED WITH BEDRAILS: A LITTLE
STANDING UP FROM CHAIR USING ARMS: A LITTLE
TOILETING: A LITTLE
DAILY ACTIVITIY SCORE: 20
WALKING IN HOSPITAL ROOM: A LITTLE
TURNING FROM BACK TO SIDE WHILE IN FLAT BAD: A LITTLE
HELP NEEDED FOR BATHING: A LITTLE
DRESSING REGULAR LOWER BODY CLOTHING: A LITTLE
CLIMB 3 TO 5 STEPS WITH RAILING: A LITTLE
MOVING TO AND FROM BED TO CHAIR: A LITTLE

## 2025-03-25 ASSESSMENT — PAIN SCALES - GENERAL
PAINLEVEL_OUTOF10: 7
PAINLEVEL_OUTOF10: 6
PAINLEVEL_OUTOF10: 10 - WORST POSSIBLE PAIN
PAINLEVEL_OUTOF10: 7
PAINLEVEL_OUTOF10: 8
PAINLEVEL_OUTOF10: 6
PAINLEVEL_OUTOF10: 6
PAIN_LEVEL: 3
PAINLEVEL_OUTOF10: 5 - MODERATE PAIN
PAINLEVEL_OUTOF10: 8

## 2025-03-25 ASSESSMENT — PAIN - FUNCTIONAL ASSESSMENT
PAIN_FUNCTIONAL_ASSESSMENT: 0-10
PAIN_FUNCTIONAL_ASSESSMENT: UNABLE TO SELF-REPORT
PAIN_FUNCTIONAL_ASSESSMENT: UNABLE TO SELF-REPORT
PAIN_FUNCTIONAL_ASSESSMENT: 0-10
PAIN_FUNCTIONAL_ASSESSMENT: UNABLE TO SELF-REPORT
PAIN_FUNCTIONAL_ASSESSMENT: 0-10
PAIN_FUNCTIONAL_ASSESSMENT: UNABLE TO SELF-REPORT

## 2025-03-25 ASSESSMENT — LIFESTYLE VARIABLES
AUDIT-C TOTAL SCORE: 0
HOW MANY STANDARD DRINKS CONTAINING ALCOHOL DO YOU HAVE ON A TYPICAL DAY: PATIENT DOES NOT DRINK
HOW OFTEN DO YOU HAVE 6 OR MORE DRINKS ON ONE OCCASION: NEVER
SKIP TO QUESTIONS 9-10: 1
HOW OFTEN DO YOU HAVE A DRINK CONTAINING ALCOHOL: NEVER
AUDIT-C TOTAL SCORE: 0

## 2025-03-25 ASSESSMENT — PATIENT HEALTH QUESTIONNAIRE - PHQ9
2. FEELING DOWN, DEPRESSED OR HOPELESS: NOT AT ALL
SUM OF ALL RESPONSES TO PHQ9 QUESTIONS 1 & 2: 0
1. LITTLE INTEREST OR PLEASURE IN DOING THINGS: NOT AT ALL

## 2025-03-25 ASSESSMENT — PAIN DESCRIPTION - LOCATION
LOCATION: ABDOMEN

## 2025-03-25 ASSESSMENT — PAIN DESCRIPTION - ORIENTATION
ORIENTATION: MID

## 2025-03-25 NOTE — ANESTHESIA PROCEDURE NOTES
Airway  Date/Time: 3/25/2025 11:43 AM  Urgency: elective    Airway not difficult    Staffing  Performed: KENYON   Authorized by: Corey Kuhn MD    Performed by: Neeru Vail  Patient location during procedure: OR    Indications and Patient Condition  Indications for airway management: anesthesia and airway protection  Spontaneous Ventilation: absent  Sedation level: deep  Preoxygenated: yes  Patient position: sniffing  MILS maintained throughout  Mask difficulty assessment: 1 - vent by mask    Final Airway Details  Final airway type: endotracheal airway      Successful airway: ETT  Cuffed: yes   Successful intubation technique: direct laryngoscopy  Facilitating devices/methods: intubating stylet  Endotracheal tube insertion site: oral  Blade: Ángel  Blade size: #3  ETT size (mm): 7.0  Cormack-Lehane Classification: grade I - full view of glottis  Placement verified by: chest auscultation and capnometry   Measured from: teeth  ETT to teeth (cm): 22  Number of attempts at approach: 1

## 2025-03-25 NOTE — PROCEDURES
Peripheral Block    Patient location during procedure: pre-op  Start time: 3/25/2025 11:00 AM  End time: 3/25/2025 11:15 AM  Reason for block: at surgeon's request and post-op pain management  Staffing  Performed: resident and attending   Authorized by: Dao Head MD    Performed by: Lisa Pineda MD  Preanesthetic Checklist  Completed: patient identified, IV checked, site marked, risks and benefits discussed, surgical consent, monitors and equipment checked, pre-op evaluation and timeout performed   Timeout performed at: 3/25/2025 11:02 AM  Peripheral Block  Patient position: laying flat  Prep: ChloraPrep  Patient monitoring: heart rate and continuous pulse ox  Block type: QL  Injection technique: catheter  Guidance: ultrasound guided  Local infiltration: lidocaine  Infiltration strength: 1 %  Dose: 3 mL  Needle  Needle type: Tuohy   Needle gauge: 22 G  Needle length: 8 cm  Needle localization: ultrasound guidance     image stored in chart  Catheter type: closed end  Catheter size: 20g.  Catheter at skin depth: 10 cm  Assessment  Injection assessment: negative aspiration for heme, no paresthesia on injection, incremental injection and local visualized surrounding nerve on ultrasound  Paresthesia pain: none  Heart rate change: no  Slow fractionated injection: yes  Additional Notes  Quadratus lumborum catheters:      Prior to procedure: Following a focused history, procedure-related and patient-specific complications were discussed. Risks, benefits, and alternatives were explained. Informed, written consent was provided by the patient and/or surrogate decision maker for the block. Anticoagulation (if any) was held per ETHAN guidelines. ASA monitors were applied. Patient was positioned, prepped with chlorhexidine, and draped with sterile towels.     Ultrasound guidance was used to identify the quadratus lumborum and surrounding structures. The needle was visualized throughout the procedure. Aspiration was  negative. A total of 30cc 0.5% ropi, 4mg decadron, 50mcg epi was divided and injected bilaterally. Catheters threaded into space and secured. Patient tolerated procedure well.     Timeout by Grace WASHINGTON

## 2025-03-25 NOTE — ANESTHESIA PREPROCEDURE EVALUATION
Patient: Yadira Davis    Procedure Information       Anesthesia Start Date/Time: 03/25/25 1131    Procedure: LAPAROSCOPY, EXPLORATORY excision of pelvic mass possible staging any indicated procedures (Abdomen)    Location: [facility] OR 03 / [facility] OR    Surgeons: Jaki Treviño MD MPH            Relevant Problems   Cardiac   (+) Chest pain   (+) Chest pain on exertion   (+) Mixed hyperlipidemia   (+) PAD (peripheral artery disease) (CMS-HCC)   (+) PVD (peripheral vascular disease) (CMS-HCC)   (+) Primary hypertension   (+) Pure hypercholesterolemia   (+) Severe uncontrolled hypertension   (+) Stenosis of iliac artery (CMS-HCC)      Pulmonary   (+) COPD exacerbation (Multi)   (+) Chronic obstructive pulmonary disease (Multi)   (+) Dyspnea on exertion      Neuro   (+) Generalized anxiety disorder      Musculoskeletal   (+) Intermittent claudication (CMS-HCC)      HEENT   (+) Asymmetrical sensorineural hearing loss   (+) Bilateral hearing loss   (+) Hearing loss   (+) Mixed conductive and sensorineural hearing loss      ID   (+) Disease due to severe acute respiratory syndrome coronavirus 2 (SARS-CoV-2)      GYN   (+) Malignant neoplasm of breast   (+) Malignant neoplasm of uterus, part unspecified (Multi)      Respiratory   (+) Cough   (+) Decreased breath sounds      Circulatory   (+) Aortoiliac stenosis (CMS-HCC)   (+) Calcification of coronary artery   (+) HFrEF (heart failure with reduced ejection fraction)      Genitourinary   (+) Adnexal mass   (+) Ovarian mass, right      Coag and Thromboembolic   (+) Aspirin long-term use      Tobacco   (+) Cigarette smoker      Cardiac and Vasculature   (+) Dyslipidemia   (+) History of hypertension      Hematology and Neoplasia   (+) History of malignant neoplasm of breast       Clinical information reviewed:   Tobacco  Allergies  Meds   Med Hx  Surg Hx  OB Status  Fam Hx  Soc   Hx        NPO Detail:  NPO/Void Status  Carbohydrate Drink Given Prior to  Surgery? : N  Date of Last Liquid: 03/24/25  Time of Last Liquid: 0630 (meds with sip)  Date of Last Solid: 03/24/25  Time of Last Solid: 1830  Last Intake Type: Clear fluids  Time of Last Void: 0900         Physical Exam    Airway  Mallampati: II  TM distance: >3 FB  Neck ROM: full     Cardiovascular - normal exam     Dental    Pulmonary   (+) decreased breath sounds     Abdominal - normal exam             Anesthesia Plan    History of general anesthesia?: yes  History of complications of general anesthesia?: no    ASA 3     general     The patient is a current smoker.  Education provided regarding risk of obstructive sleep apnea.  intravenous induction   Postoperative administration of opioids is intended.  Trial extubation is planned.  Anesthetic plan and risks discussed with patient.  Use of blood products discussed with patient who consented to blood products.    Plan discussed with CRNA and attending.

## 2025-03-25 NOTE — CONSULTS
Yadira Davis is a 74 y.o. year old female patient who presents for Procedure(s):  LAPAROSCOPY, EXPLORATORY excision of pelvic mass possible staging any indicated procedures with Jaki Treviño MD MPH on 3/25/2025.  Acute Pain consulted for assistance with pain control.     Anticipated Postop Pain Issues -   Palliative: typically relieved with IV analgesics and regional local anesthetics  Provocative: typically with movement  Quality: typically burning and aching  Radiation: typically none  Severity: typically severe 8-10/10  Timing: typically constant    Past Medical History:   Diagnosis Date    Breast cancer (Multi)     R and L breast s/p surgery, chemotherapy and RT    CAD (coronary artery disease)     COPD (chronic obstructive pulmonary disease) (Multi)     Delayed emergence from general anesthesia     HFrEF (heart failure with reduced ejection fraction)     HL (hearing loss)     HLD (hyperlipidemia)     HTN (hypertension)     Pelvic mass     PVD (peripheral vascular disease) (CMS-HCC)         Past Surgical History:   Procedure Laterality Date    ADENOIDECTOMY      APPENDECTOMY      BREAST BIOPSY       SECTION, CLASSIC      CHOLECYSTECTOMY      CT ANGIO AORTA AND BILATERAL ILIOFEMORAL RUN OFF INCLUDING WITHOUT CONTRAST IF PERFORMED  2022    MASTECTOMY      SEPTOPLASTY      TONSILLECTOMY      TOTAL ABDOMINAL HYSTERECTOMY          Family History   Problem Relation Name Age of Onset    Breast cancer Mother      Hypertension Mother      Hyperlipidemia Mother      Esophageal cancer Mother      Other (Peripheral artery disease) Mother      Coronary artery disease Father          Social History     Socioeconomic History    Marital status:      Spouse name: Not on file    Number of children: Not on file    Years of education: Not on file    Highest education level: Not on file   Occupational History    Not on file   Tobacco Use    Smoking status: Every Day     Current packs/day: 1.50     Average  packs/day: 1.5 packs/day for 55.2 years (82.8 ttl pk-yrs)     Types: Cigarettes     Start date: 1/23/1970     Passive exposure: Current    Smokeless tobacco: Never    Tobacco comments:     Declines cessation counseling, no interest in quiting    Vaping Use    Vaping status: Never Used   Substance and Sexual Activity    Alcohol use: Not Currently    Drug use: Never    Sexual activity: Not on file   Other Topics Concern    Not on file   Social History Narrative    Not on file     Social Drivers of Health     Financial Resource Strain: Low Risk  (2/16/2025)    Overall Financial Resource Strain (CARDIA)     Difficulty of Paying Living Expenses: Not very hard   Food Insecurity: No Food Insecurity (2/16/2025)    Hunger Vital Sign     Worried About Running Out of Food in the Last Year: Never true     Ran Out of Food in the Last Year: Never true   Transportation Needs: No Transportation Needs (2/16/2025)    PRAPARE - Transportation     Lack of Transportation (Medical): No     Lack of Transportation (Non-Medical): No   Physical Activity: Not on file   Stress: Not on file   Social Connections: Not on file   Intimate Partner Violence: Not At Risk (2/16/2025)    Humiliation, Afraid, Rape, and Kick questionnaire     Fear of Current or Ex-Partner: No     Emotionally Abused: No     Physically Abused: No     Sexually Abused: No   Housing Stability: Low Risk  (2/16/2025)    Housing Stability Vital Sign     Unable to Pay for Housing in the Last Year: No     Number of Times Moved in the Last Year: 0     Homeless in the Last Year: No        Allergies   Allergen Reactions    Codeine Nausea/vomiting         Review of Systems  Gen: No fatigue, anorexia, insomnia, fever.   Eyes: No vision loss, double vision, drainage, eye pain.   ENT: No pharyngitis, dry mouth, no hearing changes or ear discharge  Cardiac: No chest pain, palpitations, syncope, near syncope.   Pulmonary: No shortness of breath, cough, hemoptysis.   Heme/lymph: No swollen  glands, fever, bleeding.   GI: No abdominal pain, change in bowel habits, melena, hematemesis, hematochezia, nausea, vomiting, diarrhea.   : No discharge, dysuria, frequency, urgency, hematuria.  Endo: No polyuria or weight loss.   Musculoskeletal: Negative for any pain or loss of ROM/weakness  Skin: No rashes or lesions  Neuro: Normal speech, no numbness or weakness. No gait difficulties  Review of systems is otherwise negative unless stated above or in history of present illness.    Physical Exam:  Constitutional:  no distress, alert and cooperative  Eyes: clear sclera  Head/Neck: No apparent injury, trachea midline  Respiratory/Thorax: Patent airways, thorax symmetric, breathing comfortably  Cardiovascular: no pitting edema  Gastrointestinal: Nondistended  Musculoskeletal: ROM intact  Extremities: no clubbing  Neurological: alert, brock x4  Psychological: Appropriate affect    Results for orders placed or performed during the hospital encounter of 03/25/25 (from the past 24 hours)   POCT GLUCOSE   Result Value Ref Range    POCT Glucose 93 74 - 99 mg/dL        Yadira Davis is a 74 y.o. year old female patient who presents for Procedure(s):  LAPAROSCOPY, EXPLORATORY excision of pelvic mass possible staging any indicated procedures with Jaki Treviño MD MPH on 3/25/2025. Acute Pain consulted for assistance with pain control.     Plan:    - B/L QL nerve blocks w/ catheters performed preoperatively on 3/25/2025  - Ambit ball with Ropivacaine 0.2%/NaCl 0.9% 500mL, Rate 7 cc/hr bilaterally  - Ambit medication will not interfere with pain medication prescribed by the primary team.   - Please be aware of local anesthetic toxic dose and absorption variability before considering lidocaine patches  - Acute pain service will follow while catheters in place  - Rest of pain management per primary team    Acute Pain Resident  pg [phone] ph [phone]

## 2025-03-25 NOTE — ANESTHESIA PROCEDURE NOTES
Arterial Line:    Date/Time: 3/25/2025 11:45 AM    Staffing  Performed: [facility]   Authorized by: Corey Kuhn MD    Performed by: Neeru Vail    An arterial line was placed. in the OR for the following indication(s): continuous blood pressure monitoring and blood sampling needed.    A 20 gauge (size), 1 and 3/4 inch (length), Angiocath (type) catheter was placed into the Left radial artery, secured by Tegaderm,   Seldinger technique used.  Events:  patient tolerated procedure well with no complications.

## 2025-03-25 NOTE — H&P
History Of Present Illness  Yadira Davis is a 74 y.o. female presenting with XL USO possible staging.     Past Medical History  She has a past medical history of Breast cancer (Multi), CAD (coronary artery disease), COPD (chronic obstructive pulmonary disease) (Multi), HFrEF (heart failure with reduced ejection fraction), HLD (hyperlipidemia), HTN (hypertension), and PVD (peripheral vascular disease) (CMS-formerly [facility]).    Surgical History  She has a past surgical history that includes Mastectomy; Total abdominal hysterectomy; Breast biopsy;  section, classic; Septoplasty; Adenoidectomy; Tonsillectomy; Cholecystectomy; CT angio aorta and bilateral iliofemoral runoff including without contrast if performed (2022); and Appendectomy.      Social History  She reports that she has been smoking cigarettes. She started smoking about 55 years ago. She has a 82.8 pack-year smoking history. She has been exposed to tobacco smoke. She has never used smokeless tobacco. She reports that she does not currently use alcohol. She reports that she does not use drugs.     Allergies  Codeine    Review of Systems   All other systems reviewed and are negative.       Physical Exam  Vitals and nursing note reviewed. Exam conducted with a chaperone present.   Constitutional:       General: She is not in acute distress.     Appearance: Normal appearance.   HENT:      Head: Normocephalic and atraumatic.      Mouth/Throat:      Mouth: Mucous membranes are moist.      Pharynx: No oropharyngeal exudate or posterior oropharyngeal erythema.   Eyes:      Extraocular Movements: Extraocular movements intact.      Conjunctiva/sclera: Conjunctivae normal.      Pupils: Pupils are equal, round, and reactive to light.   Neck:      Thyroid: No thyroid mass or thyromegaly.   Cardiovascular:      Rate and Rhythm: Normal rate and regular rhythm.      Pulses: Normal pulses.      Heart sounds: Normal heart sounds.   Pulmonary:      Effort: Pulmonary  effort is normal.      Breath sounds: Normal breath sounds. No wheezing, rhonchi or rales.   Abdominal:      General: Bowel sounds are normal. There is no distension.      Palpations: Abdomen is soft. There is no mass.      Tenderness: There is no abdominal tenderness.      Hernia: No hernia is present.   Musculoskeletal:         General: No tenderness. Normal range of motion.      Cervical back: Normal range of motion and neck supple. No tenderness.      Right lower leg: No edema.      Left lower leg: No edema.   Skin:     General: Skin is warm.      Findings: No lesion or rash.   Neurological:      General: No focal deficit present.      Mental Status: She is alert and oriented to person, place, and time.   Psychiatric:         Mood and Affect: Mood normal.         Behavior: Behavior normal.          Last Recorded Vitals  There were no vitals taken for this visit.    Relevant Results  Lab Results   Component Value Date    WBC 6.9 02/18/2025    HGB 13.8 02/18/2025    HCT 42.5 02/18/2025    MCV 87 02/18/2025     02/18/2025       Assessment/Plan   Assessment & Plan  Ovarian mass, right    Adnexal mass      - consent signed  - A-line in the OR   - plan for admission post-op         Jaki Treviño MD MPH

## 2025-03-25 NOTE — ANESTHESIA POSTPROCEDURE EVALUATION
Patient: Yadira Davis    Procedure Summary       Date: 03/25/25 Room / Location: [facility] OR 03 / [facility] OR    Anesthesia Start: 1131 Anesthesia Stop: 1619    Procedure: LAPAROTOMY, EXPLORATORY,  BILATERAL SALPINGECTOMY RIGHT OOPHERECTOMY WITH RADICAL DISSECTION OMENTECTOMY PELVIC PERITONECTOMY, ARGON BEAM ABLATION, OVER SEW OF BLADDER AND BOWEL SEROSAL DEFECTS. (Abdomen) Diagnosis:       Ovarian mass, right      (Ovarian mass, right [N83.8])    Surgeons: Jaki Treviño MD MPH Responsible Provider: Jono Curry MD    Anesthesia Type: general ASA Status: 3            Anesthesia Type: general    Vitals Value Taken Time   /65 03/25/25 1615   Temp 36.6 °C (97.9 °F) 03/25/25 1607   Pulse 92 03/25/25 1620   Resp 16 03/25/25 1620   SpO2 100 % 03/25/25 1620   Vitals shown include unfiled device data.    Anesthesia Post Evaluation    Patient location during evaluation: PACU  Patient participation: complete - patient participated  Level of consciousness: awake  Pain score: 3  Pain management: adequate  Airway patency: patent  Cardiovascular status: acceptable  Respiratory status: acceptable  Hydration status: acceptable  Postoperative Nausea and Vomiting: none    There were no known notable events for this encounter.

## 2025-03-26 LAB
ANION GAP SERPL CALC-SCNC: 11 MMOL/L (ref 10–20)
BUN SERPL-MCNC: 24 MG/DL (ref 6–23)
CALCIUM SERPL-MCNC: 9.7 MG/DL (ref 8.6–10.6)
CHLORIDE SERPL-SCNC: 105 MMOL/L (ref 98–107)
CO2 SERPL-SCNC: 25 MMOL/L (ref 21–32)
CREAT SERPL-MCNC: 0.99 MG/DL (ref 0.5–1.05)
EGFRCR SERPLBLD CKD-EPI 2021: 59 ML/MIN/1.73M*2
ERYTHROCYTE [DISTWIDTH] IN BLOOD BY AUTOMATED COUNT: 13.5 % (ref 11.5–14.5)
GLUCOSE SERPL-MCNC: 124 MG/DL (ref 74–99)
HCT VFR BLD AUTO: 40 % (ref 36–46)
HGB BLD-MCNC: 13.2 G/DL (ref 12–16)
MAGNESIUM SERPL-MCNC: 1.96 MG/DL (ref 1.6–2.4)
MCH RBC QN AUTO: 28.8 PG (ref 26–34)
MCHC RBC AUTO-ENTMCNC: 33 G/DL (ref 32–36)
MCV RBC AUTO: 87 FL (ref 80–100)
NRBC BLD-RTO: 0 /100 WBCS (ref 0–0)
PLATELET # BLD AUTO: 182 X10*3/UL (ref 150–450)
POTASSIUM SERPL-SCNC: 4.2 MMOL/L (ref 3.5–5.3)
RBC # BLD AUTO: 4.58 X10*6/UL (ref 4–5.2)
SODIUM SERPL-SCNC: 137 MMOL/L (ref 136–145)
WBC # BLD AUTO: 12.8 X10*3/UL (ref 4.4–11.3)

## 2025-03-26 PROCEDURE — 1170000001 HC PRIVATE ONCOLOGY ROOM DAILY

## 2025-03-26 PROCEDURE — 97161 PT EVAL LOW COMPLEX 20 MIN: CPT | Mod: GP | Performed by: PHYSICAL THERAPIST

## 2025-03-26 PROCEDURE — 36415 COLL VENOUS BLD VENIPUNCTURE: CPT

## 2025-03-26 PROCEDURE — 2500000002 HC RX 250 W HCPCS SELF ADMINISTERED DRUGS (ALT 637 FOR MEDICARE OP, ALT 636 FOR OP/ED)

## 2025-03-26 PROCEDURE — 85027 COMPLETE CBC AUTOMATED: CPT

## 2025-03-26 PROCEDURE — 97530 THERAPEUTIC ACTIVITIES: CPT | Mod: GP | Performed by: PHYSICAL THERAPIST

## 2025-03-26 PROCEDURE — 2500000004 HC RX 250 GENERAL PHARMACY W/ HCPCS (ALT 636 FOR OP/ED): Mod: JW,TB

## 2025-03-26 PROCEDURE — 2500000004 HC RX 250 GENERAL PHARMACY W/ HCPCS (ALT 636 FOR OP/ED)

## 2025-03-26 PROCEDURE — 94640 AIRWAY INHALATION TREATMENT: CPT

## 2025-03-26 PROCEDURE — 83735 ASSAY OF MAGNESIUM: CPT

## 2025-03-26 PROCEDURE — 2500000001 HC RX 250 WO HCPCS SELF ADMINISTERED DRUGS (ALT 637 FOR MEDICARE OP)

## 2025-03-26 PROCEDURE — 2500000005 HC RX 250 GENERAL PHARMACY W/O HCPCS

## 2025-03-26 PROCEDURE — 99024 POSTOP FOLLOW-UP VISIT: CPT

## 2025-03-26 PROCEDURE — 80048 BASIC METABOLIC PNL TOTAL CA: CPT

## 2025-03-26 PROCEDURE — 99231 SBSQ HOSP IP/OBS SF/LOW 25: CPT

## 2025-03-26 PROCEDURE — 96372 THER/PROPH/DIAG INJ SC/IM: CPT

## 2025-03-26 RX ORDER — ALBUTEROL SULFATE 90 UG/1
2 INHALANT RESPIRATORY (INHALATION) EVERY 4 HOURS PRN
Status: DISCONTINUED | OUTPATIENT
Start: 2025-03-26 | End: 2025-03-28 | Stop reason: HOSPADM

## 2025-03-26 RX ORDER — FORMOTEROL FUMARATE 20 UG/2ML
20 SOLUTION RESPIRATORY (INHALATION)
Status: DISCONTINUED | OUTPATIENT
Start: 2025-03-26 | End: 2025-03-28 | Stop reason: HOSPADM

## 2025-03-26 RX ORDER — CARVEDILOL 6.25 MG/1
3.12 TABLET ORAL
Status: DISCONTINUED | OUTPATIENT
Start: 2025-03-26 | End: 2025-03-28 | Stop reason: HOSPADM

## 2025-03-26 RX ORDER — DOCUSATE SODIUM 100 MG/1
100 CAPSULE, LIQUID FILLED ORAL 2 TIMES DAILY
Status: DISCONTINUED | OUTPATIENT
Start: 2025-03-26 | End: 2025-03-28 | Stop reason: HOSPADM

## 2025-03-26 RX ORDER — HYDROMORPHONE HYDROCHLORIDE 1 MG/ML
0.2 INJECTION, SOLUTION INTRAMUSCULAR; INTRAVENOUS; SUBCUTANEOUS EVERY 2 HOUR PRN
Status: DISCONTINUED | OUTPATIENT
Start: 2025-03-26 | End: 2025-03-28 | Stop reason: HOSPADM

## 2025-03-26 RX ORDER — IPRATROPIUM BROMIDE AND ALBUTEROL SULFATE 2.5; .5 MG/3ML; MG/3ML
3 SOLUTION RESPIRATORY (INHALATION) 3 TIMES DAILY PRN
Status: DISCONTINUED | OUTPATIENT
Start: 2025-03-26 | End: 2025-03-28 | Stop reason: HOSPADM

## 2025-03-26 RX ORDER — METHOCARBAMOL 500 MG/1
500 TABLET, FILM COATED ORAL EVERY 8 HOURS SCHEDULED
Status: DISCONTINUED | OUTPATIENT
Start: 2025-03-26 | End: 2025-03-28 | Stop reason: HOSPADM

## 2025-03-26 RX ORDER — ROFLUMILAST 500 UG/1
500 TABLET ORAL DAILY
Status: DISCONTINUED | OUTPATIENT
Start: 2025-03-26 | End: 2025-03-28 | Stop reason: HOSPADM

## 2025-03-26 RX ORDER — ENOXAPARIN SODIUM 100 MG/ML
40 INJECTION SUBCUTANEOUS EVERY 24 HOURS
Status: DISCONTINUED | OUTPATIENT
Start: 2025-03-26 | End: 2025-03-28 | Stop reason: HOSPADM

## 2025-03-26 RX ORDER — POLYETHYLENE GLYCOL 3350 17 G/17G
17 POWDER, FOR SOLUTION ORAL DAILY
Status: DISCONTINUED | OUTPATIENT
Start: 2025-03-26 | End: 2025-03-28 | Stop reason: HOSPADM

## 2025-03-26 RX ORDER — LIDOCAINE 560 MG/1
1 PATCH PERCUTANEOUS; TOPICAL; TRANSDERMAL DAILY
Status: DISCONTINUED | OUTPATIENT
Start: 2025-03-26 | End: 2025-03-28 | Stop reason: HOSPADM

## 2025-03-26 RX ORDER — CYCLOBENZAPRINE HCL 10 MG
10 TABLET ORAL 3 TIMES DAILY
Status: DISCONTINUED | OUTPATIENT
Start: 2025-03-26 | End: 2025-03-26

## 2025-03-26 RX ADMIN — HEPARIN SODIUM 5000 UNITS: 5000 INJECTION, SOLUTION INTRAVENOUS; SUBCUTANEOUS at 03:59

## 2025-03-26 RX ADMIN — CARVEDILOL 3.12 MG: 6.25 TABLET, FILM COATED ORAL at 17:25

## 2025-03-26 RX ADMIN — KETOROLAC TROMETHAMINE 15 MG: 30 INJECTION, SOLUTION INTRAMUSCULAR; INTRAVENOUS at 01:35

## 2025-03-26 RX ADMIN — DOCUSATE SODIUM 100 MG: 100 CAPSULE, LIQUID FILLED ORAL at 20:48

## 2025-03-26 RX ADMIN — OXYCODONE 10 MG: 5 TABLET ORAL at 08:24

## 2025-03-26 RX ADMIN — KETOROLAC TROMETHAMINE 15 MG: 30 INJECTION, SOLUTION INTRAMUSCULAR; INTRAVENOUS at 08:23

## 2025-03-26 RX ADMIN — LIDOCAINE 1 PATCH: 4 PATCH TOPICAL at 08:23

## 2025-03-26 RX ADMIN — ACETAMINOPHEN 975 MG: 325 TABLET ORAL at 14:56

## 2025-03-26 RX ADMIN — OXYCODONE 10 MG: 5 TABLET ORAL at 12:29

## 2025-03-26 RX ADMIN — POLYETHYLENE GLYCOL 3350 17 G: 17 POWDER, FOR SOLUTION ORAL at 08:23

## 2025-03-26 RX ADMIN — HYDROMORPHONE HYDROCHLORIDE 0.2 MG: 1 INJECTION, SOLUTION INTRAMUSCULAR; INTRAVENOUS; SUBCUTANEOUS at 10:40

## 2025-03-26 RX ADMIN — KETOROLAC TROMETHAMINE 15 MG: 30 INJECTION, SOLUTION INTRAMUSCULAR; INTRAVENOUS at 14:56

## 2025-03-26 RX ADMIN — OXYCODONE 10 MG: 5 TABLET ORAL at 03:59

## 2025-03-26 RX ADMIN — IBUPROFEN 600 MG: 600 TABLET, FILM COATED ORAL at 20:48

## 2025-03-26 RX ADMIN — ACETAMINOPHEN 975 MG: 325 TABLET ORAL at 08:23

## 2025-03-26 RX ADMIN — FORMOTEROL FUMARATE DIHYDRATE 20 MCG: 20 SOLUTION RESPIRATORY (INHALATION) at 22:27

## 2025-03-26 RX ADMIN — HEPARIN SODIUM 5000 UNITS: 5000 INJECTION, SOLUTION INTRAVENOUS; SUBCUTANEOUS at 12:29

## 2025-03-26 RX ADMIN — DOCUSATE SODIUM 100 MG: 100 CAPSULE, LIQUID FILLED ORAL at 08:23

## 2025-03-26 RX ADMIN — METHOCARBAMOL TABLETS 500 MG: 500 TABLET, COATED ORAL at 20:48

## 2025-03-26 RX ADMIN — ONDANSETRON 4 MG: 4 TABLET, ORALLY DISINTEGRATING ORAL at 17:25

## 2025-03-26 RX ADMIN — METHOCARBAMOL TABLETS 500 MG: 500 TABLET, COATED ORAL at 14:56

## 2025-03-26 RX ADMIN — ENOXAPARIN SODIUM 40 MG: 100 INJECTION SUBCUTANEOUS at 20:48

## 2025-03-26 RX ADMIN — METHOCARBAMOL TABLETS 500 MG: 500 TABLET, COATED ORAL at 08:23

## 2025-03-26 RX ADMIN — HYDROMORPHONE HYDROCHLORIDE 0.2 MG: 1 INJECTION, SOLUTION INTRAMUSCULAR; INTRAVENOUS; SUBCUTANEOUS at 01:35

## 2025-03-26 RX ADMIN — ACETAMINOPHEN 975 MG: 325 TABLET ORAL at 20:48

## 2025-03-26 RX ADMIN — CARVEDILOL 3.12 MG: 6.25 TABLET, FILM COATED ORAL at 08:24

## 2025-03-26 ASSESSMENT — PAIN - FUNCTIONAL ASSESSMENT
PAIN_FUNCTIONAL_ASSESSMENT: 0-10

## 2025-03-26 ASSESSMENT — COGNITIVE AND FUNCTIONAL STATUS - GENERAL
MOVING FROM LYING ON BACK TO SITTING ON SIDE OF FLAT BED WITH BEDRAILS: A LOT
STANDING UP FROM CHAIR USING ARMS: A LOT
MOVING TO AND FROM BED TO CHAIR: A LOT
WALKING IN HOSPITAL ROOM: TOTAL
CLIMB 3 TO 5 STEPS WITH RAILING: TOTAL
MOBILITY SCORE: 10
TURNING FROM BACK TO SIDE WHILE IN FLAT BAD: A LOT

## 2025-03-26 ASSESSMENT — PAIN SCALES - GENERAL
PAINLEVEL_OUTOF10: 7
PAINLEVEL_OUTOF10: 8
PAINLEVEL_OUTOF10: 8
PAINLEVEL_OUTOF10: 7
PAINLEVEL_OUTOF10: 8
PAINLEVEL_OUTOF10: 7
PAINLEVEL_OUTOF10: 8

## 2025-03-26 ASSESSMENT — ACTIVITIES OF DAILY LIVING (ADL)
ADL_ASSISTANCE: INDEPENDENT
LACK_OF_TRANSPORTATION: PATIENT DECLINED

## 2025-03-26 NOTE — PROGRESS NOTES
Postop Pain HPI -   Palliative: relieved with IV analgesics and regional local anesthetics  Provocative: movement  Quality:  burning and aching  Radiation:  none  Severity:  8-9/10  Timing: constant    24-HOUR OPIOID CONSUMPTION:  Dilaudid 1.3 mg   Oxycodone 20 mg      Scheduled medications  acetaminophen, 975 mg, oral, q6h  carvedilol, 3.125 mg, oral, BID  docusate sodium, 100 mg, oral, BID  tiotropium, 2 puff, inhalation, Daily   And  formoterol, 20 mcg, nebulization, q12h  heparin (porcine), 5,000 Units, subcutaneous, q8h  ketorolac, 15 mg, intravenous, q6h   Followed by  ibuprofen, 600 mg, oral, q6h  polyethylene glycol, 17 g, oral, Daily  polyethylene glycol, 17 g, oral, Daily  roflumilast, 500 mcg, oral, Daily      Continuous medications     PRN medications  PRN medications: albuterol, HYDROmorphone, ipratropium-albuteroL, naloxone, ondansetron ODT **OR** ondansetron, oxyCODONE, oxyCODONE, simethicone     Physical Exam:  Constitutional:  no distress, alert and cooperative  Eyes: clear sclera  Head/Neck: No apparent injury, trachea midline  Respiratory/Thorax: Patent airways, thorax symmetric, breathing comfortably  Cardiovascular: no pitting edema  Gastrointestinal: Nondistended  Musculoskeletal: ROM intact  Extremities: no clubbing  Neurological: alert, brock x4  Psychological: Appropriate affect    Results for orders placed or performed during the hospital encounter of 03/25/25 (from the past 24 hours)   POCT GLUCOSE   Result Value Ref Range    POCT Glucose 93 74 - 99 mg/dL   Blood Gas Arterial Full Panel   Result Value Ref Range    POCT pH, Arterial 7.33 (L) 7.38 - 7.42 pH    POCT pCO2, Arterial 44 (H) 38 - 42 mm Hg    POCT pO2, Arterial 137 (H) 85 - 95 mm Hg    POCT SO2, Arterial 100 94 - 100 %    POCT Oxy Hemoglobin, Arterial 95.5 94.0 - 98.0 %    POCT Hematocrit Calculated, Arterial 41.0 36.0 - 46.0 %    POCT Sodium, Arterial 137 136 - 145 mmol/L    POCT Potassium, Arterial 4.1 3.5 - 5.3 mmol/L    POCT  Chloride, Arterial 106 98 - 107 mmol/L    POCT Ionized Calcium, Arterial 1.38 (H) 1.10 - 1.33 mmol/L    POCT Glucose, Arterial 119 (H) 74 - 99 mg/dL    POCT Lactate, Arterial 1.0 0.4 - 2.0 mmol/L    POCT Base Excess, Arterial -2.8 (L) -2.0 - 3.0 mmol/L    POCT HCO3 Calculated, Arterial 23.2 22.0 - 26.0 mmol/L    POCT Hemoglobin, Arterial 13.7 12.0 - 16.0 g/dL    POCT Anion Gap, Arterial 12 10 - 25 mmo/L    Patient Temperature 37.0 degrees Celsius    FiO2 40 %      Yadira Davis is a 74 y.o. year old female patient who presents for Procedure(s):  LAPAROSCOPY, EXPLORATORY excision of pelvic mass possible staging any indicated procedures with Jaki Treviño MD MPH on 3/25/2025. Acute Pain consulted for assistance with pain control.      Plan:     - B/L QL nerve blocks w/ catheters performed preoperatively on 3/25/2025  - Ambit ball with Ropivacaine 0.2%/NaCl 0.9% 500mL, Rate 7 cc/hr bilaterally  - Ambit bump was off --> restarted during rounds   - 5 ml of 0.5% ropivacaine bolus given bilaterally   - Ambit medication will not interfere with pain medication prescribed by the primary team.   - Please be aware of local anesthetic toxic dose and absorption variability before considering lidocaine patches  - Acute pain service will follow while catheters in place  - Rest of pain management per primary team     Acute Pain Resident  pg [phone] ph [phone]

## 2025-03-26 NOTE — CARE PLAN
The patient's goals for the shift include      The clinical goals for the shift include pt willnot fall throughout shift      Problem: Pain - Adult  Goal: Verbalizes/displays adequate comfort level or baseline comfort level  Outcome: Progressing     Problem: Safety - Adult  Goal: Free from fall injury  Outcome: Progressing     Problem: Discharge Planning  Goal: Discharge to home or other facility with appropriate resources  Outcome: Progressing     Problem: Chronic Conditions and Co-morbidities  Goal: Patient's chronic conditions and co-morbidity symptoms are monitored and maintained or improved  Outcome: Progressing     Problem: Nutrition  Goal: Nutrient intake appropriate for maintaining nutritional needs  Outcome: Progressing     Problem: Skin  Goal: Participates in plan/prevention/treatment measures  Outcome: Progressing  Goal: Prevent/manage excess moisture  Outcome: Progressing  Goal: Prevent/minimize sheer/friction injuries  Outcome: Progressing  Goal: Promote/optimize nutrition  Outcome: Progressing  Goal: Promote skin healing  Outcome: Progressing

## 2025-03-26 NOTE — CARE PLAN
Problem: Pain - Adult  Goal: Verbalizes/displays adequate comfort level or baseline comfort level  3/26/2025 1100 by Lyndon Guevara RN  Outcome: Progressing  3/26/2025 1059 by Lyndon Guevara RN  Outcome: Progressing     Problem: Safety - Adult  Goal: Free from fall injury  3/26/2025 1100 by Lyndon Guevara RN  Outcome: Progressing  3/26/2025 1059 by Lyndon Guevara RN  Outcome: Progressing     Problem: Discharge Planning  Goal: Discharge to home or other facility with appropriate resources  3/26/2025 1100 by Lyndon Guevara RN  Outcome: Progressing  3/26/2025 1059 by Lyndon Guevara RN  Outcome: Progressing     Problem: Chronic Conditions and Co-morbidities  Goal: Patient's chronic conditions and co-morbidity symptoms are monitored and maintained or improved  3/26/2025 1100 by Lyndon Guevara RN  Outcome: Progressing  3/26/2025 1059 by Lyndon Guevara RN  Outcome: Progressing   The patient's goals for the shift include      The clinical goals for the shift include pt willnot fall throughout shift    Over the shift, the patient did not make progress toward the following goals. Barriers to progression include ***. Recommendations to address these barriers include ***.

## 2025-03-26 NOTE — PROGRESS NOTES
Physical Therapy    Physical Therapy Evaluation    Patient Name: Yadira Davis  MRN: [id number]  Today's Date: 3/26/2025   Room: [address] [place], [place] [address]  Time Calculation  Start Time: 1112  Stop Time: 1136  Time Calculation (min): 24 min    Assessment/Plan   PT Assessment  PT Assessment Results: Decreased endurance, Impaired balance, Decreased mobility  Rehab Prognosis: Good  Barriers to Discharge Home: Caregiver assistance, Physical needs  Caregiver Assistance: Caregiver assistance needed per identified barriers - however, level of patient's required assistance exceeds assistance available at home  Physical Needs: In-home setup navigation limited by function/safety, High falls risk due to function or environment  Evaluation/Treatment Tolerance: Patient limited by pain, Patient limited by fatigue  Medical Staff Made Aware: Yes  Strengths: Attitude of self, Premorbid level of function  Barriers to Participation: Comorbidities  End of Session Communication: Bedside nurse  Assessment Comment: Patient presents to PT for evaluation s/p abdominal surgery. Patient was mod A for mobility, exhibited reduced activity tolerance, and demonstrated severe sway with static/dynamic balance with hand support. Patient is appropriate for continued skilled PT while in house to address previously mentioned impairments and work toward inc functional independence and endurance.     End of Session Patient Position: Bed, 2 rail up, Alarm off, not on at start of session, Alarm off, caregiver present  IP OR SWING BED PT PLAN  Inpatient or Swing Bed: Inpatient  PT Plan  Treatment/Interventions: Bed mobility, Transfer training, Gait training, Balance training, Endurance training, Therapeutic exercise, Therapeutic activity, Home exercise program  PT Plan: Ongoing PT  PT Frequency: 3 times per week  PT Discharge Recommendations: Moderate intensity level of continued care (may progress home, but will continue to assess. presented as high fall risk and unable  to progress to ambulation today.)  PT Recommended Transfer Status: Assist x1  PT - OK to Discharge: Yes    Subjective   General Visit Information:  Reason for Referral: XL, BS, R oopherectomy, omentectomy, pelvic peritonectomy, argon beam ablation, oversewing of bladder and bowel serosal defects for enlarging complex/solid pelvic mass  Past Medical History Relevant to Rehab: COPD, HTN, PAD, PVD,   Intermittent claudication, smoker, malignant neoplasm of breast  Patient Position Received: Bed, 2 rail up, Alarm off, not on at start of session  Family/Caregiver Present: Yes  Caregiver Feedback: daughter and son in law  General Comment: pleasant and agreeable to PT session.   Home Living:  Home Living  Type of Home: House  Lives With:  (daughter and son in law)  Home Adaptive Equipment: Walker rolling or standard, Wheelchair-manual (shower chair)  Home Layout: One level, Full bath main level  Home Access: Level entry  Prior Level of Function:  Prior Function Per Pt/Caregiver Report  Level of [place]: Independent with ADLs and functional transfers, Independent with homemaking with ambulation  Receives Help From: Family  ADL Assistance: Independent  Homemaking Assistance: Independent  Ambulatory Assistance: Independent  Vocational: Other (Comment) ([employer] )  Prior Function Comments: no falls, (-) drive  Precautions:  Precautions  Medical Precautions: Fall precautions  Post-Surgical Precautions: Abdominal surgery precautions  Vital Signs:   Date/Time Vitals Session Patient Position Pulse Resp SpO2 BP MAP (mmHg)    03/26/25 1112 Post PT  Lying  --  --  --  144/62  89           Objective   Lines/Tubes/Drains:     Continuous Medications/Drips:     Oxygen:    Pain:  Pain Assessment  Pain Assessment: 0-10  0-10 (Numeric) Pain Score: 8  Pain Type: Surgical pain  Pain Location: Abdomen  Pain Interventions: Repositioned  Response to Interventions: No change in pain  Cognition:  Cognition  Overall Cognitive Status:  Within Functional Limits      Extremity/Trunk Assessments:  Strength:  Strength Comments: WFL  RUE   RUE : Within Functional Limits  LUE   LUE: Within Functional Limits  RLE   RLE : Within Functional Limits  LLE   LLE : Within Functional Limits    General Assessments:  Strength  Strength Comments: WFL      Activity Tolerance  Endurance: Tolerates 10 - 20 min exercise with multiple rests        Static Sitting Balance  Static Sitting-Balance Support: Bilateral upper extremity supported  Static Sitting-Level of Assistance: Contact guard  Dynamic Sitting Balance  Dynamic Sitting-Balance Support:  (unable to assess d/t pain)  Static Standing Balance  Static Standing-Balance Support: Bilateral upper extremity supported  Static Standing-Level of Assistance: Contact guard, Minimum assistance (bouts of min A d/t posterior LOB, cues for midline with inc sway)  Static Standing-Comment/Number of Minutes: x2 min  Dynamic Standing Balance  Dynamic Standing-Balance Support: Bilateral upper extremity supported  Dynamic Standing-Level of Assistance: Minimum assistance  Dynamic Standing-Balance:  (lateral stepping)  Dynamic Standing-Comments: min A for balance control, inc sway    Functional Assessments:  Bed Mobility  Bed Mobility: Yes  Bed Mobility 1  Bed Mobility 1: Supine to sitting, Sitting to supine  Level of Assistance 1: Moderate assistance  Bed Mobility Comments 1: HOB elevated, cues for log roll, support at trunk to assist with rise and lower  Transfers  Transfer: Yes  Transfer 1  Technique 1: Sit to stand, Stand to sit (bed <> RW)  Transfer Device 1: Walker  Transfer Level of Assistance 1: Moderate assistance, Moderate verbal cues  Trials/Comments 1: cues for safe and proper handplacement with use of RW  Ambulation/Gait Training  Ambulation/Gait Training Performed:  (pt took x3 side steps to the L near bed surface, unsafe to attempt forward stepping, she required mod A and max v/c for stepping order and safety, posterior  LOB x 2 with assist to maintain mideline)          Outcome Measures:  [facility] Basic Mobility  Turning from your back to your side while in a flat bed without using bedrails: A lot  Moving from lying on your back to sitting on the side of a flat bed without using bedrails: A lot  Moving to and from bed to chair (including a wheelchair): A lot  Standing up from a chair using your arms (e.g. wheelchair or bedside chair): A lot  To walk in hospital room: Total  Climbing 3-5 steps with railing: Total  Basic Mobility - Total Score: 10                               Encounter Problems       Encounter Problems (Active)       PT Problem       patient will perform supine <> sit with CGA assist for increased independence with bed mobility upon DC  (Progressing)       Start:  03/26/25    Expected End:  04/09/25            patient will perform STS transfer with CGA for increased independence with transfers upon DC  (Progressing)       Start:  03/26/25    Expected End:  04/09/25            patient will ambulate 50 feet with use of LRD and CG assist  in order to demonstrate ability to manage short household distances.  (Progressing)       Start:  03/26/25    Expected End:  04/09/25            patient will stand x 2 min without UE support and CGA assist while performing UE task without LOB for functional carryover  (Progressing)       Start:  03/26/25    Expected End:  04/09/25               Pain - Adult              Education Documentation  No documentation found.  Education Comments  No comments found.            03/26/25 at 12:10 PM   Ifeoma Motta, PT   Rehab Office: [phone]

## 2025-03-26 NOTE — PROGRESS NOTES
03/26/25 1500   Discharge Planning   Living Arrangements Children   Support Systems Children   Assistance Needed ADLs   Type of Residence Private residence   Home or Post Acute Services Post acute facilities (Rehab/SNF/etc)   Type of Post Acute Facility Services Skilled nursing   Expected Discharge Disposition SNF   Does the patient need discharge transport arranged? Yes   RoundTrip coordination needed? Yes   Has discharge transport been arranged? No   What day is the transport expected? 03/27/25   What time is the transport expected? 1000     Pt is POD 1 from abd surgery (bi-lat oopherectomy, pelvic peritonectomy).  PT has rec'd SNF.  Pt lives with daughter Randa and Randa's spouse.  SW attempted to meet with pt to follow up on SNF recommendation but she was receiving care.  SW will follow. Prateek THORNTONW     03/26/2025 1555  SIERRA attempted to meet with pt to discuss SNF but she was on the phone and indicated that SW should return at a later time.   SW will follow. Prateek Vick [facility] [place]

## 2025-03-26 NOTE — PROGRESS NOTES
"Yadira Davis is a 74 y.o. female on day 1 of admission presenting with Ovarian mass, right.      Subjective   Sleeping upon entry into room. Upon waking, rating pain a 7/10. Denies CP and SOB. Tolerated PO fluids upon transfer to floor.     Objective     Last Recorded Vitals  Blood pressure 148/65, pulse 102, temperature 36.6 °C (97.9 °F), resp. rate 16, height 1.6 m (5' 3\"), weight 59 kg (130 lb 1.1 oz), SpO2 94%.  Intake/Output last 3 Shifts:    Intake/Output Summary (Last 24 hours) at 3/26/2025 0040  Last data filed at 3/25/2025 2201  Gross per 24 hour   Intake 2062.67 ml   Output 450 ml   Net 1612.67 ml       Physical Exam  Constitutional:       General: She is not in acute distress.  Cardiovascular:      Rate and Rhythm: Normal rate.   Pulmonary:      Effort: Pulmonary effort is normal.      Comments: Course breath sounds in upper lungs fields   Abdominal:      General: Abdomen is flat.      Palpations: Abdomen is soft.      Tenderness: There is no guarding or rebound.      Comments: Appropriate tenderness to palpation, midline vertical incision covered with dressing with minimal shadowing    Genitourinary:     Comments: Roldan draining straw colored urine   Skin:     General: Skin is warm.   Neurological:      General: No focal deficit present.      Mental Status: She is alert.         Assessment/Plan   Yadira Davis is a 74 y.o. female s/p XL, BS, R oopherectomy, omentectomy, pelvic peritonectomy, argon beam ablation, oversewing of bladder and bowel serosal defects for enlarging complex/solid pelvic mass.     Postoperative Care   - Pain management per ERAS protocol, well controlled at this time   - Starting hgb 13.8 >  ml > POD#1 labs ordered Hemodynamically stable, abdominal exam benign   - On 2L NC, planning to wean as tolerated as patient denies home O2 use in s/o COPD. Encouraged incentive spirometry 10x/hr while awake  - Regular diet as tolerated, IVF discontinued now that tolerating PO " fluids. PRN antiemetics for nausea   - Bowel regimen: daily Miralax   - Roldan in place, planning for removal on POD#1  - DVT ppx: SCDs, ambulation and heparin ppx      Co-morbidities  - CAD: home Aspirin 81 mg held   - COPD: cont LAMA/LABA alternative (home - Anoro Ellipta 62.5-25 mcg), cont home roflumilast, albuterol/duonebs PRN  - HTN, CHF: cont home Carvedilol 3.125 mg, home Jardiance and Lasix held   - HLD: home Rosuvastatin held     Dispo: continue inpatient management until meeting all postop milestones     To be d/w Dr. Mica Carter MD, PGY-3   Gynecologic Oncology, pager [phone]

## 2025-03-27 LAB
ANION GAP SERPL CALC-SCNC: 10 MMOL/L (ref 10–20)
BUN SERPL-MCNC: 24 MG/DL (ref 6–23)
CALCIUM SERPL-MCNC: 9.7 MG/DL (ref 8.6–10.6)
CARDIAC TROPONIN I PNL SERPL HS: 11 NG/L (ref 0–34)
CHLORIDE SERPL-SCNC: 104 MMOL/L (ref 98–107)
CO2 SERPL-SCNC: 26 MMOL/L (ref 21–32)
CREAT SERPL-MCNC: 1.02 MG/DL (ref 0.5–1.05)
EGFRCR SERPLBLD CKD-EPI 2021: 57 ML/MIN/1.73M*2
ERYTHROCYTE [DISTWIDTH] IN BLOOD BY AUTOMATED COUNT: 13.5 % (ref 11.5–14.5)
GLUCOSE SERPL-MCNC: 90 MG/DL (ref 74–99)
HCT VFR BLD AUTO: 36.1 % (ref 36–46)
HGB BLD-MCNC: 11.7 G/DL (ref 12–16)
MAGNESIUM SERPL-MCNC: 2.19 MG/DL (ref 1.6–2.4)
MCH RBC QN AUTO: 28.8 PG (ref 26–34)
MCHC RBC AUTO-ENTMCNC: 32.4 G/DL (ref 32–36)
MCV RBC AUTO: 89 FL (ref 80–100)
NRBC BLD-RTO: 0 /100 WBCS (ref 0–0)
PLATELET # BLD AUTO: 157 X10*3/UL (ref 150–450)
POTASSIUM SERPL-SCNC: 3.7 MMOL/L (ref 3.5–5.3)
RBC # BLD AUTO: 4.06 X10*6/UL (ref 4–5.2)
SODIUM SERPL-SCNC: 136 MMOL/L (ref 136–145)
WBC # BLD AUTO: 8.1 X10*3/UL (ref 4.4–11.3)

## 2025-03-27 PROCEDURE — 99024 POSTOP FOLLOW-UP VISIT: CPT | Performed by: STUDENT IN AN ORGANIZED HEALTH CARE EDUCATION/TRAINING PROGRAM

## 2025-03-27 PROCEDURE — 2500000004 HC RX 250 GENERAL PHARMACY W/ HCPCS (ALT 636 FOR OP/ED)

## 2025-03-27 PROCEDURE — 82374 ASSAY BLOOD CARBON DIOXIDE: CPT

## 2025-03-27 PROCEDURE — 94640 AIRWAY INHALATION TREATMENT: CPT

## 2025-03-27 PROCEDURE — 99231 SBSQ HOSP IP/OBS SF/LOW 25: CPT

## 2025-03-27 PROCEDURE — 2500000002 HC RX 250 W HCPCS SELF ADMINISTERED DRUGS (ALT 637 FOR MEDICARE OP, ALT 636 FOR OP/ED)

## 2025-03-27 PROCEDURE — 97535 SELF CARE MNGMENT TRAINING: CPT | Mod: GO

## 2025-03-27 PROCEDURE — 85027 COMPLETE CBC AUTOMATED: CPT

## 2025-03-27 PROCEDURE — 84484 ASSAY OF TROPONIN QUANT: CPT

## 2025-03-27 PROCEDURE — 2500000001 HC RX 250 WO HCPCS SELF ADMINISTERED DRUGS (ALT 637 FOR MEDICARE OP)

## 2025-03-27 PROCEDURE — 83735 ASSAY OF MAGNESIUM: CPT

## 2025-03-27 PROCEDURE — 1170000001 HC PRIVATE ONCOLOGY ROOM DAILY

## 2025-03-27 PROCEDURE — 2500000005 HC RX 250 GENERAL PHARMACY W/O HCPCS

## 2025-03-27 PROCEDURE — 36415 COLL VENOUS BLD VENIPUNCTURE: CPT

## 2025-03-27 PROCEDURE — 97165 OT EVAL LOW COMPLEX 30 MIN: CPT | Mod: GO

## 2025-03-27 RX ORDER — POTASSIUM CHLORIDE 20 MEQ/1
20 TABLET, EXTENDED RELEASE ORAL ONCE
Status: COMPLETED | OUTPATIENT
Start: 2025-03-27 | End: 2025-03-27

## 2025-03-27 RX ADMIN — ACETAMINOPHEN 975 MG: 325 TABLET ORAL at 15:53

## 2025-03-27 RX ADMIN — METHOCARBAMOL TABLETS 500 MG: 500 TABLET, COATED ORAL at 21:27

## 2025-03-27 RX ADMIN — ACETAMINOPHEN 975 MG: 325 TABLET ORAL at 21:27

## 2025-03-27 RX ADMIN — IBUPROFEN 600 MG: 600 TABLET, FILM COATED ORAL at 15:53

## 2025-03-27 RX ADMIN — FORMOTEROL FUMARATE DIHYDRATE 20 MCG: 20 SOLUTION RESPIRATORY (INHALATION) at 10:44

## 2025-03-27 RX ADMIN — ACETAMINOPHEN 975 MG: 325 TABLET ORAL at 08:34

## 2025-03-27 RX ADMIN — SIMETHICONE 80 MG: 80 TABLET, CHEWABLE ORAL at 15:53

## 2025-03-27 RX ADMIN — CARVEDILOL 3.12 MG: 6.25 TABLET, FILM COATED ORAL at 08:34

## 2025-03-27 RX ADMIN — POLYETHYLENE GLYCOL 3350 17 G: 17 POWDER, FOR SOLUTION ORAL at 08:35

## 2025-03-27 RX ADMIN — ONDANSETRON 4 MG: 4 TABLET, ORALLY DISINTEGRATING ORAL at 13:38

## 2025-03-27 RX ADMIN — DOCUSATE SODIUM 100 MG: 100 CAPSULE, LIQUID FILLED ORAL at 08:35

## 2025-03-27 RX ADMIN — METHOCARBAMOL TABLETS 500 MG: 500 TABLET, COATED ORAL at 15:53

## 2025-03-27 RX ADMIN — ENOXAPARIN SODIUM 40 MG: 100 INJECTION SUBCUTANEOUS at 21:27

## 2025-03-27 RX ADMIN — IBUPROFEN 600 MG: 600 TABLET, FILM COATED ORAL at 21:27

## 2025-03-27 RX ADMIN — CARVEDILOL 3.12 MG: 6.25 TABLET, FILM COATED ORAL at 18:34

## 2025-03-27 RX ADMIN — POTASSIUM CHLORIDE 20 MEQ: 1500 TABLET, EXTENDED RELEASE ORAL at 12:57

## 2025-03-27 RX ADMIN — IBUPROFEN 600 MG: 600 TABLET, FILM COATED ORAL at 08:34

## 2025-03-27 RX ADMIN — METHOCARBAMOL TABLETS 500 MG: 500 TABLET, COATED ORAL at 04:02

## 2025-03-27 RX ADMIN — LIDOCAINE 1 PATCH: 4 PATCH TOPICAL at 08:35

## 2025-03-27 RX ADMIN — TIOTROPIUM BROMIDE INHALATION SPRAY 2 PUFF: 3.12 SPRAY, METERED RESPIRATORY (INHALATION) at 10:46

## 2025-03-27 RX ADMIN — DOCUSATE SODIUM 100 MG: 100 CAPSULE, LIQUID FILLED ORAL at 21:27

## 2025-03-27 RX ADMIN — OXYCODONE 10 MG: 5 TABLET ORAL at 08:42

## 2025-03-27 RX ADMIN — ROFLUMILAST 500 MCG: 500 TABLET ORAL at 12:58

## 2025-03-27 RX ADMIN — ACETAMINOPHEN 975 MG: 325 TABLET ORAL at 04:02

## 2025-03-27 ASSESSMENT — PAIN - FUNCTIONAL ASSESSMENT
PAIN_FUNCTIONAL_ASSESSMENT: 0-10

## 2025-03-27 ASSESSMENT — COGNITIVE AND FUNCTIONAL STATUS - GENERAL
HELP NEEDED FOR BATHING: A LITTLE
HELP NEEDED FOR BATHING: A LITTLE
DRESSING REGULAR LOWER BODY CLOTHING: A LITTLE
TOILETING: A LITTLE
TOILETING: A LITTLE
DAILY ACTIVITIY SCORE: 20
DAILY ACTIVITIY SCORE: 19
DRESSING REGULAR UPPER BODY CLOTHING: A LITTLE
DRESSING REGULAR LOWER BODY CLOTHING: A LITTLE
DRESSING REGULAR UPPER BODY CLOTHING: A LITTLE
PERSONAL GROOMING: A LITTLE

## 2025-03-27 ASSESSMENT — ACTIVITIES OF DAILY LIVING (ADL)
HOME_MANAGEMENT_TIME_ENTRY: 8
ADL_ASSISTANCE: INDEPENDENT
BATHING_ASSISTANCE: MINIMAL

## 2025-03-27 ASSESSMENT — PAIN SCALES - GENERAL
PAINLEVEL_OUTOF10: 6
PAINLEVEL_OUTOF10: 5 - MODERATE PAIN
PAINLEVEL_OUTOF10: 6
PAINLEVEL_OUTOF10: 7

## 2025-03-27 ASSESSMENT — PAIN DESCRIPTION - LOCATION: LOCATION: ABDOMEN

## 2025-03-27 ASSESSMENT — PAIN DESCRIPTION - ORIENTATION: ORIENTATION: MID

## 2025-03-27 NOTE — PROGRESS NOTES
Postop Pain HPI -   Palliative: relieved with IV analgesics and regional local anesthetics  Provocative: movement  Quality:  burning and aching  Radiation:  none  Severity:  6-7/10  Timing: constant    24-HOUR OPIOID CONSUMPTION:  Dilaudid 1.5 mg     Scheduled medications  acetaminophen, 975 mg, oral, q6h  carvedilol, 3.125 mg, oral, BID  docusate sodium, 100 mg, oral, BID  enoxaparin, 40 mg, subcutaneous, q24h  tiotropium, 2 puff, inhalation, Daily   And  formoterol, 20 mcg, nebulization, q12h  ibuprofen, 600 mg, oral, q6h  lidocaine, 1 patch, transdermal, Daily  methocarbamol, 500 mg, oral, q8h ARNIE  polyethylene glycol, 17 g, oral, Daily  polyethylene glycol, 17 g, oral, Daily  potassium chloride CR, 20 mEq, oral, Once  roflumilast, 500 mcg, oral, Daily      Continuous medications     PRN medications  PRN medications: albuterol, HYDROmorphone, ipratropium-albuteroL, naloxone, ondansetron ODT **OR** ondansetron, oxyCODONE, oxyCODONE, simethicone     Physical Exam:  Constitutional:  no distress, alert and cooperative  Eyes: clear sclera  Head/Neck: No apparent injury, trachea midline  Respiratory/Thorax: Patent airways, thorax symmetric, breathing comfortably  Cardiovascular: no pitting edema  Gastrointestinal: Nondistended  Musculoskeletal: ROM intact  Extremities: no clubbing  Neurological: alert, brock x4  Psychological: Appropriate affect    Results for orders placed or performed during the hospital encounter of 03/25/25 (from the past 24 hours)   CBC   Result Value Ref Range    WBC 8.1 4.4 - 11.3 x10*3/uL    nRBC 0.0 0.0 - 0.0 /100 WBCs    RBC 4.06 4.00 - 5.20 x10*6/uL    Hemoglobin 11.7 (L) 12.0 - 16.0 g/dL    Hematocrit 36.1 36.0 - 46.0 %    MCV 89 80 - 100 fL    MCH 28.8 26.0 - 34.0 pg    MCHC 32.4 32.0 - 36.0 g/dL    RDW 13.5 11.5 - 14.5 %    Platelets 157 150 - 450 x10*3/uL   Basic Metabolic Panel   Result Value Ref Range    Glucose 90 74 - 99 mg/dL    Sodium 136 136 - 145 mmol/L    Potassium 3.7 3.5 - 5.3  mmol/L    Chloride 104 98 - 107 mmol/L    Bicarbonate 26 21 - 32 mmol/L    Anion Gap 10 10 - 20 mmol/L    Urea Nitrogen 24 (H) 6 - 23 mg/dL    Creatinine 1.02 0.50 - 1.05 mg/dL    eGFR 57 (L) >60 mL/min/1.73m*2    Calcium 9.7 8.6 - 10.6 mg/dL   Magnesium   Result Value Ref Range    Magnesium 2.19 1.60 - 2.40 mg/dL      Yadira Davis is a 74 y.o. year old female patient who presents for Procedure(s):  LAPAROSCOPY, EXPLORATORY excision of pelvic mass possible staging any indicated procedures with Jaki Treviño MD MPH on 3/25/2025. Acute Pain consulted for assistance with pain control.      Plan:   - B/L QL nerve blocks w/ catheters performed preoperatively on 3/25/2025  - QL catheters removed during rounds without complications  - Acute pain service will sign off       Acute Pain Resident  pg [phone] ph [phone]

## 2025-03-27 NOTE — SIGNIFICANT EVENT
"  Late note entry due to patient care.    House staff to patient bedside for nausea, sweating,  chest tightness. At 4 PM    S) Patient seen in bed, reports 3/10 chest tightness. No radiation of pain. Pain present for the last 15 minutes. Reports passing gas, voiding, ambulating. Nausea and sweating has resolved now.    O)  Blood pressure 139/65, pulse 92, temperature 36.3 °C (97.3 °F), temperature source Temporal, resp. rate 18, height 1.6 m (5' 3\"), weight 59 kg (130 lb 1.1 oz), SpO2 94%.    General: no acute distress  HEENT: normocephalic, atraumatic  Heart: warm and well perfused, RRR  Lungs: no increased WOB  Abd: soft, appropriately tender, staples on vertical midline incision, i c/d  Extremities: moving all extremities, no pain on legs  Neuro: awake and conversant  Psych: appropriate mood and affect     A&P)  Yadira Davis is a 74 y.o. female now POD 2 from XL, BS, R oopherectomy, omentectomy, pelvic peritonectomy, argon beam ablation, oversewing of bladder and bowel serosal defects for enlarging complex/solid pelvic mass.     Chest Pain:  - 3/10 non radiating, pain with stable vitals, saturating 94 on RA.  - EKG: sinus rhythm, possible posterior infarct age unknown  - Troponin ordered, fu results  - Simethicone given for possible gas pain  - Pain resolved in 1 hr, will continue to follow Troponin result    D/w Dr Gennaro Alvarez MD PGY2    "

## 2025-03-27 NOTE — PROGRESS NOTES
Occupational Therapy    Evaluation/Treatment    Patient Name: Yadira Davis  MRN: [id number]  Department: [facility]  Room: [facility]  Today's Date: 03/27/25  Time Calculation  Start Time: 1017  Stop Time: 1040  Time Calculation (min): 23 min       Assessment:  Prognosis: Good  Barriers to Discharge Home: Physical needs  Physical Needs: Intermittent mobility assistance needed, Intermittent ADL assistance needed  Evaluation/Treatment Tolerance: Patient tolerated treatment well  Medical Staff Made Aware: Yes  End of Session Patient Position: Bed, 3 rail up, Alarm off, not on at start of session  OT Assessment Results: Decreased ADL status, Decreased endurance, Decreased functional mobility  Prognosis: Good  Barriers to Discharge: None  Evaluation/Treatment Tolerance: Patient tolerated treatment well  Medical Staff Made Aware: Yes  Strengths: Ability to acquire knowledge, Attitude of self, Premorbid level of function, Support of Caregivers  Barriers to Participation: Comorbidities  Plan:  Treatment Interventions: ADL retraining, Functional transfer training, UE strengthening/ROM, Endurance training, Patient/family training, Equipment evaluation/education, Compensatory technique education  OT Frequency: 2 times per week  OT Discharge Recommendations: Low intensity level of continued care  Equipment Recommended upon Discharge:  (none, pt owns all necessary DME)  OT Recommended Transfer Status: Assist of 1  OT - OK to Discharge:  (eval complete)  Treatment Interventions: ADL retraining, Functional transfer training, UE strengthening/ROM, Endurance training, Patient/family training, Equipment evaluation/education, Compensatory technique education    Subjective   Current Problem:  1. Adnexal mass        2. Ovarian mass, right  Surgical Pathology Exam    Surgical Pathology Exam        General:   OT Received On: 03/27/25  General  Reason for Referral: XL, BS, R oopherectomy, omentectomy, pelvic peritonectomy, argon beam  ablation, oversewing of bladder and bowel serosal defects for enlarging complex/solid pelvic mass  Past Medical History Relevant to Rehab: COPD, HTN, PAD, PVD,   Intermittent claudication, smoker, malignant neoplasm of breast  Family/Caregiver Present: No  Prior to Session Communication: Bedside nurse  Patient Position Received: Bed, 3 rail up, Alarm off, not on at start of session  Preferred Learning Style: visual, verbal  General Comment: Pt pleasant and agreeable to therapy   Precautions:  Medical Precautions: Fall precautions  Post-Surgical Precautions: Abdominal surgery precautions     Date/Time Vitals Session Patient Position Pulse Resp SpO2 BP MAP (mmHg)    03/27/25 1044 --  --  --  --  96 %  --  --                 Pain:  Pain Assessment  Pain Assessment: 0-10  0-10 (Numeric) Pain Score: 6  Pain Type: Acute pain, Surgical pain  Pain Location: Abdomen  Pain Orientation: Mid  Pain Interventions: Repositioned, Ambulation/increased activity    Objective   Cognition:  Overall Cognitive Status: Within Functional Limits  Arousal/Alertness: Appropriate responses to stimuli  Orientation Level: Oriented X4  Following Commands: Follows all commands and directions without difficulty  Safety Judgment: Good awareness of safety precautions  Problem Solving: Able to problem solve independently           Home Living:  Type of Home: House  Lives With:  (dtr and DIANE, reports dtr will be able to provide 24 hr A)  Home Adaptive Equipment: Walker rolling or standard, Wheelchair-manual  Home Layout: One level, Full bath main level  Home Access: Level entry  Bathroom Shower/Tub: Tub/shower unit  Bathroom Equipment: Shower chair with back  Prior Function:  Level of Check: Independent with ADLs and functional transfers, Independent with homemaking with ambulation  ADL Assistance: Independent  Homemaking Assistance: Independent  Ambulatory Assistance: Independent  Vocational:  ([employer] )  Prior Function Comments: no  falls, (-) drive    ADL:  Eating Assistance: Independent  Grooming Assistance: Stand by  Grooming Deficit: Setup  Bathing Assistance: Minimal  Bathing Deficit:  (anticipated)  UE Dressing Assistance: Stand by  UE Dressing Deficit:  (don/doff gown)  LE Dressing Assistance: Minimal  LE Dressing Deficit:  (don/doff slippers)  Toileting Assistance with Device: Stand by  Toileting Deficit:  (anticipated for safety)  ADL Comments: Therapist provided increased education on abdominal precautions pertaining to ADLs and functional activity. Discussed compensatory strategies for LB ADLs and types of clothing easy for donning/doffing as well as preventing any irritation with incision. Verbalized understanding.    Activity Tolerance:  Endurance: Tolerates 10 - 20 min exercise with multiple rests    Bed Mobility/Transfers: Bed Mobility  Bed Mobility: Yes  Bed Mobility 1  Bed Mobility 1: Supine to sitting  Level of Assistance 1: Contact guard  Bed Mobility Comments 1: Instructions for log rolling technique    Transfers  Transfer: Yes  Transfer 1  Transfer From 1: Bed to  Transfer to 1: Sit, Stand  Technique 1: Sit to stand, Stand to sit  Transfer Device 1:  (none)  Transfer Level of Assistance 1: Minimum assistance  Trials/Comments 1: Instructions for mechanics  Transfers 2  Transfer From 2: Chair with arms to  Transfer to 2: Sit, Stand  Technique 2: Sit to stand, Stand to sit  Transfer Device 2: Walker  Transfer Level of Assistance 2: Contact guard  Trials/Comments 2: Instructions for mechanics and positioning with use of walker      Functional Mobility:  Functional Mobility  Functional Mobility Performed: Yes  Functional Mobility 1  Comments 1: Pt initially completed short functional distance in room with min A x1 and HHA (reaching for surfaces for stability). Introduced walker for next attempt, progressed to SBA to CGA, minimal cueing for device management. Therapist suggested use of FWW at home for increased stability and pain  management. Verbalized understanding.  Sitting Balance:  Static Sitting Balance  Static Sitting-Balance Support: No upper extremity supported  Static Sitting-Level of Assistance: Independent  Dynamic Sitting Balance  Dynamic Sitting-Balance Support: No upper extremity supported  Dynamic Sitting-Level of Assistance: Independent  Standing Balance:  Static Standing Balance  Static Standing-Balance Support: Bilateral upper extremity supported  Static Standing-Level of Assistance: Close supervision  Dynamic Standing Balance  Dynamic Standing-Balance Support: Bilateral upper extremity supported  Dynamic Standing-Level of Assistance: Contact guard    Vision:Vision - Basic Assessment  Current Vision: Wears glasses all the time  Sensation:  Light Touch: No apparent deficits  Strength:  Strength Comments: Formal strength testing deferred 2/2 abd pain, functional strength appears >=3+/5    Perception:  Inattention/Neglect: Appears intact  Initiation: Appears intact  Motor Planning: Appears intact  Perseveration: Not present  Coordination:  Movements are Fluid and Coordinated: Yes   Hand Function:  Hand Function  Gross Grasp: Functional  Coordination: Functional  Extremities: RUE   RUE : Within Functional Limits and LUE   LUE: Within Functional Limits    Outcome Measures: [facility] Daily Activity  Putting on and taking off regular lower body clothing: A little  Bathing (including washing, rinsing, drying): A little  Putting on and taking off regular upper body clothing: A little  Toileting, which includes using toilet, bedpan or urinal: A little  Taking care of personal grooming such as brushing teeth: A little  Eating Meals: None  Daily Activity - Total Score: 19    Brief Confusion Assessment Method (bCAM)  Feature 1: Altered Mental Status or Fluctuating Course: No  CAM Result: CAM -    Education Documentation  Body Mechanics, taught by Adele Batres OT at 3/27/2025 11:06 AM.  Learner: Patient  Readiness: Acceptance  Method:  Explanation, Demonstration  Response: Verbalizes Understanding, Demonstrated Understanding, Needs Reinforcement    Precautions, taught by dAele Batres OT at 3/27/2025 11:06 AM.  Learner: Patient  Readiness: Acceptance  Method: Explanation, Demonstration  Response: Verbalizes Understanding, Demonstrated Understanding, Needs Reinforcement    ADL Training, taught by Adele Batres OT at 3/27/2025 11:06 AM.  Learner: Patient  Readiness: Acceptance  Method: Explanation, Demonstration  Response: Verbalizes Understanding, Demonstrated Understanding, Needs Reinforcement    EDUCATION:  Education  Individual(s) Educated: Patient  Education Provided: Diagnosis & Precautions, Fall precautons, Risk and benefits of OT discussed with patient or other, POC discussed and agreed upon  Patient Response to Education: Patient/Caregiver Verbalized Understanding of Information    Goals:  Encounter Problems       Encounter Problems (Active)       ADLs       Patient with complete lower body dressing with modified independent level of assistance donning and doffing all LE clothes  with PRN adaptive equipment (Progressing)       Start:  03/27/25    Expected End:  04/10/25            Patient will complete daily grooming tasks with modified independent level of assistance and PRN adaptive equipment while standing. (Progressing)       Start:  03/27/25    Expected End:  04/10/25            Patient will complete toileting including hygiene clothing management/hygiene with modified independent level of assistance. (Progressing)       Start:  03/27/25    Expected End:  04/10/25               COGNITION/SAFETY       Patient will recall and adhere to abdominal precautions during all functional mobility/ADL tasks in order to demonstrate improved understanding and promote healing post op (Progressing)       Start:  03/27/25    Expected End:  04/10/25               MOBILITY       Patient will perform Functional mobility Household  distances/Community Distances with modified independent level of assistance and least restrictive device in order to improve safety and functional mobility. (Progressing)       Start:  03/27/25    Expected End:  04/10/25               TRANSFERS       Patient will perform bed mobility modified independent level of assistance in order to improve safety and independence with mobility (Progressing)       Start:  03/27/25    Expected End:  04/10/25            Patient will complete functional transfer to chair/commode with least restrictive device with modified independent level of assistance. (Progressing)       Start:  03/27/25    Expected End:  04/10/25

## 2025-03-27 NOTE — PROGRESS NOTES
03/26/25 1500   Discharge Planning   Living Arrangements Children   Support Systems Children   Assistance Needed ADLs   Type of Residence Private residence   Home or Post Acute Services Post acute facilities (Rehab/SNF/etc)   Type of Post Acute Facility Services Skilled nursing   Expected Discharge Disposition SNF   Does the patient need discharge transport arranged? Yes   RoundTrip coordination needed? Yes   Has discharge transport been arranged? No   What day is the transport expected? 03/27/25   What time is the transport expected? 1000     Pt is POD 1 from abd surgery (bi-lat oopherectomy, pelvic peritonectomy).  PT has rec'd SNF.  Pt lives with daughter Randa and Randa's spouse.  SW attempted to meet with pt to follow up on SNF recommendation but she was receiving care.  SW will follow. Prateek Vick [facility]     03/26/2025 1555  SW attempted to meet with pt to discuss SNF but she was on the phone and indicated that SW should return at a later time.   SW will follow. Prateek Vick [facility]    03/27/2025 1100  Per care team, pt declines SNF and will discharge home with HC PT.  SW attempted to meet with pt to confirm that she does not want to discharge to SNF but pt was receiving care.  ADOD 3/28 or 3/29.  SW will follow. Prateek Herronrigo Vick [facility]    03/27/2025 1630  SW met with pt to check in.  Pt confirmed that she declines SNF and wants [facility] PT.  Pt confirmed that she lives with her daughter and son-in-law and that there are no stairs in the home and she has a shower chair, cane, and wheelchair at home.  She is adamant that she is discharging tomorrow, Friday 3/28.  She reports some post-op pain but agreed that she is very motivated in her recovery. She reported that she has a grandchild graduating from college in the coming months and plans on being back to her baseline in time for the graduation.  Pt grandchild is bedside.   Pt denied SW needs.  SW will follow. Prateek Vick [facility]  LSW

## 2025-03-27 NOTE — NURSING NOTE
Patient reports nausea. While in the room discussing the nausea, patient reports sudden chest tightness just to the left of sternum. Patient believes it is just gas pain. EKG will be obtained. Call  placed to Gyn/Onc team, awaiting further orders.

## 2025-03-27 NOTE — PROGRESS NOTES
Physical Therapy                 Therapy Communication Note    Patient Name: Yadira Davis  MRN: [id number]  Department: [facility]  Room: [address] [place] [address]-A  Today's Date: 3/27/2025     Discipline: Physical Therapy    PT Missed Visit: Yes     Missed Visit Reason: Missed Visit Reason: Patient refused (13:33- Pt politely refused stating she just had a bout of emesis right before this PT entered room.)    Missed Time: Attempt        Rashid Case, PT, DPT

## 2025-03-27 NOTE — PROGRESS NOTES
03/27/25 1600   Discharge Planning   Living Arrangements Children   Support Systems Children   Type of Residence Private residence   Number of Stairs to Enter Residence 2   Number of Stairs Within Residence 0   Home or Post Acute Services In home services   Type of Home Care Services Home PT   Expected Discharge Disposition Home H   Does the patient need discharge transport arranged? No     Met with pt to discuss her discharge plan.  SNF was recommended by PT but the pt declined and states she is going home with her daughter, who will be assisting her.  She would like to have [facility] for PT.  She has all needed DME- wheeled walker, cane, shower chair, nebulizer and home 02.  She will have a 1st floor set up at her daughter's house.   Plan for discharge tomorrow with [facility] for PT.  Melba Spear RN, TCC

## 2025-03-27 NOTE — PROGRESS NOTES
"Gynecologic Oncology Progress Note:    Yadira Davis is a 74 y.o. female on day 2 of admission presenting with Ovarian mass, right.    Subjective   Patient resting in bed, reports good pain control with Ql blocks and tylenol. She is able to ambulate with assistance. Had one episode of emesis after lunch and did not eat dinner. Reports nausea has improved with prn antiemetics and is planning to eat breakfast. She has not had a bowl movement but is passing gas.     Objective     Last Recorded Vitals  Blood pressure 115/55, pulse 83, temperature 36.7 °C (98.1 °F), resp. rate 16, height 1.6 m (5' 3\"), weight 59 kg (130 lb 1.1 oz), SpO2 94%.    Physical Exam  Constitutional:       General: She is not in acute distress.  HENT:      Head: Normocephalic and atraumatic.   Eyes:      Conjunctiva/sclera: Conjunctivae normal.   Cardiovascular:      Rate and Rhythm: Normal rate and regular rhythm.      Pulses: Normal pulses.      Heart sounds: Normal heart sounds.   Pulmonary:      Effort: Pulmonary effort is normal.      Breath sounds: Normal breath sounds.   Abdominal:      General: Abdomen is flat. Bowel sounds are normal.      Palpations: Abdomen is soft.      Comments: Surgical site clean, dry, and intact   Skin:     General: Skin is warm and dry.      Capillary Refill: Capillary refill takes less than 2 seconds.   Neurological:      General: No focal deficit present.      Mental Status: She is alert. Mental status is at baseline.           Intake/Output last 3 Shifts:  I/O last 3 completed shifts:  In: 2542.7 (43.1 mL/kg) [P.O.:480; I.V.:2062.7 (35 mL/kg)]  Out: 825 (14 mL/kg) [Urine:575 (0.3 mL/kg/hr); Blood:250]  Weight: 59 kg   I/O this shift:  In: -   Out: 825 [Urine:825]         Assessment/Plan     Yadira Davis is a 74 y.o. female s/p XL, BS, R oopherectomy, omentectomy, pelvic peritonectomy, argon beam ablation, oversewing of bladder and bowel serosal defects for enlarging complex/solid pelvic mass.    "   Postoperative Care   - Pain management per ERAS protocol, well controlled at this time   - Starting hgb 13.8 >  ml > POD 1 hgb 13.2 > AM labs today. Hemodynamically stable, abdominal exam benign   - Pt weaned to room air yesterday without any desaturations or difficulty breathing. Encouraged incentive spirometry 10x/hr while awake  - Regular diet as tolerated, tolerating PO fluids. PRN antiemetics for nausea/vomiting.  - Bowel regimen: daily Miralax, twice daily colace  - DVT ppx: SCDs, ambulation and heparin ppx       Co-morbidities  - CAD: home Aspirin 81 mg held   - COPD: cont LAMA/LABA alternative (home - Anoro Ellipta 62.5-25 mcg), cont home roflumilast, albuterol/duonebs PRN  - HTN, CHF: cont home Carvedilol 3.125 mg, home Jardiance and Lasix held   - HLD: home Rosuvastatin held      Dispo: continue inpatient management until meeting all postop milestones     Pt seen and d/w attending physician Dr. Penny Campbell, MS3 and Chad Burdick MD

## 2025-03-28 ENCOUNTER — PHARMACY VISIT (OUTPATIENT)
Dept: PHARMACY | Facility: CLINIC | Age: 75
End: 2025-03-28
Payer: MEDICARE

## 2025-03-28 ENCOUNTER — TELEPHONE (OUTPATIENT)
Dept: GYNECOLOGIC ONCOLOGY | Facility: HOSPITAL | Age: 75
End: 2025-03-28
Payer: MEDICARE

## 2025-03-28 ENCOUNTER — DOCUMENTATION (OUTPATIENT)
Dept: HOME HEALTH SERVICES | Facility: HOME HEALTH | Age: 75
End: 2025-03-28
Payer: MEDICARE

## 2025-03-28 ENCOUNTER — HOME HEALTH ADMISSION (OUTPATIENT)
Dept: HOME HEALTH SERVICES | Facility: HOME HEALTH | Age: 75
End: 2025-03-28
Payer: MEDICARE

## 2025-03-28 VITALS
TEMPERATURE: 97.2 F | DIASTOLIC BLOOD PRESSURE: 75 MMHG | BODY MASS INDEX: 23.05 KG/M2 | WEIGHT: 130.07 LBS | OXYGEN SATURATION: 96 % | HEART RATE: 88 BPM | SYSTOLIC BLOOD PRESSURE: 147 MMHG | RESPIRATION RATE: 18 BRPM | HEIGHT: 63 IN

## 2025-03-28 LAB
ANION GAP SERPL CALC-SCNC: 12 MMOL/L (ref 10–20)
BUN SERPL-MCNC: 24 MG/DL (ref 6–23)
CALCIUM SERPL-MCNC: 10.2 MG/DL (ref 8.6–10.6)
CHLORIDE SERPL-SCNC: 103 MMOL/L (ref 98–107)
CO2 SERPL-SCNC: 26 MMOL/L (ref 21–32)
CREAT SERPL-MCNC: 0.65 MG/DL (ref 0.5–1.05)
EGFRCR SERPLBLD CKD-EPI 2021: >90 ML/MIN/1.73M*2
ERYTHROCYTE [DISTWIDTH] IN BLOOD BY AUTOMATED COUNT: 13.6 % (ref 11.5–14.5)
GLUCOSE SERPL-MCNC: 99 MG/DL (ref 74–99)
HCT VFR BLD AUTO: 40.8 % (ref 36–46)
HGB BLD-MCNC: 13.2 G/DL (ref 12–16)
MAGNESIUM SERPL-MCNC: 2.03 MG/DL (ref 1.6–2.4)
MCH RBC QN AUTO: 28.4 PG (ref 26–34)
MCHC RBC AUTO-ENTMCNC: 32.4 G/DL (ref 32–36)
MCV RBC AUTO: 88 FL (ref 80–100)
NRBC BLD-RTO: 0 /100 WBCS (ref 0–0)
PLATELET # BLD AUTO: 197 X10*3/UL (ref 150–450)
POTASSIUM SERPL-SCNC: 4.1 MMOL/L (ref 3.5–5.3)
RBC # BLD AUTO: 4.64 X10*6/UL (ref 4–5.2)
SODIUM SERPL-SCNC: 137 MMOL/L (ref 136–145)
WBC # BLD AUTO: 9.1 X10*3/UL (ref 4.4–11.3)

## 2025-03-28 PROCEDURE — 83735 ASSAY OF MAGNESIUM: CPT

## 2025-03-28 PROCEDURE — 94640 AIRWAY INHALATION TREATMENT: CPT

## 2025-03-28 PROCEDURE — 85027 COMPLETE CBC AUTOMATED: CPT

## 2025-03-28 PROCEDURE — 2500000001 HC RX 250 WO HCPCS SELF ADMINISTERED DRUGS (ALT 637 FOR MEDICARE OP)

## 2025-03-28 PROCEDURE — 36415 COLL VENOUS BLD VENIPUNCTURE: CPT

## 2025-03-28 PROCEDURE — 2500000005 HC RX 250 GENERAL PHARMACY W/O HCPCS

## 2025-03-28 PROCEDURE — 2500000004 HC RX 250 GENERAL PHARMACY W/ HCPCS (ALT 636 FOR OP/ED)

## 2025-03-28 PROCEDURE — 2500000002 HC RX 250 W HCPCS SELF ADMINISTERED DRUGS (ALT 637 FOR MEDICARE OP, ALT 636 FOR OP/ED)

## 2025-03-28 PROCEDURE — RXMED WILLOW AMBULATORY MEDICATION CHARGE

## 2025-03-28 PROCEDURE — 80048 BASIC METABOLIC PNL TOTAL CA: CPT

## 2025-03-28 RX ORDER — PANTOPRAZOLE SODIUM 40 MG/1
40 TABLET, DELAYED RELEASE ORAL
Qty: 30 TABLET | Refills: 3 | Status: SHIPPED | OUTPATIENT
Start: 2025-03-28

## 2025-03-28 RX ORDER — AMOXICILLIN 250 MG
1 CAPSULE ORAL DAILY
Qty: 20 TABLET | Refills: 0 | Status: SHIPPED | OUTPATIENT
Start: 2025-03-28

## 2025-03-28 RX ORDER — ACETAMINOPHEN 325 MG/1
650 TABLET ORAL EVERY 6 HOURS PRN
Qty: 20 TABLET | Refills: 0 | Status: SHIPPED | OUTPATIENT
Start: 2025-03-28

## 2025-03-28 RX ORDER — PANTOPRAZOLE SODIUM 40 MG/1
40 TABLET, DELAYED RELEASE ORAL
Status: DISCONTINUED | OUTPATIENT
Start: 2025-03-28 | End: 2025-03-28 | Stop reason: HOSPADM

## 2025-03-28 RX ORDER — ONDANSETRON 4 MG/1
4 TABLET, FILM COATED ORAL EVERY 6 HOURS PRN
Qty: 20 TABLET | Refills: 0 | Status: SHIPPED | OUTPATIENT
Start: 2025-03-28

## 2025-03-28 RX ORDER — TRAMADOL HYDROCHLORIDE 50 MG/1
50 TABLET ORAL EVERY 6 HOURS PRN
Qty: 12 TABLET | Refills: 0 | Status: SHIPPED | OUTPATIENT
Start: 2025-03-28

## 2025-03-28 RX ORDER — FAMOTIDINE 20 MG/1
20 TABLET, FILM COATED ORAL DAILY
Status: DISCONTINUED | OUTPATIENT
Start: 2025-03-28 | End: 2025-03-28 | Stop reason: HOSPADM

## 2025-03-28 RX ORDER — CALCIUM CARBONATE 200(500)MG
500 TABLET,CHEWABLE ORAL 4 TIMES DAILY PRN
Status: DISCONTINUED | OUTPATIENT
Start: 2025-03-28 | End: 2025-03-28 | Stop reason: HOSPADM

## 2025-03-28 RX ADMIN — FAMOTIDINE 20 MG: 20 TABLET, FILM COATED ORAL at 04:26

## 2025-03-28 RX ADMIN — FORMOTEROL FUMARATE DIHYDRATE 20 MCG: 20 SOLUTION RESPIRATORY (INHALATION) at 09:05

## 2025-03-28 RX ADMIN — ACETAMINOPHEN 975 MG: 325 TABLET ORAL at 14:43

## 2025-03-28 RX ADMIN — DOCUSATE SODIUM 100 MG: 100 CAPSULE, LIQUID FILLED ORAL at 10:31

## 2025-03-28 RX ADMIN — ROFLUMILAST 500 MCG: 500 TABLET ORAL at 10:31

## 2025-03-28 RX ADMIN — METHOCARBAMOL TABLETS 500 MG: 500 TABLET, COATED ORAL at 14:43

## 2025-03-28 RX ADMIN — ACETAMINOPHEN 975 MG: 325 TABLET ORAL at 10:31

## 2025-03-28 RX ADMIN — CARVEDILOL 3.12 MG: 6.25 TABLET, FILM COATED ORAL at 10:31

## 2025-03-28 RX ADMIN — IBUPROFEN 600 MG: 600 TABLET, FILM COATED ORAL at 14:43

## 2025-03-28 RX ADMIN — LIDOCAINE 1 PATCH: 4 PATCH TOPICAL at 10:32

## 2025-03-28 RX ADMIN — TIOTROPIUM BROMIDE INHALATION SPRAY 2 PUFF: 3.12 SPRAY, METERED RESPIRATORY (INHALATION) at 09:08

## 2025-03-28 RX ADMIN — CALCIUM CARBONATE (ANTACID) CHEW TAB 500 MG 500 MG: 500 CHEW TAB at 06:30

## 2025-03-28 RX ADMIN — ACETAMINOPHEN 975 MG: 325 TABLET ORAL at 02:56

## 2025-03-28 RX ADMIN — PANTOPRAZOLE SODIUM 40 MG: 40 TABLET, DELAYED RELEASE ORAL at 09:30

## 2025-03-28 RX ADMIN — CALCIUM CARBONATE (ANTACID) CHEW TAB 500 MG 500 MG: 500 CHEW TAB at 04:26

## 2025-03-28 RX ADMIN — IBUPROFEN 600 MG: 600 TABLET, FILM COATED ORAL at 10:31

## 2025-03-28 ASSESSMENT — PAIN SCALES - GENERAL: PAINLEVEL_OUTOF10: 4

## 2025-03-28 NOTE — CARE PLAN
The patient's goals for the shift include      The clinical goals for the shift include Adequate pain relief, Walk in the hallway    Problem: Pain - Adult  Goal: Verbalizes/displays adequate comfort level or baseline comfort level  Outcome: Progressing     Problem: Safety - Adult  Goal: Free from fall injury  Outcome: Progressing     Problem: Discharge Planning  Goal: Discharge to home or other facility with appropriate resources  Outcome: Progressing     Problem: Chronic Conditions and Co-morbidities  Goal: Patient's chronic conditions and co-morbidity symptoms are monitored and maintained or improved  Outcome: Progressing     Problem: Nutrition  Goal: Nutrient intake appropriate for maintaining nutritional needs  Outcome: Progressing     Problem: Skin  Goal: Participates in plan/prevention/treatment measures  Outcome: Progressing  Goal: Prevent/manage excess moisture  Outcome: Progressing  Goal: Prevent/minimize sheer/friction injuries  Outcome: Progressing  Goal: Promote/optimize nutrition  Outcome: Progressing  Goal: Promote skin healing  Outcome: Progressing

## 2025-03-28 NOTE — PROGRESS NOTES
03/28/25 1600   Discharge Planning   Living Arrangements Children   Support Systems Children   Type of Residence Private residence   Number of Stairs to Enter Residence 2   Number of Stairs Within Residence 0   Home or Post Acute Services In home services   Type of Home Care Services Home PT   Expected Discharge Disposition Home H     SW following to assist with discharge planning. Pt to discharge home today with [facility].  Start of care confirmed for 3/30/25-3/31/25.  Elizabeth Gunsalus LISW-S  Care Transitions Supervisor

## 2025-03-28 NOTE — PROGRESS NOTES
"Gynecologic Oncology Progress Note:    Yadira Davis is a 74 y.o. female on day 3 of admission presenting with Ovarian mass, right.    Subjective   Episode of chest tightness yesterday evening, 3/10. Vitals stable, trops neg, EKG NSR. Pain self-resolved.   Pt reporting heartburn after drinking water this morning, burning sensation throughout chest, feels different from pain yesterday. Unimproved by tums. Denies associated nausea, chest pain, difficulty breathing. Had 1x emesis after taking pills yesterday, otherwise tolerated toast, fruit. Passing gas. Ambulating to bathroom, voiding. Pain well controlled with medications.      Objective     Last Recorded Vitals  Blood pressure 115/55, pulse 83, temperature 36.7 °C (98.1 °F), resp. rate 16, height 1.6 m (5' 3\"), weight 59 kg (130 lb 1.1 oz), SpO2 94%.    Physical Exam  General: no acute distress  HEENT: normocephalic, atraumatic  Heart: warm and well perfused, RRR  Lungs: no increased WOB on RA, CTAB  Abd: soft, minimally tender to palpation throughout. Vertical midline incision closed with staples, well-approximated, no erythema or induration   Extremities: moving all extremities, SCDs in place  Neuro: awake and conversant  Psych: appropriate mood and affect      Intake/Output last 3 Shifts:  I/O last 3 completed shifts:  In: 519.9 (8.8 mL/kg) [P.O.:443; I.V.:76.9 (1.3 mL/kg)]  Out: 1625 (27.5 mL/kg) [Urine:1575 (0.7 mL/kg/hr); Emesis/NG output:50]  Weight: 59 kg   No intake/output data recorded.       Assessment/Plan     Yadira Davis is a 74 y.o. female s/p XL, BS, R oopherectomy, omentectomy, pelvic peritonectomy, argon beam ablation, oversewing of bladder and bowel serosal defects for enlarging complex/solid pelvic mass on 3/25     Postoperative Care   - Pain management per ERAS protocol, well controlled at this time   - Preop Hgb 13.8, . POD2 Hgb 11.7, stable.   - Stable on RA. Continue to encourage IS.   - Regular diet as tolerated, tolerating PO " fluids. PRN antiemetics for nausea/vomiting. Bowel reg scheduled.   - DVT ppx: SCDs, ambulation, lovenox ppx     Chest pain  - episode 3/27 PM, self-resolved. EKG NSR, trops neg, VSS  - pain this AM more consistent with reflux. Pt declines EKG this AM, would like to trial more antacid medications. Protonix ordered, will follow-up for resolution.   - plan for EKG if unresolved given hx CHF with EF of 38%, hx CAD. Vitals currently stable, otherwise asx.      Co-morbidities  - CAD: home Aspirin 81 mg held   - COPD: cont LAMA/LABA alternative (home - Anoro Ellipta 62.5-25 mcg), cont home roflumilast, albuterol/duonebs PRN  - HTN, CHF: cont home Carvedilol 3.125 mg, home Jardiance and Lasix held   - HLD: home Rosuvastatin held      Dispo: continue inpatient management until meeting all postop milestones     To be seen and d/w Dr. Hudson Buenrostro MD  PGY3, Gynecologic Oncology  Pager [phone]

## 2025-03-28 NOTE — CARE PLAN
The clinical goals for the shift include pt will remain HDS and VSS throughout EOS  03/28/2025      Problem: Pain - Adult  Goal: Verbalizes/displays adequate comfort level or baseline comfort level  Outcome: Progressing     Problem: Safety - Adult  Goal: Free from fall injury  Outcome: Progressing     Problem: Discharge Planning  Goal: Discharge to home or other facility with appropriate resources  Outcome: Progressing     Problem: Chronic Conditions and Co-morbidities  Goal: Patient's chronic conditions and co-morbidity symptoms are monitored and maintained or improved  Outcome: Progressing     Problem: Nutrition  Goal: Nutrient intake appropriate for maintaining nutritional needs  Outcome: Progressing     Problem: Skin  Goal: Participates in plan/prevention/treatment measures  Outcome: Progressing  Flowsheets (Taken 3/27/2025 2219)  Participates in plan/prevention/treatment measures: Elevate heels  Goal: Prevent/manage excess moisture  Outcome: Progressing  Flowsheets (Taken 3/27/2025 2219)  Prevent/manage excess moisture: Moisturize dry skin  Goal: Prevent/minimize sheer/friction injuries  Outcome: Progressing  Flowsheets (Taken 3/27/2025 2219)  Prevent/minimize sheer/friction injuries:   HOB 30 degrees or less   Increase activity/out of bed for meals  Goal: Promote/optimize nutrition  Outcome: Progressing  Flowsheets (Taken 3/27/2025 2219)  Promote/optimize nutrition: Offer water/supplements/favorite foods  Goal: Promote skin healing  Outcome: Progressing  Flowsheets (Taken 3/27/2025 2219)  Promote skin healing: Assess skin/pad under line(s)/device(s)      GERD - Acid reflux disease. Rx. PPI (Prilosec/Prevacid/Protonix/Nexium) and educate diet and life style changes. Referred patient to an endoscopy (EGD) and check H. Pylori titers.

## 2025-03-28 NOTE — DISCHARGE SUMMARY
Discharge Diagnosis  Ovarian mass, right    Issues Requiring Follow-Up  Pathology result  Staple removal visit  Follow up visit    Test Results Pending At Discharge  Pending Labs       Order Current Status    Surgical Pathology Exam In process    Type And Screen In process            Hospital Course  Yadira Davis, 74 y.o. female with enlarging complex/solid pelvic mass was admitted on 3/25/2025 to the hospital. 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 EBL was 200 ml.  Postop day 1 Hgb was 11.7.     She met all her postop milestones.  PT recommended SNF, but patient would like to go home where she lives with her daughter and have home PT. She had chest tightness and GERD on postop day 2 which was evaluated. Troponin and EKG were negative. Her discomfort resolved with Simethicone, tums and Protonix .     She was discharged home in stable condition on 3/28/2025 with home PT and plan to follow up with Dr. Treviño on 4/16/2025.    Pertinent Physical Exam At Time of Discharge    General: no acute distress  HEENT: normocephalic, atraumatic  Heart: warm and well perfused, RRR  Lungs: no increased WOB on RA, CTAB  Abd: soft, minimally tender to palpation throughout. Vertical midline incision closed with staples, well-approximated, no erythema or induration   Extremities: moving all extremities, SCDs in place  Neuro: awake and conversant  Psych: appropriate mood and affect       Home Medications     Medication List      START taking these medications     apixaban 2.5 mg tablet; Commonly known as: Eliquis; Take 1 tablet (2.5   mg) by mouth 2 times a day.   ondansetron 4 mg tablet; Commonly known as: Zofran; Take 1 tablet (4 mg)   by mouth every 6 hours if needed for nausea or vomiting.   pantoprazole 40 mg EC tablet; Commonly known as:  ProtoNix; Take 1 tablet   (40 mg) by mouth once daily in the morning. Take before meals. Do not   crush, chew, or split.   sennosides-docusate sodium 8.6-50 mg tablet; Commonly known as:   Lenka-Colace; Take 1 tablet by mouth once daily.   traMADol 50 mg tablet; Commonly known as: Ultram; Take 1 tablet (50 mg)   by mouth every 6 hours if needed for severe pain (7 - 10) for up to 12   doses.     CHANGE how you take these medications     * acetaminophen 325 mg tablet; Commonly known as: Tylenol; Take 2   tablets (650 mg) by mouth every 4 hours if needed for headaches, mild pain   (1 - 3) or fever (temp greater than 38.0 C).; What changed: Another   medication with the same name was added. Make sure you understand how and   when to take each.   * acetaminophen 325 mg tablet; Commonly known as: Tylenol; Take 2   tablets (650 mg) by mouth every 6 hours if needed for mild pain (1 - 3)   for up to 20 doses.; What changed: You were already taking a medication   with the same name, and this prescription was added. Make sure you   understand how and when to take each.  * This list has 2 medication(s) that are the same as other medications   prescribed for you. Read the directions carefully, and ask your doctor or   other care provider to review them with you.     CONTINUE taking these medications     albuterol 90 mcg/actuation inhaler; Commonly known as: Ventolin HFA;   Inhale 2 puffs every 4 hours if needed for shortness of breath.   aspirin 81 mg EC tablet; Take 1 tablet (81 mg) by mouth once daily.   carvedilol 3.125 mg tablet; Commonly known as: Coreg; Take 1 tablet   (3.125 mg) by mouth 2 times daily (morning and late afternoon).   furosemide 20 mg tablet; Commonly known as: Lasix   ipratropium-albuteroL 0.5-2.5 mg/3 mL nebulizer solution; Commonly known   as: Duo-Neb; Take 3 mL by nebulization 3 times a day as needed for   wheezing.   Jardiance 10 mg tablet; Generic drug: empagliflozin; Take 1 tablet (10   mg) by mouth  once daily.   oxyCODONE 5 mg immediate release tablet; Commonly known as: Roxicodone;   Take 1 tablet (5 mg) by mouth every 6 hours if needed for severe pain (7 -   10).   polyethylene glycol 17 gram packet; Commonly known as: Glycolax, Miralax   roflumilast 500 mcg tablet; Commonly known as: Daliresp; Take 1 tablet   (500 mcg) by mouth once daily.   rosuvastatin 40 mg tablet; Commonly known as: Crestor; Take 1 tablet (40   mg) by mouth once daily.   umeclidinium-vilanteroL 62.5-25 mcg/actuation blister with inhaler   device; Commonly known as: Anoro Ellipta; Inhale 1 puff once daily.     STOP taking these medications     chlorhexidine 4 % external liquid; Commonly known as: Hibiclens   docusate sodium 100 mg capsule; Commonly known as: Colace       Outpatient Follow-Up  Future Appointments   Date Time Provider Department Center   4/10/2025  3:00 PM JOHN [facility] [id number] [facility]   4/16/2025  9:20 AM Jaki Treviño MD MPH [id number] [place]   5/12/2025  1:00 PM Sylvie Medina DO [id number] [facility]   7/28/2025  2:00 PM Ike Brooks DO [id number] [facility]     Seen and d/w Dr. Hudson Alvarez MD PGY2

## 2025-03-28 NOTE — DISCHARGE INSTRUCTIONS
Activity  No driving for 2 weeks.    No lifting over 10 lbs for 6 weeks  Use the railing for support when going up and down stairs.  Activity as tolerated  You may shower.   Do not take tub baths, go swimming or use a hot tub until instructed to do so  Pelvic Rest: You should not resume sexual activity or place anything inside your vagina at this time.  Your doctor will discuss recommendations at your follow-up appointment.  Notify Your Doctor or Nurse if you have any of the following  Wound Infection Symptoms  Call your doctor or nurse right away if you have signs of infection at your wound such as:  More pain around the wound  Change in the amount , color and odor of drainage  The skin around the wound feels warm or has red streaks  The wound separates or opens up  You have a temperature greater than 100.4  Deep Vein Thrombosis Symptoms  Call your doctor or nurse right away if you have any signs of blood clots such as:  Tender, swollen or reddened areas anywhere in your leg.  Numbness or tingling in your lower leg or calf, or at the top of your leg or groin  Skin on you leg looks pale or blue or feels cold to touch  Chest pain or have trouble breathing  Fever or chills  Fever or Chills  Call your doctor or nurse if you have a temperature greater than 100.4 degrees F that lasts more than 1 hour and/or chills.  Nausea and Vomiting     Call your doctor or nurse if you have nausea and vomiting that will not let you keep medicine down and will not let you keep fluids down  Vaginal Discharge or Bleeding  Call your doctor or nurse if foul smelling discharge form your vagina and heavy bleeding from your vagina that soaks 2 to 3 pads in 1 hour.  Unrelieved Pain  Call your doctor or nurse if your pain gets worse or is not eased 1 hour after taking your pain medicine.  Urinary Tract Infection Symptoms  Call your doctor or nurse right away if you have signs of a urinary tract infection such as:  Urine is cloudy, bloody or has  a bad odor  You leak urine or you have to urinate more often  You feel burning when you urinate  You have a temperature greater than 100.4  Incision Care  You do not need to cover your incision.  Clean it each day with mild soap and water.  Wash the incision daily and pat it dry. You may shower, but do not soak incision  even after staple removal until the wound is healed.  Do not apply any ointments, medications, creams or lotions to the wound.  Staple Care  Your incision is closed with staples that should remain in place for 10 to 14 days after surgery.    If you have any questions about your care, please contact us at [phone].

## 2025-03-28 NOTE — HH CARE COORDINATION
Home Care received a Referral for Physical Therapy and Occupational Therapy. We have processed the referral for a Start of Care on 3/30/25-3/31 .     If you have any questions or concerns, please feel free to contact us at [phone]. Follow the prompts, enter your five digit zip code, and you will be directed to your care team on E[facility] 3.

## 2025-03-29 ENCOUNTER — TELEPHONE (OUTPATIENT)
Dept: GYNECOLOGIC ONCOLOGY | Facility: HOSPITAL | Age: 75
End: 2025-03-29
Payer: MEDICARE

## 2025-03-29 DIAGNOSIS — G89.18 POSTOPERATIVE PAIN: Primary | ICD-10-CM

## 2025-03-29 DIAGNOSIS — N83.8 OVARIAN MASS, RIGHT: ICD-10-CM

## 2025-03-29 DIAGNOSIS — R10.2 PELVIC PAIN IN FEMALE: ICD-10-CM

## 2025-03-29 RX ORDER — OXYCODONE HYDROCHLORIDE 5 MG/1
5 TABLET ORAL EVERY 6 HOURS PRN
Qty: 15 TABLET | Refills: 0 | Status: CANCELLED | OUTPATIENT
Start: 2025-03-29 | End: 2025-04-05

## 2025-03-29 RX ORDER — LIDOCAINE 50 MG/G
1 PATCH TOPICAL DAILY
Qty: 3 PATCH | Refills: 1 | Status: SHIPPED | OUTPATIENT
Start: 2025-03-29 | End: 2025-06-27

## 2025-03-29 RX ORDER — OXYCODONE HYDROCHLORIDE 5 MG/1
5 TABLET ORAL EVERY 6 HOURS PRN
Qty: 15 TABLET | Refills: 0 | Status: SHIPPED | OUTPATIENT
Start: 2025-03-29 | End: 2025-04-04 | Stop reason: SDUPTHER

## 2025-03-29 NOTE — NURSING NOTE
Peripheral IV removed. Discharge instructions given to patient and daughter. Both verbalized understanding. Medications from pharmacy and [facility] home medication sent home with patient. Melchor discharged in stable condition with daughter driving her in personal vehicle.

## 2025-03-31 ENCOUNTER — HOME CARE VISIT (OUTPATIENT)
Dept: HOME HEALTH SERVICES | Facility: HOME HEALTH | Age: 75
End: 2025-03-31
Payer: MEDICARE

## 2025-03-31 DIAGNOSIS — I50.9 ACUTE HEART FAILURE, UNSPECIFIED HEART FAILURE TYPE: ICD-10-CM

## 2025-03-31 LAB
ABO GROUP (TYPE) IN BLOOD: NORMAL
ANTIBODY SCREEN: NORMAL
RH FACTOR (ANTIGEN D): NORMAL

## 2025-03-31 PROCEDURE — 169592 NO-PAY CLAIM PROCEDURE

## 2025-03-31 PROCEDURE — G0151 HHCP-SERV OF PT,EA 15 MIN: HCPCS | Mod: HHH

## 2025-03-31 RX ORDER — FUROSEMIDE 20 MG/1
20 TABLET ORAL EVERY OTHER DAY
Qty: 45 TABLET | Refills: 1 | Status: SHIPPED | OUTPATIENT
Start: 2025-03-31

## 2025-04-01 ENCOUNTER — HOME CARE VISIT (OUTPATIENT)
Dept: HOME HEALTH SERVICES | Facility: HOME HEALTH | Age: 75
End: 2025-04-01
Payer: MEDICARE

## 2025-04-01 VITALS
RESPIRATION RATE: 18 BRPM | BODY MASS INDEX: 21.97 KG/M2 | TEMPERATURE: 96.7 F | OXYGEN SATURATION: 97 % | WEIGHT: 124 LBS | HEIGHT: 63 IN | HEART RATE: 84 BPM

## 2025-04-01 VITALS
DIASTOLIC BLOOD PRESSURE: 60 MMHG | SYSTOLIC BLOOD PRESSURE: 105 MMHG | OXYGEN SATURATION: 95 % | TEMPERATURE: 97.7 F | HEART RATE: 76 BPM

## 2025-04-01 PROCEDURE — G0152 HHCP-SERV OF OT,EA 15 MIN: HCPCS | Mod: HHH

## 2025-04-01 SDOH — HEALTH STABILITY: PHYSICAL HEALTH
EXERCISE COMMENTS: INSTRUCTION AND RETURN DEMONSTRATION OF THERAPEUTIC EXERCISES INCLUDING SEATED KNEE EXTENSIONS, SEATED MARCHING, SEATED HEEL AND TOE RAISES 1 SET 10 REPS EACH.  PATIENT REQUIRED VERBAL AND VISUAL CUING FOR PROPER EXECUTION OF EXERCISES.  PT HAS BEEN

## 2025-04-01 SDOH — HEALTH STABILITY: PHYSICAL HEALTH: EXERCISE COMMENTS: PROVIDED WRITTEN HOME EX HANDOUTS.

## 2025-04-01 SDOH — ECONOMIC STABILITY: HOUSING INSECURITY: HOME SAFETY: PT WOULD EVACUTE TO HER BROTHER'S HOME.

## 2025-04-01 SDOH — HEALTH STABILITY: PHYSICAL HEALTH: EXERCISE TYPE: BLE STRENGTHENING EXERCISES

## 2025-04-01 ASSESSMENT — ENCOUNTER SYMPTOMS
PAIN LOCATION - PAIN QUALITY: ACHING
PAIN LOCATION - EXACERBATING FACTORS: MOVEMENT
SHORTNESS OF BREATH: T
HYPERTENSION: 1
NAUSEA: 1
PERSON REPORTING PAIN: PATIENT
HIGHEST PAIN SEVERITY IN PAST 24 HOURS: 7/10
LOWEST PAIN SEVERITY IN PAST 24 HOURS: 5/10
PAIN LOCATION - PAIN DURATION: DAILY
PAIN: 1
PAIN SEVERITY GOAL: 2/10
PAIN LOCATION - PAIN FREQUENCY: INTERMITTENT
PAIN LOCATION: ABDOMEN
MUSCLE WEAKNESS: 1
PAIN LOCATION - PAIN SEVERITY: 6/10
PERSON REPORTING PAIN: PATIENT
PAIN: 1
SUBJECTIVE PAIN PROGRESSION: UNCHANGED
PAIN LOCATION - RELIEVING FACTORS: MEDS, REST
PAIN LOCATION: ABDOMEN
HIGHEST PAIN SEVERITY IN PAST 24 HOURS: 4/10

## 2025-04-01 ASSESSMENT — PAIN SCALES - PAIN ASSESSMENT IN ADVANCED DEMENTIA (PAINAD)
FACIALEXPRESSION: 0
FACIALEXPRESSION: 0 - SMILING OR INEXPRESSIVE.
NEGVOCALIZATION: 0
BODYLANGUAGE: 0
CONSOLABILITY: 0
BREATHING: 0
TOTALSCORE: 0
BODYLANGUAGE: 0 - RELAXED.
NEGVOCALIZATION: 0 - NONE.
CONSOLABILITY: 0 - NO NEED TO CONSOLE.

## 2025-04-01 ASSESSMENT — ACTIVITIES OF DAILY LIVING (ADL)
HAIR_CARE_ASSESSED: 1
AMBULATION ASSISTANCE: STAND BY ASSIST
PHYSICAL TRANSFERS ASSESSED: 1
ORAL_CARE_SUPERVISION: 1
ENTERING_EXITING_HOME: CONTACT GUARD ASSIST
AMBULATION ASSISTANCE: ONE PERSON
TOILETING: MINIMUM ASSIST
SPONGING_LB_CURRENT_FUNCTION: MINIMUM ASSIST
CURRENT_FUNCTION: MINIMUM ASSIST
HAIR_CARE_MINIMUM_ASSIST: 1
CURRENT_FUNCTION: STAND BY ASSIST
SPONGING_UB_CURRENT_FUNCTION: MINIMUM ASSIST
CURRENT_FUNCTION: ONE PERSON
FEEDING_WITHIN_DEFINED_LIMITS: 1
TOILETING: 1
AMBULATION ASSISTANCE ON FLAT SURFACES: 1
OASIS_M1830: 05

## 2025-04-04 ENCOUNTER — HOME CARE VISIT (OUTPATIENT)
Dept: HOME HEALTH SERVICES | Facility: HOME HEALTH | Age: 75
End: 2025-04-04
Payer: MEDICARE

## 2025-04-04 ENCOUNTER — TELEPHONE (OUTPATIENT)
Dept: GYNECOLOGIC ONCOLOGY | Facility: HOSPITAL | Age: 75
End: 2025-04-04
Payer: MEDICARE

## 2025-04-04 VITALS
DIASTOLIC BLOOD PRESSURE: 54 MMHG | SYSTOLIC BLOOD PRESSURE: 110 MMHG | RESPIRATION RATE: 16 BRPM | HEART RATE: 86 BPM | TEMPERATURE: 97.2 F | OXYGEN SATURATION: 97 %

## 2025-04-04 DIAGNOSIS — R10.2 PELVIC PAIN IN FEMALE: ICD-10-CM

## 2025-04-04 DIAGNOSIS — N83.8 OVARIAN MASS, RIGHT: ICD-10-CM

## 2025-04-04 PROCEDURE — G0151 HHCP-SERV OF PT,EA 15 MIN: HCPCS | Mod: HHH

## 2025-04-04 PROCEDURE — G0158 HHC OT ASSISTANT EA 15: HCPCS | Mod: CO,HHH

## 2025-04-04 RX ORDER — OXYCODONE HYDROCHLORIDE 5 MG/1
5 TABLET ORAL EVERY 6 HOURS PRN
Qty: 20 TABLET | Refills: 0 | Status: SHIPPED | OUTPATIENT
Start: 2025-04-04

## 2025-04-04 RX ORDER — IBUPROFEN 600 MG/1
600 TABLET ORAL EVERY 6 HOURS PRN
Qty: 90 TABLET | Refills: 1 | Status: SHIPPED | OUTPATIENT
Start: 2025-04-04 | End: 2025-05-04

## 2025-04-04 SDOH — HEALTH STABILITY: PHYSICAL HEALTH: EXERCISE TYPE: BLE STRENGTHENING

## 2025-04-04 SDOH — HEALTH STABILITY: PHYSICAL HEALTH
EXERCISE COMMENTS: INSTRUCTION AND RETURN DEMONSTRATION OF THERAPEUTIC EXERCISES INCLUDING SEATED PEDALING X 2:00 MINS, SEATED KNEE EXTENSIONS, SEATED MARCHING, SEATED HEEL AND TOE RAISES, SEATED ORANGE THERABAND HIP ABDUCTION AND KNEE FLEXION, AND SEATED PILLOW SQUEEZ

## 2025-04-04 SDOH — HEALTH STABILITY: PHYSICAL HEALTH
EXERCISE COMMENTS: E HIP ADDUCTION 1 SET 10-20 REPS EACH.  PATIENT REQUIRED VERBAL AND VISUAL CUING FOR PROPER EXECUTION OF EXERCISES.  PT HAS BEEN PROVIDED WRITTEN HOME EX HANDOUTS.

## 2025-04-04 ASSESSMENT — PAIN SCALES - PAIN ASSESSMENT IN ADVANCED DEMENTIA (PAINAD)
BODYLANGUAGE: 0 - RELAXED.
BODYLANGUAGE: 0
FACIALEXPRESSION: 0 - SMILING OR INEXPRESSIVE.
FACIALEXPRESSION: 0
NEGVOCALIZATION: 0 - NONE.
TOTALSCORE: 0
NEGVOCALIZATION: 0
BREATHING: 0
CONSOLABILITY: 0 - NO NEED TO CONSOLE.
CONSOLABILITY: 0

## 2025-04-04 ASSESSMENT — ENCOUNTER SYMPTOMS
PAIN LOCATION: ABDOMEN
PAIN LOCATION - PAIN FREQUENCY: CONSTANT
PAIN SEVERITY GOAL: 2/10
PAIN LOCATION - PAIN FREQUENCY: CONSTANT
SUBJECTIVE PAIN PROGRESSION: UNCHANGED
PERSON REPORTING PAIN: PATIENT
PAIN LOCATION: RIGHT LEG
LOWEST PAIN SEVERITY IN PAST 24 HOURS: 5/10
HIGHEST PAIN SEVERITY IN PAST 24 HOURS: 8/10
PAIN: 1

## 2025-04-04 ASSESSMENT — ACTIVITIES OF DAILY LIVING (ADL): AMBULATION ASSISTANCE ON FLAT SURFACES: 1

## 2025-04-04 NOTE — TELEPHONE ENCOUNTER
Phoned patient and spoke with her daughter, Randa in follow up to [facility] message sent requesting refill for oxycodone.   Daughter states patient is taking Oxycodone 5mg 1 tab 3 times/day with good control of abdominal incision pain.    Patient also alternating  Tylenol and Ibuprofen.   Daughter states Ibuprofen is more effective than tylenol during the day for pain relief.     Daughter states she has been giving patient iii tabs  mg Ibuprofen.  Message routed to Dr. Treviño with patient/daughters request for oxycodone and Tylenol refill and prescription for Ibuprofen 600mg tablets.    S/p exp lap on 3/25/25.        1432  Phoned patients daughter to notify that Dr. Treviño sent prescriptions for oxycodone and Ibuprofen to the pharmacy this afternoon.      
No

## 2025-04-05 ASSESSMENT — ENCOUNTER SYMPTOMS
PERSON REPORTING PAIN: PATIENT
PAIN: 1

## 2025-04-08 ENCOUNTER — HOME CARE VISIT (OUTPATIENT)
Dept: HOME HEALTH SERVICES | Facility: HOME HEALTH | Age: 75
End: 2025-04-08
Payer: MEDICARE

## 2025-04-08 VITALS
TEMPERATURE: 97.9 F | RESPIRATION RATE: 17 BRPM | DIASTOLIC BLOOD PRESSURE: 77 MMHG | HEART RATE: 72 BPM | OXYGEN SATURATION: 98 % | SYSTOLIC BLOOD PRESSURE: 122 MMHG

## 2025-04-08 PROCEDURE — G0157 HHC PT ASSISTANT EA 15: HCPCS | Mod: CQ,HHH

## 2025-04-08 SDOH — HEALTH STABILITY: PHYSICAL HEALTH: EXERCISE TYPE: B LE STRENGTHENING THER EX, ENDURANCE TRAINING

## 2025-04-08 SDOH — HEALTH STABILITY: PHYSICAL HEALTH
EXERCISE COMMENTS: 1 SET OF 15 EACH, BLUE THERABAND  SEATED THER EX: MARCHES, LEG EXT, LAQ, TBAND HIP ABD, BALL SQUEEZES HIP ADD  STANDING THER EX: MARCHES, HEEL/TOE RAISES, HIP ABD, HIP FLEX/EXT, MINI SQUATS , HAM CURLS

## 2025-04-08 ASSESSMENT — ENCOUNTER SYMPTOMS
ARTHRALGIAS: 1
MUSCLE WEAKNESS: 1
HIGHEST PAIN SEVERITY IN PAST 24 HOURS: 9/10
PERSON REPORTING PAIN: PATIENT
PAIN LOCATION - PAIN QUALITY: ACHING, BURNING
PAIN LOCATION - PAIN FREQUENCY: INTERMITTENT
LOWEST PAIN SEVERITY IN PAST 24 HOURS: 5/10
SUBJECTIVE PAIN PROGRESSION: GRADUALLY IMPROVING
PAIN LOCATION - PAIN SEVERITY: 8/10
OCCASIONAL FEELINGS OF UNSTEADINESS: 1
PAIN LOCATION: RIGHT LEG
PAIN: 1
PAIN LOCATION - RELIEVING FACTORS: TIME OF DAY
PAIN LOCATION - EXACERBATING FACTORS: TIME OF DAY
PAIN SEVERITY GOAL: 0/10

## 2025-04-08 ASSESSMENT — ACTIVITIES OF DAILY LIVING (ADL)
AMBULATION_DISTANCE/DURATION_TOLERATED: 50 FT
AMBULATION ASSISTANCE: 1
AMBULATION ASSISTANCE: STAND BY ASSIST
BATHING ASSESSED: 1
AMBULATION ASSISTANCE ON FLAT SURFACES: 1
BATHING EQUIPMENT USED: SHOWER CHAIR
BATHING_CURRENT_FUNCTION: STAND BY ASSIST

## 2025-04-09 NOTE — PROGRESS NOTES
Patient ID: Yadira Davis, 74 y.o.  Referring Physician: No referring provider defined for this encounter.  Primary Care Provider: Sylvie Medina DO        Subjective    HPI: 73 y/o F with complex medical history including HTN, HLD, nonobstructive CAD, HFrEF (EF 38% on 1/8/25), PAD s/p bilateral iliac artery stenting, COPD with chronic home oxygen use 3L overnight and tobacco use who is seen in consultation for a pelvic mass. Report of acute onset abdominal pain. Poorly localized. Constant. Treatment with tylenol tried without much relief. Denies NV and early satiety. Decreased appetite. She was seen in the ER on 12/24. Work up with CT revealed necrotic right adnexal mass measuring 6.5 cm x 6 cm x 6.7 cm as well as nodularity along the omentum measuring up to 7 mm. Pelvic US confirmed R ovarian mass with internal vascularity within the solid component. Ca125 was obtained and normal at 26. She was discharged home with outpatient follow up.     3/25/25 Ex Lap, Bilateral salpingectomy, right oophorectomy with radical dissection omentectomy, pelvic peritonectomy, argon beam ablation, oversew of bladder and bowel serosal defects    Interval History: Overall feeling well other than right sciatic pain. Reports intermittent numbness, tingling, and pain down right leg. Denies any swelling or erythema. Has been moving bowel regularly. Still taking oxycodone as needed for incisional pain. Appetite has been decreased, eating small frequent meals. Denies any vaginal bleeding.    Objective      Past Medical History:   Diagnosis Date    Breast cancer     R and L breast s/p surgery, chemotherapy and RT    CAD (coronary artery disease)     COPD (chronic obstructive pulmonary disease) (Multi)     Delayed emergence from general anesthesia     HFrEF (heart failure with reduced ejection fraction)     HL (hearing loss)     HLD (hyperlipidemia)     HTN (hypertension)     Pelvic mass     PVD (peripheral vascular disease) ([facility])          Past Surgical History:   Procedure Laterality Date    ADENOIDECTOMY      APPENDECTOMY      BREAST BIOPSY       SECTION, CLASSIC      CHOLECYSTECTOMY      CT ANGIO AORTA AND BILATERAL ILIOFEMORAL RUN OFF INCLUDING WITHOUT CONTRAST IF PERFORMED  2022    MASTECTOMY      SEPTOPLASTY      TONSILLECTOMY      TOTAL ABDOMINAL HYSTERECTOMY          Family History   Problem Relation Name Age of Onset    Breast cancer Mother      Hypertension Mother      Hyperlipidemia Mother      Esophageal cancer Mother      Other (Peripheral artery disease) Mother      Coronary artery disease Father       Otherwise, denies history of endometrial, ovarian, breast, prostate, or colorectal cancers. She declines genetic testing. Has not previously completed genetic testing.     OBGYN Hx:  - ; C/S x3  - Menopause unknown; denies HRT use  - Last Pap unknown    Screening:  - Last Mammogram: unknown  - Last Colonoscopy: unknown    Social Hx:  Yadira Davis  reports that she has been smoking cigarettes. She started smoking about 55 years ago. She has a 82.8 pack-year smoking history. She has been exposed to tobacco smoke. She has never used smokeless tobacco.  She  reports that she does not currently use alcohol.  She  reports no history of drug use.  No desire to quit smoking.   Lives at home alone with family nearby. . Currently on leave. Works in retail.     Physical Exam  Vitals and nursing note reviewed.   Constitutional:       Appearance: Normal appearance. She is normal weight.   HENT:      Mouth/Throat:      Mouth: Mucous membranes are moist.      Pharynx: Oropharynx is clear.   Eyes:      Conjunctiva/sclera: Conjunctivae normal.      Pupils: Pupils are equal, round, and reactive to light.   Cardiovascular:      Rate and Rhythm: Normal rate and regular rhythm.   Pulmonary:      Effort: Pulmonary effort is normal.      Breath sounds: Normal breath sounds.   Abdominal:      General: Abdomen is flat. There is  "no distension.      Palpations: Abdomen is soft. There is no mass.      Tenderness: There is no abdominal tenderness.      Comments: Midline incision healing well, staples removed and replaced with several steri strips. No erythema or drainage noted.   Genitourinary:     Comments: deferred  Musculoskeletal:         General: Normal range of motion.   Skin:     General: Skin is warm and dry.   Neurological:      Mental Status: She is alert.   Psychiatric:         Mood and Affect: Mood normal.         Behavior: Behavior normal.       BSA: 1.56 meters squared  /77 (BP Location: Right arm, Patient Position: Sitting, BP Cuff Size: Adult)   Pulse 97   Temp 36.9 °C (98.4 °F) (Core)   Resp 18   Wt 54.9 kg (121 lb)   SpO2 96%   BMI 21.43 kg/m²     Pathology:  Surgical Pathology Exam: [id number]  Order: [id number]   Collected 3/25/2025 12:54       Status: Final result       Visible to patient: Yes (seen)       Dx: Ovarian mass, right    0 Result Notes      Component    FINAL DIAGNOSIS      A. Specimen labeled \"left abdomen side wall\":  -- Metastatic carcinosarcoma involving fibroadipose tissue.     B. 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  -- Immunohistochemical stains performed on formalin-fixed, paraffin embedded sections demonstrate neoplastic cells to be positive for AE1/AE3, CAM 5.2, PAX8, p16 (block pattern), WT1 (multifocal), desmin (multifocal), myogenin (focal), DE (multifocal) and ER (weak, focal), and negative for S100 and SOX10.     Note: The neoplasm is histologically composed of sarcoma with focal heterologous rhabdomyosarcomatous differentiation (30%) and high grade serous carcinoma (70%). Immunohistochemical staining for mismatch repair proteins, HER2 and p53 will be performed and reported in addenda.      C. Specimen labeled \"Left adnexa biopsy\":  -- Carcinosarcoma, " "involving fibroadipose periadnexal tissue and extending into ovary and fallopian tube; see note #2.     Note #2: The carcinoma is mostly composed of high grade serous carcinoma with a small focus of carcinosarcoma.     D. Specimen labeled \"Appendix\":  -- Metastatic carcinosarcoma involving appendiceal serosa and mesoappendix.     E. Specimen labeled \"Omentum\":  -- Metastatic carcinoma involving adipose tissue; see note #3.     F. Specimen labeled \"Mesentery nodule\":  -- Metastatic carcinosarcoma involving fibroadipose tissue.     G. Specimen labeled \"Right pelvic sidewall\":  -- Metastatic carcinosarcoma involving fibroadipose tissue.     H. Specimen labeled \"Left pelvic sidewall\":  -- Metastatic carcinosarcoma involving fibroadipose tissue.     I. Specimen labeled \"Right pericolic nodule\":  -- Metastatic carcinoma involving fibroadipose tissue; see note #3.     J. Specimen labeled \"Left abdominal wall peritoneum\":  -- Small foci of metastatic carcinoma involving fibroadipose tissue; see note #3.     Note #3: These foci of carcinoma are consistent with spread from the ovarian primary and are composed solely of the carcinomatous component.      Electronically signed by Michelle Marti MD on 4/9/2025 at 1459          Imaging:  CT AP 1/7/25  IMPRESSION:  1. Findings compatible with acute uncomplicated diverticulitis  involving the proximal to mid sigmoid colon. No extraluminal gas or  abscess.  2. Complex cystic versus possible necrotic right adnexal mass  measuring 6.5 cm x 6 cm x 6.7 cm. Findings concerning for a possible  cystic ovarian neoplasm. Recommend further evaluation with pelvic  ultrasound and correlation with  levels.  3. Areas of nodularity noted along the omentum measuring up to  approximately 7 mm to the right of midline. Findings concerning for  possible peritoneal carcinomatosis.  4. Small left pleural effusion with minimal left basilar atelectasis.  5. Mild hepatic " steatosis.    Performance Status:  Asymptomatic    Assessment/Plan    74 y.o. with complex pelvic mass, suspect ovarian in origin though normal tumor markers cannot rule out malignancy though could be torsion  Comorbidities: HTN, HLD, nonobstructive CAD, HFrEF (EF 38% on 1/8/25), PAD s/p bilateral iliac artery stenting, COPD noncompliant with inhalers with chronic home oxygen use 3L overnight and tobacco use     Diagnoses and all orders for this visit:  Ovarian mass, right  Generalized abdominal pain  Chronic obstructive pulmonary disease, unspecified COPD type (Multi)  Coronary artery disease     # Pelvic mass  - We discussed the possible etiologies of a pelvic mass including benign, borderline, and malignant.   - Imaging was reviewed and discussed with the patient  - Tumor markers normal  but will be obtain (CA 19-9, CEA)  - Pelvic MRI ordered    # Post op  - Follow up next week for pathology review with Dr. Treviño  - Recovering well  - Reviewed ongoing restrictions (no lifting more than 10-15lb, nothing per vagina, no soaking)  - Staples removed today, incision healing well  - Will start Gabapentin 100mg BID for management of post op sciatic nerve pain, prescription sent.   - Rx Capsaicin Cream to apply topically    # Acute on chronic pain   - Refill Oxycodone 5 mg PRN q6 PO to the pharmacy     # COPD on home O2  - Encourage compliance with inhaler   - Referral to pulmonology for pre-operative optimization, establish     # CAD, HFrEF  - Follows with cardiology, will need optimization and clearance if surgery can be considered     # Tobacco use disorder  - Cessation counseling complete, pre contemplative phase  - Declines patch, gum, lozenge     Roxana Anders, APRN-CNP

## 2025-04-10 ENCOUNTER — OFFICE VISIT (OUTPATIENT)
Dept: GYNECOLOGIC ONCOLOGY | Facility: CLINIC | Age: 75
End: 2025-04-10
Payer: MEDICARE

## 2025-04-10 ENCOUNTER — HOME CARE VISIT (OUTPATIENT)
Dept: HOME HEALTH SERVICES | Facility: HOME HEALTH | Age: 75
End: 2025-04-10
Payer: MEDICARE

## 2025-04-10 VITALS
OXYGEN SATURATION: 96 % | RESPIRATION RATE: 18 BRPM | BODY MASS INDEX: 21.43 KG/M2 | TEMPERATURE: 98.4 F | SYSTOLIC BLOOD PRESSURE: 151 MMHG | HEART RATE: 97 BPM | DIASTOLIC BLOOD PRESSURE: 77 MMHG | WEIGHT: 121 LBS

## 2025-04-10 DIAGNOSIS — N83.8 OVARIAN MASS, RIGHT: Primary | ICD-10-CM

## 2025-04-10 DIAGNOSIS — G89.18 POSTOPERATIVE PAIN: ICD-10-CM

## 2025-04-10 DIAGNOSIS — M54.31 SCIATIC PAIN, RIGHT: ICD-10-CM

## 2025-04-10 DIAGNOSIS — R10.2 PELVIC PAIN IN FEMALE: ICD-10-CM

## 2025-04-10 PROCEDURE — 99211 OFF/OP EST MAY X REQ PHY/QHP: CPT | Performed by: NURSE PRACTITIONER

## 2025-04-10 PROCEDURE — G0158 HHC OT ASSISTANT EA 15: HCPCS | Mod: CO,HHH

## 2025-04-10 PROCEDURE — 3077F SYST BP >= 140 MM HG: CPT | Performed by: NURSE PRACTITIONER

## 2025-04-10 PROCEDURE — 1125F AMNT PAIN NOTED PAIN PRSNT: CPT | Performed by: NURSE PRACTITIONER

## 2025-04-10 PROCEDURE — 1111F DSCHRG MED/CURRENT MED MERGE: CPT | Performed by: NURSE PRACTITIONER

## 2025-04-10 PROCEDURE — 3078F DIAST BP <80 MM HG: CPT | Performed by: NURSE PRACTITIONER

## 2025-04-10 PROCEDURE — 4004F PT TOBACCO SCREEN RCVD TLK: CPT | Performed by: NURSE PRACTITIONER

## 2025-04-10 PROCEDURE — 1159F MED LIST DOCD IN RCRD: CPT | Performed by: NURSE PRACTITIONER

## 2025-04-10 RX ORDER — CAPSAICIN 0 G/G
CREAM TOPICAL 2 TIMES DAILY
Qty: 56.6 G | Refills: 2 | Status: SHIPPED | OUTPATIENT
Start: 2025-04-10

## 2025-04-10 RX ORDER — GABAPENTIN 100 MG/1
100 CAPSULE ORAL 2 TIMES DAILY
Qty: 60 CAPSULE | Refills: 1 | Status: SHIPPED | OUTPATIENT
Start: 2025-04-10 | End: 2025-05-10

## 2025-04-10 RX ORDER — OXYCODONE HYDROCHLORIDE 5 MG/1
5 TABLET ORAL EVERY 6 HOURS PRN
Qty: 20 TABLET | Refills: 0 | Status: SHIPPED | OUTPATIENT
Start: 2025-04-10

## 2025-04-10 ASSESSMENT — PAIN SCALES - GENERAL: PAINLEVEL_OUTOF10: 8

## 2025-04-11 ENCOUNTER — HOME CARE VISIT (OUTPATIENT)
Dept: HOME HEALTH SERVICES | Facility: HOME HEALTH | Age: 75
End: 2025-04-11
Payer: MEDICARE

## 2025-04-11 VITALS
HEART RATE: 68 BPM | OXYGEN SATURATION: 97 % | RESPIRATION RATE: 17 BRPM | SYSTOLIC BLOOD PRESSURE: 122 MMHG | TEMPERATURE: 97.7 F | DIASTOLIC BLOOD PRESSURE: 62 MMHG

## 2025-04-11 LAB
LAB AP ASR DISCLAIMER: NORMAL
LAB AP BLOCK FOR ADDITIONAL STUDIES: NORMAL
LABORATORY COMMENT REPORT: NORMAL
Lab: NORMAL
PATH REPORT.ADDENDUM SPEC: NORMAL
PATH REPORT.ADDENDUM SPEC: NORMAL
PATH REPORT.FINAL DX SPEC: NORMAL
PATH REPORT.GROSS SPEC: NORMAL
PATH REPORT.RELEVANT HX SPEC: NORMAL
PATH REPORT.TOTAL CANCER: NORMAL
PATHOLOGY SYNOPTIC REPORT: NORMAL

## 2025-04-11 PROCEDURE — G0157 HHC PT ASSISTANT EA 15: HCPCS | Mod: CQ,HHH

## 2025-04-11 SDOH — HEALTH STABILITY: PHYSICAL HEALTH: EXERCISE TYPE: B LE STRENGTHENING THER EX, ENDURANCE TRAINING

## 2025-04-11 SDOH — HEALTH STABILITY: PHYSICAL HEALTH
EXERCISE COMMENTS: 1 SET OF 10 EACH, ORANGE THERABAND  SEATED THER EX: MARCHES, LEG EXT, LAQ, TBAND HIP ABD, BALL SQUEEZES HIP ADD  SUPINE THER EX: SLR, HIP ABD, HEEL SLIDES, SAQ, GLUT SETS 5 SEC HOLDS QUAD SETS 5 SEC HOLDS  STANDING THER EX: MARCHES, HEEL/TOE RAISES,

## 2025-04-11 SDOH — HEALTH STABILITY: PHYSICAL HEALTH: EXERCISE COMMENTS: HIP ABD, HIP FLEX/EXT, MINI SQUATS , HAM CURLS

## 2025-04-11 ASSESSMENT — ENCOUNTER SYMPTOMS
OCCASIONAL FEELINGS OF UNSTEADINESS: 1
ARTHRALGIAS: 1
PAIN LOCATION: LEFT LEG
PAIN LOCATION - PAIN FREQUENCY: INTERMITTENT
PERSON REPORTING PAIN: PATIENT
PAIN LOCATION - EXACERBATING FACTORS: TIME OF DAY
PAIN SEVERITY GOAL: 0/10
LOWEST PAIN SEVERITY IN PAST 24 HOURS: 3/10
PAIN LOCATION - RELIEVING FACTORS: TIME OF DAY
PAIN LOCATION - PAIN QUALITY: ACHING
PAIN LOCATION - PAIN SEVERITY: 4/10
HIGHEST PAIN SEVERITY IN PAST 24 HOURS: 6/10
SUBJECTIVE PAIN PROGRESSION: UNCHANGED
PAIN: 1
MUSCLE WEAKNESS: 1

## 2025-04-11 ASSESSMENT — ACTIVITIES OF DAILY LIVING (ADL)
AMBULATION_DISTANCE/DURATION_TOLERATED: 55 FT
AMBULATION ASSISTANCE: 1
AMBULATION ASSISTANCE: STAND BY ASSIST
BATHING ASSESSED: 1
AMBULATION ASSISTANCE ON FLAT SURFACES: 1
BATHING_CURRENT_FUNCTION: STAND BY ASSIST
BATHING EQUIPMENT USED: SHOWER CHAIR

## 2025-04-12 PROBLEM — C56.9 OVARIAN CANCER (MULTI): Status: ACTIVE | Noted: 2025-04-12

## 2025-04-13 ASSESSMENT — ENCOUNTER SYMPTOMS
PAIN LOCATION - PAIN DURATION: SINCE SURGERY
PAIN LOCATION - PAIN SEVERITY: 5/10
PAIN LOCATION - PAIN FREQUENCY: CONSTANT
PAIN LOCATION - PAIN QUALITY: ACHE
PAIN: 1
PERSON REPORTING PAIN: PATIENT
PAIN LOCATION: RIGHT LEG

## 2025-04-13 NOTE — PROGRESS NOTES
Patient ID: Yadira Davis is a 74 y.o. female.  Referring Physician: No referring provider defined for this encounter.  Primary Care Provider: Sylvie Medina DO    Subjective    Here for POV s/p XL primary CRS for pelvic mass with BS RO omentectomy  pelvic peritonectomy argon beam ablation. Sciatica pain not improved with gabapentin, voltaren gel with some relief. Sharp R sided pain. Constipation ongoing despite miralax and colace. Denies NV. Continues to work with PT.     Objective    BSA: 1.59 meters squared  /80   Pulse 90   Temp 36.5 °C (97.7 °F)   Resp 16   Wt 56.7 kg (125 lb)   SpO2 98%   BMI 22.14 kg/m²      Physical Exam  Vitals and nursing note reviewed. Exam conducted with a chaperone present.   Constitutional:       Appearance: Normal appearance. She is normal weight.   HENT:      Head: Normocephalic and atraumatic.      Mouth/Throat:      Mouth: Mucous membranes are moist.      Pharynx: No oropharyngeal exudate or posterior oropharyngeal erythema.   Eyes:      Extraocular Movements: Extraocular movements intact.      Conjunctiva/sclera: Conjunctivae normal.      Pupils: Pupils are equal, round, and reactive to light.   Cardiovascular:      Rate and Rhythm: Normal rate.   Pulmonary:      Effort: Pulmonary effort is normal.      Breath sounds: Wheezing and rhonchi present. No rales.   Abdominal:      General: A surgical scar is present.      Palpations: Abdomen is soft. There is no mass.      Tenderness: There is no guarding or rebound.      Hernia: No hernia is present.      Comments: Surgical incision clean/dry/intact without redness, drainage or erythema   Steris removed   Genitourinary:     Pubic Area: No rash.    Musculoskeletal:         General: Normal range of motion.   Skin:     General: Skin is warm.      Findings: No erythema or rash.   Neurological:      General: No focal deficit present.      Mental Status: She is alert and oriented to person, place, and time.   Psychiatric:     "     Mood and Affect: Mood normal.         Behavior: Behavior normal.         FINAL DIAGNOSIS      A. Specimen labeled \"left abdomen side wall\":  -- Metastatic carcinosarcoma involving fibroadipose tissue.     B. 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  -- Immunohistochemical stains performed on formalin-fixed, paraffin embedded sections demonstrate neoplastic cells to be positive for AE1/AE3, CAM 5.2, PAX8, p16 (block pattern), WT1 (multifocal), desmin (multifocal), myogenin (focal), ID (multifocal) and ER (weak, focal), and negative for S100 and SOX10.     Note: The neoplasm is histologically composed of sarcoma with focal heterologous rhabdomyosarcomatous differentiation (30%) and high grade serous carcinoma (70%). Immunohistochemical staining for mismatch repair proteins, HER2 and p53 will be performed and reported in addenda.      C. Specimen labeled \"Left adnexa biopsy\":  -- Carcinosarcoma, involving fibroadipose periadnexal tissue and extending into ovary and fallopian tube; see note #2.     Note #2: The carcinoma is mostly composed of high grade serous carcinoma with a small focus of carcinosarcoma.     D. Specimen labeled \"Appendix\":  -- Metastatic carcinosarcoma involving appendiceal serosa and mesoappendix.     E. Specimen labeled \"Omentum\":  -- Metastatic carcinoma involving adipose tissue; see note #3.     F. Specimen labeled \"Mesentery nodule\":  -- Metastatic carcinosarcoma involving fibroadipose tissue.     G. Specimen labeled \"Right pelvic sidewall\":  -- Metastatic carcinosarcoma involving fibroadipose tissue.     H. Specimen labeled \"Left pelvic sidewall\":  -- Metastatic carcinosarcoma involving fibroadipose tissue.     I. Specimen labeled \"Right pericolic nodule\":  -- Metastatic carcinoma involving fibroadipose tissue; see note #3.     J. Specimen labeled \"Left " "abdominal wall peritoneum\":  -- Small foci of metastatic carcinoma involving fibroadipose tissue; see note #3.     Note #3: These foci of carcinoma are consistent with spread from the ovarian primary and are composed solely of the carcinomatous component     Performance Status:  Asymptomatic    Assessment/Plan     Oncology History Overview Note   3/25/25 XL BS RO with radical dissection omentectomy pelvic peritonectomy argon beam ablation clinical stage IIIB   Pathology Carcinosarcoma of the ovary      Ovarian cancer (Multi)   4/12/2025 Initial Diagnosis    Ovarian cancer (Multi)       Diagnoses and all orders for this visit:  Malignant neoplasm of ovary, unspecified laterality (Multi)    # Ovarian cancer  - We reviewed her imaging and pathology results, reviewed high risk histology  - We discussed the pathophysiology of ovarian, fallopian tube, and peritoneal cancers.   - We discussed my recommendation for adjuvant treatment with carboplatin AUC 5 and paclitaxel 135 mg/m2, consent signed  - Referral to genetics, reported daughter is known BRCA1 carrier but patient herself has never been tested  - [employer] [facility] with FOLR1, HER2 testing  - Repeat CT ordered STAT  - TB presentation     # sciatica   - Continue to work with PT, increase gabapentin 300 BID    Jaki Treviño MD MPH      "

## 2025-04-15 ENCOUNTER — HOME CARE VISIT (OUTPATIENT)
Dept: HOME HEALTH SERVICES | Facility: HOME HEALTH | Age: 75
End: 2025-04-15
Payer: MEDICARE

## 2025-04-15 VITALS
SYSTOLIC BLOOD PRESSURE: 117 MMHG | TEMPERATURE: 97.4 F | DIASTOLIC BLOOD PRESSURE: 79 MMHG | OXYGEN SATURATION: 94 % | HEART RATE: 93 BPM

## 2025-04-15 VITALS
OXYGEN SATURATION: 98 % | HEART RATE: 77 BPM | SYSTOLIC BLOOD PRESSURE: 122 MMHG | DIASTOLIC BLOOD PRESSURE: 65 MMHG | RESPIRATION RATE: 17 BRPM | TEMPERATURE: 97.9 F

## 2025-04-15 DIAGNOSIS — J44.1 COPD EXACERBATION (MULTI): ICD-10-CM

## 2025-04-15 DIAGNOSIS — R06.09 DYSPNEA ON EXERTION: ICD-10-CM

## 2025-04-15 PROCEDURE — G0152 HHCP-SERV OF OT,EA 15 MIN: HCPCS | Mod: HHH

## 2025-04-15 PROCEDURE — G0157 HHC PT ASSISTANT EA 15: HCPCS | Mod: CQ,HHH

## 2025-04-15 RX ORDER — IPRATROPIUM BROMIDE AND ALBUTEROL SULFATE 2.5; .5 MG/3ML; MG/3ML
SOLUTION RESPIRATORY (INHALATION)
Qty: 90 ML | Refills: 0 | Status: SHIPPED | OUTPATIENT
Start: 2025-04-15

## 2025-04-15 SDOH — HEALTH STABILITY: PHYSICAL HEALTH: EXERCISE COMMENTS: P ABD, HIP FLEX/EXT, MINI SQUATS , HAM CURLS

## 2025-04-15 SDOH — HEALTH STABILITY: MENTAL HEALTH: SMOKING IN HOME: 0

## 2025-04-15 SDOH — ECONOMIC STABILITY: HOUSING INSECURITY: HOME SAFETY: OXYGEN AT NIGHT BUT PATIENT HAS NOT BEEN USING FOR THE PAST WEEK

## 2025-04-15 SDOH — HEALTH STABILITY: PHYSICAL HEALTH
EXERCISE COMMENTS: 1 SET OF 10 EACH, BLUE THERABAND  SEATED THER EX: MARCHES, LEG EXT, LAQ, TBAND HIP ABD, BALL SQUEEZES HIP ADD  SUPINE THER EX: SLR, HIP ABD, HEEL SLIDES, SAQ, GLUT SETS 5 SEC HOLDS QUAD SETS 5 SEC HOLDS  STANDING THER EX: MARCHES, HEEL/TOE RAISES, HI

## 2025-04-15 SDOH — HEALTH STABILITY: PHYSICAL HEALTH: EXERCISE TYPE: B LE STRENGTHENING THER EX

## 2025-04-15 SDOH — ECONOMIC STABILITY: HOUSING INSECURITY: EVIDENCE OF SMOKING MATERIAL: 0

## 2025-04-15 ASSESSMENT — ACTIVITIES OF DAILY LIVING (ADL)
BATHING ASSESSED: 1
PHYSICAL TRANSFERS ASSESSED: 1
AMBULATION ASSISTANCE: 1
CURRENT_FUNCTION: CONTACT GUARD ASSIST
AMBULATION_DISTANCE/DURATION_TOLERATED: 95 FT
AMBULATION ASSISTANCE ON FLAT SURFACES: 1
BATHING EQUIPMENT USED: SHOWER CHAIR
BATHING_CURRENT_FUNCTION: STAND BY ASSIST
AMBULATION ASSISTANCE: STAND BY ASSIST

## 2025-04-15 ASSESSMENT — ENCOUNTER SYMPTOMS
PERSON REPORTING PAIN: PATIENT
PAIN: 1
LOWEST PAIN SEVERITY IN PAST 24 HOURS: 3/10
PAIN LOCATION - PAIN QUALITY: ACHING
HIGHEST PAIN SEVERITY IN PAST 24 HOURS: 5/10
HIGHEST PAIN SEVERITY IN PAST 24 HOURS: 6/10
ARTHRALGIAS: 1
PERSON REPORTING PAIN: PATIENT
PAIN LOCATION - RELIEVING FACTORS: HEAT
OCCASIONAL FEELINGS OF UNSTEADINESS: 1
MUSCLE WEAKNESS: 1
PAIN LOCATION - EXACERBATING FACTORS: MOVEMENT
PAIN SEVERITY GOAL: 0/10
PAIN LOCATION: ABDOMEN
PAIN LOCATION: ABDOMEN
PAIN LOCATION - EXACERBATING FACTORS: TIME OF DAY
PAIN: 1
PAIN LOCATION - PAIN SEVERITY: 4/10
PAIN LOCATION - PAIN QUALITY: SORENESS
PAIN LOCATION - PAIN FREQUENCY: INTERMITTENT
SUBJECTIVE PAIN PROGRESSION: UNCHANGED
PAIN LOCATION - RELIEVING FACTORS: TIME OF DAY , REST
PAIN LOCATION - PAIN SEVERITY: 4/10
PAIN LOCATION - PAIN FREQUENCY: INTERMITTENT
PAIN LOCATION - PAIN DURATION: DAILY

## 2025-04-15 NOTE — DOCUMENTATION CLARIFICATION NOTE
"    PATIENT:               VLADIMIR CROWELL  ACCT #:                  [id number]  MRN:                       [id number]  :                       1950  ADMIT DATE:       3/25/2025 9:40 AM  DISCH DATE:        3/28/2025 4:05 PM  RESPONDING PROVIDER #:        [id number]          PROVIDER RESPONSE TEXT:    Primary ovarian cancer with metastasis to the left abdominal side wall, bilateral fallopian tubes, appendix, omentum, mesenteric nodule, bilateral pelvic sidewalls, right pericolic nodule and left abdominal wall peritoneum    CDI QUERY TEXT:    Clarification        Instruction:    Based on your assessment of the patient and the clinical information, please provide the requested documentation by clicking on the appropriate radio button and enter any additional information if prompted.    Question: Based on your assessment of the patient and the pathology findings, can the pathology findings be confirmed by providing the requested documentation to include if specimen is benign or malignant, primary or secondary and any metastatic sites.  Please include diagnosis of disease    When answering this query, please exercise your independent professional judgment. The fact that a question is being asked, does not imply that any particular answer is desired or expected.    The patient's clinical indicators include:  Clinical Information:  73 y/o female with pelvic mass admitted for surgery.    Surgical Pathology from3/25/25 resulted as -  \"FINAL DIAGNOSIS  A. Specimen labeled \"left abdomen side wall\":  -- Metastatic carcinosarcoma involving fibroadipose tissue.  B. 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 Immunohistochemical staining for " "mismatch repair proteins, HER2 and p53 will be performed and reported in addenda.  C. Specimen labeled \"Left adnexa biopsy\":  -- Carcinosarcoma, involving fibroadipose periadnexal tissue and extending into ovary and fallopian tube; see note #2.  Note #2: The carcinoma is mostly composed of high grade serous carcinoma with a small focus of carcinosarcoma.  D. Specimen labeled \"Appendix\":  -- Metastatic carcinosarcoma involving appendiceal serosa and mesoappendix  E. Specimen labeled \"Omentum\":  -- Metastatic carcinoma involving adipose tissue; see note #3.  F. Specimen labeled \"Mesentery nodule\":  -- Metastatic carcinosarcoma involving fibroadipose tissue.  G. Specimen labeled \"Right pelvic sidewall\":  -- Metastatic carcinosarcoma involving fibroadipose tissue.  H. Specimen labeled \"Left pelvic sidewall\":  -- Metastatic carcinosarcoma involving fibroadipose tissue.  I. Specimen labeled \"Right pericolic nodule\":  -- Metastatic carcinoma involving fibroadipose tissue; see note #3.  J. Specimen labeled \"Left abdominal wall peritoneum\":  -- Small foci of metastatic carcinoma involving fibroadipose tissue; see note #3.  Note #3: These foci of carcinoma are consistent with spread from the ovarian primary and are composed solely of the carcinomatous component.\"  Options provided:  -- Primary ovarian cancer with metastasis to the left abdominal side wall, bilateral fallopian tubes, appendix, omentum, mesenteric nodule, bilateral pelvic sidewalls, right pericolic nodule and left abdominal wall peritoneum  -- Pathology findings, Please specify additional information below  -- Other - I will add my own diagnosis  -- Refer to Clinical Documentation Reviewer    Query created by: Elma Taylor on 4/14/2025 2:46 PM      Electronically signed by:  SULEIMAN WILSON MD 4/15/2025 1:30 PM          "

## 2025-04-16 ENCOUNTER — OFFICE VISIT (OUTPATIENT)
Dept: GYNECOLOGIC ONCOLOGY | Facility: CLINIC | Age: 75
End: 2025-04-16
Payer: MEDICARE

## 2025-04-16 VITALS
RESPIRATION RATE: 16 BRPM | BODY MASS INDEX: 22.14 KG/M2 | WEIGHT: 125 LBS | HEART RATE: 90 BPM | TEMPERATURE: 97.7 F | OXYGEN SATURATION: 98 % | SYSTOLIC BLOOD PRESSURE: 168 MMHG | DIASTOLIC BLOOD PRESSURE: 80 MMHG

## 2025-04-16 DIAGNOSIS — M54.31 SCIATIC PAIN, RIGHT: ICD-10-CM

## 2025-04-16 DIAGNOSIS — J44.9 CHRONIC OBSTRUCTIVE PULMONARY DISEASE, UNSPECIFIED COPD TYPE (MULTI): ICD-10-CM

## 2025-04-16 DIAGNOSIS — Z90.710 S/P HYSTERECTOMY: Primary | ICD-10-CM

## 2025-04-16 DIAGNOSIS — C56.9 MALIGNANT NEOPLASM OF OVARY, UNSPECIFIED LATERALITY (MULTI): ICD-10-CM

## 2025-04-16 LAB
TEST COMMENT - SURGICAL SENDOUT REQUEST: NORMAL
TEST COMMENT - SURGICAL SENDOUT REQUEST: NORMAL

## 2025-04-16 PROCEDURE — 1111F DSCHRG MED/CURRENT MED MERGE: CPT | Performed by: STUDENT IN AN ORGANIZED HEALTH CARE EDUCATION/TRAINING PROGRAM

## 2025-04-16 PROCEDURE — 1160F RVW MEDS BY RX/DR IN RCRD: CPT | Performed by: STUDENT IN AN ORGANIZED HEALTH CARE EDUCATION/TRAINING PROGRAM

## 2025-04-16 PROCEDURE — 99211 OFF/OP EST MAY X REQ PHY/QHP: CPT | Mod: 24 | Performed by: STUDENT IN AN ORGANIZED HEALTH CARE EDUCATION/TRAINING PROGRAM

## 2025-04-16 PROCEDURE — 1125F AMNT PAIN NOTED PAIN PRSNT: CPT | Performed by: STUDENT IN AN ORGANIZED HEALTH CARE EDUCATION/TRAINING PROGRAM

## 2025-04-16 PROCEDURE — 3077F SYST BP >= 140 MM HG: CPT | Performed by: STUDENT IN AN ORGANIZED HEALTH CARE EDUCATION/TRAINING PROGRAM

## 2025-04-16 PROCEDURE — 3079F DIAST BP 80-89 MM HG: CPT | Performed by: STUDENT IN AN ORGANIZED HEALTH CARE EDUCATION/TRAINING PROGRAM

## 2025-04-16 PROCEDURE — 1159F MED LIST DOCD IN RCRD: CPT | Performed by: STUDENT IN AN ORGANIZED HEALTH CARE EDUCATION/TRAINING PROGRAM

## 2025-04-16 RX ORDER — DICLOFENAC SODIUM 10 MG/G
4 GEL TOPICAL 4 TIMES DAILY PRN
COMMUNITY

## 2025-04-16 RX ORDER — GABAPENTIN 300 MG/1
300 CAPSULE ORAL 2 TIMES DAILY
Qty: 60 CAPSULE | Refills: 11 | Status: SHIPPED | OUTPATIENT
Start: 2025-04-16 | End: 2026-04-16

## 2025-04-16 RX ORDER — HEPARIN 100 UNIT/ML
500 SYRINGE INTRAVENOUS AS NEEDED
OUTPATIENT
Start: 2025-04-30

## 2025-04-16 RX ORDER — HEPARIN SODIUM,PORCINE/PF 10 UNIT/ML
50 SYRINGE (ML) INTRAVENOUS AS NEEDED
OUTPATIENT
Start: 2025-04-30

## 2025-04-16 ASSESSMENT — PAIN SCALES - GENERAL: PAINLEVEL_OUTOF10: 6

## 2025-04-16 NOTE — TUMOR BOARD NOTE
Gynecologic Oncology Tumor Board 2025    Specialties Present: Gyn Onc, Radiology, Genetics, Radiation oncology, Pathology, Nurse Navigator, Research    Yadira Davis  74 y.o.    1950  MRN [id number]    Provider: Jaki Treviño MD  Disease Site: Ovarian  Pathology: Carcinosarcoma of the ovary  Grade: High   Stage: IIIB    We reviewed previous medical and familial history, history of present illness, and recent lab results along with all available histopathologic and imaging studies. The tumor board considered available treatment options and made the following recommendations.    Recommendations: Follow-up FOLR1, [facility]. Recommend adjuvant Carboplatin/Paclitaxel. Genetics referral.        Clinical Trial Consideration: None Available    National site-specific guidelines were discussed with respect to the case. It is recognized that there may be additional factors not discussed in the Review Board discussion that can influence management decisions, and alternative management options which fall within the standard of care. Accordingly the final treatment disposition will be determined by the patient, in the context of an informed discussion with their providers. The actual prescribed management or treatment plan may or may not be consistent with the Review Board recommendations.

## 2025-04-17 ENCOUNTER — HOSPITAL ENCOUNTER (OUTPATIENT)
Dept: RADIOLOGY | Facility: HOSPITAL | Age: 75
Discharge: HOME | End: 2025-04-17
Payer: MEDICARE

## 2025-04-17 ENCOUNTER — TELEPHONE (OUTPATIENT)
Dept: GYNECOLOGIC ONCOLOGY | Facility: HOSPITAL | Age: 75
End: 2025-04-17
Payer: MEDICARE

## 2025-04-17 ENCOUNTER — ONCOLOGY MEDICATION OUTREACH (OUTPATIENT)
Dept: GYNECOLOGIC ONCOLOGY | Facility: HOSPITAL | Age: 75
End: 2025-04-17
Payer: MEDICARE

## 2025-04-17 ENCOUNTER — HOME CARE VISIT (OUTPATIENT)
Dept: HOME HEALTH SERVICES | Facility: HOME HEALTH | Age: 75
End: 2025-04-17
Payer: MEDICARE

## 2025-04-17 VITALS
DIASTOLIC BLOOD PRESSURE: 66 MMHG | RESPIRATION RATE: 16 BRPM | TEMPERATURE: 98 F | SYSTOLIC BLOOD PRESSURE: 122 MMHG | OXYGEN SATURATION: 98 % | HEART RATE: 77 BPM

## 2025-04-17 DIAGNOSIS — C56.9 MALIGNANT NEOPLASM OF OVARY, UNSPECIFIED LATERALITY (MULTI): ICD-10-CM

## 2025-04-17 DIAGNOSIS — C56.9 MALIGNANT NEOPLASM OF OVARY, UNSPECIFIED LATERALITY (MULTI): Primary | ICD-10-CM

## 2025-04-17 PROCEDURE — 2550000001 HC RX 255 CONTRASTS: Mod: JZ | Performed by: STUDENT IN AN ORGANIZED HEALTH CARE EDUCATION/TRAINING PROGRAM

## 2025-04-17 PROCEDURE — G0157 HHC PT ASSISTANT EA 15: HCPCS | Mod: CQ,HHH

## 2025-04-17 PROCEDURE — 74177 CT ABD & PELVIS W/CONTRAST: CPT

## 2025-04-17 RX ORDER — DEXAMETHASONE 4 MG/1
TABLET ORAL
Qty: 66 TABLET | Refills: 0 | Status: SHIPPED | OUTPATIENT
Start: 2025-04-17

## 2025-04-17 RX ORDER — PROCHLORPERAZINE MALEATE 10 MG
10 TABLET ORAL EVERY 6 HOURS PRN
Qty: 30 TABLET | Refills: 5 | Status: SHIPPED | OUTPATIENT
Start: 2025-04-17

## 2025-04-17 RX ORDER — MAGNESIUM GLUCONATE 27 MG(500)
54 TABLET ORAL 2 TIMES DAILY
Qty: 120 TABLET | Refills: 5 | Status: SHIPPED | OUTPATIENT
Start: 2025-04-17

## 2025-04-17 RX ORDER — ONDANSETRON HYDROCHLORIDE 8 MG/1
8 TABLET, FILM COATED ORAL EVERY 8 HOURS PRN
Qty: 30 TABLET | Refills: 5 | Status: SHIPPED | OUTPATIENT
Start: 2025-04-17

## 2025-04-17 RX ADMIN — IOHEXOL 75 ML: 350 INJECTION, SOLUTION INTRAVENOUS at 10:12

## 2025-04-17 SDOH — HEALTH STABILITY: PHYSICAL HEALTH: EXERCISE TYPE: B LE STRENGTHENING THER EX, ENDURANCE TRAINING

## 2025-04-17 SDOH — HEALTH STABILITY: PHYSICAL HEALTH
EXERCISE COMMENTS: 1 SET OF 15 EACH, BLUE THERABAND  SEATED THER EX: MARCHES, LEG EXT, LAQ, TBAND HIP ABD, BALL SQUEEZES HIP ADD  SUPINE THER EX: SLR, HIP ABD, HEEL SLIDES, SAQ, GLUT SETS 5 SEC HOLDS QUAD SETS 5 SEC HOLDS  STANDING THER EX: MARCHES, HEEL/TOE RAISES, HI

## 2025-04-17 SDOH — HEALTH STABILITY: PHYSICAL HEALTH: EXERCISE COMMENTS: P ABD, HIP FLEX/EXT, MINI SQUATS , HAM CURLS

## 2025-04-17 ASSESSMENT — ACTIVITIES OF DAILY LIVING (ADL)
BATHING ASSESSED: 1
AMBULATION ASSISTANCE: 1
AMBULATION ASSISTANCE: STAND BY ASSIST
BATHING_CURRENT_FUNCTION: STAND BY ASSIST
BATHING EQUIPMENT USED: SHOWER CHAIR
AMBULATION ASSISTANCE ON FLAT SURFACES: 1
AMBULATION_DISTANCE/DURATION_TOLERATED: 55 FT

## 2025-04-17 ASSESSMENT — ENCOUNTER SYMPTOMS
ARTHRALGIAS: 1
SUBJECTIVE PAIN PROGRESSION: UNCHANGED
PAIN LOCATION - RELIEVING FACTORS: TIME OF DAY
PAIN LOCATION: ABDOMEN
PERSON REPORTING PAIN: PATIENT
PAIN: 1
PAIN LOCATION - PAIN SEVERITY: 5/10
HIGHEST PAIN SEVERITY IN PAST 24 HOURS: 7/10
PAIN LOCATION - EXACERBATING FACTORS: TIME OF DAY
PAIN LOCATION - PAIN FREQUENCY: INTERMITTENT
OCCASIONAL FEELINGS OF UNSTEADINESS: 1
MUSCLE WEAKNESS: 1
PAIN SEVERITY GOAL: 0/10
LOWEST PAIN SEVERITY IN PAST 24 HOURS: 4/10
PAIN LOCATION - PAIN QUALITY: ACHING

## 2025-04-17 NOTE — PROGRESS NOTES
"Bilateral L5 TFESI. Estimated body mass index is 24.33 kg/mÂ² as calculated from the following:    Height as of 8/11/22: 5' 2"" (1.575 m). Weight as of 8/11/22: 60.3 kg (133 lb).     Payor: [employer] [facility] [place] / Plan: [id number] / Product Type: POS MISC    " ONCOLOGY CLINICAL PHARMACY NOTE     Subjective  Yadira Davis is a 74 y.o. female with ovarian cancer, called for education.        Treatment history  Treatment Details   Treatment goal Curative   Plan Name PACLitaxel / CARBOplatin, 21 Day Cycles - Gyn   Status Active   Start Date 4/30/2025 (Planned)   End Date 8/13/2025 (Planned)   Provider Jaki Treviño MD MPH   Chemotherapy fosaprepitant (Emend) 150 mg in sodium chloride 0.9% 250 mL IV, 150 mg, intravenous, Once, 0 of 6 cycles    CARBOplatin (Paraplatin) in sodium chloride 0.9% 100 mL IV, , intravenous, Once, 0 of 6 cycles    PACLitaxeL (Taxol) 216 mg in dextrose 5% 286 mL IV, 135 mg/m2 = 216 mg (77.1 % of original dose 175 mg/m2), intravenous, Once, 0 of 6 cycles  Dose modification: 135 mg/m2 (original dose 175 mg/m2, Cycle 1, Reason: Other (See Comments))    methylPREDNISolone sod succinate (SOLU-Medrol) 40 mg/mL injection 40 mg, 40 mg, intravenous, As needed, 0 of 6 cycles    palonosetron (Aloxi) injection 0.25 mg, 0.25 mg, intravenous, Once, 0 of 6 cycles       Treatment Details   Treatment goal [No plan goal]   Plan Name Venous Access Orders   Status Active   Start Date 4/30/2025   End Date Until discontinued   Provider Jaki Treviño MD MPH   Chemotherapy [No matching medication found in this treatment plan]        Objective  There were no vitals taken for this visit.  Lab Results   Component Value Date    WBC 9.1 03/28/2025    HGB 13.2 03/28/2025    HCT 40.8 03/28/2025    MCV 88 03/28/2025     03/28/2025      Lab Results   Component Value Date    GLUCOSE 99 03/28/2025    CALCIUM 10.2 03/28/2025     03/28/2025    K 4.1 03/28/2025    CO2 26 03/28/2025     03/28/2025    BUN 24 (H) 03/28/2025    CREATININE 0.65 03/28/2025     Lab Results   Component Value Date    ALT 9 02/16/2025    AST 14 02/16/2025    ALKPHOS 77 02/16/2025    BILITOT 0.4 02/16/2025       Allergies and Medications   Allergies[1]  Current Medications[2]    Assessment and  Plan  Yadira Davis is a 74 y.o. female with ovarian cancer, to be treated with carboplatin/paclitaxel.    Chemotherapy  Education: Reviewed drug, dose, frequency, administration, treatment cycle, duration of therapy, and missed doses. Counseled on potential side effects including but not limited to chemotherapy side effects: neutropenia, infection risk, anemia, fatigue, weakness, low energy, thrombocytopenia, bleeding/bruising, n/v, diarrhea, constipation, electrolyte changes, hair loss, muscle or joint pain, mucositis, skin rash, infusion reactions, and neuropathy. Also reviewed signs of organ dysfunction, taste changes, and nail discoloration Discussed techniques to mitigate severity of side effects such as blood count checks, temperature checks, electrolyte monitoring, antiemetic use, loperamide use with max dose of 8 tabs per 24 hours, staying hydrated if having diarrhea, and monitoring for leg edema, SOB, trouble breathing.  Handouts not provided due to virtual nature of encounter. Patient had questions about toxicities. All questions answered and contact information was given to patient.    Drug-drug interactions:  separate gabapentin from magnesium by 2 hours  Time spent on patient care: 30 minutes            [place], PharmD         [1]   Allergies  Allergen Reactions    Codeine Nausea/vomiting   [2]   Current Outpatient Medications:     acetaminophen (Tylenol) 325 mg tablet, Take 2 tablets (650 mg) by mouth every 4 hours if needed for headaches, mild pain (1 - 3) or fever (temp greater than 38.0 C)., Disp: , Rfl:     albuterol (Ventolin HFA) 90 mcg/actuation inhaler, Inhale 2 puffs every 4 hours if needed for shortness of breath., Disp: 18 g, Rfl: 3    apixaban (Eliquis) 2.5 mg tablet, Take 1 tablet (2.5 mg) by mouth 2 times a day., Disp: 60 tablet, Rfl: 0    aspirin 81 mg EC tablet, Take 1 tablet (81 mg) by mouth once daily., Disp: 90 tablet, Rfl: 3    carvedilol (Coreg) 3.125 mg tablet, Take  1 tablet (3.125 mg) by mouth 2 times daily (morning and late afternoon)., Disp: 180 tablet, Rfl: 3    dexAMETHasone (Decadron) 4 mg tablet, Take 5 tablets (20 mg) by mouth once 12 hours prior to PACLitaxel treatment. Then take 2 tablets (8 mg) by mouth once daily for 3 days starting the day after treatment., Disp: 66 tablet, Rfl: 0    diclofenac sodium (Voltaren) 1 % gel, Apply 4.5 inches (4 g) topically 4 times a day as needed., Disp: , Rfl:     docusate sodium (Colace) 100 mg capsule, Take 1 capsule (100 mg) by mouth 2 times a day., Disp: , Rfl:     empagliflozin (Jardiance) 10 mg tablet, Take 1 tablet (10 mg) by mouth once daily., Disp: 90 tablet, Rfl: 1    furosemide (Lasix) 20 mg tablet, Take 1 tablet (20 mg) by mouth every other day., Disp: 45 tablet, Rfl: 1    gabapentin (Neurontin) 300 mg capsule, Take 1 capsule (300 mg) by mouth 2 times a day., Disp: 60 capsule, Rfl: 11    ibuprofen 600 mg tablet, Take 1 tablet (600 mg) by mouth every 6 hours if needed for mild pain (1 - 3)., Disp: 90 tablet, Rfl: 1    ipratropium-albuteroL (Duo-Neb) 0.5-2.5 mg/3 mL nebulizer solution, inhale the contents of 1 vial via nebulizer 3 times a day as needed for  wheezing, Disp: 90 mL, Rfl: 0    magnesium gluconate (Magonate) 27 mg magnesium (500 mg) tablet, Take 2 tablets (54 mg) by mouth 2 times a day., Disp: 120 tablet, Rfl: 5    ondansetron (Zofran) 8 mg tablet, Take 1 tablet (8 mg) by mouth every 8 hours if needed for nausea or vomiting., Disp: 30 tablet, Rfl: 5    oxyCODONE (Roxicodone) 5 mg immediate release tablet, Take 1 tablet (5 mg) by mouth every 6 hours if needed for severe pain (7 - 10)., Disp: 20 tablet, Rfl: 0    pantoprazole (ProtoNix) 40 mg EC tablet, Take 1 tablet (40 mg) by mouth once daily in the morning. Take before meals. Do not crush, chew, or split., Disp: 30 tablet, Rfl: 3    polyethylene glycol (Glycolax, Miralax) 17 gram packet, Take 17 g by mouth once daily., Disp: , Rfl:     prochlorperazine  (Compazine) 10 mg tablet, Take 1 tablet (10 mg) by mouth every 6 hours if needed for nausea or vomiting., Disp: 30 tablet, Rfl: 5    roflumilast (Daliresp) 500 mcg tablet, Take 1 tablet (500 mcg) by mouth once daily., Disp: 21 tablet, Rfl: 3    rosuvastatin (Crestor) 40 mg tablet, Take 1 tablet (40 mg) by mouth once daily., Disp: 90 tablet, Rfl: 3    sennosides-docusate sodium (Lenka-Colace) 8.6-50 mg tablet, Take 1 tablet by mouth once daily., Disp: 20 tablet, Rfl: 0    umeclidinium-vilanteroL (Anoro Ellipta) 62.5-25 mcg/actuation blister with device, Inhale 1 puff once daily., Disp: 1 each, Rfl: 3  No current facility-administered medications for this visit.

## 2025-04-17 NOTE — TELEPHONE ENCOUNTER
Chemotherapy, home medications, resources, and important contact phone numbers folder put in outgoing mail addressed to the patient's home address.

## 2025-04-18 ENCOUNTER — TUMOR BOARD CONFERENCE (OUTPATIENT)
Dept: HEMATOLOGY/ONCOLOGY | Facility: HOSPITAL | Age: 75
End: 2025-04-18
Payer: MEDICARE

## 2025-04-18 ENCOUNTER — TELEPHONE (OUTPATIENT)
Dept: GENETICS | Facility: CLINIC | Age: 75
End: 2025-04-18

## 2025-04-21 ENCOUNTER — PATIENT MESSAGE (OUTPATIENT)
Dept: PRIMARY CARE | Facility: CLINIC | Age: 75
End: 2025-04-21
Payer: MEDICARE

## 2025-04-21 ENCOUNTER — TELEPHONE (OUTPATIENT)
Dept: GYNECOLOGIC ONCOLOGY | Facility: HOSPITAL | Age: 75
End: 2025-04-21
Payer: MEDICARE

## 2025-04-21 DIAGNOSIS — J01.90 ACUTE SINUSITIS, RECURRENCE NOT SPECIFIED, UNSPECIFIED LOCATION: ICD-10-CM

## 2025-04-21 DIAGNOSIS — J44.1 COPD EXACERBATION (MULTI): Primary | ICD-10-CM

## 2025-04-21 DIAGNOSIS — J44.9 CHRONIC OBSTRUCTIVE PULMONARY DISEASE, UNSPECIFIED COPD TYPE (MULTI): ICD-10-CM

## 2025-04-21 RX ORDER — AMOXICILLIN AND CLAVULANATE POTASSIUM 875; 125 MG/1; MG/1
875 TABLET, FILM COATED ORAL 2 TIMES DAILY
Qty: 20 TABLET | Refills: 0 | Status: SHIPPED | OUTPATIENT
Start: 2025-04-21 | End: 2025-05-01

## 2025-04-21 NOTE — TELEPHONE ENCOUNTER
Reason for Conversation  Lower extremity assessment    Background   Received message from pt requesting refill on pain medication for leg pain. Per Dr. Treviño, call to pt and left message to call back regarding leg pain. Dr. Treviño is inquiring if her leg is cold. If so, will consider LE venous and arterial US.    Disposition   Awaiting return call from pt.

## 2025-04-22 ENCOUNTER — HOME CARE VISIT (OUTPATIENT)
Dept: HOME HEALTH SERVICES | Facility: HOME HEALTH | Age: 75
End: 2025-04-22
Payer: MEDICARE

## 2025-04-22 DIAGNOSIS — N83.8 OVARIAN MASS, RIGHT: ICD-10-CM

## 2025-04-22 DIAGNOSIS — R10.2 PELVIC PAIN IN FEMALE: ICD-10-CM

## 2025-04-22 DIAGNOSIS — M79.604 PAIN OF RIGHT LOWER EXTREMITY: Primary | ICD-10-CM

## 2025-04-22 DIAGNOSIS — C56.9 MALIGNANT NEOPLASM OF OVARY, UNSPECIFIED LATERALITY (MULTI): ICD-10-CM

## 2025-04-22 DIAGNOSIS — R09.89 OTHER SPECIFIED SYMPTOMS AND SIGNS INVOLVING THE CIRCULATORY AND RESPIRATORY SYSTEMS: ICD-10-CM

## 2025-04-22 RX ORDER — OXYCODONE HYDROCHLORIDE 5 MG/1
5 TABLET ORAL EVERY 6 HOURS PRN
Qty: 30 TABLET | Refills: 0 | Status: SHIPPED | OUTPATIENT
Start: 2025-04-22

## 2025-04-23 ENCOUNTER — APPOINTMENT (OUTPATIENT)
Dept: VASCULAR MEDICINE | Facility: HOSPITAL | Age: 75
End: 2025-04-23
Payer: MEDICARE

## 2025-04-23 ENCOUNTER — HOME CARE VISIT (OUTPATIENT)
Dept: HOME HEALTH SERVICES | Facility: HOME HEALTH | Age: 75
End: 2025-04-23
Payer: MEDICARE

## 2025-04-23 VITALS
TEMPERATURE: 97.7 F | OXYGEN SATURATION: 94 % | SYSTOLIC BLOOD PRESSURE: 120 MMHG | HEART RATE: 87 BPM | DIASTOLIC BLOOD PRESSURE: 56 MMHG

## 2025-04-23 PROCEDURE — G0152 HHCP-SERV OF OT,EA 15 MIN: HCPCS | Mod: HHH

## 2025-04-23 ASSESSMENT — ENCOUNTER SYMPTOMS
PERSON REPORTING PAIN: PATIENT
DENIES PAIN: 1

## 2025-04-23 ASSESSMENT — ACTIVITIES OF DAILY LIVING (ADL)
PHYSICAL TRANSFERS ASSESSED: 1
CURRENT_FUNCTION: SUPERVISION
PREPARING MEALS: INDEPENDENT

## 2025-04-23 NOTE — CASE COMMUNICATION
PT missed visit note: Pt. dtr calls therapist today to cancel scheduled therapy appt stating patient is not feeling well today and needs to cancel and to come later in the week.

## 2025-04-24 ENCOUNTER — HOME CARE VISIT (OUTPATIENT)
Dept: HOME HEALTH SERVICES | Facility: HOME HEALTH | Age: 75
End: 2025-04-24
Payer: MEDICARE

## 2025-04-24 VITALS
TEMPERATURE: 97.9 F | SYSTOLIC BLOOD PRESSURE: 125 MMHG | HEART RATE: 77 BPM | DIASTOLIC BLOOD PRESSURE: 66 MMHG | OXYGEN SATURATION: 98 % | RESPIRATION RATE: 17 BRPM

## 2025-04-24 PROCEDURE — G0157 HHC PT ASSISTANT EA 15: HCPCS | Mod: CQ,HHH

## 2025-04-24 SDOH — HEALTH STABILITY: PHYSICAL HEALTH
EXERCISE COMMENTS: 1 SET OF 10 EACH, BLUE THERABAND  SEATED THER EX: MARCHES, LEG EXT, LAQ, TBAND HIP ABD, BALL SQUEEZES HIP ADD  STANDING THER EX: MARCHES, HEEL/TOE RAISES, HIP ABD, HIP FLEX/EXT, MINI SQUATS , HAM CURLS

## 2025-04-24 SDOH — HEALTH STABILITY: PHYSICAL HEALTH: EXERCISE TYPE: B LE STRENGTHENING THER EX,ENDURANCE TRAINING

## 2025-04-24 ASSESSMENT — ENCOUNTER SYMPTOMS
PERSON REPORTING PAIN: PATIENT
PAIN LOCATION - RELIEVING FACTORS: TIME OF DAY
PAIN LOCATION - EXACERBATING FACTORS: TIME OF DAY
PAIN LOCATION: ABDOMEN
MUSCLE WEAKNESS: 1
PAIN SEVERITY GOAL: 0/10
OCCASIONAL FEELINGS OF UNSTEADINESS: 1
PAIN LOCATION - PAIN QUALITY: ACHING
PAIN LOCATION - PAIN SEVERITY: 3/10
PAIN LOCATION - PAIN FREQUENCY: INTERMITTENT
PAIN: 1
ARTHRALGIAS: 1
SUBJECTIVE PAIN PROGRESSION: WAXING AND WANING
LOWEST PAIN SEVERITY IN PAST 24 HOURS: 2/10
HIGHEST PAIN SEVERITY IN PAST 24 HOURS: 6/10

## 2025-04-24 ASSESSMENT — ACTIVITIES OF DAILY LIVING (ADL)
BATHING_CURRENT_FUNCTION: STAND BY ASSIST
AMBULATION ASSISTANCE ON FLAT SURFACES: 1
BATHING ASSESSED: 1
AMBULATION ASSISTANCE: 1
AMBULATION_DISTANCE/DURATION_TOLERATED: 75 FT
AMBULATION ASSISTANCE: STAND BY ASSIST
BATHING EQUIPMENT USED: SHOWER CHAIR

## 2025-04-25 ENCOUNTER — TELEPHONE (OUTPATIENT)
Dept: GYNECOLOGIC ONCOLOGY | Facility: HOSPITAL | Age: 75
End: 2025-04-25
Payer: MEDICARE

## 2025-04-25 DIAGNOSIS — C56.9 MALIGNANT NEOPLASM OF OVARY, UNSPECIFIED LATERALITY (MULTI): ICD-10-CM

## 2025-04-25 NOTE — TELEPHONE ENCOUNTER
I left a message for the patient about her upcoming chemotherapy treatment. I advised her to have her pretreatment labs done Monday or Tuesday. I told her to return my call with any questions.

## 2025-04-28 ENCOUNTER — LAB (OUTPATIENT)
Dept: LAB | Facility: HOSPITAL | Age: 75
End: 2025-04-28
Payer: MEDICARE

## 2025-04-28 ENCOUNTER — TELEPHONE (OUTPATIENT)
Facility: CLINIC | Age: 75
End: 2025-04-28
Payer: MEDICARE

## 2025-04-28 ENCOUNTER — HOME CARE VISIT (OUTPATIENT)
Dept: HOME HEALTH SERVICES | Facility: HOME HEALTH | Age: 75
End: 2025-04-28
Payer: MEDICARE

## 2025-04-28 VITALS
TEMPERATURE: 96.9 F | OXYGEN SATURATION: 97 % | DIASTOLIC BLOOD PRESSURE: 68 MMHG | RESPIRATION RATE: 18 BRPM | HEART RATE: 86 BPM | SYSTOLIC BLOOD PRESSURE: 128 MMHG

## 2025-04-28 DIAGNOSIS — C56.9 MALIGNANT NEOPLASM OF UNSPECIFIED OVARY (MULTI): Primary | ICD-10-CM

## 2025-04-28 LAB
ALBUMIN SERPL BCP-MCNC: 4 G/DL (ref 3.4–5)
ALP SERPL-CCNC: 85 U/L (ref 33–136)
ALT SERPL W P-5'-P-CCNC: 11 U/L (ref 7–45)
ANION GAP SERPL CALC-SCNC: 11 MMOL/L (ref 10–20)
AST SERPL W P-5'-P-CCNC: 12 U/L (ref 9–39)
BASOPHILS # BLD AUTO: 0.03 X10*3/UL (ref 0–0.1)
BASOPHILS NFR BLD AUTO: 0.3 %
BILIRUB SERPL-MCNC: 0.3 MG/DL (ref 0–1.2)
BUN SERPL-MCNC: 14 MG/DL (ref 6–23)
CALCIUM SERPL-MCNC: 10.2 MG/DL (ref 8.6–10.3)
CANCER AG125 SERPL-ACNC: 9.9 U/ML (ref 0–30.2)
CHLORIDE SERPL-SCNC: 106 MMOL/L (ref 98–107)
CO2 SERPL-SCNC: 28 MMOL/L (ref 21–32)
CREAT SERPL-MCNC: 0.72 MG/DL (ref 0.5–1.05)
EGFRCR SERPLBLD CKD-EPI 2021: 87 ML/MIN/1.73M*2
EOSINOPHIL # BLD AUTO: 2.07 X10*3/UL (ref 0–0.4)
EOSINOPHIL NFR BLD AUTO: 20.1 %
ERYTHROCYTE [DISTWIDTH] IN BLOOD BY AUTOMATED COUNT: 14.2 % (ref 11.5–14.5)
GLUCOSE SERPL-MCNC: 83 MG/DL (ref 74–99)
HBV CORE AB SER QL: NONREACTIVE
HBV SURFACE AB SER-ACNC: <3.1 MIU/ML
HBV SURFACE AG SERPL QL IA: NONREACTIVE
HCT VFR BLD AUTO: 42.5 % (ref 36–46)
HGB BLD-MCNC: 13.5 G/DL (ref 12–16)
IMM GRANULOCYTES # BLD AUTO: 0.01 X10*3/UL (ref 0–0.5)
IMM GRANULOCYTES NFR BLD AUTO: 0.1 % (ref 0–0.9)
LYMPHOCYTES # BLD AUTO: 2.93 X10*3/UL (ref 0.8–3)
LYMPHOCYTES NFR BLD AUTO: 28.5 %
MAGNESIUM SERPL-MCNC: 2.12 MG/DL (ref 1.6–2.4)
MCH RBC QN AUTO: 27.7 PG (ref 26–34)
MCHC RBC AUTO-ENTMCNC: 31.8 G/DL (ref 32–36)
MCV RBC AUTO: 87 FL (ref 80–100)
MONOCYTES # BLD AUTO: 0.61 X10*3/UL (ref 0.05–0.8)
MONOCYTES NFR BLD AUTO: 5.9 %
NEUTROPHILS # BLD AUTO: 4.64 X10*3/UL (ref 1.6–5.5)
NEUTROPHILS NFR BLD AUTO: 45.1 %
NRBC BLD-RTO: 0 /100 WBCS (ref 0–0)
PLATELET # BLD AUTO: 282 X10*3/UL (ref 150–450)
POTASSIUM SERPL-SCNC: 4 MMOL/L (ref 3.5–5.3)
PROT SERPL-MCNC: 6.1 G/DL (ref 6.4–8.2)
RBC # BLD AUTO: 4.88 X10*6/UL (ref 4–5.2)
SODIUM SERPL-SCNC: 141 MMOL/L (ref 136–145)
WBC # BLD AUTO: 10.3 X10*3/UL (ref 4.4–11.3)

## 2025-04-28 PROCEDURE — 86304 IMMUNOASSAY TUMOR CA 125: CPT

## 2025-04-28 PROCEDURE — 83735 ASSAY OF MAGNESIUM: CPT

## 2025-04-28 PROCEDURE — 86704 HEP B CORE ANTIBODY TOTAL: CPT

## 2025-04-28 PROCEDURE — 87340 HEPATITIS B SURFACE AG IA: CPT

## 2025-04-28 PROCEDURE — G0151 HHCP-SERV OF PT,EA 15 MIN: HCPCS | Mod: HHH

## 2025-04-28 PROCEDURE — 86706 HEP B SURFACE ANTIBODY: CPT

## 2025-04-28 PROCEDURE — 85025 COMPLETE CBC W/AUTO DIFF WBC: CPT

## 2025-04-28 PROCEDURE — 80053 COMPREHEN METABOLIC PANEL: CPT

## 2025-04-28 SDOH — HEALTH STABILITY: PHYSICAL HEALTH: EXERCISE TYPE: BLE STRENGTHENING EXERCISES.

## 2025-04-28 ASSESSMENT — PAIN SCALES - PAIN ASSESSMENT IN ADVANCED DEMENTIA (PAINAD)
BODYLANGUAGE: 0
CONSOLABILITY: 0 - NO NEED TO CONSOLE.
CONSOLABILITY: 0
BODYLANGUAGE: 0 - RELAXED.
NEGVOCALIZATION: 0
NEGVOCALIZATION: 0 - NONE.
FACIALEXPRESSION: 0
BREATHING: 0
TOTALSCORE: 0
FACIALEXPRESSION: 0 - SMILING OR INEXPRESSIVE.

## 2025-04-28 ASSESSMENT — ACTIVITIES OF DAILY LIVING (ADL)
AMBULATION ASSISTANCE ON FLAT SURFACES: 1
AMBULATION_DISTANCE/DURATION_TOLERATED: 80 FT X 1

## 2025-04-28 ASSESSMENT — ENCOUNTER SYMPTOMS
PAIN LOCATION: ABDOMEN
PAIN LOCATION - PAIN FREQUENCY: INTERMITTENT
PAIN SEVERITY GOAL: 0/10
PAIN: 1
SUBJECTIVE PAIN PROGRESSION: GRADUALLY IMPROVING
LOWEST PAIN SEVERITY IN PAST 24 HOURS: 0/10
PERSON REPORTING PAIN: PATIENT
OCCASIONAL FEELINGS OF UNSTEADINESS: 1
HIGHEST PAIN SEVERITY IN PAST 24 HOURS: 2/10

## 2025-04-28 NOTE — TELEPHONE ENCOUNTER
----- Message from Oly Cronin sent at 4/28/2025  1:46 PM EDT -----  Regarding: RE: followup  Gulshan  ----- Message -----  From: Kat Patience  Sent: 4/28/2025   9:27 AM EDT  To: MARIA ANTONIA Bishop  Subject: RE: followup                                     Talked to daughter. Patient is starting chemo therapy tomorrow. She wants to know if you can MyChart message the results and your thoughts and if you think that results need to be discussed in more detail, I gave her office phone number and daughter will set up a virtual visit for her mom.  ----- Message -----  From: MARIA ANTONIA Bishop  Sent: 4/28/2025  12:00 AM EDT  To:  [email or web address] Clinical Support Staff  Subject: followup                                         Patient had PFT and CT chest and has no followup. Please reach out to patientSantina

## 2025-04-29 ENCOUNTER — HOME CARE VISIT (OUTPATIENT)
Dept: HOME HEALTH SERVICES | Facility: HOME HEALTH | Age: 75
End: 2025-04-29
Payer: MEDICARE

## 2025-04-29 VITALS
TEMPERATURE: 98.1 F | SYSTOLIC BLOOD PRESSURE: 126 MMHG | HEART RATE: 88 BPM | DIASTOLIC BLOOD PRESSURE: 78 MMHG | RESPIRATION RATE: 16 BRPM | OXYGEN SATURATION: 98 %

## 2025-04-29 PROCEDURE — G0157 HHC PT ASSISTANT EA 15: HCPCS | Mod: CQ,HHH

## 2025-04-29 SDOH — HEALTH STABILITY: PHYSICAL HEALTH: EXERCISE TYPE: B LE STRENGTHENING THER EX, ENDURANCE TRAINING

## 2025-04-29 SDOH — HEALTH STABILITY: PHYSICAL HEALTH: EXERCISE COMMENTS: P ABD, HIP FLEX/EXT, MINI SQUATS , HAM CURLS

## 2025-04-29 ASSESSMENT — ENCOUNTER SYMPTOMS
PAIN LOCATION - PAIN SEVERITY: 3/10
MUSCLE WEAKNESS: 1
PAIN LOCATION - PAIN FREQUENCY: INTERMITTENT
PAIN LOCATION - PAIN QUALITY: ACHING
ARTHRALGIAS: 1
PAIN LOCATION: LEFT LEG
PERSON REPORTING PAIN: PATIENT
PAIN: 1
OCCASIONAL FEELINGS OF UNSTEADINESS: 1
LOWEST PAIN SEVERITY IN PAST 24 HOURS: 4/10
PAIN LOCATION - PAIN QUALITY: ACHING
PAIN SEVERITY GOAL: 0/10
PAIN LOCATION - PAIN FREQUENCY: INTERMITTENT
HIGHEST PAIN SEVERITY IN PAST 24 HOURS: 6/10
PAIN LOCATION - RELIEVING FACTORS: REST
PAIN LOCATION - RELIEVING FACTORS: REST
PAIN LOCATION - EXACERBATING FACTORS: TIME OF DAY
PAIN LOCATION - EXACERBATING FACTORS: TIME OF DAY
PAIN LOCATION: RIGHT LEG
SUBJECTIVE PAIN PROGRESSION: UNCHANGED
PAIN LOCATION - PAIN SEVERITY: 3/10

## 2025-04-29 ASSESSMENT — ACTIVITIES OF DAILY LIVING (ADL)
BATHING_CURRENT_FUNCTION: STAND BY ASSIST
AMBULATION ASSISTANCE ON FLAT SURFACES: 1
AMBULATION ASSISTANCE: STAND BY ASSIST
AMBULATION_DISTANCE/DURATION_TOLERATED: 85 FT
BATHING ASSESSED: 1
BATHING EQUIPMENT USED: SHOWER CHAIR
AMBULATION ASSISTANCE: 1

## 2025-04-30 ENCOUNTER — SOCIAL WORK (OUTPATIENT)
Dept: HEMATOLOGY/ONCOLOGY | Facility: CLINIC | Age: 75
End: 2025-04-30

## 2025-04-30 ENCOUNTER — OFFICE VISIT (OUTPATIENT)
Dept: GYNECOLOGIC ONCOLOGY | Facility: CLINIC | Age: 75
End: 2025-04-30
Payer: MEDICARE

## 2025-04-30 ENCOUNTER — INFUSION (OUTPATIENT)
Dept: HEMATOLOGY/ONCOLOGY | Facility: CLINIC | Age: 75
End: 2025-04-30
Payer: MEDICARE

## 2025-04-30 VITALS
HEIGHT: 63 IN | BODY MASS INDEX: 21.41 KG/M2 | OXYGEN SATURATION: 98 % | TEMPERATURE: 97 F | SYSTOLIC BLOOD PRESSURE: 146 MMHG | RESPIRATION RATE: 16 BRPM | WEIGHT: 120.81 LBS | HEART RATE: 103 BPM | DIASTOLIC BLOOD PRESSURE: 71 MMHG

## 2025-04-30 DIAGNOSIS — C56.9 MALIGNANT NEOPLASM OF OVARY, UNSPECIFIED LATERALITY (MULTI): ICD-10-CM

## 2025-04-30 DIAGNOSIS — M79.604 PAIN IN BOTH LOWER EXTREMITIES: ICD-10-CM

## 2025-04-30 DIAGNOSIS — R06.09 DYSPNEA ON EXERTION: ICD-10-CM

## 2025-04-30 DIAGNOSIS — M54.31 SCIATIC PAIN, RIGHT: ICD-10-CM

## 2025-04-30 DIAGNOSIS — J44.1 COPD EXACERBATION (MULTI): ICD-10-CM

## 2025-04-30 DIAGNOSIS — M79.605 PAIN IN BOTH LOWER EXTREMITIES: ICD-10-CM

## 2025-04-30 DIAGNOSIS — Z51.11 CHEMOTHERAPY MANAGEMENT, ENCOUNTER FOR: Primary | ICD-10-CM

## 2025-04-30 PROCEDURE — 96417 CHEMO IV INFUS EACH ADDL SEQ: CPT

## 2025-04-30 PROCEDURE — 2500000001 HC RX 250 WO HCPCS SELF ADMINISTERED DRUGS (ALT 637 FOR MEDICARE OP): Performed by: STUDENT IN AN ORGANIZED HEALTH CARE EDUCATION/TRAINING PROGRAM

## 2025-04-30 PROCEDURE — 96415 CHEMO IV INFUSION ADDL HR: CPT

## 2025-04-30 PROCEDURE — 2500000004 HC RX 250 GENERAL PHARMACY W/ HCPCS (ALT 636 FOR OP/ED): Mod: JZ | Performed by: STUDENT IN AN ORGANIZED HEALTH CARE EDUCATION/TRAINING PROGRAM

## 2025-04-30 PROCEDURE — 96375 TX/PRO/DX INJ NEW DRUG ADDON: CPT | Mod: INF

## 2025-04-30 PROCEDURE — 99215 OFFICE O/P EST HI 40 MIN: CPT | Mod: 25 | Performed by: STUDENT IN AN ORGANIZED HEALTH CARE EDUCATION/TRAINING PROGRAM

## 2025-04-30 PROCEDURE — 2500000004 HC RX 250 GENERAL PHARMACY W/ HCPCS (ALT 636 FOR OP/ED): Performed by: STUDENT IN AN ORGANIZED HEALTH CARE EDUCATION/TRAINING PROGRAM

## 2025-04-30 PROCEDURE — 96413 CHEMO IV INFUSION 1 HR: CPT

## 2025-04-30 PROCEDURE — 96367 TX/PROPH/DG ADDL SEQ IV INF: CPT

## 2025-04-30 RX ORDER — DIPHENHYDRAMINE HCL 25 MG
50 CAPSULE ORAL ONCE
Status: CANCELLED | OUTPATIENT
Start: 2025-04-30

## 2025-04-30 RX ORDER — HEPARIN SODIUM,PORCINE/PF 10 UNIT/ML
50 SYRINGE (ML) INTRAVENOUS AS NEEDED
OUTPATIENT
Start: 2025-04-30

## 2025-04-30 RX ORDER — PROCHLORPERAZINE MALEATE 10 MG
10 TABLET ORAL EVERY 6 HOURS PRN
Status: DISCONTINUED | OUTPATIENT
Start: 2025-04-30 | End: 2025-04-30 | Stop reason: HOSPADM

## 2025-04-30 RX ORDER — EPINEPHRINE 0.3 MG/.3ML
0.3 INJECTION SUBCUTANEOUS EVERY 5 MIN PRN
Status: DISCONTINUED | OUTPATIENT
Start: 2025-04-30 | End: 2025-04-30 | Stop reason: HOSPADM

## 2025-04-30 RX ORDER — PROCHLORPERAZINE EDISYLATE 5 MG/ML
10 INJECTION INTRAMUSCULAR; INTRAVENOUS EVERY 6 HOURS PRN
Status: DISCONTINUED | OUTPATIENT
Start: 2025-04-30 | End: 2025-04-30 | Stop reason: HOSPADM

## 2025-04-30 RX ORDER — EPINEPHRINE 0.3 MG/.3ML
0.3 INJECTION SUBCUTANEOUS EVERY 5 MIN PRN
Status: CANCELLED | OUTPATIENT
Start: 2025-04-30

## 2025-04-30 RX ORDER — FAMOTIDINE 10 MG/ML
20 INJECTION, SOLUTION INTRAVENOUS ONCE AS NEEDED
Status: CANCELLED | OUTPATIENT
Start: 2025-04-30

## 2025-04-30 RX ORDER — PROCHLORPERAZINE MALEATE 10 MG
10 TABLET ORAL EVERY 6 HOURS PRN
Status: CANCELLED | OUTPATIENT
Start: 2025-04-30

## 2025-04-30 RX ORDER — DIPHENHYDRAMINE HYDROCHLORIDE 50 MG/ML
50 INJECTION, SOLUTION INTRAMUSCULAR; INTRAVENOUS AS NEEDED
Status: DISCONTINUED | OUTPATIENT
Start: 2025-04-30 | End: 2025-04-30 | Stop reason: HOSPADM

## 2025-04-30 RX ORDER — IPRATROPIUM BROMIDE AND ALBUTEROL SULFATE 2.5; .5 MG/3ML; MG/3ML
3 SOLUTION RESPIRATORY (INHALATION)
Qty: 90 ML | Refills: 3 | Status: SHIPPED | OUTPATIENT
Start: 2025-04-30

## 2025-04-30 RX ORDER — PALONOSETRON 0.05 MG/ML
0.25 INJECTION, SOLUTION INTRAVENOUS ONCE
Status: COMPLETED | OUTPATIENT
Start: 2025-04-30 | End: 2025-04-30

## 2025-04-30 RX ORDER — ALBUTEROL SULFATE 0.83 MG/ML
3 SOLUTION RESPIRATORY (INHALATION) AS NEEDED
Status: CANCELLED | OUTPATIENT
Start: 2025-04-30

## 2025-04-30 RX ORDER — DIPHENHYDRAMINE HYDROCHLORIDE 50 MG/ML
50 INJECTION, SOLUTION INTRAMUSCULAR; INTRAVENOUS AS NEEDED
Status: CANCELLED | OUTPATIENT
Start: 2025-04-30

## 2025-04-30 RX ORDER — FAMOTIDINE 10 MG/ML
20 INJECTION, SOLUTION INTRAVENOUS ONCE
Status: COMPLETED | OUTPATIENT
Start: 2025-04-30 | End: 2025-04-30

## 2025-04-30 RX ORDER — FAMOTIDINE 10 MG/ML
20 INJECTION, SOLUTION INTRAVENOUS ONCE AS NEEDED
Status: DISCONTINUED | OUTPATIENT
Start: 2025-04-30 | End: 2025-04-30 | Stop reason: HOSPADM

## 2025-04-30 RX ORDER — HEPARIN 100 UNIT/ML
500 SYRINGE INTRAVENOUS AS NEEDED
OUTPATIENT
Start: 2025-04-30

## 2025-04-30 RX ORDER — PROCHLORPERAZINE EDISYLATE 5 MG/ML
10 INJECTION INTRAMUSCULAR; INTRAVENOUS EVERY 6 HOURS PRN
Status: CANCELLED | OUTPATIENT
Start: 2025-04-30

## 2025-04-30 RX ORDER — DIPHENHYDRAMINE HCL 25 MG
50 CAPSULE ORAL ONCE
Status: COMPLETED | OUTPATIENT
Start: 2025-04-30 | End: 2025-04-30

## 2025-04-30 RX ORDER — FAMOTIDINE 10 MG/ML
20 INJECTION, SOLUTION INTRAVENOUS ONCE
Status: CANCELLED | OUTPATIENT
Start: 2025-04-30

## 2025-04-30 RX ORDER — PALONOSETRON 0.05 MG/ML
0.25 INJECTION, SOLUTION INTRAVENOUS ONCE
Status: CANCELLED | OUTPATIENT
Start: 2025-04-30

## 2025-04-30 RX ORDER — ALBUTEROL SULFATE 0.83 MG/ML
3 SOLUTION RESPIRATORY (INHALATION) AS NEEDED
Status: DISCONTINUED | OUTPATIENT
Start: 2025-04-30 | End: 2025-04-30 | Stop reason: HOSPADM

## 2025-04-30 RX ADMIN — DEXAMETHASONE SODIUM PHOSPHATE 12 MG: 10 INJECTION, SOLUTION INTRAMUSCULAR; INTRAVENOUS at 09:26

## 2025-04-30 RX ADMIN — FAMOTIDINE 20 MG: 10 INJECTION INTRAVENOUS at 09:28

## 2025-04-30 RX ADMIN — FOSAPREPITANT 150 MG: 150 INJECTION, POWDER, LYOPHILIZED, FOR SOLUTION INTRAVENOUS at 09:57

## 2025-04-30 RX ADMIN — PACLITAXEL 216 MG: 6 INJECTION, SOLUTION INTRAVENOUS at 10:41

## 2025-04-30 RX ADMIN — DIPHENHYDRAMINE HYDROCHLORIDE 50 MG: 25 CAPSULE ORAL at 09:28

## 2025-04-30 RX ADMIN — PALONOSETRON 0.25 MG: 0.05 INJECTION, SOLUTION INTRAVENOUS at 09:27

## 2025-04-30 RX ADMIN — CARBOPLATIN 420 MG: 10 INJECTION, SOLUTION INTRAVENOUS at 14:02

## 2025-04-30 ASSESSMENT — PAIN SCALES - GENERAL: PAINLEVEL_OUTOF10: 0-NO PAIN

## 2025-04-30 NOTE — PROGRESS NOTES
"Patient ID: Yadira Davis is a 74 y.o. female.  Referring Physician: No referring provider defined for this encounter.  Primary Care Provider: Sylvie Medina DO    Subjective    Here for C1 carboplatin/paclitaxel for ovarian carcinosarcoma. CT completed with plan to review today. Leg pain has resolved and that is why she did not complete the US. Continues with gabapentin and Voltaren cream. Denies NV. Voiding without difficulty. No abdominal pain. Completed course of augmentin for URI.     Objective    BSA: 1.56 meters squared  /71   Pulse 103   Temp 36.1 °C (97 °F)   Resp 16   Ht (S) 1.592 m (5' 2.68\")   Wt 54.8 kg (120 lb 13 oz)   SpO2 98%   BMI 21.62 kg/m²      Physical Exam  Vitals and nursing note reviewed. Exam conducted with a chaperone present.   Constitutional:       Appearance: Normal appearance. She is normal weight.   HENT:      Head: Normocephalic and atraumatic.      Mouth/Throat:      Mouth: Mucous membranes are moist.      Pharynx: No oropharyngeal exudate or posterior oropharyngeal erythema.   Eyes:      Extraocular Movements: Extraocular movements intact.      Conjunctiva/sclera: Conjunctivae normal.      Pupils: Pupils are equal, round, and reactive to light.   Cardiovascular:      Rate and Rhythm: Normal rate.   Pulmonary:      Effort: Pulmonary effort is normal.      Breath sounds: No wheezing, rhonchi or rales.   Abdominal:      General: A surgical scar is present.      Palpations: Abdomen is soft. There is no mass.      Tenderness: There is no guarding or rebound.      Hernia: No hernia is present.      Comments: Surgical incision clean/dry/intact without redness, drainage or erythema      Genitourinary:     Pubic Area: No rash.    Musculoskeletal:         General: Normal range of motion.   Skin:     General: Skin is warm.      Findings: No erythema or rash.   Neurological:      General: No focal deficit present.      Mental Status: She is alert and oriented to person, place, " and time.   Psychiatric:         Mood and Affect: Mood normal.         Behavior: Behavior normal.         Performance Status:  Asymptomatic    Assessment/Plan     Oncology History Overview Note   3/25/25 XL BS RO with radical dissection omentectomy pelvic peritonectomy argon beam ablation clinical stage IIIB   Pathology Carcinosarcoma of the ovary      Ovarian cancer (Multi)   4/12/2025 Initial Diagnosis    Ovarian cancer (Multi)     4/30/2025 -  Chemotherapy    PACLitaxel / CARBOplatin, 21 Day Cycles - Gyn       Diagnoses and all orders for this visit:  Malignant neoplasm of ovary, unspecified laterality (Multi)    # Ovarian cancer  - We reviewed her imaging and pathology results, reviewed high risk histology  - We discussed the pathophysiology of ovarian, fallopian tube, and peritoneal cancers.   - We discussed my recommendation for adjuvant treatment with carboplatin AUC 5 and paclitaxel 135 mg/m2, consent signed previously   - Labs reviewed and appropriate for treatment  - Referral to genetics, reported daughter is known BRCA1 carrier but patient herself has never been tested, scheduled 5/12/25  - [facility] with FOLR1, HER2 testing  - CT reviewed without stable disease, posterior vaginal cuff nodule, equivocal peritoneal nodules, stable lung nodules     # Leg pain, bilateral R > L; resolved  - Recommend duplex US arterial and venous, did not complete   - Monitor    # sciatica   - Continue to work with PT, continue gabapentin 300 BID    Jaki Treviño MD MPH

## 2025-05-01 LAB — SCAN RESULT: NORMAL

## 2025-05-01 NOTE — PROGRESS NOTES
PSYCHOSOCIAL ASSESSMENT     Demographic Information  Yadira Davis  1950  [id number]  Preferred Name: Yadira  Assessment Type:  Initial  Date of assessment: 05/01/25  Provider(s): Dr. Treviño  Diagnosis: Ovarian Cancer  Person(s) present during assessment: daughter  Primary language: english  Interpretive services used: none    Distress Thermometer  Distress Score: N/A  Distress Concerns: N/A                Living Environment/Support Systems  Partner Status:   Children: 2 daughters  Support systems: daughters, grandchildren, other family/friends  Primary caregiver: Self  Current Living Situation: House  Resides with: daughter and son-in-law  Concerns with Housing Environment: None  Comments:     Safety  Patient safe at home? yes  History of Domestic Violence: no history of domestic violence was disclosed      Functional Status  Functional status: Independent  Patient currently ambulates: Independently  Patient has following equipment: None  Other physical health issues that the patient is experiencing: no other health concerns noted at this time  What supports are in place to assist the patient: None  Transportation:  Family/Friends: daughter  Comments:         Finances/Insurance  Insurance: Anthem medicare  Does the patient have any pending insurance applications: No   Hospital Financial Assistance: None  Patient's income source: FDC and employment  Work History: 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 No   Applicable [place]: No   Comments:      Advance Directives  Advance Directives were not discussed at this time  Health Care Agent Status:Not Activated  Health Care Agent, When applicable:   Comments:    Legal Involvement  Relevant current or previous legal concerns: No current legal concerns     Spiritism  or Spiritual Identity  Comments: patient identifies as Sikhism    Mental Health  Active SI/HI: No  Mental Health Diagnosis, if applicable:   Mood: Appropriate for the situation  Concerns relating to substance use (including alcohol/tobacco): none  Patient identified coping skills: humor  Cognitive Comments: No cognitive concerns noted  Relevant Providers:   Comments:       Assessment  Potential Barriers to Care:  None Identified  Patient Strengths:  Healthy Coping Skills, Motivated for Treatment, Self-Advocate, and Strong Support System    Plan  Referrals: [facility]  Applications: N/A  Other: N/A    Narrative: Patient is a 74 year old female starting treatment for ovarian cancer.  She was open to meeting with  and was easily engaged in conversation.  Met with patient and daughter during her 1st treatment visit, patient follows with Dr. Treviño.  Patient appears to be coping appropriately - she uses humor to cope and is maintaining a positive outlook.  She has good family support from her daughters - one who lives with the patient and the other who is out of state.  She also shared that she has supportive grandchildren.  Patient shared that she had breast cancer years ago and did receive treatment for that.  She shared that she had a difficult time with the treatment she received but hopes that she will tolerate this treatment better.  Patient shared that her granddaughter is graduating from [employer] next week and she is looking forward to the celebration.  She is hoping that her hair will not fall out until after the graduation - but she is considering options for wigs/head coverings. Provided her with information on the wig salons through the [facility].  Also spoke briefly about other area salons and wig shops.  Patient appreciative of the information provided.      Patient denied financial concerns related to her treatment.  She currently is  on [employer] from her job - she works as a  at [employer] and does have income from [employer].  Patient currently living at home with her daughter and son-in-law where she has lived for the last 5 years.  She remains independent and denies any need for homecare services.  Patient did inquire if there is a way to consolidate her medical bills owed - will reach out to Reena Lockett, [facility] financial navigator to ask that she follow up with patient regarding a payment plan.     No other issues or concerns noted at this time.  LSW provided my contact information and will continue to follow patient for ongoing support and assessment.

## 2025-05-02 ENCOUNTER — TELEPHONE (OUTPATIENT)
Dept: GYNECOLOGIC ONCOLOGY | Facility: HOSPITAL | Age: 75
End: 2025-05-02
Payer: MEDICARE

## 2025-05-02 DIAGNOSIS — R30.0 DYSURIA: Primary | ICD-10-CM

## 2025-05-02 PROCEDURE — 82378 CARCINOEMBRYONIC ANTIGEN: CPT

## 2025-05-02 PROCEDURE — 85027 COMPLETE CBC AUTOMATED: CPT

## 2025-05-02 PROCEDURE — 86301 IMMUNOASSAY TUMOR CA 19-9: CPT

## 2025-05-02 PROCEDURE — 80048 BASIC METABOLIC PNL TOTAL CA: CPT

## 2025-05-02 RX ORDER — SULFAMETHOXAZOLE AND TRIMETHOPRIM 800; 160 MG/1; MG/1
1 TABLET ORAL 2 TIMES DAILY
Qty: 6 TABLET | Refills: 0 | Status: SHIPPED | OUTPATIENT
Start: 2025-05-02 | End: 2025-05-05

## 2025-05-02 NOTE — TELEPHONE ENCOUNTER
Reason for Conversation  The patient's daughter called with complaints of burning with urination, urgency and sharp persistent pain on the right side that started last night. The patient told her daughter that it feels like she has a urinary tract infection.  Background   The patient has diagnosis of uterine cancer and had her first chemotherapy treatment on Wednesday.     Disposition   No disposition on file.      I left a message on the patient's daughter's voicemail that a UA/UC was ordered as well as antibiotics. I instructed her to have the patient provide a sample to the lab prior to starting the antibiotics.

## 2025-05-03 ENCOUNTER — LAB (OUTPATIENT)
Dept: LAB | Facility: HOSPITAL | Age: 75
End: 2025-05-03
Payer: MEDICARE

## 2025-05-03 DIAGNOSIS — R97.8 OTHER ABNORMAL TUMOR MARKERS: ICD-10-CM

## 2025-05-03 DIAGNOSIS — N83.8 OVARIAN MASS, RIGHT: ICD-10-CM

## 2025-05-03 LAB
ANION GAP SERPL CALC-SCNC: 11 MMOL/L (ref 10–20)
BUN SERPL-MCNC: 31 MG/DL (ref 6–23)
CALCIUM SERPL-MCNC: 10.1 MG/DL (ref 8.6–10.6)
CANCER AG19-9 SERPL-ACNC: 9.37 U/ML
CEA SERPL-MCNC: 2 UG/L
CHLORIDE SERPL-SCNC: 106 MMOL/L (ref 98–107)
CO2 SERPL-SCNC: 24 MMOL/L (ref 21–32)
CREAT SERPL-MCNC: 0.76 MG/DL (ref 0.5–1.05)
EGFRCR SERPLBLD CKD-EPI 2021: 81 ML/MIN/1.73M*2
ERYTHROCYTE [DISTWIDTH] IN BLOOD BY AUTOMATED COUNT: 14.4 % (ref 11.5–14.5)
GLUCOSE SERPL-MCNC: 125 MG/DL (ref 74–99)
HCT VFR BLD AUTO: 39.4 % (ref 36–46)
HGB BLD-MCNC: 12.5 G/DL (ref 12–16)
MCH RBC QN AUTO: 28 PG (ref 26–34)
MCHC RBC AUTO-ENTMCNC: 31.7 G/DL (ref 32–36)
MCV RBC AUTO: 88 FL (ref 80–100)
NRBC BLD-RTO: 0.2 /100 WBCS (ref 0–0)
PLATELET # BLD AUTO: 291 X10*3/UL (ref 150–450)
POTASSIUM SERPL-SCNC: 4.3 MMOL/L (ref 3.5–5.3)
RBC # BLD AUTO: 4.46 X10*6/UL (ref 4–5.2)
SODIUM SERPL-SCNC: 137 MMOL/L (ref 136–145)
WBC # BLD AUTO: 13.1 X10*3/UL (ref 4.4–11.3)

## 2025-05-06 DIAGNOSIS — R10.2 PELVIC PAIN IN FEMALE: ICD-10-CM

## 2025-05-06 DIAGNOSIS — N83.8 OVARIAN MASS, RIGHT: ICD-10-CM

## 2025-05-06 RX ORDER — OXYCODONE HYDROCHLORIDE 5 MG/1
5 TABLET ORAL EVERY 6 HOURS PRN
Qty: 30 TABLET | Refills: 0 | Status: SHIPPED | OUTPATIENT
Start: 2025-05-06

## 2025-05-07 ENCOUNTER — TELEPHONE (OUTPATIENT)
Dept: PALLIATIVE MEDICINE | Facility: CLINIC | Age: 75
End: 2025-05-07
Payer: MEDICARE

## 2025-05-07 NOTE — TELEPHONE ENCOUNTER
Patient referred to supportive oncology/palliative care by Dr. Treviño. Patient with ovarian cancer experiencing cancer related abdominal pain, neuropathy, and insomnia. Patient also with post op sciatic nerve pain per notes. I called patient's daughter, Randa, and left  providing our call back number. I will try calling later this week if no return call.

## 2025-05-08 ENCOUNTER — HOME CARE VISIT (OUTPATIENT)
Dept: HOME HEALTH SERVICES | Facility: HOME HEALTH | Age: 75
End: 2025-05-08
Payer: MEDICARE

## 2025-05-08 NOTE — CASE COMMUNICATION
PT missed visit notification: Pt. dtr states patient is not feeling well from chemo and asks for therapist to come 1x this week on Friday 5.9.25

## 2025-05-09 ENCOUNTER — HOME CARE VISIT (OUTPATIENT)
Dept: HOME HEALTH SERVICES | Facility: HOME HEALTH | Age: 75
End: 2025-05-09
Payer: MEDICARE

## 2025-05-10 ENCOUNTER — HOSPITAL ENCOUNTER (EMERGENCY)
Facility: HOSPITAL | Age: 75
Discharge: HOME | End: 2025-05-11
Attending: EMERGENCY MEDICINE
Payer: MEDICARE

## 2025-05-10 ENCOUNTER — APPOINTMENT (OUTPATIENT)
Dept: RADIOLOGY | Facility: HOSPITAL | Age: 75
End: 2025-05-10
Payer: MEDICARE

## 2025-05-10 DIAGNOSIS — E86.0 DEHYDRATION: Primary | ICD-10-CM

## 2025-05-10 DIAGNOSIS — R11.2 NAUSEA AND VOMITING, UNSPECIFIED VOMITING TYPE: ICD-10-CM

## 2025-05-10 LAB
ALBUMIN SERPL BCP-MCNC: 3.8 G/DL (ref 3.4–5)
ALP SERPL-CCNC: 70 U/L (ref 33–136)
ALT SERPL W P-5'-P-CCNC: 13 U/L (ref 7–45)
ANION GAP SERPL CALC-SCNC: 13 MMOL/L (ref 10–20)
AST SERPL W P-5'-P-CCNC: 9 U/L (ref 9–39)
BASOPHILS # BLD AUTO: 0.01 X10*3/UL (ref 0–0.1)
BASOPHILS NFR BLD AUTO: 0.7 %
BILIRUB SERPL-MCNC: 0.5 MG/DL (ref 0–1.2)
BUN SERPL-MCNC: 23 MG/DL (ref 6–23)
CALCIUM SERPL-MCNC: 10.7 MG/DL (ref 8.6–10.3)
CHLORIDE SERPL-SCNC: 97 MMOL/L (ref 98–107)
CO2 SERPL-SCNC: 24 MMOL/L (ref 21–32)
CREAT SERPL-MCNC: 0.83 MG/DL (ref 0.5–1.05)
EGFRCR SERPLBLD CKD-EPI 2021: 73 ML/MIN/1.73M*2
EOSINOPHIL # BLD AUTO: 0.05 X10*3/UL (ref 0–0.4)
EOSINOPHIL NFR BLD AUTO: 3.6 %
ERYTHROCYTE [DISTWIDTH] IN BLOOD BY AUTOMATED COUNT: 13.2 % (ref 11.5–14.5)
GLUCOSE SERPL-MCNC: 121 MG/DL (ref 74–99)
HCT VFR BLD AUTO: 36.1 % (ref 36–46)
HGB BLD-MCNC: 12.3 G/DL (ref 12–16)
IMM GRANULOCYTES # BLD AUTO: 0.02 X10*3/UL (ref 0–0.5)
IMM GRANULOCYTES NFR BLD AUTO: 1.4 % (ref 0–0.9)
LYMPHOCYTES # BLD AUTO: 0.94 X10*3/UL (ref 0.8–3)
LYMPHOCYTES NFR BLD AUTO: 67.1 %
MAGNESIUM SERPL-MCNC: 1.75 MG/DL (ref 1.6–2.4)
MCH RBC QN AUTO: 28 PG (ref 26–34)
MCHC RBC AUTO-ENTMCNC: 34.1 G/DL (ref 32–36)
MCV RBC AUTO: 82 FL (ref 80–100)
MONOCYTES # BLD AUTO: 0.36 X10*3/UL (ref 0.05–0.8)
MONOCYTES NFR BLD AUTO: 25.7 %
NEUTROPHILS # BLD AUTO: 0.02 X10*3/UL (ref 1.6–5.5)
NEUTROPHILS NFR BLD AUTO: 1.5 %
NRBC BLD-RTO: 0 /100 WBCS (ref 0–0)
OVALOCYTES BLD QL SMEAR: NORMAL
PHOSPHATE SERPL-MCNC: 2 MG/DL (ref 2.5–4.9)
PLATELET # BLD AUTO: 123 X10*3/UL (ref 150–450)
POTASSIUM SERPL-SCNC: 3.6 MMOL/L (ref 3.5–5.3)
PROT SERPL-MCNC: 6.4 G/DL (ref 6.4–8.2)
RBC # BLD AUTO: 4.39 X10*6/UL (ref 4–5.2)
RBC MORPH BLD: NORMAL
SODIUM SERPL-SCNC: 130 MMOL/L (ref 136–145)
WBC # BLD AUTO: 1.4 X10*3/UL (ref 4.4–11.3)

## 2025-05-10 PROCEDURE — 83735 ASSAY OF MAGNESIUM: CPT | Performed by: EMERGENCY MEDICINE

## 2025-05-10 PROCEDURE — 2500000004 HC RX 250 GENERAL PHARMACY W/ HCPCS (ALT 636 FOR OP/ED): Mod: JZ | Performed by: EMERGENCY MEDICINE

## 2025-05-10 PROCEDURE — 36415 COLL VENOUS BLD VENIPUNCTURE: CPT | Performed by: EMERGENCY MEDICINE

## 2025-05-10 PROCEDURE — 96360 HYDRATION IV INFUSION INIT: CPT

## 2025-05-10 PROCEDURE — 84100 ASSAY OF PHOSPHORUS: CPT | Performed by: EMERGENCY MEDICINE

## 2025-05-10 PROCEDURE — 74177 CT ABD & PELVIS W/CONTRAST: CPT | Performed by: STUDENT IN AN ORGANIZED HEALTH CARE EDUCATION/TRAINING PROGRAM

## 2025-05-10 PROCEDURE — 99285 EMERGENCY DEPT VISIT HI MDM: CPT | Mod: 25 | Performed by: EMERGENCY MEDICINE

## 2025-05-10 PROCEDURE — 81003 URINALYSIS AUTO W/O SCOPE: CPT | Performed by: EMERGENCY MEDICINE

## 2025-05-10 PROCEDURE — 87075 CULTR BACTERIA EXCEPT BLOOD: CPT | Mod: PORLAB,59 | Performed by: EMERGENCY MEDICINE

## 2025-05-10 PROCEDURE — 85025 COMPLETE CBC W/AUTO DIFF WBC: CPT | Performed by: EMERGENCY MEDICINE

## 2025-05-10 PROCEDURE — 2550000001 HC RX 255 CONTRASTS: Mod: JZ | Performed by: EMERGENCY MEDICINE

## 2025-05-10 PROCEDURE — 80053 COMPREHEN METABOLIC PANEL: CPT | Performed by: EMERGENCY MEDICINE

## 2025-05-10 PROCEDURE — 74177 CT ABD & PELVIS W/CONTRAST: CPT

## 2025-05-10 RX ADMIN — SODIUM CHLORIDE 1000 ML: 0.9 INJECTION, SOLUTION INTRAVENOUS at 20:48

## 2025-05-10 RX ADMIN — IOHEXOL 75 ML: 350 INJECTION, SOLUTION INTRAVENOUS at 21:41

## 2025-05-10 RX ADMIN — SODIUM CHLORIDE 1000 ML: 0.9 INJECTION, SOLUTION INTRAVENOUS at 22:29

## 2025-05-10 ASSESSMENT — PAIN SCALES - GENERAL: PAINLEVEL_OUTOF10: 4

## 2025-05-10 ASSESSMENT — PAIN DESCRIPTION - LOCATION: LOCATION: ABDOMEN

## 2025-05-10 ASSESSMENT — PAIN - FUNCTIONAL ASSESSMENT: PAIN_FUNCTIONAL_ASSESSMENT: 0-10

## 2025-05-10 NOTE — CASE COMMUNICATION
PT missed visit notifcation: Pt. dtr calls to cancel therapy today stating patient is still has nausea from chemo this week.  Therapy will attempt and return next week.

## 2025-05-11 VITALS
HEIGHT: 63 IN | BODY MASS INDEX: 21.26 KG/M2 | OXYGEN SATURATION: 99 % | TEMPERATURE: 97.9 F | HEART RATE: 94 BPM | SYSTOLIC BLOOD PRESSURE: 102 MMHG | WEIGHT: 120 LBS | DIASTOLIC BLOOD PRESSURE: 64 MMHG | RESPIRATION RATE: 16 BRPM

## 2025-05-11 LAB
APPEARANCE UR: CLEAR
BILIRUB UR STRIP.AUTO-MCNC: NEGATIVE MG/DL
COLOR UR: ABNORMAL
GLUCOSE UR STRIP.AUTO-MCNC: ABNORMAL MG/DL
HOLD SPECIMEN: NORMAL
KETONES UR STRIP.AUTO-MCNC: ABNORMAL MG/DL
LEUKOCYTE ESTERASE UR QL STRIP.AUTO: NEGATIVE
MUCOUS THREADS #/AREA URNS AUTO: ABNORMAL /LPF
NITRITE UR QL STRIP.AUTO: NEGATIVE
PH UR STRIP.AUTO: 5.5 [PH]
PROT UR STRIP.AUTO-MCNC: ABNORMAL MG/DL
RBC # UR STRIP.AUTO: ABNORMAL MG/DL
RBC #/AREA URNS AUTO: >20 /HPF
SP GR UR STRIP.AUTO: 1.02
SQUAMOUS #/AREA URNS AUTO: ABNORMAL /HPF
TRANS CELLS #/AREA UR COMP ASSIST: ABNORMAL /HPF
UROBILINOGEN UR STRIP.AUTO-MCNC: ABNORMAL MG/DL
WBC #/AREA URNS AUTO: ABNORMAL /HPF

## 2025-05-11 RX ORDER — ONDANSETRON 4 MG/1
4 TABLET, ORALLY DISINTEGRATING ORAL EVERY 8 HOURS PRN
Qty: 30 TABLET | Refills: 0 | Status: SHIPPED | OUTPATIENT
Start: 2025-05-10 | End: 2025-08-08

## 2025-05-11 NOTE — ED TRIAGE NOTES
Patient presents to the ED with c/o vomiting x the past 4 days.  She is a cancer patient, 9 days out of last chemo treatment, is unable to keep fluids/food down.  She states her weakness is increasing, her oncologist instructed her to come to the ED.

## 2025-05-11 NOTE — PROGRESS NOTES
Emergency Medicine Transition of Care Note.    I received Yadira Davis in signout from Dr. Macias.  Please see the previous ED provider note for all HPI, PE and MDM up to the time of signout. This is in addition to the primary record.    In brief Yadira Davis is an 74 y.o. female presenting for   Chief Complaint   Patient presents with    Vomiting     At the time of signout we were awaiting: UA    Diagnoses as of 05/11/25 0121   Dehydration   Nausea and vomiting, unspecified vomiting type       Medical Decision Making  UA without convincing evidence of infection.    Final diagnoses:   [E86.0] Dehydration   [R11.2] Nausea and vomiting, unspecified vomiting type           Procedure  Procedures    Gemma Stanley DO

## 2025-05-11 NOTE — TELEPHONE ENCOUNTER
Patient’s daughter called into the answering service reporting that patient has been having diarrhea for the last 2 to 3 days and vomiting for the same amount of time. No fever but feeling chills. Unable to keep food or liquids down and daughter is concerned about hydration. Reviewed patient’s chart, status post one cycle of chemotherapy approximately nine days ago.Instructed patient’s daughter to present to nearest ER for evaluation.

## 2025-05-12 ENCOUNTER — APPOINTMENT (OUTPATIENT)
Dept: PRIMARY CARE | Facility: CLINIC | Age: 75
End: 2025-05-12
Payer: MEDICARE

## 2025-05-13 ENCOUNTER — HOME CARE VISIT (OUTPATIENT)
Dept: HOME HEALTH SERVICES | Facility: HOME HEALTH | Age: 75
End: 2025-05-13
Payer: MEDICARE

## 2025-05-13 VITALS
RESPIRATION RATE: 16 BRPM | SYSTOLIC BLOOD PRESSURE: 117 MMHG | TEMPERATURE: 97.9 F | HEART RATE: 77 BPM | OXYGEN SATURATION: 98 % | DIASTOLIC BLOOD PRESSURE: 65 MMHG

## 2025-05-13 PROCEDURE — G0157 HHC PT ASSISTANT EA 15: HCPCS | Mod: CQ,HHH

## 2025-05-13 SDOH — HEALTH STABILITY: PHYSICAL HEALTH
EXERCISE COMMENTS: 1 SET OF 10 EACH, ORANGE THERABAND  SEATED THER EX: MARCHES, LEG EXT, LAQ, TBAND HIP ABD, BALL SQUEEZES HIP ADD  SUPINE THER EX: SLR, HIP ABD, HEEL SLIDES, SAQ, GLUT SETS 5 SEC HOLDS QUAD SETS 5 SEC HOLDS

## 2025-05-13 SDOH — HEALTH STABILITY: PHYSICAL HEALTH: EXERCISE TYPE: B LE STRENGTHENING THER EX, ENDURANCE TRAINING

## 2025-05-13 ASSESSMENT — ACTIVITIES OF DAILY LIVING (ADL)
AMBULATION ASSISTANCE: 1
BATHING_CURRENT_FUNCTION: ONE PERSON
AMBULATION_DISTANCE/DURATION_TOLERATED: 45 FT
BATHING ASSESSED: 1
AMBULATION ASSISTANCE: ONE PERSON
AMBULATION ASSISTANCE ON FLAT SURFACES: 1
BATHING EQUIPMENT USED: SHOWER CHAIR

## 2025-05-13 ASSESSMENT — ENCOUNTER SYMPTOMS
PAIN LOCATION - PAIN QUALITY: ACHING
PAIN LOCATION - PAIN SEVERITY: 7/10
LOWEST PAIN SEVERITY IN PAST 24 HOURS: 5/10
PAIN: 1
PAIN SEVERITY GOAL: 0/10
ARTHRALGIAS: 1
PAIN LOCATION - RELIEVING FACTORS: POSITIONING, TIME OF DAY
PAIN LOCATION - PAIN FREQUENCY: CONSTANT
SUBJECTIVE PAIN PROGRESSION: UNCHANGED
MUSCLE WEAKNESS: 1
PERSON REPORTING PAIN: PATIENT
OCCASIONAL FEELINGS OF UNSTEADINESS: 1
HIGHEST PAIN SEVERITY IN PAST 24 HOURS: 8/10
PAIN LOCATION: BACK

## 2025-05-15 ENCOUNTER — HOME CARE VISIT (OUTPATIENT)
Dept: HOME HEALTH SERVICES | Facility: HOME HEALTH | Age: 75
End: 2025-05-15
Payer: MEDICARE

## 2025-05-15 LAB
BACTERIA BLD CULT: NORMAL
BACTERIA BLD CULT: NORMAL

## 2025-05-15 PROCEDURE — G0157 HHC PT ASSISTANT EA 15: HCPCS | Mod: CQ,HHH

## 2025-05-15 SDOH — HEALTH STABILITY: PHYSICAL HEALTH: EXERCISE TYPE: B LE STRENGTHENING THER EX, ENDURANCE TRAINING

## 2025-05-15 ASSESSMENT — ENCOUNTER SYMPTOMS
PERSON REPORTING PAIN: PATIENT
PAIN LOCATION: BACK
PAIN LOCATION - PAIN QUALITY: ACHING
PAIN LOCATION - PAIN SEVERITY: 7/10
PAIN: 1
OCCASIONAL FEELINGS OF UNSTEADINESS: 1
HIGHEST PAIN SEVERITY IN PAST 24 HOURS: 7/10
PAIN SEVERITY GOAL: 0/10
ARTHRALGIAS: 1
MUSCLE WEAKNESS: 1
PAIN LOCATION - EXACERBATING FACTORS: TIME OF DAY, POSTIONING
SUBJECTIVE PAIN PROGRESSION: WAXING AND WANING
PAIN LOCATION - PAIN FREQUENCY: INTERMITTENT
LOWEST PAIN SEVERITY IN PAST 24 HOURS: 5/10

## 2025-05-15 ASSESSMENT — ACTIVITIES OF DAILY LIVING (ADL)
BATHING_CURRENT_FUNCTION: ONE PERSON
AMBULATION ASSISTANCE: CONTACT GUARD ASSIST
BATHING EQUIPMENT USED: SHOWER CHAIR
AMBULATION_DISTANCE/DURATION_TOLERATED: 55 FT
BATHING ASSESSED: 1
AMBULATION ASSISTANCE: 1
AMBULATION ASSISTANCE ON FLAT SURFACES: 1

## 2025-05-15 NOTE — PROGRESS NOTES
"History of Present Illness:  Yadira Davis is a 74 y.o. female with a {[facility]}.  Yadira Davis was referred to the Cancer Genetics Clinic at [facility] by Jaki Treviño MD MPH. Yadira Davis is interested in genetic testing {EAMREASONSFORTESTIN}. ***EAMHEREWITH      Cancer Medical History:  Personal history of cancer? Yes  Type: ovarian carcinosarcoma   Age at diagnosis: 74  Summary: Summarized Per Dr. Treviño    3/25/25 XL BS RO with radical dissection omentectomy pelvic peritonectomy argon beam ablation clinical stage IIIB .   Chemotherapy started 25    Surgical pathology    A. Specimen labeled \"left abdomen side wall\":  -- Metastatic carcinosarcoma involving fibroadipose tissue.     B. 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  -- Immunohistochemical stains performed on formalin-fixed, paraffin embedded sections demonstrate neoplastic cells to be positive for AE1/AE3, CAM 5.2, PAX8, p16 (block pattern), WT1 (multifocal), desmin (multifocal), myogenin (focal), VT (multifocal) and ER (weak, focal), and negative for S100 and SOX10.     Note: The neoplasm is histologically composed of sarcoma with focal heterologous rhabdomyosarcomatous differentiation (30%) and high grade serous carcinoma (70%). Immunohistochemical staining for mismatch repair proteins, HER2 and p53 will be performed and reported in addenda.      C. Specimen labeled \"Left adnexa biopsy\":  -- Carcinosarcoma, involving fibroadipose periadnexal tissue and extending into ovary and fallopian tube; see note #2.     Note #2: The carcinoma is mostly composed of high grade serous carcinoma with a small focus of carcinosarcoma.     D. Specimen labeled \"Appendix\":  -- Metastatic carcinosarcoma involving appendiceal serosa and mesoappendix.     E. Specimen labeled \"Omentum\":  -- Metastatic " "carcinoma involving adipose tissue; see note #3.     F. Specimen labeled \"Mesentery nodule\":  -- Metastatic carcinosarcoma involving fibroadipose tissue.     G. Specimen labeled \"Right pelvic sidewall\":  -- Metastatic carcinosarcoma involving fibroadipose tissue.     H. Specimen labeled \"Left pelvic sidewall\":  -- Metastatic carcinosarcoma involving fibroadipose tissue.     I. Specimen labeled \"Right pericolic nodule\":  -- Metastatic carcinoma involving fibroadipose tissue; see note #3.     J. Specimen labeled \"Left abdominal wall peritoneum\":  -- Small foci of metastatic carcinoma involving fibroadipose tissue; see note #3.     Note #3: These foci of carcinoma are consistent with spread from the ovarian primary and are composed solely of the carcinomatous component.      [facility]    Test Name: PulseOnX  Laboratory Name: [facility]  Specimen Source: Ovary  Collection Date: 25-Mar-2025  Cancer Type: Ovary carcinosarcoma  Report Id: [id number]    Molecular Findings:  * Gene: PALB2, Variant: R686fs*23, 2052delC, Frameshift (Allele Frequency - 61%) (2bPrecise Insight: Cancer Susceptibility Gene)  * Gene: TP53, Variant: P177fs*2, [id number]>TG, Frameshift (Allele Frequency - 64.6%)  * Biomarker: HRD signature, Status: HRDsig Negative  * Biomarker: Microsatellite Instability, Status: MS-Stable  * Biomarker: Tumor Mutation [place] (1 Muts/Mb)  * Disease associated genes/variants with no reportable findings:     * BRCA1    * BRCA2  * 2 variants of unknown significance        History of other cancers: {YES,NO:29115}        Prior hereditary cancer (germline) genetic testing? No    Prior hereditary cancer (germline) genetic testing in family members? Yes- Daughter has BRCA1 mutation    Cancer screening history:  Mammograms? {Yes, most recent or No:16882}    Patient {Denies, Reports:251069} personal history of breast biopsy. ***  PAP smear? {Yes, most recent or No:77104}   Colonoscopy? {Yes, most " recent or No:60575}  Patient {Víctor, Reports:304750} personal history of colorectal polyps.   Patient reports a total of  # colonoscopies, and reports a cumulative poly count of #.   Upper endoscopy? {Yes, most recent or No:18866}   Dermatology? {Yes, most recent or No:38429}   Other cancer screening? ***    Reproductive History:  Number of children: {EAMOPTIONS:83031}  Number of pregnancies: {EAMOPTIONS:17138}  Age first birth: {EAMOPTIONS:26252}  Breast feeding? {EAMOPTIONS:09574}  Menarche (age): {EAMOPTIONS:26495}  Menopause (age): {EAMOPTIONS:32374}  OCP: {EAMOCPUSE:23042}  HRT: {EAMOPTIONS:20383}    Hysterectomy? {Yes- *** No N/A:51399}  Oophorectomy? {Yes- *** No N/A:03630}      Family history:  A 4-generation pedigree was obtained and was significant for the following:     eported daughter is known BRCA1 carrier     Maternal ancestry is ***.  Paternal ancestry is ***. There is {maternal\paternal\no known:83387} Ashkenazi Adventist ancestry. Consanguinity was denied.       Genetic counseling:    Summary  Ms. Davis is a 74 y.o. female with {EAMCANCERHPI:71717}    Yadira Davis's reported history is concerning for possible hereditary {EAMCANCERTYPE:56724} cancer. Ms. Davis meets current national (NCCN) criteria for testing of {EAMNCCNTESTINGFOR:46960}    Testing is medically necessary, as it will help determine if Ms. Davis is a candidate for {EAMMEDNEC:60288}    Yadira is interested in testing, which is recommended, and was ordered today via the {EAMPANELS:53500}. Our discussion is summarized below.    Genetic Counseling Discussion    We reviewed genes and chromosomes, inherited forms of ovarian cancer.  We discussed that most cancers are not due to an inherited genetic susceptibility.  However, it is estimated that up to 20-25% of individuals with epithelial ovarian cancer (and about 5-10% of those with cancer, overall) have a hereditary predisposition to cancer. Within families with a genetic  predisposition to cancer, we often see certain patterns, such as multiple family members with cancer and cancers occurring in multiple generations. In addition, rare cancers (such as ovarian cancer), early-onset cancers, and/or bilateral cancers can be suggestive of an inherited predisposition. There also tends to be a clustering of certain types of cancer in these families, such as breast cancer and ovarian cancer.    Ovarian carcinosarcoma is a rarer type of epithelial ovarian cancer (PMID: 83073188), which is typically defined as having both epithelial and mesenchymal components (PMID: 93393632). We specifically discussed her diagnosis of ovarian carcinosarcoma, which has been reported in both patients with germline BRCA and Carter syndrome gene mutations (PMID: 37392711).    65-85% of hereditary ovarian cancer cases are attributed to disease-causing variants in BRCA1 and BRCA2 (PMID: 66392631, 94827477). We discussed the BRCA1 and BRCA2 genes, which are two genes that have been linked to early-onset breast and/or ovarian cancer, causing HBOC (hereditary breast and ovarian cancer syndrome).  Mutations in these genes are inherited in a dominant pattern and confer up to an 69% lifetime risk for breast cancer.  This is elevated compared to the general population risk of 10-12%.  In addition, BRCA1 and BRCA2 mutation carriers have up to a 58% lifetime risk for ovarian cancer, which is elevated over the 2% general population risk.  Mutation carriers who have already been diagnosed with cancer have an increased risk to develop a second, contralateral breast cancer.  BRCA1/2 gene mutation carriers have an increased risk for male breast cancer, prostate cancer, melanoma, and pancreatic cancer. We discussed potential medical management changes for individuals who carry a BRCA1/2 mutation, such as removing the ovaries to reduce the risk for ovarian cancer.    Gene mutations in BRCA1 and BRCA2 are inherited in an autosomal  dominant fashion. This means that if an individual has a change in either of these genes, their siblings and children have a 50% chance of also having that gene change and a 50% chance of not having the gene change.     We discussed that there are multiple genes associated with increased ovarian cancer risk. Other ovarian cancer susceptibility genes include:  Carter syndrome genes [MLH1, MSH2, MSH6, EPCAM], SEDRICK, BRIP1,  PALB2,  RAD51C, and  RAD51D.    Some genes, like the BRCA1 and BRCA2 genes, are considered highly penetrant ovarian cancer genes, meaning a mutation in the gene confers a high risk of ovarian cancer. Additionally, there are other intermediate (moderate risk) ovarian cancer genes  (such as PALB2 and SEDRICK) For some of the moderate risk genes, there is often limited information regarding the degree to which a mutation in the gene affects risk of different types of cancers. Additionally, for some of these moderate risk genes, the appropriate management for individuals who have a mutation in one of these genes is not always clear. Our knowledge about the cancer risks associated with mutations in these moderate risk genes is always growing, and we will likely be able to provide more comprehensive information in the future.    We discussed performing testing as part of a multi-gene panel that looks at high and moderate penetrance genes. Some genes are considered highly penetrant cancer genes, meaning a mutation in the gene confers a high risk of cancer. On the other hand, there are other intermediate (moderate risk) cancer genes. For many of the moderate risk genes, there may be limited information regarding the degree to which a mutation in the gene affects risk of different types of cancers. Additionally, for some of these moderate risk genes, the appropriate management for individuals who have a mutation in one of these genes is not always clear. In many cases, even if an individual tests positive for a  mutation in a moderate risk gene, recommendations are still based on the family history, not the positive test result.  Additionally, unexpected findings may occur, where a mutation is identified that confers a risk for a cancer not seen in the family history.  Lastly, in the era of multigene panel testing, we know that more than one pathogenic or likely pathogenic variant can be identified in any one individual and/or family    Yadira Davis was counseled about hereditary cancer susceptibility including cancer risks, options for increased screening and/or risk reduction, genetic testing, and the implications for other family members.  We discussed different panel options available to Ms. Davis. After a discussion about the risks, benefits, and limitations of genetic testing, Yadira Davis elected to undergo genetic testing for hereditary cancer using the panel described above. Yadira Davis gave {[facility]ENT[facility]YPES:26583} consent to proceed with testing.    We reviewed the types of results we can get back:   - A positive result means that we identified a change in a gene that confers an increased cancer risk for cancer. We will discuss potential changes in management for her and her family based on the specific gene mutation found.    - A negative (normal) result clears her for many cancer predisposition syndromes, but cannot clear her for any/all cancer predisposition risks. She would continue to be screened based on her personal and family history.    - A Variant of Uncertain Significance (VUS) means that some kind of change was found in a gene, but it is currently unknown whether this specific change is harmful.  These results do not  recommendations or warrant familial variant testing.    We reviewed the limitations of testing an unaffected individual for a hereditary cancer syndrome. When testing an individual who has not had a cancer diagnosis, like Ms. Davis, a negative test result may  have limited utility in defining future cancer risks, as it is unclear whether the affected family members had a mutation that the unaffected individual did not inherit, or alternatively, whether the family history of cancer is due to unidentifiable risk factors that are shared between the unaffected individual and their family members. Testing for her *** would be most informative for Ms. Davis's {[facility] Hospital[facility]ERTYPE:98867} cancer risk. Ms. Davis stated that her understanding of these limitations ***and that testing for her *** is not feasible at this time, we discussed that testing for Ms. Davis would certainly be appropriate.    We discussed the Genetic Information Nondiscrimination Act (UMESH). We discussed the protections and limitations of UMESH. UMESH generally makes in illegal for health insurance companies, group health plans, and most employers (with >15 employees) to discriminate based on genetic information. It does not protect against genetic discrimination for life insurance, disability insurance, long-term care, or other insurances.    Ms. Davis will return to the Cancer Genetics Clinic to discuss her testing results.  At that time, we will make recommendations for both Ms. Davis and their family members in terms of cancer screening and/or cancer risk reduction options. Ms. Davis was reminded to follow her primary care providers' recommendations for age-related cancer screenings, such as mammograms, colonoscopies, etc.    We discussed that Yadira Limons test results may be released to her chart when they are reported. Most people choose to review the results with their provider at their follow up visit. However, if she chooses to view her results in advance of her visit, the provider may not be available to discuss. Yadira Davis was advised to keep her follow-up appointment regardless of her results to discuss appropriate management and referrals.      PLAN:  Ms. Davis elected to undergo genetic  testing for hereditary cancer using the {EAMPANELS:34586} panel.  Yadira Davis gave their {EAMCONSENTTYPES:07785} consent to proceed with testing.    {EAMSAMPLEOPTIONS:66904}  {EAMEPICORDER:85826}  Results are typically available within 3-4 weeks from the time of sample reception. Ms. Davis will return to the Cancer Genetics Clinic discuss her test results.  {EAMSCHEDULEFUV:05930}  We remain available to Ms. Davis at [phone] if any questions arise regarding information discussed at today's visit.    {EAMSIGNOFF:93090}

## 2025-05-16 DIAGNOSIS — C56.9 MALIGNANT NEOPLASM OF OVARY, UNSPECIFIED LATERALITY (MULTI): ICD-10-CM

## 2025-05-19 ENCOUNTER — APPOINTMENT (OUTPATIENT)
Facility: CLINIC | Age: 75
End: 2025-05-19
Payer: MEDICARE

## 2025-05-20 ENCOUNTER — TELEPHONE (OUTPATIENT)
Dept: GENETICS | Facility: CLINIC | Age: 75
End: 2025-05-20

## 2025-05-20 ENCOUNTER — HOME CARE VISIT (OUTPATIENT)
Dept: HOME HEALTH SERVICES | Facility: HOME HEALTH | Age: 75
End: 2025-05-20
Payer: MEDICARE

## 2025-05-20 VITALS
SYSTOLIC BLOOD PRESSURE: 124 MMHG | RESPIRATION RATE: 16 BRPM | TEMPERATURE: 98.1 F | DIASTOLIC BLOOD PRESSURE: 65 MMHG | OXYGEN SATURATION: 98 % | HEART RATE: 77 BPM

## 2025-05-20 PROBLEM — C55 MALIGNANT NEOPLASM OF UTERUS, PART UNSPECIFIED (MULTI): Status: RESOLVED | Noted: 2023-03-20 | Resolved: 2025-05-20

## 2025-05-20 PROCEDURE — G0157 HHC PT ASSISTANT EA 15: HCPCS | Mod: CQ,HHH

## 2025-05-20 PROCEDURE — 36415 COLL VENOUS BLD VENIPUNCTURE: CPT

## 2025-05-20 PROCEDURE — 85025 COMPLETE CBC W/AUTO DIFF WBC: CPT

## 2025-05-20 PROCEDURE — 83735 ASSAY OF MAGNESIUM: CPT

## 2025-05-20 PROCEDURE — 80053 COMPREHEN METABOLIC PANEL: CPT

## 2025-05-20 PROCEDURE — 86304 IMMUNOASSAY TUMOR CA 125: CPT

## 2025-05-20 SDOH — HEALTH STABILITY: PHYSICAL HEALTH: EXERCISE TYPE: B LE STRENGTHENING THER EX, ENDURANCE TRAINING

## 2025-05-20 SDOH — HEALTH STABILITY: PHYSICAL HEALTH
EXERCISE COMMENTS: 1 SET OF 10 EACH, ORANGE THERABAND  SEATED THER EX: MARCHES, LEG EXT, LAQ, TBAND HIP ABD, BALL SQUEEZES HIP ADD

## 2025-05-20 ASSESSMENT — ACTIVITIES OF DAILY LIVING (ADL)
AMBULATION_DISTANCE/DURATION_TOLERATED: 65 FT
AMBULATION ASSISTANCE: ONE PERSON
BATHING ASSESSED: 1
AMBULATION ASSISTANCE: 1
BATHING EQUIPMENT USED: SHOWER CHAIR
BATHING_CURRENT_FUNCTION: CONTACT GUARD ASSIST
AMBULATION ASSISTANCE ON FLAT SURFACES: 1

## 2025-05-20 ASSESSMENT — ENCOUNTER SYMPTOMS
PAIN: 1
LOWEST PAIN SEVERITY IN PAST 24 HOURS: 5/10
ARTHRALGIAS: 1
PAIN LOCATION - EXACERBATING FACTORS: TIME OF DAY
SUBJECTIVE PAIN PROGRESSION: UNCHANGED
PAIN LOCATION - RELIEVING FACTORS: TIME OF DAY
OCCASIONAL FEELINGS OF UNSTEADINESS: 1
HIGHEST PAIN SEVERITY IN PAST 24 HOURS: 7/10
PAIN LOCATION - PAIN QUALITY: ACHING
PAIN LOCATION - PAIN SEVERITY: 6/10
MUSCLE WEAKNESS: 1
PAIN LOCATION - PAIN FREQUENCY: INTERMITTENT
PAIN LOCATION: BACK
PERSON REPORTING PAIN: PATIENT
PAIN SEVERITY GOAL: 0/10

## 2025-05-20 NOTE — PROGRESS NOTES
Patient ID: Yadira aDvis is a 74 y.o. female.  Referring Physician: No referring provider defined for this encounter.  Primary Care Provider: Sylvie Medina DO    Subjective    Here for C2 carboplatin/paclitaxel for ovarian carcinosarcoma. Feels unwell. Nausea/emesis and retching for 2 weeks after treatment. Limited PO intake and weight loss reported. Diarrhea after treatment now resolved, but bottom feels raw. Here in a wheelchair. Laying in bed mostly. Was seen in the ER for NV, dehydration.     Objective    BSA: 1.49 meters squared  /81   Pulse (!) 111   Temp 36.3 °C (97.3 °F)   Resp 17   Wt 50.1 kg (110 lb 7.2 oz)   SpO2 95%   BMI 19.57 kg/m²      Physical Exam  Vitals and nursing note reviewed. Exam conducted with a chaperone present.   Constitutional:       Appearance: Normal appearance. She is normal weight.   HENT:      Head: Normocephalic and atraumatic.      Mouth/Throat:      Mouth: Mucous membranes are moist.      Pharynx: No oropharyngeal exudate or posterior oropharyngeal erythema.   Eyes:      Extraocular Movements: Extraocular movements intact.      Conjunctiva/sclera: Conjunctivae normal.      Pupils: Pupils are equal, round, and reactive to light.   Cardiovascular:      Rate and Rhythm: Normal rate.   Pulmonary:      Effort: Pulmonary effort is normal.      Breath sounds: No wheezing, rhonchi or rales.   Abdominal:      General: A surgical scar is present.      Palpations: Abdomen is soft. There is no mass.      Tenderness: There is no guarding or rebound.      Hernia: No hernia is present.      Comments: Surgical incision clean/dry/intact without redness, drainage or erythema      Genitourinary:     Pubic Area: No rash.    Musculoskeletal:         General: Normal range of motion.   Skin:     General: Skin is warm.      Findings: No erythema or rash.   Neurological:      General: No focal deficit present.      Mental Status: She is alert and oriented to person, place, and time.    Psychiatric:         Mood and Affect: Mood normal.         Behavior: Behavior normal.       Performance Status:  Asymptomatic    Assessment/Plan     Oncology History Overview Note   3/25/25 XL BS RO with radical dissection omentectomy pelvic peritonectomy argon beam ablation clinical stage IIIB   HER2 3+MMRp   4/28/25  9  Pathology Carcinosarcoma of the ovary   [email or web address] GIS negative, negative BRCA1/2   Foundation PALB2 mutation, HRD negativ, ROSETTE, TMB low, p53 mut     Ovarian cancer (Multi)   4/12/2025 Initial Diagnosis    Ovarian cancer (Multi)     4/30/2025 -  Chemotherapy    PACLitaxel / CARBOplatin, 21 Day Cycles - Gyn       Diagnoses and all orders for this visit:  Malignant neoplasm of ovary, unspecified laterality (Multi)    # Ovarian cancer  - We reviewed her imaging and pathology results, reviewed high risk histology  - We discussed the pathophysiology of ovarian, fallopian tube, and peritoneal cancers.   - We discussed my recommendation for adjuvant treatment with carboplatin AUC 5 and paclitaxel 135 mg/m2; dose reduce taxol 110 mg/m2 today  - Labs reviewed and appropriate for treatment  - Referral to genetics, missed visit; encouraged to reschedule  - [email or web address] [email or web address] pending    # chemotherapy induced nausea  - Zyprexa 2.5 mg PO x 5 days  - continue PRN anti-emetics    # dysuria   - Ucx ordered    # Leg pain, bilateral R > L; resolved  - Recommend duplex US arterial and venous, did not complete   - Monitor    # sciatica   - Continue to work with PT, continue gabapentin 300 BID    # chronic pain   - Scheduled with supportive oncology 6/13    Jaki Treviño MD MPH

## 2025-05-21 ENCOUNTER — OFFICE VISIT (OUTPATIENT)
Dept: GYNECOLOGIC ONCOLOGY | Facility: CLINIC | Age: 75
End: 2025-05-21
Payer: MEDICARE

## 2025-05-21 ENCOUNTER — INFUSION (OUTPATIENT)
Dept: HEMATOLOGY/ONCOLOGY | Facility: CLINIC | Age: 75
End: 2025-05-21
Payer: MEDICARE

## 2025-05-21 ENCOUNTER — LAB (OUTPATIENT)
Dept: LAB | Facility: HOSPITAL | Age: 75
End: 2025-05-21
Payer: MEDICARE

## 2025-05-21 VITALS
WEIGHT: 110.45 LBS | TEMPERATURE: 97.3 F | DIASTOLIC BLOOD PRESSURE: 81 MMHG | BODY MASS INDEX: 19.57 KG/M2 | OXYGEN SATURATION: 95 % | HEART RATE: 111 BPM | RESPIRATION RATE: 17 BRPM | SYSTOLIC BLOOD PRESSURE: 156 MMHG

## 2025-05-21 VITALS — BODY MASS INDEX: 19.77 KG/M2 | HEIGHT: 63 IN

## 2025-05-21 DIAGNOSIS — Z51.11 CHEMOTHERAPY MANAGEMENT, ENCOUNTER FOR: Primary | ICD-10-CM

## 2025-05-21 DIAGNOSIS — R30.0 DYSURIA: ICD-10-CM

## 2025-05-21 DIAGNOSIS — T45.1X5A CHEMOTHERAPY-INDUCED NAUSEA: ICD-10-CM

## 2025-05-21 DIAGNOSIS — R11.0 CHEMOTHERAPY-INDUCED NAUSEA: ICD-10-CM

## 2025-05-21 DIAGNOSIS — R10.2 PELVIC PAIN IN FEMALE: ICD-10-CM

## 2025-05-21 DIAGNOSIS — C56.9 MALIGNANT NEOPLASM OF OVARY, UNSPECIFIED LATERALITY (MULTI): ICD-10-CM

## 2025-05-21 DIAGNOSIS — N83.8 OVARIAN MASS, RIGHT: ICD-10-CM

## 2025-05-21 DIAGNOSIS — C56.9 MALIGNANT NEOPLASM OF UNSPECIFIED OVARY (MULTI): Primary | ICD-10-CM

## 2025-05-21 LAB
ALBUMIN SERPL BCP-MCNC: 3.8 G/DL (ref 3.4–5)
ALP SERPL-CCNC: 79 U/L (ref 33–136)
ALT SERPL W P-5'-P-CCNC: 13 U/L (ref 7–45)
ANION GAP SERPL CALC-SCNC: 11 MMOL/L (ref 10–20)
APPEARANCE UR: CLEAR
AST SERPL W P-5'-P-CCNC: 15 U/L (ref 9–39)
BASOPHILS # BLD AUTO: 0.02 X10*3/UL (ref 0–0.1)
BASOPHILS NFR BLD AUTO: 0.3 %
BILIRUB SERPL-MCNC: 0.3 MG/DL (ref 0–1.2)
BILIRUB UR STRIP.AUTO-MCNC: NEGATIVE MG/DL
BUN SERPL-MCNC: 9 MG/DL (ref 6–23)
CALCIUM SERPL-MCNC: 10.5 MG/DL (ref 8.6–10.6)
CANCER AG125 SERPL-ACNC: 5.9 U/ML (ref 0–30.2)
CHLORIDE SERPL-SCNC: 101 MMOL/L (ref 98–107)
CO2 SERPL-SCNC: 28 MMOL/L (ref 21–32)
COLOR UR: ABNORMAL
CREAT SERPL-MCNC: 0.58 MG/DL (ref 0.5–1.05)
EGFRCR SERPLBLD CKD-EPI 2021: >90 ML/MIN/1.73M*2
EOSINOPHIL # BLD AUTO: 0.03 X10*3/UL (ref 0–0.4)
EOSINOPHIL NFR BLD AUTO: 0.5 %
ERYTHROCYTE [DISTWIDTH] IN BLOOD BY AUTOMATED COUNT: 14.8 % (ref 11.5–14.5)
GLUCOSE SERPL-MCNC: 88 MG/DL (ref 74–99)
GLUCOSE UR STRIP.AUTO-MCNC: ABNORMAL MG/DL
HCT VFR BLD AUTO: 37.6 % (ref 36–46)
HGB BLD-MCNC: 11.7 G/DL (ref 12–16)
IMM GRANULOCYTES # BLD AUTO: 0.03 X10*3/UL (ref 0–0.5)
IMM GRANULOCYTES NFR BLD AUTO: 0.5 % (ref 0–0.9)
KETONES UR STRIP.AUTO-MCNC: NEGATIVE MG/DL
LEUKOCYTE ESTERASE UR QL STRIP.AUTO: NEGATIVE
LYMPHOCYTES # BLD AUTO: 2.17 X10*3/UL (ref 0.8–3)
LYMPHOCYTES NFR BLD AUTO: 37 %
MAGNESIUM SERPL-MCNC: 2.25 MG/DL (ref 1.6–2.4)
MCH RBC QN AUTO: 27.5 PG (ref 26–34)
MCHC RBC AUTO-ENTMCNC: 31.1 G/DL (ref 32–36)
MCV RBC AUTO: 88 FL (ref 80–100)
MONOCYTES # BLD AUTO: 0.42 X10*3/UL (ref 0.05–0.8)
MONOCYTES NFR BLD AUTO: 7.2 %
NEUTROPHILS # BLD AUTO: 3.2 X10*3/UL (ref 1.6–5.5)
NEUTROPHILS NFR BLD AUTO: 54.5 %
NITRITE UR QL STRIP.AUTO: NEGATIVE
NRBC BLD-RTO: 0 /100 WBCS (ref 0–0)
PH UR STRIP.AUTO: 6.5 [PH]
PLATELET # BLD AUTO: 319 X10*3/UL (ref 150–450)
POTASSIUM SERPL-SCNC: 3.4 MMOL/L (ref 3.5–5.3)
PROT SERPL-MCNC: 6.1 G/DL (ref 6.4–8.2)
PROT UR STRIP.AUTO-MCNC: NEGATIVE MG/DL
RBC # BLD AUTO: 4.26 X10*6/UL (ref 4–5.2)
RBC # UR STRIP.AUTO: NEGATIVE MG/DL
SODIUM SERPL-SCNC: 137 MMOL/L (ref 136–145)
SP GR UR STRIP.AUTO: 1.02
UROBILINOGEN UR STRIP.AUTO-MCNC: NORMAL MG/DL
WBC # BLD AUTO: 5.9 X10*3/UL (ref 4.4–11.3)

## 2025-05-21 PROCEDURE — 2500000004 HC RX 250 GENERAL PHARMACY W/ HCPCS (ALT 636 FOR OP/ED): Performed by: STUDENT IN AN ORGANIZED HEALTH CARE EDUCATION/TRAINING PROGRAM

## 2025-05-21 PROCEDURE — 87086 URINE CULTURE/COLONY COUNT: CPT

## 2025-05-21 PROCEDURE — 96417 CHEMO IV INFUS EACH ADDL SEQ: CPT

## 2025-05-21 PROCEDURE — 81003 URINALYSIS AUTO W/O SCOPE: CPT

## 2025-05-21 PROCEDURE — 96415 CHEMO IV INFUSION ADDL HR: CPT

## 2025-05-21 PROCEDURE — 96375 TX/PRO/DX INJ NEW DRUG ADDON: CPT | Mod: INF

## 2025-05-21 PROCEDURE — 2500000004 HC RX 250 GENERAL PHARMACY W/ HCPCS (ALT 636 FOR OP/ED): Mod: JZ | Performed by: STUDENT IN AN ORGANIZED HEALTH CARE EDUCATION/TRAINING PROGRAM

## 2025-05-21 PROCEDURE — 96413 CHEMO IV INFUSION 1 HR: CPT

## 2025-05-21 PROCEDURE — 96367 TX/PROPH/DG ADDL SEQ IV INF: CPT

## 2025-05-21 PROCEDURE — 2500000001 HC RX 250 WO HCPCS SELF ADMINISTERED DRUGS (ALT 637 FOR MEDICARE OP): Performed by: STUDENT IN AN ORGANIZED HEALTH CARE EDUCATION/TRAINING PROGRAM

## 2025-05-21 PROCEDURE — 99215 OFFICE O/P EST HI 40 MIN: CPT | Performed by: STUDENT IN AN ORGANIZED HEALTH CARE EDUCATION/TRAINING PROGRAM

## 2025-05-21 RX ORDER — DIPHENHYDRAMINE HYDROCHLORIDE 50 MG/ML
50 INJECTION, SOLUTION INTRAMUSCULAR; INTRAVENOUS AS NEEDED
Status: DISCONTINUED | OUTPATIENT
Start: 2025-05-21 | End: 2025-05-21 | Stop reason: HOSPADM

## 2025-05-21 RX ORDER — PALONOSETRON 0.05 MG/ML
0.25 INJECTION, SOLUTION INTRAVENOUS ONCE
Status: COMPLETED | OUTPATIENT
Start: 2025-05-21 | End: 2025-05-21

## 2025-05-21 RX ORDER — DIPHENHYDRAMINE HCL 25 MG
25 CAPSULE ORAL ONCE
Status: CANCELLED | OUTPATIENT
Start: 2025-05-21

## 2025-05-21 RX ORDER — HEPARIN SODIUM,PORCINE/PF 10 UNIT/ML
50 SYRINGE (ML) INTRAVENOUS AS NEEDED
OUTPATIENT
Start: 2025-05-21

## 2025-05-21 RX ORDER — PALONOSETRON 0.05 MG/ML
0.25 INJECTION, SOLUTION INTRAVENOUS ONCE
Status: CANCELLED | OUTPATIENT
Start: 2025-05-21

## 2025-05-21 RX ORDER — OLANZAPINE 2.5 MG/1
5 TABLET, FILM COATED ORAL NIGHTLY
Qty: 10 TABLET | Refills: 11 | Status: SHIPPED | OUTPATIENT
Start: 2025-05-21 | End: 2025-05-26

## 2025-05-21 RX ORDER — ALBUTEROL SULFATE 0.83 MG/ML
3 SOLUTION RESPIRATORY (INHALATION) AS NEEDED
Status: DISCONTINUED | OUTPATIENT
Start: 2025-05-21 | End: 2025-05-21 | Stop reason: HOSPADM

## 2025-05-21 RX ORDER — DIPHENHYDRAMINE HYDROCHLORIDE 50 MG/ML
50 INJECTION, SOLUTION INTRAMUSCULAR; INTRAVENOUS AS NEEDED
Status: CANCELLED | OUTPATIENT
Start: 2025-05-21

## 2025-05-21 RX ORDER — HEPARIN 100 UNIT/ML
500 SYRINGE INTRAVENOUS AS NEEDED
OUTPATIENT
Start: 2025-05-21

## 2025-05-21 RX ORDER — PROCHLORPERAZINE EDISYLATE 5 MG/ML
10 INJECTION INTRAMUSCULAR; INTRAVENOUS EVERY 6 HOURS PRN
Status: DISCONTINUED | OUTPATIENT
Start: 2025-05-21 | End: 2025-05-21 | Stop reason: HOSPADM

## 2025-05-21 RX ORDER — EPINEPHRINE 0.3 MG/.3ML
0.3 INJECTION SUBCUTANEOUS EVERY 5 MIN PRN
Status: DISCONTINUED | OUTPATIENT
Start: 2025-05-21 | End: 2025-05-21 | Stop reason: HOSPADM

## 2025-05-21 RX ORDER — EPINEPHRINE 0.3 MG/.3ML
0.3 INJECTION SUBCUTANEOUS EVERY 5 MIN PRN
Status: CANCELLED | OUTPATIENT
Start: 2025-05-21

## 2025-05-21 RX ORDER — PROCHLORPERAZINE EDISYLATE 5 MG/ML
10 INJECTION INTRAMUSCULAR; INTRAVENOUS EVERY 6 HOURS PRN
Status: CANCELLED | OUTPATIENT
Start: 2025-05-21

## 2025-05-21 RX ORDER — PROCHLORPERAZINE MALEATE 10 MG
10 TABLET ORAL EVERY 6 HOURS PRN
Status: CANCELLED | OUTPATIENT
Start: 2025-05-21

## 2025-05-21 RX ORDER — FAMOTIDINE 10 MG/ML
20 INJECTION, SOLUTION INTRAVENOUS ONCE AS NEEDED
Status: DISCONTINUED | OUTPATIENT
Start: 2025-05-21 | End: 2025-05-21 | Stop reason: HOSPADM

## 2025-05-21 RX ORDER — ALBUTEROL SULFATE 0.83 MG/ML
3 SOLUTION RESPIRATORY (INHALATION) AS NEEDED
Status: CANCELLED | OUTPATIENT
Start: 2025-05-21

## 2025-05-21 RX ORDER — PROCHLORPERAZINE MALEATE 10 MG
10 TABLET ORAL EVERY 6 HOURS PRN
Status: DISCONTINUED | OUTPATIENT
Start: 2025-05-21 | End: 2025-05-21 | Stop reason: HOSPADM

## 2025-05-21 RX ORDER — FAMOTIDINE 10 MG/ML
40 INJECTION, SOLUTION INTRAVENOUS ONCE
Status: CANCELLED | OUTPATIENT
Start: 2025-05-21

## 2025-05-21 RX ORDER — DIPHENHYDRAMINE HCL 25 MG
25 CAPSULE ORAL ONCE
Status: COMPLETED | OUTPATIENT
Start: 2025-05-21 | End: 2025-05-21

## 2025-05-21 RX ORDER — FAMOTIDINE 10 MG/ML
20 INJECTION, SOLUTION INTRAVENOUS ONCE AS NEEDED
Status: CANCELLED | OUTPATIENT
Start: 2025-05-21

## 2025-05-21 RX ORDER — FAMOTIDINE 10 MG/ML
40 INJECTION, SOLUTION INTRAVENOUS ONCE
Status: COMPLETED | OUTPATIENT
Start: 2025-05-21 | End: 2025-05-21

## 2025-05-21 RX ADMIN — PALONOSETRON HYDROCHLORIDE 0.25 MG: 0.25 INJECTION, SOLUTION INTRAVENOUS at 09:46

## 2025-05-21 RX ADMIN — CARBOPLATIN 430 MG: 10 INJECTION INTRAVENOUS at 14:06

## 2025-05-21 RX ADMIN — PACLITAXEL 174 MG: 6 INJECTION INTRAVENOUS at 10:58

## 2025-05-21 RX ADMIN — DIPHENHYDRAMINE HYDROCHLORIDE 25 MG: 25 CAPSULE ORAL at 09:46

## 2025-05-21 RX ADMIN — DEXAMETHASONE SODIUM PHOSPHATE 12 MG: 10 INJECTION, SOLUTION INTRAMUSCULAR; INTRAVENOUS at 09:42

## 2025-05-21 RX ADMIN — FOSAPREPITANT 150 MG: 150 INJECTION, POWDER, LYOPHILIZED, FOR SOLUTION INTRAVENOUS at 10:09

## 2025-05-21 RX ADMIN — FAMOTIDINE 40 MG: 10 INJECTION INTRAVENOUS at 09:46

## 2025-05-21 RX ADMIN — SODIUM CHLORIDE 500 ML: 9 INJECTION, SOLUTION INTRAVENOUS at 09:40

## 2025-05-21 ASSESSMENT — PAIN SCALES - GENERAL: PAINLEVEL_OUTOF10: 5

## 2025-05-22 ENCOUNTER — TELEPHONE (OUTPATIENT)
Dept: GYNECOLOGIC ONCOLOGY | Facility: HOSPITAL | Age: 75
End: 2025-05-22
Payer: MEDICARE

## 2025-05-22 DIAGNOSIS — C56.9 MALIGNANT NEOPLASM OF OVARY, UNSPECIFIED LATERALITY (MULTI): ICD-10-CM

## 2025-05-22 LAB — BACTERIA UR CULT: NORMAL

## 2025-05-22 RX ORDER — OXYCODONE HYDROCHLORIDE 5 MG/1
5 TABLET ORAL EVERY 6 HOURS PRN
Qty: 40 TABLET | Refills: 0 | Status: SHIPPED | OUTPATIENT
Start: 2025-05-22

## 2025-05-23 ENCOUNTER — HOME CARE VISIT (OUTPATIENT)
Dept: HOME HEALTH SERVICES | Facility: HOME HEALTH | Age: 75
End: 2025-05-23
Payer: MEDICARE

## 2025-05-23 LAB — SCAN RESULT: NORMAL

## 2025-05-23 NOTE — CASE COMMUNICATION
PT MISSED VISIT NOTFICATION: Pt. asks therapist to cancel 2nd visit for the week due to having Chemo this week and wanting time to recover.

## 2025-05-25 DIAGNOSIS — B37.9 YEAST INFECTION: Primary | ICD-10-CM

## 2025-05-25 DIAGNOSIS — N83.8 OVARIAN MASS, RIGHT: ICD-10-CM

## 2025-05-25 DIAGNOSIS — C56.9 MALIGNANT NEOPLASM OF OVARY, UNSPECIFIED LATERALITY (MULTI): ICD-10-CM

## 2025-05-25 RX ORDER — TERCONAZOLE 4 MG/G
1 CREAM VAGINAL NIGHTLY
Qty: 45 G | Refills: 0 | Status: SHIPPED | OUTPATIENT
Start: 2025-05-25 | End: 2025-06-01

## 2025-05-25 NOTE — PROGRESS NOTES
SUPPORTIVE AND PALLIATIVE ONCOLOGY CONSULT - OUTPATIENT      SERVICE DATE: 5/25/2025    Referred by:  Dr. Treviño  Medical Oncologist: Jaki Treviño MD MPH   Radiation Oncologist: No care team member to display  Primary Physician: Sylvie Medina  [phone]    REASON FOR CONSULT/CHIEF CONSULT COMPLAINT: pain management    Subjective   HISTORY OF PRESENT ILLNESS: Patient is a 74 year old female with a past medical history of ovarian cancer. She was initially diagnosed in 4/25. Plans for patient to start on Carbo/taxol       Information was collected from chart review, discussion with patient/family, and discussion with care team     SUBJECTIVE:    ***    {Associated S/Sx:[id number]}    Pain Assessment:  Pain Score:    Location:    Education:        Symptom Assessment:  ROS otherwise negative, pertinent positives documented in the HPI       Information obtained from: chart review and interview of patient  ______________________________________________________________________     Oncology History Overview Note   3/25/25 XL BS RO with radical dissection omentectomy pelvic peritonectomy argon beam ablation clinical stage IIIB   HER2 3+MMRp   4/28/25  9  Pathology Carcinosarcoma of the ovary   [employer] GIS negative, negative BRCA1/2   Foundation PALB2 mutation, HRD negativ, ROSETTE, TMB low, p53 mut     Ovarian cancer (Multi)   4/12/2025 Initial Diagnosis    Ovarian cancer (Multi)     4/30/2025 -  Chemotherapy    PACLitaxel / CARBOplatin, 21 Day Cycles - Gyn         Medical History[1]  Surgical History[2]  Family History[3]     SOCIAL HISTORY  {SOC HX:62688}   Social History:  reports that she has been smoking cigarettes. She started smoking about 55 years ago. She has a 83 pack-year smoking history. She has been exposed to tobacco smoke. She has never used smokeless tobacco. She reports that she does not currently use alcohol. She reports that she does not use drugs.  ***    REVIEW OF SYSTEMS  Review of systems negative  unless noted in HPI.       Objective     Palliative Performance Scale % (PPS) ***     Labs:  Results for orders placed or performed in visit on 05/21/25 (from the past 96 hours)   Urine culture    Specimen: Clean Catch/Voided; Urine   Result Value Ref Range    Urine Culture       Growth indicates contamination with mixed bacterial gill. Repeat culture if clinically indicated.   Urinalysis with Reflex Culture and Microscopic   Result Value Ref Range    Color, Urine Light-Yellow Light-Yellow, Yellow, Dark-Yellow    Appearance, Urine Clear Clear    Specific Gravity, Urine 1.020 1.005 - 1.035    pH, Urine 6.5 5.0, 5.5, 6.0, 6.5, 7.0, 7.5, 8.0    Protein, Urine NEGATIVE NEGATIVE, 10 (TRACE), 20 (TRACE) mg/dL    Glucose, Urine OVER (4+) (A) Normal mg/dL    Blood, Urine NEGATIVE NEGATIVE mg/dL    Ketones, Urine NEGATIVE NEGATIVE mg/dL    Bilirubin, Urine NEGATIVE NEGATIVE mg/dL    Urobilinogen, Urine Normal Normal mg/dL    Nitrite, Urine NEGATIVE NEGATIVE    Leukocyte Esterase, Urine NEGATIVE NEGATIVE         Pain Management Panel           No data to display                 Medications:   Current Outpatient Medications   Medication Instructions    acetaminophen (TYLENOL) 650 mg, oral, Every 4 hours PRN    albuterol (Ventolin HFA) 90 mcg/actuation inhaler 2 puffs, inhalation, Every 4 hours PRN    aspirin 81 mg, oral, Daily    carvedilol (COREG) 3.125 mg, oral, 2 times daily (morning and late afternoon)    dexAMETHasone (Decadron) 4 mg tablet Take 5 tablets (20 mg) by mouth once 12 hours prior to PACLitaxel treatment. Then take 2 tablets (8 mg) by mouth once daily for 3 days starting the day after treatment.    diclofenac sodium (VOLTAREN) 4 g, 4 times daily PRN    docusate sodium (COLACE) 100 mg, 2 times daily    empagliflozin (JARDIANCE) 10 mg, oral, Daily    furosemide (LASIX) 20 mg, oral, Every other day    gabapentin (NEURONTIN) 300 mg, oral, 2 times daily    ipratropium-albuteroL (Duo-Neb) 0.5-2.5 mg/3 mL nebulizer  solution 3 mL, nebulization, 3 times daily RT    magnesium gluconate (MAGONATE) 54 mg, oral, 2 times daily    OLANZapine (ZYPREXA) 5 mg, oral, Nightly    ondansetron (ZOFRAN) 8 mg, oral, Every 8 hours PRN    ondansetron ODT (ZOFRAN-ODT) 4 mg, oral, Every 8 hours PRN    oxyCODONE (ROXICODONE) 5 mg, oral, Every 6 hours PRN    pantoprazole (PROTONIX) 40 mg, oral, Daily before breakfast, Do not crush, chew, or split.    polyethylene glycol (GLYCOLAX, MIRALAX) 17 g, Daily    prochlorperazine (COMPAZINE) 10 mg, oral, Every 6 hours PRN    roflumilast (DALIRESP) 500 mcg, oral, Daily    rosuvastatin (CRESTOR) 40 mg, oral, Daily    sennosides-docusate sodium (Lenka-Colace) 8.6-50 mg tablet 1 tablet, oral, Daily    umeclidinium-vilanteroL (Anoro Ellipta) 62.5-25 mcg/actuation blister with device 1 puff, inhalation, Daily       Allergies:   RX Allergies[4]    PHYSICAL EXAMINATION  Vital Signs:   Vital signs reviewed  There were no vitals filed for this visit.  Pain Score:           Physical Exam    ASSESSMENT/PLAN    Pain  Pain is: { :31623}  Type: { :87153}  Pain control: { :41766}  Intolerances/previously tried: ***  Personalized pain goal: ***  Pain Plan:  Increase Oxycodone 5mg Q6 PRN   Gabapentin 300mg BID     Opioid Use  Medication Management:   - OARRS report reviewed with no aberrant behavior; consistent with  prescriptions/records and patient history  - MED ***.  Overdose Risk Score 90.   This has been discussed with patient.   - We will continue to closely monitor the patient for signs of prescription misuse including UDS, OARRS review and subjective reports at each visit.  - No concurrent benzodiazepine use   - I am a provider who is certified in Hospice and Palliative Medicine and have conducted a face-face visit and examination for this patient.  - Routine Urine Drug Screen is needed and was completed on *** appropriately positive for opioids and negative for illicit substances  - Controlled Substance Agreement:  is needed. Completed on: ***  - Specifically discussed that controlled substance prescriptions will only be provided by our group as outlined in the completed agreement  - Naloxone {IS/ IS NOT:39345} needed  - Red Flags: ***     Nausea   { :27899} nausea {with or without:71511} vomiting related to { :57648}  Nausea Plan:  Continue Compazine PRN   Continue Zofran PRN     Constipation   At risk for constipation related to opioids, ***   LBM ***  Constipation Plan:  Continue Senna S   Continue Miralax   Colace     Altered Mood  {Acute/Chronic:84389} {anxiety/depression:21755} related to {related to:39981}   Mood Plan:  Continue       Decreased appetite  Related to { :44964}  Reports weight loss over the past year of *** pounds   Anorexia Plan:   Continue     Introduction to Supportive and Palliative Oncology:  Introduced the role and philosophy of Supportive and Palliative oncology in the evaluation and management of symptoms during cancer treatment  Palliative care was introduced as a service for patients with serious illness to help with symptoms, assist with goals of care conversations, navigate complex decision making, improve quality of life for patients, and provide support both patients and families.  Patient seemed to appreciate the extra layer of support.      Advance Directives  Existence of Advance Directives:{ :53504}  Decision maker: { :63576} ***  Code Status: { :65975}    Next Follow-Up Visit:  Return to clinic in ***    Signature and billing  Thank you for allowing us to participate in the care of this patient. Recommendations will be communicated back to the consulting service by way of shared electronic medical record or face-to-face.    Medical complexity was {medical complexity:54669} level due to due to complexity of problems, extensive data review, and high risk of management/treatment.  Time was spent on the following: {time spent:95170}. Total time spent: ***      DATA   Diagnostic tests and  information reviewed for today's visit:  { :47756}           SIGNATURE: [facility]    Contact information:  Supportive and Palliative Oncology  Monday-Friday 8 AM-5 PM  Phone:  [phone], press option #5, then option #1.   Or Epic Secure Chat              [1]   Past Medical History:  Diagnosis Date    Breast cancer     R and L breast s/p surgery, chemotherapy and RT    CAD (coronary artery disease)     COPD (chronic obstructive pulmonary disease) (Multi)     Delayed emergence from general anesthesia     HFrEF (heart failure with reduced ejection fraction)     HL (hearing loss)     HLD (hyperlipidemia)     HTN (hypertension)     Pelvic mass     PVD (peripheral vascular disease)    [2]   Past Surgical History:  Procedure Laterality Date    ADENOIDECTOMY      APPENDECTOMY      BREAST BIOPSY       SECTION, CLASSIC      CHOLECYSTECTOMY      CT ANGIO AORTA AND BILATERAL ILIOFEMORAL RUN OFF INCLUDING WITHOUT CONTRAST IF PERFORMED  2022    MASTECTOMY      SEPTOPLASTY      TONSILLECTOMY      TOTAL ABDOMINAL HYSTERECTOMY     [3]   Family History  Problem Relation Name Age of Onset    Breast cancer Mother      Hypertension Mother      Hyperlipidemia Mother      Esophageal cancer Mother      Other (Peripheral artery disease) Mother      Coronary artery disease Father     [4]   Allergies  Allergen Reactions    Codeine Nausea/vomiting

## 2025-05-27 ENCOUNTER — HOME CARE VISIT (OUTPATIENT)
Dept: HOME HEALTH SERVICES | Facility: HOME HEALTH | Age: 75
End: 2025-05-27
Payer: MEDICARE

## 2025-05-27 ENCOUNTER — TELEPHONE (OUTPATIENT)
Dept: GYNECOLOGIC ONCOLOGY | Facility: HOSPITAL | Age: 75
End: 2025-05-27
Payer: MEDICARE

## 2025-05-27 VITALS
DIASTOLIC BLOOD PRESSURE: 57 MMHG | OXYGEN SATURATION: 97 % | HEART RATE: 87 BPM | SYSTOLIC BLOOD PRESSURE: 110 MMHG | RESPIRATION RATE: 16 BRPM | TEMPERATURE: 97.7 F

## 2025-05-27 PROCEDURE — G0157 HHC PT ASSISTANT EA 15: HCPCS | Mod: CQ,HHH

## 2025-05-27 SDOH — HEALTH STABILITY: PHYSICAL HEALTH: EXERCISE TYPE: B LE STRENGTHENING THER EX, ENDURANCE TRAINING

## 2025-05-27 SDOH — HEALTH STABILITY: PHYSICAL HEALTH
EXERCISE COMMENTS: 1 SET OF 10 EACH , ORANGE THERABAND  SEATED THER EX: MARCHES, LEG EXT, LAQ, TBAND HIP ABD, BALL SQUEEZES HIP ADD  SUPINE THER EX: SLR, HIP ABD, HEEL SLIDES, SAQ, GLUT SETS 5 SEC HOLDS QUAD SETS 5 SEC HOLDS

## 2025-05-27 ASSESSMENT — ACTIVITIES OF DAILY LIVING (ADL)
BATHING_CURRENT_FUNCTION: CONTACT GUARD ASSIST
AMBULATION ASSISTANCE: 1
BATHING EQUIPMENT USED: SHOWER CHAIR
AMBULATION ASSISTANCE: CONTACT GUARD ASSIST
BATHING ASSESSED: 1

## 2025-05-27 ASSESSMENT — ENCOUNTER SYMPTOMS
OCCASIONAL FEELINGS OF UNSTEADINESS: 1
MUSCLE WEAKNESS: 1
ARTHRALGIAS: 1
DENIES PAIN: 1
PERSON REPORTING PAIN: PATIENT

## 2025-05-27 NOTE — CASE COMMUNICATION
Pt. not able to walk at this time bc she is dealing with possible UTI or yeast infection.  is treating patient for this at this time and patient took antibiotic yesterday however it is very painful to walk or urinate at this time. Pt. is weak from chemo at this time and defintely needs physical therapy to continue to improve upon her strength.

## 2025-05-27 NOTE — TELEPHONE ENCOUNTER
The patient's daughter called and to report that the patient still has a persistent rash from her vagina to her rectum. The daughter said the are is red and causing the patient a constant burning sensation. They have been applying terconazole as prescribed and have tried Desitin, Pinxav, and Butt Paste with no relief. I told the daughter that I would update her physician for recommendations. I also advised the daughter to try to take the patient to urgent care for quicker evaluation.

## 2025-05-28 ENCOUNTER — HOME CARE VISIT (OUTPATIENT)
Dept: HOME HEALTH SERVICES | Facility: HOME HEALTH | Age: 75
End: 2025-05-28
Payer: MEDICARE

## 2025-05-28 VITALS
RESPIRATION RATE: 16 BRPM | HEART RATE: 90 BPM | OXYGEN SATURATION: 95 % | TEMPERATURE: 96.9 F | DIASTOLIC BLOOD PRESSURE: 54 MMHG | SYSTOLIC BLOOD PRESSURE: 122 MMHG

## 2025-05-28 PROCEDURE — G0151 HHCP-SERV OF PT,EA 15 MIN: HCPCS | Mod: HHH

## 2025-05-28 RX ORDER — IBUPROFEN 600 MG/1
TABLET ORAL
Qty: 90 TABLET | Refills: 0 | Status: SHIPPED | OUTPATIENT
Start: 2025-05-28

## 2025-05-28 RX ORDER — DEXAMETHASONE 4 MG/1
TABLET ORAL
Qty: 66 TABLET | Refills: 0 | Status: SHIPPED | OUTPATIENT
Start: 2025-05-28

## 2025-05-28 SDOH — HEALTH STABILITY: PHYSICAL HEALTH: EXERCISE COMMENTS: DOUTS.

## 2025-05-28 SDOH — HEALTH STABILITY: PHYSICAL HEALTH
EXERCISE COMMENTS: INSTRUCTION AND RETURN DEMONSTRATION OF THERAPEUTIC EXERCISES INCLUDING SEATED KNEE EXTENSIONS, SEATED MARCHING, SEATED HEEL AND TOE RAISES, SEATED BLUE THERABAND HIP ABDUCTION AND KNEE FLEXION, SEATED PILLOW SQUEEZE HIP ADDUCTION, STANDING HEEL AND

## 2025-05-28 SDOH — HEALTH STABILITY: PHYSICAL HEALTH: EXERCISE TYPE: BLE STRENGTHENING EXERCISES

## 2025-05-28 SDOH — HEALTH STABILITY: PHYSICAL HEALTH
EXERCISE COMMENTS: TOE RAISES, STANDING KNEE CURLS, STANDING HIP EXTENSION, STANDING HIP ABDUCTION, STANDING MARCHING, MINI SQUATS 1 SET 10 REPS EACH.  PATIENT REQUIRED VERBAL AND VISUAL CUING FOR PROPER EXECUTION OF EXERCISES.  PT HAS BEEN PROVIDED WRITTEN HOME EX HAN

## 2025-05-28 ASSESSMENT — ENCOUNTER SYMPTOMS
SUBJECTIVE PAIN PROGRESSION: UNCHANGED
PERSON REPORTING PAIN: PATIENT
PAIN: 1
PAIN LOCATION: PERINEUM
HIGHEST PAIN SEVERITY IN PAST 24 HOURS: 9/10
LOWEST PAIN SEVERITY IN PAST 24 HOURS: 8/10
OCCASIONAL FEELINGS OF UNSTEADINESS: 1
PAIN SEVERITY GOAL: 2/10

## 2025-05-28 ASSESSMENT — PAIN SCALES - PAIN ASSESSMENT IN ADVANCED DEMENTIA (PAINAD)
NEGVOCALIZATION: 0
BREATHING: 0
FACIALEXPRESSION: 0 - SMILING OR INEXPRESSIVE.
CONSOLABILITY: 0 - NO NEED TO CONSOLE.
TOTALSCORE: 0
NEGVOCALIZATION: 0 - NONE.
FACIALEXPRESSION: 0
BODYLANGUAGE: 0 - RELAXED.
CONSOLABILITY: 0
BODYLANGUAGE: 0

## 2025-05-28 ASSESSMENT — ACTIVITIES OF DAILY LIVING (ADL)
OASIS_M1830: 02
HOME_HEALTH_OASIS: 01

## 2025-05-28 NOTE — PROGRESS NOTES
Patient ID: Yadira Davis is a 74 y.o. female.  Referring Physician: No referring provider defined for this encounter.  Primary Care Provider: Sylvie Medina DO    Subjective    Here for complaints of vulvar pain and rash. S/p C2 carboplatin/paclitaxel for ovarian carcinosarcoma. Reports perineal pain is severe, preventing her from sleeping at night. Has significant burning on the skin with urination. Diarrhea after treatment now resolved, but bottom continues to feel raw. Here in a wheelchair. Laying in bed mostly.    Objective    BSA: 1.51 meters squared  /76   Pulse 97   Temp 36.4 °C (97.5 °F)   Resp 18   Wt 51.5 kg (113 lb 9.6 oz)   SpO2 100%   BMI 20.33 kg/m²      Physical Exam  Vitals and nursing note reviewed. Exam conducted with a chaperone present.   Constitutional:       Appearance: Normal appearance. She is normal weight.   HENT:      Head: Normocephalic and atraumatic.      Mouth/Throat:      Mouth: Mucous membranes are moist.      Pharynx: No oropharyngeal exudate or posterior oropharyngeal erythema.   Eyes:      Extraocular Movements: Extraocular movements intact.      Conjunctiva/sclera: Conjunctivae normal.      Pupils: Pupils are equal, round, and reactive to light.   Cardiovascular:      Rate and Rhythm: Normal rate.   Pulmonary:      Effort: Pulmonary effort is normal.      Breath sounds: No wheezing, rhonchi or rales.   Abdominal:      General: A surgical scar is present.      Palpations: Abdomen is soft. There is no mass.      Tenderness: There is no guarding or rebound.      Hernia: No hernia is present.      Comments: Surgical incision clean/dry/intact without redness, drainage or erythema      Genitourinary:     Pubic Area: No rash.           Comments: Vulva with several 3-4mm ulcerations and generalized erythema extending outwards. HSV swab collected. Thick white discharge noted in vagina. Stage II pressure ulcer noted on coccyx. Mepilex applied.   Musculoskeletal:          General: Normal range of motion.   Skin:     General: Skin is warm.      Findings: No erythema or rash.   Neurological:      General: No focal deficit present.      Mental Status: She is alert and oriented to person, place, and time.   Psychiatric:         Mood and Affect: Mood normal.         Behavior: Behavior normal.         Performance Status:  Asymptomatic    Assessment/Plan     Oncology History Overview Note   3/25/25 XL BS RO with radical dissection omentectomy pelvic peritonectomy argon beam ablation clinical stage IIIB   HER2 3+MMRp   4/28/25  9  Pathology Carcinosarcoma of the ovary   [employer] GIS negative, negative BRCA1/2   Foundation PALB2 mutation, HRD negativ, ROSETTE, TMB low, p53 mut     Ovarian cancer (Multi)   4/12/2025 Initial Diagnosis    Ovarian cancer (Multi)     4/30/2025 -  Chemotherapy    PACLitaxel / CARBOplatin, 21 Day Cycles - Gyn       Diagnoses and all orders for this visit:  Malignant neoplasm of ovary, unspecified laterality (Multi)    # Ovarian cancer  - We reviewed her imaging and pathology results, reviewed high risk histology  - We discussed the pathophysiology of ovarian, fallopian tube, and peritoneal cancers.   - We discussed my recommendation for adjuvant treatment with carboplatin AUC 5 and paclitaxel 135 mg/m2; dose reduce taxol 110 mg/m2   - Referral to genetics, missed visit; encouraged to reschedule  - [employer] reviewed with FOLR1 pending    # chemotherapy induced nausea  - Zyprexa 2.5 mg PO x 5 days  - continue PRN anti-emetics    # dysuria   - UA and culture negative    # Vulvar/Perineal pain  - Follow up HSV swab  - Discussed comfort measures with sitz bath, georgina bottle, application of aquaphor or other skin protectant, changing positions frequently  - Rx Tramadol, oxycodone causes significant drowsiness per daughter    # Pressure Ulcer  - Stage II Coccyx  - Reinforced importance of changing positions frequently  - Provided with several Mepilex   -  If not improved or worsening at time of next visit, recommend wound care referral    # Candidiasis   - Rx Flagyl x 3 days    # sciatica   - Continue to work with PT, continue gabapentin 300 BID    # chronic pain   - Scheduled with supportive oncology 6/13    JOHN Albarran-CNP

## 2025-05-29 ENCOUNTER — OFFICE VISIT (OUTPATIENT)
Dept: GYNECOLOGIC ONCOLOGY | Facility: CLINIC | Age: 75
End: 2025-05-29
Payer: MEDICARE

## 2025-05-29 VITALS
BODY MASS INDEX: 20.33 KG/M2 | SYSTOLIC BLOOD PRESSURE: 140 MMHG | WEIGHT: 113.6 LBS | HEART RATE: 97 BPM | DIASTOLIC BLOOD PRESSURE: 76 MMHG | TEMPERATURE: 97.5 F | OXYGEN SATURATION: 100 % | RESPIRATION RATE: 18 BRPM

## 2025-05-29 DIAGNOSIS — C56.9 MALIGNANT NEOPLASM OF OVARY, UNSPECIFIED LATERALITY (MULTI): ICD-10-CM

## 2025-05-29 DIAGNOSIS — R10.2 PELVIC PAIN IN FEMALE: ICD-10-CM

## 2025-05-29 DIAGNOSIS — B37.9 YEAST INFECTION: ICD-10-CM

## 2025-05-29 DIAGNOSIS — L98.491 SKIN ULCER, LIMITED TO BREAKDOWN OF SKIN: Primary | ICD-10-CM

## 2025-05-29 PROCEDURE — 99215 OFFICE O/P EST HI 40 MIN: CPT | Performed by: NURSE PRACTITIONER

## 2025-05-29 RX ORDER — TRAMADOL HYDROCHLORIDE 50 MG/1
50 TABLET, FILM COATED ORAL EVERY 8 HOURS PRN
Qty: 15 TABLET | Refills: 0 | Status: SHIPPED | OUTPATIENT
Start: 2025-05-29 | End: 2025-06-03

## 2025-05-29 RX ORDER — FLUCONAZOLE 150 MG/1
150 TABLET ORAL DAILY
Qty: 3 TABLET | Refills: 0 | Status: SHIPPED | OUTPATIENT
Start: 2025-05-29 | End: 2025-06-01

## 2025-05-29 ASSESSMENT — PAIN SCALES - GENERAL: PAINLEVEL_OUTOF10: 0-NO PAIN

## 2025-06-02 ENCOUNTER — TELEPHONE (OUTPATIENT)
Dept: GYNECOLOGIC ONCOLOGY | Facility: HOSPITAL | Age: 75
End: 2025-06-02
Payer: MEDICARE

## 2025-06-02 DIAGNOSIS — B02.9 HERPES ZOSTER WITHOUT COMPLICATION: Primary | ICD-10-CM

## 2025-06-02 RX ORDER — VALACYCLOVIR HYDROCHLORIDE 500 MG/1
500 TABLET, FILM COATED ORAL 2 TIMES DAILY
Qty: 20 TABLET | Refills: 1 | Status: SHIPPED | OUTPATIENT
Start: 2025-06-02 | End: 2025-06-22

## 2025-06-02 NOTE — TELEPHONE ENCOUNTER
I spoke with the patient and her daughter about how her sores are. The patient is still having discomfort and burning when urinating.

## 2025-06-03 NOTE — ED PROVIDER NOTES
HPI   Chief Complaint   Patient presents with    Vomiting       Patient presents emergency department for nausea and vomiting for the past 4 days.  Patient has ovarian cancer and had chemo about 9 days prior.  She states she is unable to keep any fluids or food down.  She is reporting increasing weakness.  She talked her oncology team who recommended she come here to the emergency department.  She denies any fevers, known sick contacts.                          No data recorded                Patient History   Medical History[1]  Surgical History[2]  Family History[3]  Social History[4]    Physical Exam   ED Triage Vitals   Temperature Heart Rate Respirations BP   05/10/25 2005 05/10/25 2005 05/10/25 2005 05/10/25 2005   36.6 °C (97.9 °F) (!) 101 18 (!) 85/48      Pulse Ox Temp src Heart Rate Source Patient Position   05/10/25 2005 -- 05/10/25 2100 05/10/25 2215   98 %  Monitor Lying      BP Location FiO2 (%)     05/10/25 2215 --     Left arm          Physical Exam  Vitals and nursing note reviewed.   Constitutional:       General: She is not in acute distress.     Appearance: She is well-developed.   HENT:      Head: Normocephalic and atraumatic.      Right Ear: External ear normal.      Left Ear: External ear normal.      Nose: Nose normal.      Mouth/Throat:      Mouth: Mucous membranes are moist.      Pharynx: Oropharynx is clear.   Eyes:      Extraocular Movements: Extraocular movements intact.      Conjunctiva/sclera: Conjunctivae normal.   Neck:      Comments: Trachea midline  Cardiovascular:      Rate and Rhythm: Tachycardia present.      Pulses: Normal pulses.   Pulmonary:      Effort: Pulmonary effort is normal. No respiratory distress.      Breath sounds: Normal breath sounds.   Abdominal:      General: Bowel sounds are normal.      Palpations: Abdomen is soft.      Tenderness: There is no abdominal tenderness.   Musculoskeletal:         General: No swelling or tenderness.      Cervical back: No  tenderness.   Skin:     General: Skin is warm and dry.      Capillary Refill: Capillary refill takes less than 2 seconds.   Neurological:      General: No focal deficit present.      Mental Status: She is alert and oriented to person, place, and time.   Psychiatric:         Mood and Affect: Mood normal.         Thought Content: Thought content does not include homicidal or suicidal ideation.         ED Course & MDM   Diagnoses as of 06/03/25 1008   Dehydration   Nausea and vomiting, unspecified vomiting type       Medical Decision Making  Patient presents with nausea vomiting while on chemotherapy. Available chart reviewed. On initial assessment the patient was found non-toxic, no acute distress, initially hypotensive and tachycardic.  Afebrile. Initial concern for dehydration, infection, and abdominal process.  Patient ordered fluids.  Magnesium is normal.  Phosphorus slightly low.  Metabolic panel shows hyponatremia of 130, hypochloremia of 97, normal kidney and liver function with slight elevation of calcium.  CBC shows leukopenia likely secondary to chemotherapy of 1.4, no anemia.  Urinalysis is not concerning for infection.  Blood cultures were obtained.  CT of the abdomen pelvis shows colonic inflammation concerning for colitis and resolution of peritoneal fluid collection and decreasing left pelvic sidewall fluid collection.  Copy of CT results were given to the patient.  She feels better upon reevaluation and fluid boluses.  Patient was prescribed Zofran for home.    No indication for admission.  Discussed findings and diagnosis with the patient, follow-up and return to ED precautions given, patient voiced understanding, agrees with plan, questions answered, patient was discharged in stable condition.        Procedure  Procedures       [1]   Past Medical History:  Diagnosis Date    Breast cancer     R and L breast s/p surgery, chemotherapy and RT    CAD (coronary artery disease)     COPD (chronic obstructive  pulmonary disease) (Multi)     Delayed emergence from general anesthesia     HFrEF (heart failure with reduced ejection fraction)     HL (hearing loss)     HLD (hyperlipidemia)     HTN (hypertension)     Pelvic mass     PVD (peripheral vascular disease)    [2]   Past Surgical History:  Procedure Laterality Date    ADENOIDECTOMY      APPENDECTOMY      BREAST BIOPSY       SECTION, CLASSIC      CHOLECYSTECTOMY      CT ANGIO AORTA AND BILATERAL ILIOFEMORAL RUN OFF INCLUDING WITHOUT CONTRAST IF PERFORMED  2022    MASTECTOMY      SEPTOPLASTY      TONSILLECTOMY      TOTAL ABDOMINAL HYSTERECTOMY     [3]   Family History  Problem Relation Name Age of Onset    Breast cancer Mother      Hypertension Mother      Hyperlipidemia Mother      Esophageal cancer Mother      Other (Peripheral artery disease) Mother      Coronary artery disease Father     [4]   Social History  Tobacco Use    Smoking status: Every Day     Current packs/day: 1.50     Average packs/day: 1.5 packs/day for 55.4 years (83.0 ttl pk-yrs)     Types: Cigarettes     Start date: 1970     Passive exposure: Current    Smokeless tobacco: Never    Tobacco comments:     Declines cessation counseling, no interest in quiting    Vaping Use    Vaping status: Never Used   Substance Use Topics    Alcohol use: Not Currently    Drug use: Never        João Macias MD  [phone]

## 2025-06-04 DIAGNOSIS — R06.09 DYSPNEA ON EXERTION: ICD-10-CM

## 2025-06-04 DIAGNOSIS — J44.1 COPD EXACERBATION (MULTI): ICD-10-CM

## 2025-06-05 RX ORDER — ROFLUMILAST 500 UG/1
500 TABLET ORAL DAILY
Qty: 21 TABLET | Refills: 0 | Status: SHIPPED | OUTPATIENT
Start: 2025-06-05

## 2025-06-07 NOTE — PROGRESS NOTES
"Patient ID: Yadira Davis is a 74 y.o. female.  Referring Physician: No referring provider defined for this encounter.  Primary Care Provider: Sylvie Medina DO    Subjective    Here for C3 carboplatin/paclitaxel for ovarian carcinosarcoma. Seen by Roxana with suspected HSV. Treatment with valtrex ongoing. Cultures were discontinued by lab. Recommend suppression given immune suppressed state. \"Blues\" ongoing though improved with zyprexa. Nausea much improved but appetite is down. Suspect food aversion from previously NV. Not sleeping well. Ongoing neuropathy. Not taking gabapentin regularly. Very worried about risk of the cancer returning. Decub ulcer, stage 2. Applying Voltaren gel directly on it with burning. Minimal activity. Mostly supine.     Objective    BSA: 1.46 meters squared  /83   Pulse 110   Temp 36.6 °C (97.9 °F)   Resp 17   Wt 48 kg (105 lb 13.1 oz)   SpO2 98%   BMI 18.94 kg/m²      Physical Exam  Vitals and nursing note reviewed. Exam conducted with a chaperone present.   Constitutional:       Appearance: Normal appearance. She is normal weight.   HENT:      Head: Normocephalic and atraumatic.      Mouth/Throat:      Mouth: Mucous membranes are moist.      Pharynx: No oropharyngeal exudate or posterior oropharyngeal erythema.   Eyes:      Extraocular Movements: Extraocular movements intact.      Conjunctiva/sclera: Conjunctivae normal.      Pupils: Pupils are equal, round, and reactive to light.   Cardiovascular:      Rate and Rhythm: Normal rate.   Pulmonary:      Effort: Pulmonary effort is normal.      Breath sounds: No wheezing, rhonchi or rales.   Abdominal:      General: A surgical scar is present.      Palpations: Abdomen is soft. There is no mass.      Tenderness: There is no guarding or rebound.      Hernia: No hernia is present.      Comments: Surgical incision clean/dry/intact without redness, drainage or erythema      Genitourinary:     Pubic Area: No rash.  "   Musculoskeletal:         General: Normal range of motion.   Skin:     General: Skin is warm.      Findings: No erythema or rash.      Comments: Stage 2 sacral decub   Neurological:      General: No focal deficit present.      Mental Status: She is alert and oriented to person, place, and time.   Psychiatric:         Mood and Affect: Mood normal.         Behavior: Behavior normal.         Performance Status:  Asymptomatic    Assessment/Plan     Oncology History Overview Note   3/25/25 XL BS RO with radical dissection omentectomy pelvic peritonectomy argon beam ablation clinical stage IIIB   HER2 3+MMRp   4/28/25  9  Pathology Carcinosarcoma of the ovary   [facility] Inter[facility] GIS negative, negative BRCA1/2   Foundation PALB2 mutation, HRD negativ, ROSETTE, TMB low, p53 mut     Ovarian cancer (Multi)   4/12/2025 Initial Diagnosis    Ovarian cancer (Multi)     4/30/2025 -  Chemotherapy    PACLitaxel / CARBOplatin, 21 Day Cycles - Gyn       Diagnoses and all orders for this visit:  Malignant neoplasm of ovary, unspecified laterality (Multi)    # Ovarian cancer  - We reviewed her imaging and pathology results, reviewed high risk histology  - We discussed the pathophysiology of ovarian, fallopian tube, and peritoneal cancers.   - We discussed my recommendation for adjuvant treatment with carboplatin AUC 5 and paclitaxel 110 mg/m2 today  - Labs reviewed and appropriate for treatment  - Referral to genetics, missed visit; encouraged to reschedule (daughter is BRCA1)  - [employer] [facility] reviewed with FOLR1 pending    # chemotherapy induced nausea  - Zyprexa 2.5 mg PO x 5 days, discussed extending to daily  - continue PRN anti-emetics, refill zofran  - Discussed nutritional supplements, consider nutrition referral     # Depression   - Defer to supportive oncology    # vulvar lesions, suspect HSV  - Recommend suppression with valtrex 500 daily    # Leg pain, bilateral R > L; resolved  - Recommend duplex US arterial and venous,  did not complete   - Monitor    # Sacral decub ulcer   - Wound care as discuss, encourage mobility, shifting when supine off the back    # sciatica, neuropathy G1  - Continue gabapentin 300 BID    # chronic pain   - Scheduled with supportive oncology today    Jaki Treviño MD MPH

## 2025-06-09 ENCOUNTER — LAB (OUTPATIENT)
Dept: LAB | Facility: HOSPITAL | Age: 75
End: 2025-06-09
Payer: MEDICARE

## 2025-06-09 DIAGNOSIS — N83.8 OVARIAN MASS, RIGHT: ICD-10-CM

## 2025-06-09 DIAGNOSIS — R10.2 PELVIC PAIN IN FEMALE: ICD-10-CM

## 2025-06-09 DIAGNOSIS — C56.9 MALIGNANT NEOPLASM OF UNSPECIFIED OVARY (MULTI): Primary | ICD-10-CM

## 2025-06-09 LAB
ALBUMIN SERPL BCP-MCNC: 3.8 G/DL (ref 3.4–5)
ALP SERPL-CCNC: 86 U/L (ref 33–136)
ALT SERPL W P-5'-P-CCNC: 17 U/L (ref 7–45)
ANION GAP SERPL CALC-SCNC: 14 MMOL/L (ref 10–20)
AST SERPL W P-5'-P-CCNC: 21 U/L (ref 9–39)
BASOPHILS # BLD AUTO: 0.01 X10*3/UL (ref 0–0.1)
BASOPHILS NFR BLD AUTO: 0.2 %
BILIRUB SERPL-MCNC: 0.3 MG/DL (ref 0–1.2)
BUN SERPL-MCNC: 10 MG/DL (ref 6–23)
BURR CELLS BLD QL SMEAR: NORMAL
CALCIUM SERPL-MCNC: 10.5 MG/DL (ref 8.6–10.3)
CHLORIDE SERPL-SCNC: 102 MMOL/L (ref 98–107)
CO2 SERPL-SCNC: 26 MMOL/L (ref 21–32)
CREAT SERPL-MCNC: 0.57 MG/DL (ref 0.5–1.05)
EGFRCR SERPLBLD CKD-EPI 2021: >90 ML/MIN/1.73M*2
EOSINOPHIL # BLD AUTO: 0.02 X10*3/UL (ref 0–0.4)
EOSINOPHIL NFR BLD AUTO: 0.4 %
ERYTHROCYTE [DISTWIDTH] IN BLOOD BY AUTOMATED COUNT: 16.1 % (ref 11.5–14.5)
GLUCOSE SERPL-MCNC: 102 MG/DL (ref 74–99)
HCT VFR BLD AUTO: 36 % (ref 36–46)
HGB BLD-MCNC: 11.9 G/DL (ref 12–16)
IMM GRANULOCYTES # BLD AUTO: 0.01 X10*3/UL (ref 0–0.5)
IMM GRANULOCYTES NFR BLD AUTO: 0.2 % (ref 0–0.9)
LYMPHOCYTES # BLD AUTO: 1.94 X10*3/UL (ref 0.8–3)
LYMPHOCYTES NFR BLD AUTO: 41.8 %
MAGNESIUM SERPL-MCNC: 1.59 MG/DL (ref 1.6–2.4)
MCH RBC QN AUTO: 28.5 PG (ref 26–34)
MCHC RBC AUTO-ENTMCNC: 33.1 G/DL (ref 32–36)
MCV RBC AUTO: 86 FL (ref 80–100)
MONOCYTES # BLD AUTO: 0.35 X10*3/UL (ref 0.05–0.8)
MONOCYTES NFR BLD AUTO: 7.5 %
NEUTROPHILS # BLD AUTO: 2.31 X10*3/UL (ref 1.6–5.5)
NEUTROPHILS NFR BLD AUTO: 49.9 %
NRBC BLD-RTO: 0 /100 WBCS (ref 0–0)
PLATELET # BLD AUTO: 151 X10*3/UL (ref 150–450)
POTASSIUM SERPL-SCNC: 3.1 MMOL/L (ref 3.5–5.3)
PROT SERPL-MCNC: 5.9 G/DL (ref 6.4–8.2)
RBC # BLD AUTO: 4.17 X10*6/UL (ref 4–5.2)
RBC MORPH BLD: NORMAL
SODIUM SERPL-SCNC: 139 MMOL/L (ref 136–145)
WBC # BLD AUTO: 4.6 X10*3/UL (ref 4.4–11.3)

## 2025-06-09 PROCEDURE — 36415 COLL VENOUS BLD VENIPUNCTURE: CPT

## 2025-06-09 PROCEDURE — 86304 IMMUNOASSAY TUMOR CA 125: CPT

## 2025-06-09 PROCEDURE — 85025 COMPLETE CBC W/AUTO DIFF WBC: CPT

## 2025-06-09 PROCEDURE — 83735 ASSAY OF MAGNESIUM: CPT

## 2025-06-09 PROCEDURE — 80053 COMPREHEN METABOLIC PANEL: CPT

## 2025-06-09 RX ORDER — MAGNESIUM SULFATE HEPTAHYDRATE 40 MG/ML
2 INJECTION, SOLUTION INTRAVENOUS ONCE
Status: CANCELLED | OUTPATIENT
Start: 2025-06-11

## 2025-06-09 RX ORDER — OXYCODONE HYDROCHLORIDE 5 MG/1
5 TABLET ORAL EVERY 6 HOURS PRN
Qty: 40 TABLET | Refills: 0 | Status: SHIPPED | OUTPATIENT
Start: 2025-06-09

## 2025-06-09 RX ORDER — DIPHENHYDRAMINE HYDROCHLORIDE 50 MG/ML
50 INJECTION, SOLUTION INTRAMUSCULAR; INTRAVENOUS AS NEEDED
Status: CANCELLED | OUTPATIENT
Start: 2025-06-11

## 2025-06-09 RX ORDER — PALONOSETRON 0.05 MG/ML
0.25 INJECTION, SOLUTION INTRAVENOUS ONCE
Status: CANCELLED | OUTPATIENT
Start: 2025-06-11

## 2025-06-09 RX ORDER — EPINEPHRINE 0.3 MG/.3ML
0.3 INJECTION SUBCUTANEOUS EVERY 5 MIN PRN
Status: CANCELLED | OUTPATIENT
Start: 2025-06-11

## 2025-06-09 RX ORDER — FAMOTIDINE 10 MG/ML
20 INJECTION, SOLUTION INTRAVENOUS ONCE AS NEEDED
Status: CANCELLED | OUTPATIENT
Start: 2025-06-11

## 2025-06-09 RX ORDER — FAMOTIDINE 10 MG/ML
40 INJECTION, SOLUTION INTRAVENOUS ONCE
Status: CANCELLED | OUTPATIENT
Start: 2025-06-11

## 2025-06-09 RX ORDER — ALBUTEROL SULFATE 0.83 MG/ML
3 SOLUTION RESPIRATORY (INHALATION) AS NEEDED
Status: CANCELLED | OUTPATIENT
Start: 2025-06-11

## 2025-06-09 RX ORDER — DIPHENHYDRAMINE HCL 25 MG
25 CAPSULE ORAL ONCE
Status: CANCELLED | OUTPATIENT
Start: 2025-06-11

## 2025-06-09 RX ORDER — PROCHLORPERAZINE EDISYLATE 5 MG/ML
10 INJECTION INTRAMUSCULAR; INTRAVENOUS EVERY 6 HOURS PRN
Status: CANCELLED | OUTPATIENT
Start: 2025-06-11

## 2025-06-09 RX ORDER — PROCHLORPERAZINE MALEATE 10 MG
10 TABLET ORAL EVERY 6 HOURS PRN
Status: CANCELLED | OUTPATIENT
Start: 2025-06-11

## 2025-06-10 LAB — CANCER AG125 SERPL-ACNC: 5.3 U/ML (ref 0–30.2)

## 2025-06-11 ENCOUNTER — OFFICE VISIT (OUTPATIENT)
Dept: GYNECOLOGIC ONCOLOGY | Facility: CLINIC | Age: 75
End: 2025-06-11
Payer: MEDICARE

## 2025-06-11 ENCOUNTER — SOCIAL WORK (OUTPATIENT)
Dept: HEMATOLOGY/ONCOLOGY | Facility: CLINIC | Age: 75
End: 2025-06-11

## 2025-06-11 ENCOUNTER — NUTRITION (OUTPATIENT)
Dept: HEMATOLOGY/ONCOLOGY | Facility: CLINIC | Age: 75
End: 2025-06-11

## 2025-06-11 ENCOUNTER — INFUSION (OUTPATIENT)
Dept: HEMATOLOGY/ONCOLOGY | Facility: CLINIC | Age: 75
End: 2025-06-11
Payer: MEDICARE

## 2025-06-11 ENCOUNTER — OFFICE VISIT (OUTPATIENT)
Dept: PALLIATIVE MEDICINE | Facility: CLINIC | Age: 75
End: 2025-06-11
Payer: MEDICARE

## 2025-06-11 VITALS
OXYGEN SATURATION: 98 % | SYSTOLIC BLOOD PRESSURE: 180 MMHG | WEIGHT: 105.82 LBS | HEART RATE: 110 BPM | DIASTOLIC BLOOD PRESSURE: 83 MMHG | BODY MASS INDEX: 18.94 KG/M2 | TEMPERATURE: 97.9 F | RESPIRATION RATE: 17 BRPM

## 2025-06-11 VITALS — HEIGHT: 63 IN | WEIGHT: 105.82 LBS | BODY MASS INDEX: 18.75 KG/M2

## 2025-06-11 DIAGNOSIS — M54.31 SCIATIC PAIN, RIGHT: ICD-10-CM

## 2025-06-11 DIAGNOSIS — L98.491 SKIN ULCER, LIMITED TO BREAKDOWN OF SKIN: ICD-10-CM

## 2025-06-11 DIAGNOSIS — Z51.11 CHEMOTHERAPY MANAGEMENT, ENCOUNTER FOR: Primary | ICD-10-CM

## 2025-06-11 DIAGNOSIS — C56.9 MALIGNANT NEOPLASM OF OVARY, UNSPECIFIED LATERALITY (MULTI): ICD-10-CM

## 2025-06-11 DIAGNOSIS — R63.0 DECREASED APPETITE: ICD-10-CM

## 2025-06-11 DIAGNOSIS — G89.3 CANCER RELATED PAIN: ICD-10-CM

## 2025-06-11 DIAGNOSIS — B00.9 HSV INFECTION: ICD-10-CM

## 2025-06-11 DIAGNOSIS — K59.03 DRUG-INDUCED CONSTIPATION: Primary | ICD-10-CM

## 2025-06-11 DIAGNOSIS — Z51.5 PALLIATIVE CARE ENCOUNTER: ICD-10-CM

## 2025-06-11 DIAGNOSIS — F17.200 TOBACCO USE DISORDER: ICD-10-CM

## 2025-06-11 DIAGNOSIS — R11.0 CHEMOTHERAPY-INDUCED NAUSEA: ICD-10-CM

## 2025-06-11 DIAGNOSIS — R11.2 NAUSEA AND VOMITING, UNSPECIFIED VOMITING TYPE: ICD-10-CM

## 2025-06-11 DIAGNOSIS — T45.1X5A CHEMOTHERAPY-INDUCED NAUSEA: ICD-10-CM

## 2025-06-11 DIAGNOSIS — F32.A DEPRESSION, UNSPECIFIED DEPRESSION TYPE: ICD-10-CM

## 2025-06-11 DIAGNOSIS — C56.1 MALIGNANT NEOPLASM OF RIGHT OVARY: ICD-10-CM

## 2025-06-11 PROCEDURE — 96415 CHEMO IV INFUSION ADDL HR: CPT

## 2025-06-11 PROCEDURE — 99215 OFFICE O/P EST HI 40 MIN: CPT | Performed by: STUDENT IN AN ORGANIZED HEALTH CARE EDUCATION/TRAINING PROGRAM

## 2025-06-11 PROCEDURE — 96366 THER/PROPH/DIAG IV INF ADDON: CPT | Mod: INF

## 2025-06-11 PROCEDURE — 96417 CHEMO IV INFUS EACH ADDL SEQ: CPT

## 2025-06-11 PROCEDURE — 96368 THER/DIAG CONCURRENT INF: CPT

## 2025-06-11 PROCEDURE — 2500000004 HC RX 250 GENERAL PHARMACY W/ HCPCS (ALT 636 FOR OP/ED): Performed by: STUDENT IN AN ORGANIZED HEALTH CARE EDUCATION/TRAINING PROGRAM

## 2025-06-11 PROCEDURE — 96413 CHEMO IV INFUSION 1 HR: CPT

## 2025-06-11 PROCEDURE — 3079F DIAST BP 80-89 MM HG: CPT | Performed by: STUDENT IN AN ORGANIZED HEALTH CARE EDUCATION/TRAINING PROGRAM

## 2025-06-11 PROCEDURE — 99205 OFFICE O/P NEW HI 60 MIN: CPT

## 2025-06-11 PROCEDURE — 99215 OFFICE O/P EST HI 40 MIN: CPT | Mod: 25

## 2025-06-11 PROCEDURE — 3077F SYST BP >= 140 MM HG: CPT | Performed by: STUDENT IN AN ORGANIZED HEALTH CARE EDUCATION/TRAINING PROGRAM

## 2025-06-11 PROCEDURE — 96375 TX/PRO/DX INJ NEW DRUG ADDON: CPT | Mod: INF

## 2025-06-11 PROCEDURE — 2500000001 HC RX 250 WO HCPCS SELF ADMINISTERED DRUGS (ALT 637 FOR MEDICARE OP): Performed by: STUDENT IN AN ORGANIZED HEALTH CARE EDUCATION/TRAINING PROGRAM

## 2025-06-11 PROCEDURE — 1125F AMNT PAIN NOTED PAIN PRSNT: CPT | Performed by: STUDENT IN AN ORGANIZED HEALTH CARE EDUCATION/TRAINING PROGRAM

## 2025-06-11 PROCEDURE — 1159F MED LIST DOCD IN RCRD: CPT | Performed by: STUDENT IN AN ORGANIZED HEALTH CARE EDUCATION/TRAINING PROGRAM

## 2025-06-11 PROCEDURE — 96367 TX/PROPH/DG ADDL SEQ IV INF: CPT

## 2025-06-11 RX ORDER — FAMOTIDINE 10 MG/ML
20 INJECTION, SOLUTION INTRAVENOUS ONCE AS NEEDED
Status: DISCONTINUED | OUTPATIENT
Start: 2025-06-11 | End: 2025-06-11 | Stop reason: HOSPADM

## 2025-06-11 RX ORDER — HEPARIN 100 UNIT/ML
500 SYRINGE INTRAVENOUS AS NEEDED
OUTPATIENT
Start: 2025-06-11

## 2025-06-11 RX ORDER — OLANZAPINE 2.5 MG/1
2.5 TABLET, FILM COATED ORAL NIGHTLY
Qty: 30 TABLET | Refills: 3 | Status: SHIPPED | OUTPATIENT
Start: 2025-06-11 | End: 2025-07-11

## 2025-06-11 RX ORDER — PALONOSETRON 0.05 MG/ML
0.25 INJECTION, SOLUTION INTRAVENOUS ONCE
Status: COMPLETED | OUTPATIENT
Start: 2025-06-11 | End: 2025-06-11

## 2025-06-11 RX ORDER — ALBUTEROL SULFATE 0.83 MG/ML
3 SOLUTION RESPIRATORY (INHALATION) AS NEEDED
Status: DISCONTINUED | OUTPATIENT
Start: 2025-06-11 | End: 2025-06-11 | Stop reason: HOSPADM

## 2025-06-11 RX ORDER — AMOXICILLIN 250 MG
2 CAPSULE ORAL 2 TIMES DAILY
Qty: 120 TABLET | Refills: 3 | Status: SHIPPED | OUTPATIENT
Start: 2025-06-11 | End: 2025-07-11

## 2025-06-11 RX ORDER — FAMOTIDINE 10 MG/ML
40 INJECTION, SOLUTION INTRAVENOUS ONCE
Status: COMPLETED | OUTPATIENT
Start: 2025-06-11 | End: 2025-06-11

## 2025-06-11 RX ORDER — VALACYCLOVIR HYDROCHLORIDE 500 MG/1
500 TABLET, FILM COATED ORAL DAILY
Qty: 30 TABLET | Refills: 11 | Status: SHIPPED | OUTPATIENT
Start: 2025-06-11 | End: 2026-06-11

## 2025-06-11 RX ORDER — DIPHENHYDRAMINE HYDROCHLORIDE 50 MG/ML
50 INJECTION, SOLUTION INTRAMUSCULAR; INTRAVENOUS AS NEEDED
Status: DISCONTINUED | OUTPATIENT
Start: 2025-06-11 | End: 2025-06-11 | Stop reason: HOSPADM

## 2025-06-11 RX ORDER — DIPHENHYDRAMINE HCL 25 MG
25 CAPSULE ORAL ONCE
Status: COMPLETED | OUTPATIENT
Start: 2025-06-11 | End: 2025-06-11

## 2025-06-11 RX ORDER — HEPARIN SODIUM,PORCINE/PF 10 UNIT/ML
50 SYRINGE (ML) INTRAVENOUS AS NEEDED
OUTPATIENT
Start: 2025-06-11

## 2025-06-11 RX ORDER — EPINEPHRINE 0.3 MG/.3ML
0.3 INJECTION SUBCUTANEOUS EVERY 5 MIN PRN
Status: DISCONTINUED | OUTPATIENT
Start: 2025-06-11 | End: 2025-06-11 | Stop reason: HOSPADM

## 2025-06-11 RX ORDER — ONDANSETRON 8 MG/1
8 TABLET, FILM COATED ORAL EVERY 8 HOURS PRN
Qty: 30 TABLET | Refills: 5 | Status: SHIPPED | OUTPATIENT
Start: 2025-06-11

## 2025-06-11 RX ORDER — PROCHLORPERAZINE EDISYLATE 5 MG/ML
10 INJECTION INTRAMUSCULAR; INTRAVENOUS EVERY 6 HOURS PRN
Status: DISCONTINUED | OUTPATIENT
Start: 2025-06-11 | End: 2025-06-11 | Stop reason: HOSPADM

## 2025-06-11 RX ORDER — MAGNESIUM SULFATE HEPTAHYDRATE 40 MG/ML
2 INJECTION, SOLUTION INTRAVENOUS ONCE
Status: COMPLETED | OUTPATIENT
Start: 2025-06-11 | End: 2025-06-11

## 2025-06-11 RX ORDER — PROCHLORPERAZINE MALEATE 10 MG
10 TABLET ORAL EVERY 6 HOURS PRN
Status: DISCONTINUED | OUTPATIENT
Start: 2025-06-11 | End: 2025-06-11 | Stop reason: HOSPADM

## 2025-06-11 RX ADMIN — PACLITAXEL 162 MG: 6 INJECTION INTRAVENOUS at 11:02

## 2025-06-11 RX ADMIN — CARBOPLATIN 380 MG: 10 INJECTION, SOLUTION INTRAVENOUS at 14:23

## 2025-06-11 RX ADMIN — MAGNESIUM SULFATE IN WATER 2 G: 40 INJECTION, SOLUTION INTRAVENOUS at 11:01

## 2025-06-11 RX ADMIN — DEXAMETHASONE SODIUM PHOSPHATE 12 MG: 10 INJECTION, SOLUTION INTRAMUSCULAR; INTRAVENOUS at 09:53

## 2025-06-11 RX ADMIN — DIPHENHYDRAMINE HYDROCHLORIDE 25 MG: 25 CAPSULE ORAL at 09:53

## 2025-06-11 RX ADMIN — FOSAPREPITANT 150 MG: 150 INJECTION, POWDER, LYOPHILIZED, FOR SOLUTION INTRAVENOUS at 10:18

## 2025-06-11 RX ADMIN — FAMOTIDINE 40 MG: 10 INJECTION INTRAVENOUS at 09:54

## 2025-06-11 RX ADMIN — PALONOSETRON 0.25 MG: 0.05 INJECTION, SOLUTION INTRAVENOUS at 09:53

## 2025-06-11 ASSESSMENT — PAIN SCALES - GENERAL: PAINLEVEL_OUTOF10: 8

## 2025-06-11 NOTE — PROGRESS NOTES
SUPPORTIVE AND PALLIATIVE ONCOLOGY CONSULT - OUTPATIENT      SERVICE DATE: 6/11/2025    Referred by:  Jaki Treviño MD  Medical Oncologist: Jaki Treviño MD MPH   Radiation Oncologist: No care team member to display  Primary Physician: Sylvie Medina  [phone]    REASON FOR CONSULT/CHIEF CONSULT COMPLAINT: pain management, other symptom control  nausea, constipation, and Introduction to Supportive and Palliative Oncology Services    Subjective   HISTORY OF PRESENT ILLNESS: Yadira Davis is a 74 y.o. female who presents with a PMH of CAD, PVD, COPD, HTN, HLD and recently diagnosed ovarian cancer. She is s/p ex lap, BSRO with radical dissection, omentectomy, and pelvic peritonectomy 03/25/25. She was started on Carboplatin + Paclitaxel 4/30/25.     Pain Assessment:  Pain Score: 5/10  Location: legs and tailbone    Symptom Assessment:  Pain:somewhat cramping pain in her legs that is intermittent. She had vaginal pain that was burning but this has resolved. She has aching, throbbing pain in her tailbone. This disrupts her sleep. She has been taking oxycodone 5 mg 2 times per day which provides relief. She has not been taking Gabapentin recently.   Headache: none  Dizziness:none  Lack of energy: somewhat more fatigued, weak and tired overall. She is sitting the majority of the day and has not been as active.   Difficulty sleeping: somewhat due to pain and positioning from her tailbone  Worrying: somewhat  Anxiety: somewhat reports being high strung and always having anxiety  Depression: a little more tearful and crying recently  Pain in mouth/swallowing: none  Dry mouth: none  Taste changes: somewhat states that nothing tastes good and stops her from eating   Shortness of breath: none  Lack of appetite: somewhat not eating a lot and losing weight. She also reports that she is not eating well due to taste changes. She is full quickly. Her daughter did buy [employer] instant breakfast and protein shakes as well to  try  Nausea: somewhat this was improved after this past cycle with the addition of olanzapine   Vomiting: somewhat denied vomiting after this last cycle since starting Olanzapine  Constipation: somewhat going every 2 days. Has not been taking laxatives consistently.   Diarrhea: none  Sore muscles: none  Numbness or tingling in hands/feet/other: a little  Weight loss: a little  Other: none      Information obtained from: chart review, interview of patient, and interview of family  ______________________________________________________________________     Oncology History Overview Note   3/25/25 XL BS RO with radical dissection omentectomy pelvic peritonectomy argon beam ablation clinical stage IIIB   HER2 3+MMRp   4/28/25  9  Pathology Carcinosarcoma of the ovary   [facility] GIS negative, negative BRCA1/2   Foundation PALB2 mutation, HRD negativ, ROSETTE, TMB low, p53 mut     Ovarian cancer (Multi)   4/12/2025 Initial Diagnosis    Ovarian cancer (Multi)     4/30/2025 -  Chemotherapy    PACLitaxel / CARBOplatin, 21 Day Cycles - Gyn         Medical History[1]  Surgical History[2]  Family History[3]     SOCIAL HISTORY  Children 2 daughters, Grandchildren 3 grand-daughters and 3 great grandkids, Support system daughters and grandkids, Employment worked as a caregiver and a [employer] , and Hobbies [employer], watching TV   Social History:  reports that she has been smoking cigarettes. She started smoking about 55 years ago. She has a 83.1 pack-year smoking history. She has been exposed to tobacco smoke. She has never used smokeless tobacco. She reports that she does not currently use alcohol. She reports that she does not use drugs.    REVIEW OF SYSTEMS  Review of systems negative unless noted in HPI.       Objective     Current Outpatient Medications   Medication Instructions    acetaminophen (TYLENOL) 650 mg, oral, Every 4 hours PRN    albuterol (Ventolin HFA) 90 mcg/actuation inhaler 2 puffs, inhalation, Every 4 hours  PRN    aspirin 81 mg, oral, Daily    carvedilol (COREG) 3.125 mg, oral, 2 times daily (morning and late afternoon)    dexAMETHasone (Decadron) 4 mg tablet TAKE 5 TABLETS BY MOUTH ONCE 12 HOURS PRIOR TO PACLITAXEL TREATMENT. THEN TAKE 2 TABLETS ONCE DAILY FOR 3 DAYS STARTING THE DAY AFTER TREATMENT.    diclofenac sodium (VOLTAREN) 4 g, 4 times daily PRN    docusate sodium (COLACE) 100 mg, 2 times daily    empagliflozin (JARDIANCE) 10 mg, oral, Daily    furosemide (LASIX) 20 mg, oral, Every other day    gabapentin (NEURONTIN) 300 mg, oral, 2 times daily     mg tablet TAKE ONE TABLET BY MOUTH EVERY 6 HOURS AS NEEDED FOR mild PAIN (1-3)    ipratropium-albuteroL (Duo-Neb) 0.5-2.5 mg/3 mL nebulizer solution 3 mL, nebulization, 3 times daily RT    magnesium gluconate (MAGONATE) 54 mg, oral, 2 times daily    OLANZapine (ZYPREXA) 5 mg, oral, Nightly    OLANZapine (ZYPREXA) 5 mg, Nightly    ondansetron (ZOFRAN) 8 mg, oral, Every 8 hours PRN    ondansetron ODT (ZOFRAN-ODT) 4 mg, oral, Every 8 hours PRN    oxyCODONE (ROXICODONE) 5 mg, oral, Every 6 hours PRN    pantoprazole (PROTONIX) 40 mg, oral, Daily before breakfast, Do not crush, chew, or split.    polyethylene glycol (GLYCOLAX, MIRALAX) 17 g, Daily    prochlorperazine (COMPAZINE) 10 mg, oral, Every 6 hours PRN    roflumilast (DALIRESP) 500 mcg, oral, Daily    rosuvastatin (CRESTOR) 40 mg, oral, Daily    sennosides-docusate sodium (Lenka-Colace) 8.6-50 mg tablet 1 tablet, oral, Daily    umeclidinium-vilanteroL (Anoro Ellipta) 62.5-25 mcg/actuation blister with device 1 puff, inhalation, Daily    valACYclovir (VALTREX) 500 mg, oral, 2 times daily    valACYclovir (VALTREX) 500 mg, oral, Daily       Allergies: RX Allergies[4]    Orders Only on 06/11/2025   Component Date Value Ref Range Status    WBC 06/09/2025 4.6  4.4 - 11.3 x10*3/uL Final    nRBC 06/09/2025 0.0  0.0 - 0.0 /100 WBCs Final    RBC 06/09/2025 4.17  4.00 - 5.20 x10*6/uL Final    Hemoglobin 06/09/2025 11.9  (L)  12.0 - 16.0 g/dL Final    Hematocrit 06/09/2025 36.0  36.0 - 46.0 % Final    MCV 06/09/2025 86  80 - 100 fL Final    MCH 06/09/2025 28.5  26.0 - 34.0 pg Final    MCHC 06/09/2025 33.1  32.0 - 36.0 g/dL Final    RDW 06/09/2025 16.1 (H)  11.5 - 14.5 % Final    Platelets 06/09/2025 151  150 - 450 x10*3/uL Final    Neutrophils % 06/09/2025 49.9  40.0 - 80.0 % Final    Immature Granulocytes %, Automated 06/09/2025 0.2  0.0 - 0.9 % Final    Immature Granulocyte Count (IG) includes promyelocytes, myelocytes and metamyelocytes but does not include bands. Percent differential counts (%) should be interpreted in the context of the absolute cell counts (cells/UL).    Lymphocytes % 06/09/2025 41.8  13.0 - 44.0 % Final    Monocytes % 06/09/2025 7.5  2.0 - 10.0 % Final    Eosinophils % 06/09/2025 0.4  0.0 - 6.0 % Final    Basophils % 06/09/2025 0.2  0.0 - 2.0 % Final    Neutrophils Absolute 06/09/2025 2.31  1.60 - 5.50 x10*3/uL Final    Percent differential counts (%) should be interpreted in the context of the absolute cell counts (cells/uL).    Immature Granulocytes Absolute, Au* 06/09/2025 0.01  0.00 - 0.50 x10*3/uL Final    Lymphocytes Absolute 06/09/2025 1.94  0.80 - 3.00 x10*3/uL Final    Monocytes Absolute 06/09/2025 0.35  0.05 - 0.80 x10*3/uL Final    Eosinophils Absolute 06/09/2025 0.02  0.00 - 0.40 x10*3/uL Final    Basophils Absolute 06/09/2025 0.01  0.00 - 0.10 x10*3/uL Final    Glucose 06/09/2025 102 (H)  74 - 99 mg/dL Final    Sodium 06/09/2025 139  136 - 145 mmol/L Final    Potassium 06/09/2025 3.1 (L)  3.5 - 5.3 mmol/L Final    Chloride 06/09/2025 102  98 - 107 mmol/L Final    Bicarbonate 06/09/2025 26  21 - 32 mmol/L Final    Anion Gap 06/09/2025 14  10 - 20 mmol/L Final    Urea Nitrogen 06/09/2025 10  6 - 23 mg/dL Final    Creatinine 06/09/2025 0.57  0.50 - 1.05 mg/dL Final    eGFR 06/09/2025 >90  >60 mL/min/1.73m*2 Final    Calculations of estimated GFR are performed using the 2021 CKD-EPI Study Refit  "equation without the race variable for the IDMS-Traceable creatinine methods.  https://jasn.asnjournals.org/content/early/2021/09/22/ASN.3698007678    Calcium 06/09/2025 10.5 (H)  8.6 - 10.3 mg/dL Final    Albumin 06/09/2025 3.8  3.4 - 5.0 g/dL Final    Alkaline Phosphatase 06/09/2025 86  33 - 136 U/L Final    Total Protein 06/09/2025 5.9 (L)  6.4 - 8.2 g/dL Final    AST 06/09/2025 21  9 - 39 U/L Final    Bilirubin, Total 06/09/2025 0.3  0.0 - 1.2 mg/dL Final    ALT 06/09/2025 17  7 - 45 U/L Final    Patients treated with Sulfasalazine may generate falsely decreased results for ALT.    Cancer  06/09/2025 5.3  0.0 - 30.2 U/mL Final    Magnesium 06/09/2025 1.59 (L)  1.60 - 2.40 mg/dL Final    RBC Morphology 06/09/2025 See Below   Final    [place] Cells 06/09/2025 Few   Final       PHYSICAL EXAMINATION  Vital Signs:       5/20/2025     9:42 AM 5/21/2025     8:14 AM 5/21/2025     9:00 AM 5/27/2025     9:20 AM 5/28/2025     9:10 AM 5/29/2025    10:04 AM 6/11/2025     8:09 AM   Vitals   Systolic 124 156  110 122 140 180   Diastolic 65 81  57 54 76 83   BP Location Left arm   Right arm Left arm     Heart Rate 77 111  87 90 97 110   Temp 36.7 °C (98.1 °F) 36.3 °C (97.3 °F)  36.5 °C (97.7 °F) 36.1 °C (96.9 °F) 36.4 °C (97.5 °F) 36.6 °C (97.9 °F)   Resp 16 17  16 16 18 17   Height   1.592 m (5' 2.68\")       Weight (lb)  110.45    113.6 105.82   BMI  19.57 kg/m2 19.77 kg/m2   20.33 kg/m2 18.94 kg/m2   BSA (m2)  1.49 m2 1.49 m2   1.51 m2 1.46 m2   Visit Report  Report Report   Report Report    Report   Vital signs reviewed       Physical Exam  HENT:      Head: Normocephalic.      Nose: Nose normal.   Eyes:      Pupils: Pupils are equal, round, and reactive to light.   Pulmonary:      Effort: Pulmonary effort is normal.   Neurological:      Mental Status: She is alert and oriented to person, place, and time.   Psychiatric:         Mood and Affect: Mood normal.         Behavior: Behavior normal. "         ASSESSMENT/PLAN    Pain  Pain is: cancer related pain  Type: somatic and neuropathic  Pain control: sub-optimally controlled  Home regimen:   - Continue Oxycodone 5 mg every 4 hours as needed - encouraged her to take this more than 2 times per day if she is having pain  - Restart Gabapentin 300 mg BID  - Tramadol was not effective for pain   - Could consider Duloxetine if needed    Opioid Use  Medication Management:   - OARRS report reviewed with no aberrant behavior; consistent with  prescriptions/records and patient history  - MED 15.  Overdose Risk Score 090.   This has been discussed with patient.   - We will continue to closely monitor the patient for signs of prescription misuse including UDS, OARRS review and subjective reports at each visit.  - No concurrent benzodiazepine use   - I am a provider who either is or has consulted and collaborated with a provider certified in Hospice and Palliative Medicine and have conducted a face-face visit and examination for this patient.  - Routine Urine Drug Screen complete next visit  - Controlled Substance Agreement complete next visit   - Specifically discussed that controlled substance prescriptions will only be provided by our group as outlined in the completed agreement  - Prescribed naloxone n/a  - Red Flags: none     Nausea   Intermittent nausea with vomiting related to chemotherapy   - Start Olanzapine 2.5 mg nightly - she will take this every night now instead of only for 5 nights  - Continue Ondansetron 8 mg every 8 hours as needed  - She may consider medical marijuana in the future as well    Constipation   At risk for constipation related to opioids,  currently constipated  - Start Senna-S 2 tabs BID  - HOLD Miralax 17 g daily - if constipation is not well controlled with Senna- S, restart Miralax 17 g daily  - IF no BM within 48-72 hours, start MOM 8% every 6 hours as needed     Altered Mood  Acute anxiety and depression related to health concerns    uncontrolled with home regimen  Current regimen:   - Continue Olanzapine nightly as mentioned above  - Could consider an anti-depressant in the future if needed    Sleeping Difficulty:  Impaired sleep related to pain   Current regimen:    - Restart Gabapentin as mentioned above  - Continue Olanzapine nightly as mentioned above    Decreased appetite  Related to taste changes  Nutrition following  - Encouraged smaller, more frequent meals   - Encouraged at least 1 protein shake per day - can split and do half in the morning and half in the afternoon  - Encouraged adequate fluid intake   - Olanzapine may assist with appetite  - Discussed biotene, lemon heads, or lemon sorbet for taste changes     Weakness/fatigue  - Encouraged 20 minutes of activity per day  - Discussed getting up hourly if she has been sitting for at least 1 hour  - She is interested in PT - Dr. Treviño is placing the order today     Advance Directives  Existence of Advance Directives:No - has interest  Decision maker: Surrogate decision maker is Daughter Randa Ying  Code Status: Full code    Next Follow-Up Visit:  Return to clinic in 3 weeks     Signature and billing  Thank you for allowing us to participate in the care of this patient. Recommendations will be communicated back to the consulting service by way of shared electronic medical record or face-to-face.    Medical complexity was high level due to due to complexity of problems, extensive data review, and high risk of management/treatment.  Time was spent on the following: Prep Time, Time Directly with Patient/Family/Caregiver, Documentation Time. Total time spent: 70      DATA   Diagnostic tests and information reviewed for today's visit:  Most recent labs, Most recent imaging, Medications       Some elements copied from oncology note on 5/29/25, the elements have been updated and all reflect current decision making from today, 6/11/2025.      Plan of Care discussed with: Patient and  Family/Significant Other: daughter and grand-daughter       SIGNATURE: MARIA ANTONIA Nagy    Contact information:  Supportive and Palliative Oncology  Monday-Friday 8 AM-5 PM  Phone:  [phone], press option #5, then option #1.   Or Epic Secure Chat            [1]   Past Medical History:  Diagnosis Date    Breast cancer     R and L breast s/p surgery, chemotherapy and RT    CAD (coronary artery disease)     COPD (chronic obstructive pulmonary disease) (Multi)     Delayed emergence from general anesthesia     HFrEF (heart failure with reduced ejection fraction)     HL (hearing loss)     HLD (hyperlipidemia)     HTN (hypertension)     Pelvic mass     PVD (peripheral vascular disease)    [2]   Past Surgical History:  Procedure Laterality Date    ADENOIDECTOMY      APPENDECTOMY      BREAST BIOPSY       SECTION, CLASSIC      CHOLECYSTECTOMY      CT ANGIO AORTA AND BILATERAL ILIOFEMORAL RUN OFF INCLUDING WITHOUT CONTRAST IF PERFORMED  2022    MASTECTOMY      SEPTOPLASTY      TONSILLECTOMY      TOTAL ABDOMINAL HYSTERECTOMY     [3]   Family History  Problem Relation Name Age of Onset    Breast cancer Mother      Hypertension Mother      Hyperlipidemia Mother      Esophageal cancer Mother      Other (Peripheral artery disease) Mother      Coronary artery disease Father     [4]   Allergies  Allergen Reactions    Codeine Nausea/vomiting

## 2025-06-11 NOTE — PROGRESS NOTES
"NUTRITION Assessment NOTE    Reason for Visit:  Yadira Davis is a 74 y.o. female with Stage 3B Ovarian Ca (dx'd 3/2025).    Started treatment with Paclitaxel/Carboplatin on 4/30/25    Pt seen today in infusion.  Here for C3 treatment.     Problem List[1]    Nutrition Significant labs:  Lab Results   Component Value Date/Time    GLUCOSE 102 (H) 06/09/2025 1159     06/09/2025 1159    K 3.1 (L) 06/09/2025 1159     06/09/2025 1159    CO2 26 06/09/2025 1159    ANIONGAP 14 06/09/2025 1159    BUN 10 06/09/2025 1159    CREATININE 0.57 06/09/2025 1159    EGFR >90 06/09/2025 1159    CALCIUM 10.5 (H) 06/09/2025 1159    ALBUMIN 3.8 06/09/2025 1159    ALKPHOS 86 06/09/2025 1159    PROT 5.9 (L) 06/09/2025 1159    AST 21 06/09/2025 1159    BILITOT 0.3 06/09/2025 1159    ALT 17 06/09/2025 1159    MG 1.59 (L) 06/09/2025 1159    PHOS 2.0 (L) 05/10/2025 2045     Lab Results   Component Value Date    WBC 4.6 06/09/2025    HGB 11.9 (L) 06/09/2025    HCT 36.0 06/09/2025    MCV 86 06/09/2025     06/09/2025       No results found for: \"VITD25\"      Anthropometrics:  Height: 159.2 cm (5' 2.68\")   Weight: 48 kg (105 lb 13.1 oz)   BMI (Calculated): 18.94    IBW/kg (Dietitian Calculated): 51.3 kg   Percent of IBW: 94 %        *Wt down 3.5 kg (7%) x 2 weeks    Weight History:   Wt Readings from Last 20 Encounters:   06/11/25 48 kg (105 lb 13.1 oz)   06/11/25 48 kg (105 lb 13.1 oz)   05/29/25 51.5 kg (113 lb 9.6 oz)   05/21/25 50.1 kg (110 lb 7.2 oz)   05/10/25 54.4 kg (120 lb)   04/30/25 54.8 kg (120 lb 13 oz)   04/16/25 56.7 kg (125 lb)   04/10/25 54.9 kg (121 lb)   03/31/25 56.2 kg (124 lb)   03/25/25 59 kg (130 lb 1.1 oz)   03/13/25 58.1 kg (128 lb 1.6 oz)   03/05/25 58.5 kg (129 lb)   03/05/25 55.8 kg (123 lb)   02/20/25 57.7 kg (127 lb 4.8 oz)   02/18/25 55.7 kg (122 lb 12.8 oz)   02/04/25 58.6 kg (129 lb 3.2 oz)   01/31/25 57.6 kg (127 lb)   01/23/25 57.2 kg (126 lb)   01/22/25 57.6 kg (127 lb)   01/07/25 56.7 kg " (125 lb)       Weight Change %:   Overall, wt down ~9 kg (15%) x 1 month    Nutrition History:     Appetite not great since starting chemo  Gets a taste for something but only able to eat;   No desire to eat   First round of chemo was very hard; nausea was really bad due to dehydration   Somewhat fear of eating for fear of getting sick  When has dairy gets a lot of phlegm   Got Equate; smoothies  Yogurt with protein   Does not like water; will drink juice, Luiz Aid, tea   Lot of constipation late (Zofran/Constipation)--increase Colace to 2 pills BID;    Lasix every other day--no edema   Does best for few days after treatment         Olanzapine to be changed to daily today         Intakes today:  No breakfast or lunch yet  Ate only goldfish crackers and 1 Naida Doone     1 sloppy joes and irving slaw (ate half) on Monday for dinner   ham, mashed potatoes and peas (1/2 plate) on Tuesday for dinner    Having Mac and cheese for dinner today                          Current Medications[2]     Nutrition Focused Physical Exam Findings:    Deferred but appears thin with muscle and fat wasting                       Estimated Needs:                           Nutrition Diagnosis                Nutrition Interventions/Recommendations   Nutrition Prescription:       Recommendations:    Reviewed high calorie, high protein diet and reviewed importance of nutrition during treatment.  Discussed ways to maximize nutrient density of diet and ways to increase calorie content of the foods pt is able to eat.  Discussed importance of protein intakes; reviewed food sources of protein and encouraged protein intakes at all meals and snacks  Encouraged smaller, more frequent meals and snacks every 2-3 hrs.  Encourage adequate fluid intakes; prefer calorie containing beverages vs water.  Goal fluid intakes:  Provided handout: High Calorie Snack Ideas          Follow Up:               [1]   Patient Active Problem List  Diagnosis    Aortoiliac  stenosis    Stenosis of iliac artery    Chronic obstructive pulmonary disease (Multi)    Calcification of coronary artery    Dependence on supplemental oxygen    Dyslipidemia    Primary hypertension    Weakness    Generalized anxiety disorder    Hypoxia    Intermittent claudication    Malignant neoplasm of breast    PAD (peripheral artery disease)    Perforation of tympanic membrane    Severe uncontrolled hypertension    Tachycardia    Dizziness    Aspirin long-term use    Chest pain    Hearing loss    Mixed hyperlipidemia    PVD (peripheral vascular disease)    Dyspnea on exertion    Tobacco use    Palpitations    Acute bronchitis    Bilateral hearing loss    Cigarette smoker    Cough    Decrease in appetite    Chest pain on exertion    Pure hypercholesterolemia    Pain of right lower extremity    Otitis media    Mixed conductive and sensorineural hearing loss    Asymmetrical sensorineural hearing loss    Low oxygen saturation    History of malignant neoplasm of uterine body    History of malignant neoplasm of breast    History of hypertension    History of elevated lipids    Disease due to severe acute respiratory syndrome coronavirus 2 (SARS-CoV-2)    Falling episodes    Decreased breath sounds    Adnexal mass    Ovarian mass, right    COPD exacerbation (Multi)    HFrEF (heart failure with reduced ejection fraction)    S/P right oophorectomy    Ovarian cancer (Multi)   [2]   Current Outpatient Medications:     acetaminophen (Tylenol) 325 mg tablet, Take 2 tablets (650 mg) by mouth every 4 hours if needed for headaches, mild pain (1 - 3) or fever (temp greater than 38.0 C)., Disp: , Rfl:     albuterol (Ventolin HFA) 90 mcg/actuation inhaler, Inhale 2 puffs every 4 hours if needed for shortness of breath., Disp: 18 g, Rfl: 3    aspirin 81 mg EC tablet, Take 1 tablet (81 mg) by mouth once daily., Disp: 90 tablet, Rfl: 3    carvedilol (Coreg) 3.125 mg tablet, Take 1 tablet (3.125 mg) by mouth 2 times daily (morning  and late afternoon)., Disp: 180 tablet, Rfl: 3    dexAMETHasone (Decadron) 4 mg tablet, TAKE 5 TABLETS BY MOUTH ONCE 12 HOURS PRIOR TO PACLITAXEL TREATMENT. THEN TAKE 2 TABLETS ONCE DAILY FOR 3 DAYS STARTING THE DAY AFTER TREATMENT., Disp: 66 tablet, Rfl: 0    diclofenac sodium (Voltaren) 1 % gel, Apply 4.5 inches (4 g) topically 4 times a day as needed., Disp: , Rfl:     docusate sodium (Colace) 100 mg capsule, Take 1 capsule (100 mg) by mouth 2 times a day., Disp: , Rfl:     empagliflozin (Jardiance) 10 mg tablet, Take 1 tablet (10 mg) by mouth once daily., Disp: 90 tablet, Rfl: 1    furosemide (Lasix) 20 mg tablet, Take 1 tablet (20 mg) by mouth every other day., Disp: 45 tablet, Rfl: 1    gabapentin (Neurontin) 300 mg capsule, Take 1 capsule (300 mg) by mouth 2 times a day., Disp: 60 capsule, Rfl: 11     mg tablet, TAKE ONE TABLET BY MOUTH EVERY 6 HOURS AS NEEDED FOR mild PAIN (1-3), Disp: 90 tablet, Rfl: 0    ipratropium-albuteroL (Duo-Neb) 0.5-2.5 mg/3 mL nebulizer solution, Take 3 mL by nebulization 3 times a day., Disp: 90 mL, Rfl: 3    magnesium gluconate (Magonate) 27 mg magnesium (500 mg) tablet, Take 2 tablets (54 mg) by mouth 2 times a day., Disp: 120 tablet, Rfl: 5    OLANZapine (ZyPREXA) 2.5 mg tablet, Take 2 tablets (5 mg) by mouth once daily at bedtime for 5 days., Disp: 10 tablet, Rfl: 11    OLANZapine (ZyPREXA) 2.5 mg tablet, Take 2 tablets (5 mg) by mouth once daily at bedtime., Disp: , Rfl:     OLANZapine (ZyPREXA) 2.5 mg tablet, Take 1 tablet (2.5 mg) by mouth once daily at bedtime., Disp: 30 tablet, Rfl: 3    ondansetron (Zofran) 8 mg tablet, Take 1 tablet (8 mg) by mouth every 8 hours if needed for nausea or vomiting., Disp: 30 tablet, Rfl: 5    ondansetron ODT (Zofran-ODT) 4 mg disintegrating tablet, Dissolve 1 tablet (4 mg) in the mouth every 8 hours if needed for nausea or vomiting., Disp: 30 tablet, Rfl: 0    oxyCODONE (Roxicodone) 5 mg immediate release tablet, Take 1 tablet (5  mg) by mouth every 6 hours if needed for severe pain (7 - 10)., Disp: 40 tablet, Rfl: 0    pantoprazole (ProtoNix) 40 mg EC tablet, Take 1 tablet (40 mg) by mouth once daily in the morning. Take before meals. Do not crush, chew, or split., Disp: 30 tablet, Rfl: 3    polyethylene glycol (Glycolax, Miralax) 17 gram packet, Take 17 g by mouth once daily., Disp: , Rfl:     prochlorperazine (Compazine) 10 mg tablet, Take 1 tablet (10 mg) by mouth every 6 hours if needed for nausea or vomiting., Disp: 30 tablet, Rfl: 5    roflumilast (Daliresp) 500 mcg tablet, TAKE ONE TABLET BY MOUTH EVERY DAY, Disp: 21 tablet, Rfl: 0    rosuvastatin (Crestor) 40 mg tablet, Take 1 tablet (40 mg) by mouth once daily., Disp: 90 tablet, Rfl: 3    sennosides-docusate sodium (Senna-S) 8.6-50 mg tablet, Take 2 tablets by mouth 2 times a day., Disp: 120 tablet, Rfl: 3    umeclidinium-vilanteroL (Anoro Ellipta) 62.5-25 mcg/actuation blister with device, Inhale 1 puff once daily., Disp: 1 each, Rfl: 3    valACYclovir (Valtrex) 500 mg tablet, Take 1 tablet (500 mg) by mouth 2 times a day for 20 days., Disp: 20 tablet, Rfl: 1    valACYclovir (Valtrex) 500 mg tablet, Take 1 tablet (500 mg) by mouth once daily., Disp: 30 tablet, Rfl: 11  No current facility-administered medications for this visit.    Facility-Administered Medications Ordered in Other Visits:     albuterol 2.5 mg /3 mL (0.083 %) nebulizer solution 3 mL, 3 mL, nebulization, PRN, Jaki Treviño MD MPH    CARBOplatin (Paraplatin) 380 mg in sodium chloride 0.9% 148 mL IV, 380 mg, intravenous, Once, Jaki Treviño MD MPH    dextrose 5 % in water (D5W) bolus 500 mL, 500 mL, intravenous, PRN, Jaki Treviño MD MPH    diphenhydrAMINE (BENADryl) injection 50 mg, 50 mg, intravenous, PRN, Jaki Treviño MD MPH    EPINEPHrine (Epipen) injection syringe 0.3 mg, 0.3 mg, intramuscular, q5 min PRN, Jaki Treviño MD MPH    famotidine PF (Pepcid) injection 20 mg, 20 mg, intravenous, Once PRN, Jaki Treviño,  MD MPH    magnesium sulfate 2 g in sterile water for injection 50 mL, 2 g, intravenous, Once, Jaki Treviño MD MPH, Last Rate: 50 mL/hr at 06/11/25 1101, 2 g at 06/11/25 1101    methylPREDNISolone sod succinate (SOLU-Medrol) 40 mg/mL injection 40 mg, 40 mg, intravenous, PRN, Jaki Treviño MD MPH    PACLitaxeL (Taxol) 162 mg in dextrose 5% 294 mL IV, 110 mg/m2 (Treatment Plan Recorded), intravenous, Once, Jaki Treviño MD MPH, 162 mg at 06/11/25 1102    prochlorperazine (Compazine) injection 10 mg, 10 mg, intravenous, q6h PRN, Jaki Treviño MD MPH    prochlorperazine (Compazine) tablet 10 mg, 10 mg, oral, q6h PRN, Jaki Treviño MD MPH    sodium chloride 0.9 % bolus 500 mL, 500 mL, intravenous, PRN, Jaki Treviño MD MPH

## 2025-06-12 DIAGNOSIS — C56.9 MALIGNANT NEOPLASM OF OVARY, UNSPECIFIED LATERALITY (MULTI): ICD-10-CM

## 2025-06-12 NOTE — PROGRESS NOTES
LSW continues to follow patient for support and ongoing assessment.  Met with patient today during her infusion visit.  Patient open to meeting with  and was easily engaged in conversation.  Patient shared how she has been tolerating treatment over the last few weeks.  She noted that she has lost about 20 pounds in the last month and isn't moving as much.  She states that she feels tired often and has a low appetite.  She is meeting with the dietician and states that she is going to work on walking more around her home.  Patient has a small wound that is forming on her backside, she is working with the medical team to make sure that is cleaned and covered in the appropriate way.  Patient would not qualify for any homecare services at this time as the wound is not severe enough, she feels she is able to manage it as well at the moment. No other issues or concerns noted.  Patient shared stories about her family and LSW spent time providing a supportive presence.  Social work will continue to follow.

## 2025-06-13 ENCOUNTER — APPOINTMENT (OUTPATIENT)
Dept: PALLIATIVE MEDICINE | Facility: CLINIC | Age: 75
End: 2025-06-13
Payer: MEDICARE

## 2025-06-13 DIAGNOSIS — Z51.81 THERAPEUTIC DRUG MONITORING: Primary | ICD-10-CM

## 2025-06-15 ENCOUNTER — HOSPITAL ENCOUNTER (EMERGENCY)
Facility: HOSPITAL | Age: 75
Discharge: HOME | End: 2025-06-15
Attending: EMERGENCY MEDICINE
Payer: MEDICARE

## 2025-06-15 ENCOUNTER — APPOINTMENT (OUTPATIENT)
Dept: RADIOLOGY | Facility: HOSPITAL | Age: 75
End: 2025-06-15
Payer: MEDICARE

## 2025-06-15 ENCOUNTER — APPOINTMENT (OUTPATIENT)
Dept: CARDIOLOGY | Facility: HOSPITAL | Age: 75
End: 2025-06-15
Payer: MEDICARE

## 2025-06-15 VITALS
SYSTOLIC BLOOD PRESSURE: 129 MMHG | DIASTOLIC BLOOD PRESSURE: 61 MMHG | TEMPERATURE: 98.1 F | BODY MASS INDEX: 21.26 KG/M2 | HEART RATE: 72 BPM | HEIGHT: 62 IN | OXYGEN SATURATION: 99 % | WEIGHT: 115.52 LBS | RESPIRATION RATE: 16 BRPM

## 2025-06-15 DIAGNOSIS — E87.6 HYPOKALEMIA: ICD-10-CM

## 2025-06-15 DIAGNOSIS — R55 SYNCOPE WITH NORMAL NEUROLOGIC EXAMINATION: Primary | ICD-10-CM

## 2025-06-15 LAB
ALBUMIN SERPL BCP-MCNC: 3.9 G/DL (ref 3.4–5)
ALP SERPL-CCNC: 65 U/L (ref 33–136)
ALT SERPL W P-5'-P-CCNC: 25 U/L (ref 7–45)
ANION GAP SERPL CALC-SCNC: 12 MMOL/L (ref 10–20)
APPEARANCE UR: CLEAR
AST SERPL W P-5'-P-CCNC: 25 U/L (ref 9–39)
BASOPHILS # BLD AUTO: 0.01 X10*3/UL (ref 0–0.1)
BASOPHILS NFR BLD AUTO: 0.2 %
BILIRUB SERPL-MCNC: 0.4 MG/DL (ref 0–1.2)
BILIRUB UR STRIP.AUTO-MCNC: NEGATIVE MG/DL
BUN SERPL-MCNC: 24 MG/DL (ref 6–23)
CALCIUM SERPL-MCNC: 9.9 MG/DL (ref 8.6–10.3)
CARDIAC TROPONIN I PNL SERPL HS: 15 NG/L (ref 0–13)
CARDIAC TROPONIN I PNL SERPL HS: 15 NG/L (ref 0–13)
CHLORIDE SERPL-SCNC: 97 MMOL/L (ref 98–107)
CO2 SERPL-SCNC: 28 MMOL/L (ref 21–32)
COLOR UR: COLORLESS
CREAT SERPL-MCNC: 0.67 MG/DL (ref 0.5–1.05)
EGFRCR SERPLBLD CKD-EPI 2021: >90 ML/MIN/1.73M*2
EOSINOPHIL # BLD AUTO: 0.01 X10*3/UL (ref 0–0.4)
EOSINOPHIL NFR BLD AUTO: 0.2 %
ERYTHROCYTE [DISTWIDTH] IN BLOOD BY AUTOMATED COUNT: 17.1 % (ref 11.5–14.5)
GLUCOSE SERPL-MCNC: 104 MG/DL (ref 74–99)
GLUCOSE UR STRIP.AUTO-MCNC: ABNORMAL MG/DL
HCT VFR BLD AUTO: 36.4 % (ref 36–46)
HGB BLD-MCNC: 12.4 G/DL (ref 12–16)
IMM GRANULOCYTES # BLD AUTO: 0.03 X10*3/UL (ref 0–0.5)
IMM GRANULOCYTES NFR BLD AUTO: 0.5 % (ref 0–0.9)
KETONES UR STRIP.AUTO-MCNC: NEGATIVE MG/DL
LEUKOCYTE ESTERASE UR QL STRIP.AUTO: NEGATIVE
LYMPHOCYTES # BLD AUTO: 1.87 X10*3/UL (ref 0.8–3)
LYMPHOCYTES NFR BLD AUTO: 31.3 %
MAGNESIUM SERPL-MCNC: 1.82 MG/DL (ref 1.6–2.4)
MCH RBC QN AUTO: 29 PG (ref 26–34)
MCHC RBC AUTO-ENTMCNC: 34.1 G/DL (ref 32–36)
MCV RBC AUTO: 85 FL (ref 80–100)
MONOCYTES # BLD AUTO: 0.04 X10*3/UL (ref 0.05–0.8)
MONOCYTES NFR BLD AUTO: 0.7 %
NEUTROPHILS # BLD AUTO: 4.02 X10*3/UL (ref 1.6–5.5)
NEUTROPHILS NFR BLD AUTO: 67.1 %
NITRITE UR QL STRIP.AUTO: NEGATIVE
NRBC BLD-RTO: 0 /100 WBCS (ref 0–0)
PH UR STRIP.AUTO: 6.5 [PH]
PLATELET # BLD AUTO: 302 X10*3/UL (ref 150–450)
POTASSIUM SERPL-SCNC: 2.8 MMOL/L (ref 3.5–5.3)
PROT SERPL-MCNC: 6.2 G/DL (ref 6.4–8.2)
PROT UR STRIP.AUTO-MCNC: NEGATIVE MG/DL
RBC # BLD AUTO: 4.27 X10*6/UL (ref 4–5.2)
RBC # UR STRIP.AUTO: NEGATIVE MG/DL
RBC MORPH BLD: NORMAL
SODIUM SERPL-SCNC: 134 MMOL/L (ref 136–145)
SP GR UR STRIP.AUTO: 1.01
UROBILINOGEN UR STRIP.AUTO-MCNC: NORMAL MG/DL
WBC # BLD AUTO: 6 X10*3/UL (ref 4.4–11.3)

## 2025-06-15 PROCEDURE — 85025 COMPLETE CBC W/AUTO DIFF WBC: CPT | Performed by: EMERGENCY MEDICINE

## 2025-06-15 PROCEDURE — 70450 CT HEAD/BRAIN W/O DYE: CPT

## 2025-06-15 PROCEDURE — 70450 CT HEAD/BRAIN W/O DYE: CPT | Performed by: RADIOLOGY

## 2025-06-15 PROCEDURE — 84484 ASSAY OF TROPONIN QUANT: CPT | Performed by: EMERGENCY MEDICINE

## 2025-06-15 PROCEDURE — 96360 HYDRATION IV INFUSION INIT: CPT | Performed by: EMERGENCY MEDICINE

## 2025-06-15 PROCEDURE — 96361 HYDRATE IV INFUSION ADD-ON: CPT | Performed by: EMERGENCY MEDICINE

## 2025-06-15 PROCEDURE — 2500000002 HC RX 250 W HCPCS SELF ADMINISTERED DRUGS (ALT 637 FOR MEDICARE OP, ALT 636 FOR OP/ED): Performed by: EMERGENCY MEDICINE

## 2025-06-15 PROCEDURE — 81003 URINALYSIS AUTO W/O SCOPE: CPT | Performed by: EMERGENCY MEDICINE

## 2025-06-15 PROCEDURE — 93005 ELECTROCARDIOGRAM TRACING: CPT

## 2025-06-15 PROCEDURE — 2500000004 HC RX 250 GENERAL PHARMACY W/ HCPCS (ALT 636 FOR OP/ED): Performed by: EMERGENCY MEDICINE

## 2025-06-15 PROCEDURE — 99285 EMERGENCY DEPT VISIT HI MDM: CPT | Mod: 25 | Performed by: EMERGENCY MEDICINE

## 2025-06-15 PROCEDURE — 80053 COMPREHEN METABOLIC PANEL: CPT | Performed by: EMERGENCY MEDICINE

## 2025-06-15 PROCEDURE — 36415 COLL VENOUS BLD VENIPUNCTURE: CPT | Performed by: EMERGENCY MEDICINE

## 2025-06-15 PROCEDURE — 83735 ASSAY OF MAGNESIUM: CPT | Performed by: EMERGENCY MEDICINE

## 2025-06-15 RX ORDER — POTASSIUM CHLORIDE 750 MG/1
40 TABLET, FILM COATED, EXTENDED RELEASE ORAL ONCE
Status: COMPLETED | OUTPATIENT
Start: 2025-06-15 | End: 2025-06-15

## 2025-06-15 RX ORDER — POTASSIUM CHLORIDE 20 MEQ/1
20 TABLET, EXTENDED RELEASE ORAL DAILY
Qty: 10 TABLET | Refills: 0 | Status: SHIPPED | OUTPATIENT
Start: 2025-06-15 | End: 2025-06-25

## 2025-06-15 RX ADMIN — POTASSIUM CHLORIDE 40 MEQ: 750 TABLET, FILM COATED, EXTENDED RELEASE ORAL at 18:30

## 2025-06-15 RX ADMIN — SODIUM CHLORIDE, SODIUM LACTATE, POTASSIUM CHLORIDE, AND CALCIUM CHLORIDE 1000 ML: .6; .31; .03; .02 INJECTION, SOLUTION INTRAVENOUS at 16:47

## 2025-06-15 ASSESSMENT — LIFESTYLE VARIABLES
HAVE YOU EVER FELT YOU SHOULD CUT DOWN ON YOUR DRINKING: NO
HAVE PEOPLE ANNOYED YOU BY CRITICIZING YOUR DRINKING: NO
TOTAL SCORE: 0
EVER HAD A DRINK FIRST THING IN THE MORNING TO STEADY YOUR NERVES TO GET RID OF A HANGOVER: NO
EVER FELT BAD OR GUILTY ABOUT YOUR DRINKING: NO

## 2025-06-15 ASSESSMENT — HEART SCORE
ECG: NORMAL
HEART SCORE: 3
AGE: 65+
TROPONIN: 1-3 TIMES NORMAL LIMIT
HISTORY: SLIGHTLY SUSPICIOUS
RISK FACTORS: NO KNOWN RISK FACTORS

## 2025-06-15 ASSESSMENT — PAIN SCALES - GENERAL: PAINLEVEL_OUTOF10: 0 - NO PAIN

## 2025-06-15 ASSESSMENT — PAIN - FUNCTIONAL ASSESSMENT: PAIN_FUNCTIONAL_ASSESSMENT: 0-10

## 2025-06-15 NOTE — ED PROVIDER NOTES
Emergency Department Provider Note       History of Present Illness     History provided by: Patient and Family Member  Limitations to History: None  External Records Reviewed with Brief Summary: outpatient oncology notes    HPI:  Yadira Davis is a 74 y.o. female Who presents with an episode of unresponsiveness.  Family is at bedside to assist with the history.  They were sitting on the back patio, was hot and humid day.  Patient noted a mild frontal headache.  She felt dizzy.  She started whispering.  She then went unresponsive.  Family thinks this occurred for approximately 10 to 15 minutes.  When she regained consciousness she immediately recommends her surroundings.  Denied any urinary fecal incontinence.  She has a history of ovarian cancer, getting IV chemotherapy infusions every 3 weeks.  Last infusion was last Wednesday, approximately 4 days ago.  States she always has symptoms with this.  She recently increased her gabapentin from 100 to 300 mg.  No other recent medication changes.  Has otherwise been feeling well.  She states she is now back to baseline.  Denied any dizziness or lightheadedness, focal numbness weakness, speech deficits.    Physical Exam   Triage vitals:  T 36.7 °C (98.1 °F)  HR 80  /77  RR 20  O2 100 % None (Room air)    Physical Exam  Vitals and nursing note reviewed.   Constitutional:       Appearance: Normal appearance.      Comments: Thin female    HENT:      Head: Normocephalic and atraumatic.      Mouth/Throat:      Mouth: Mucous membranes are moist.   Eyes:      Extraocular Movements: Extraocular movements intact.      Conjunctiva/sclera: Conjunctivae normal.      Pupils: Pupils are equal, round, and reactive to light.   Cardiovascular:      Rate and Rhythm: Normal rate and regular rhythm.      Pulses: Normal pulses.      Heart sounds: Normal heart sounds.   Pulmonary:      Effort: Pulmonary effort is normal.      Breath sounds: Normal breath sounds.      Comments:  CTAB, No tachypnea or increased work of breathing, no wheezing or rhonchi  Abdominal:      General: Abdomen is flat. There is no distension.      Palpations: Abdomen is soft.      Tenderness: There is no abdominal tenderness.   Musculoskeletal:         General: Normal range of motion.      Cervical back: Normal range of motion and neck supple. No rigidity or tenderness.   Skin:     General: Skin is warm and dry.      Capillary Refill: Capillary refill takes less than 2 seconds.   Neurological:      General: No focal deficit present.      Mental Status: She is alert and oriented to person, place, and time. Mental status is at baseline.      Comments: No aphasia or dysarthria, cranial nerves II through XII intact, 5/5 strength in all 4 extremities, positive sensation intact to light touch in all 4 extremities, normal finger-to-nose and heel-to-shin bilaterally           Medical Decision Making & ED Course   Medical Decision Makin y.o. female Who presents with an episode of unresponsiveness.  She was sitting outside on the back patio when she felt dizzy lightheaded.  She became very quiet and unresponsive for approximately 10 to 15 minutes.  No trauma or injuries.  No urinary fecal incontinence.  No postictal period.  Only recent medications have been chemotherapy IV infusion a few days ago which she has had before as well as slight increase in gabapentin to 300 mg twice daily.  Patient now feels back to baseline.  She has no focal neurological deficits.  NIH score is 0.    AMS w/up initiated. Labs showed no leukocytosis or left shift.  Electrolytes showed mild hyponatremia and hypokalemia 2.8.  She was able to take potassium tablets.  Urine analysis showed glucose but no evidence of urinary tract infection.  Initial troponin was 15.  Repeat was also 15.  troponin has been higher in the past.  EKG is nonischemic.  Delta troponin is negative.  No clinical concern or clinical significance of the troponin of 15  which is only slightly above normal range, patient is asymptomatic.  She does have close outpatient follow-up appointments.  Family is at bedside and very supportive.      Unresponsive episode is likely due to her baseline low blood pressure and being in the heat after chemotherapy and decreased p.o. intake from the chemotherapy over the last few days.    Family and patient are agreeable to discharge plan. patient is stable for discharge home  ----   NIH = 0     Differential diagnoses considered include but are not limited to: Vasovagal syncope, cardiogenic syncope, electrolyte abnormality, trauma, stroke    Social Determinants of Health which Significantly Impact Care: Social Determinants of Health which Significantly Impact Care: None identified The following actions were taken to address these social determinants : good family support, attends appts    EKG Independent Interpretation: EKG interpreted by myself. Please see ED Course for full interpretation.    EKG at 1557 showed normal sinus rhythm, ventricular rate 77, FL interval 183, QTc 465, left axis deviation, no acute ST or T wave changes, unchanged from prior March 2025    Independent Result Review and Interpretation: CT head as interpreted by me revealed negative for ICH    Chronic conditions affecting the patient's care: As documented above in [facility]    The patient was discussed with the following consultants/services: None    Care Considerations: As documented above in [facility]    ED Course:  Diagnoses as of 06/17/25 2126   Syncope with normal neurologic examination   Hypokalemia       Disposition   As a result of the work-up, the patient was discharged home.  she was informed of her diagnosis and instructed to come back with any concerns or worsening of condition.  she and was agreeable to the plan as discussed above.  she was given the opportunity to ask questions.  All of the patient's questions were answered.    Procedures   Procedures    Patient was seen  independently    Khadar Hadley MD MPH  Emergency Medicine                                                       Khadar Hadley MD MPH  06/17/25 4086

## 2025-06-16 LAB
ATRIAL RATE: 77 BPM
HOLD SPECIMEN: NORMAL
P AXIS: 51 DEGREES
PR INTERVAL: 183 MS
Q ONSET: 249 MS
QRS COUNT: 12 BEATS
QRS DURATION: 88 MS
QT INTERVAL: 410 MS
QTC CALCULATION(BAZETT): 465 MS
QTC FREDERICIA: 445 MS
R AXIS: -15 DEGREES
T AXIS: 91 DEGREES
T OFFSET: 454 MS
VENTRICULAR RATE: 77 BPM

## 2025-06-16 NOTE — DISCHARGE INSTRUCTIONS
Please start taking the potassium tablets.  Please call your primary care doctor or oncologist tomorrow for a follow-up appointment.  Return for any worsening passing out episodes, any chest pain or chest pressure, any shortness of breath, or any other concerning symptoms.

## 2025-06-18 ENCOUNTER — DOCUMENTATION (OUTPATIENT)
Dept: GYNECOLOGIC ONCOLOGY | Facility: HOSPITAL | Age: 75
End: 2025-06-18
Payer: MEDICARE

## 2025-06-18 NOTE — PROGRESS NOTES
Patient's daughter sent a [employer] message requesting Dr. Treviño to send in a prescription for oral potassium after ER visit this week.  Per Dr. Treviño, patient needs to have blood work done prior to next chemotherapy and if potassium continues to be low, she will send in another prescription.  Message sent to patient's daughter letting her know.

## 2025-06-23 ENCOUNTER — PATIENT MESSAGE (OUTPATIENT)
Dept: PALLIATIVE MEDICINE | Facility: CLINIC | Age: 75
End: 2025-06-23
Payer: MEDICARE

## 2025-06-23 DIAGNOSIS — R10.2 PELVIC PAIN IN FEMALE: ICD-10-CM

## 2025-06-23 DIAGNOSIS — N83.8 OVARIAN MASS, RIGHT: ICD-10-CM

## 2025-06-23 RX ORDER — IBUPROFEN 600 MG/1
600 TABLET, FILM COATED ORAL EVERY 6 HOURS
Qty: 120 TABLET | Refills: 0 | Status: SHIPPED | OUTPATIENT
Start: 2025-06-23 | End: 2025-07-23

## 2025-06-23 RX ORDER — OXYCODONE HYDROCHLORIDE 5 MG/1
5 TABLET ORAL EVERY 4 HOURS PRN
Qty: 120 TABLET | Refills: 0 | Status: SHIPPED | OUTPATIENT
Start: 2025-06-23 | End: 2025-07-23

## 2025-06-23 NOTE — PATIENT COMMUNICATION
Patient last seen by JOHN Alford on 6/11 with plan to continue oxycodone 5mg q4h PRN. No mention of ibuprofen in note, however a prescription was sent in for 600mg q6h PRN on 5/28. Follow up visit is scheduled for 7/2. OARRS reviewed and no aberrancy noted. Patient last filled oxycodone 5mg #40/10 days on 6/9. Prescriptions pended to provider to approve.

## 2025-06-26 ENCOUNTER — HOSPITAL ENCOUNTER (OUTPATIENT)
Dept: RADIOLOGY | Facility: HOSPITAL | Age: 75
Discharge: HOME | End: 2025-06-26
Payer: MEDICARE

## 2025-06-26 DIAGNOSIS — C56.9 MALIGNANT NEOPLASM OF OVARY, UNSPECIFIED LATERALITY (MULTI): ICD-10-CM

## 2025-06-26 PROCEDURE — 71260 CT THORAX DX C+: CPT

## 2025-06-26 PROCEDURE — 2550000001 HC RX 255 CONTRASTS: Performed by: STUDENT IN AN ORGANIZED HEALTH CARE EDUCATION/TRAINING PROGRAM

## 2025-06-26 RX ADMIN — IOHEXOL 75 ML: 350 INJECTION, SOLUTION INTRAVENOUS at 09:03

## 2025-06-27 ENCOUNTER — TELEPHONE (OUTPATIENT)
Dept: GYNECOLOGIC ONCOLOGY | Facility: HOSPITAL | Age: 75
End: 2025-06-27
Payer: MEDICARE

## 2025-06-27 DIAGNOSIS — I26.99 ACUTE PULMONARY EMBOLISM, UNSPECIFIED PULMONARY EMBOLISM TYPE, UNSPECIFIED WHETHER ACUTE COR PULMONALE PRESENT (MULTI): Primary | ICD-10-CM

## 2025-06-27 NOTE — TELEPHONE ENCOUNTER
Spoke with patient's daughter, Randa re: findings of PE on CT scan per Dr. Treviño. She will  Eliquis starter pack from local pharm today and have patient start it TODAY. (She already received a notification from pharmacy). We discussed that because she has cancer she is at an increased risk for blood clots etc and this sometimes happens in our patient population. Dr. Treviño will discuss in more detail next week. We also discussed that she will be on the Eliquis for the duration of her cancer treatment and she may discuss with Dr. Treviño what the expectation is after that.     We discussed S/S to look out for and when to call 911 such as sudden and acute SOB. The patient at baseline does receive nebulizers 3x/day and has prn albuterol per her daughter. She was encouraged to call the on-call service over the weekend if needed for urgent but non-life threatening needs if needed, she does have the phone number. She asked which lung has the clots, from the report it appears to be bilateral but I encouraged her to discuss w Dr. Treviño at [facility] next week. She had no further questions at this time.

## 2025-06-27 NOTE — TELEPHONE ENCOUNTER
Tried to call patient on home phone and daughter, Argelia, line. No answer. CT w/ concern for new PE. Rx for eliquis submitted to the pharmacy. Will try to call again this afternoon.     Jaki Treviño MD MPH

## 2025-06-30 ENCOUNTER — LAB (OUTPATIENT)
Dept: LAB | Facility: HOSPITAL | Age: 75
End: 2025-06-30
Payer: MEDICARE

## 2025-06-30 DIAGNOSIS — C56.9 MALIGNANT NEOPLASM OF UNSPECIFIED OVARY (MULTI): Primary | ICD-10-CM

## 2025-06-30 LAB
ALBUMIN SERPL BCP-MCNC: 3.5 G/DL (ref 3.4–5)
ALP SERPL-CCNC: 91 U/L (ref 33–136)
ALT SERPL W P-5'-P-CCNC: 16 U/L (ref 7–45)
ANION GAP SERPL CALC-SCNC: 9 MMOL/L (ref 10–20)
AST SERPL W P-5'-P-CCNC: 14 U/L (ref 9–39)
BASOPHILS # BLD AUTO: 0.02 X10*3/UL (ref 0–0.1)
BASOPHILS NFR BLD AUTO: 0.3 %
BILIRUB SERPL-MCNC: 0.2 MG/DL (ref 0–1.2)
BUN SERPL-MCNC: 9 MG/DL (ref 6–23)
CALCIUM SERPL-MCNC: 10.2 MG/DL (ref 8.6–10.3)
CHLORIDE SERPL-SCNC: 104 MMOL/L (ref 98–107)
CO2 SERPL-SCNC: 28 MMOL/L (ref 21–32)
CREAT SERPL-MCNC: 0.62 MG/DL (ref 0.5–1.05)
EGFRCR SERPLBLD CKD-EPI 2021: >90 ML/MIN/1.73M*2
EOSINOPHIL # BLD AUTO: 0.13 X10*3/UL (ref 0–0.4)
EOSINOPHIL NFR BLD AUTO: 2 %
ERYTHROCYTE [DISTWIDTH] IN BLOOD BY AUTOMATED COUNT: 19.5 % (ref 11.5–14.5)
GLUCOSE SERPL-MCNC: 103 MG/DL (ref 74–99)
HCT VFR BLD AUTO: 33.8 % (ref 36–46)
HGB BLD-MCNC: 10.5 G/DL (ref 12–16)
IMM GRANULOCYTES # BLD AUTO: 0.05 X10*3/UL (ref 0–0.5)
IMM GRANULOCYTES NFR BLD AUTO: 0.8 % (ref 0–0.9)
LYMPHOCYTES # BLD AUTO: 2.21 X10*3/UL (ref 0.8–3)
LYMPHOCYTES NFR BLD AUTO: 33.6 %
MAGNESIUM SERPL-MCNC: 1.96 MG/DL (ref 1.6–2.4)
MCH RBC QN AUTO: 28.8 PG (ref 26–34)
MCHC RBC AUTO-ENTMCNC: 31.1 G/DL (ref 32–36)
MCV RBC AUTO: 93 FL (ref 80–100)
MONOCYTES # BLD AUTO: 0.49 X10*3/UL (ref 0.05–0.8)
MONOCYTES NFR BLD AUTO: 7.5 %
NEUTROPHILS # BLD AUTO: 3.67 X10*3/UL (ref 1.6–5.5)
NEUTROPHILS NFR BLD AUTO: 55.8 %
NRBC BLD-RTO: 0 /100 WBCS (ref 0–0)
PLATELET # BLD AUTO: 194 X10*3/UL (ref 150–450)
POTASSIUM SERPL-SCNC: 3.7 MMOL/L (ref 3.5–5.3)
PROT SERPL-MCNC: 5.7 G/DL (ref 6.4–8.2)
RBC # BLD AUTO: 3.64 X10*6/UL (ref 4–5.2)
SODIUM SERPL-SCNC: 137 MMOL/L (ref 136–145)
WBC # BLD AUTO: 6.6 X10*3/UL (ref 4.4–11.3)

## 2025-06-30 PROCEDURE — 36415 COLL VENOUS BLD VENIPUNCTURE: CPT

## 2025-06-30 PROCEDURE — 86304 IMMUNOASSAY TUMOR CA 125: CPT

## 2025-06-30 PROCEDURE — 85025 COMPLETE CBC W/AUTO DIFF WBC: CPT

## 2025-06-30 PROCEDURE — 83735 ASSAY OF MAGNESIUM: CPT

## 2025-06-30 PROCEDURE — 80053 COMPREHEN METABOLIC PANEL: CPT

## 2025-06-30 NOTE — PROGRESS NOTES
Patient ID: Yadira Davis is a 74 y.o. female.  Referring Physician: No referring provider defined for this encounter.  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 Interval CT with concern for PE, started on DOAC. Feels well. Mood is improved on zyprexa. Tolerating more PO. Had low potassium and was taken to the ER by EMS within 1 week of treatment. Had been on supplements but no longer. Was staring when EMS was called and not responding.     Objective    BSA: 1.47 meters squared  /72   Pulse 99   Temp 36.3 °C (97.3 °F)   Resp 17   Wt 49.5 kg (109 lb 2 oz)   SpO2 98%   BMI 19.96 kg/m²      Physical Exam  Vitals and nursing note reviewed. Exam conducted with a chaperone present.   Constitutional:       Appearance: Normal appearance. She is normal weight.   HENT:      Head: Normocephalic and atraumatic.      Mouth/Throat:      Mouth: Mucous membranes are moist.      Pharynx: No oropharyngeal exudate or posterior oropharyngeal erythema.   Eyes:      Extraocular Movements: Extraocular movements intact.      Conjunctiva/sclera: Conjunctivae normal.      Pupils: Pupils are equal, round, and reactive to light.   Cardiovascular:      Rate and Rhythm: Normal rate.   Pulmonary:      Effort: Pulmonary effort is normal.      Breath sounds: No wheezing, rhonchi or rales.   Abdominal:      General: A surgical scar is present.      Palpations: Abdomen is soft. There is no mass.      Tenderness: There is no guarding or rebound.      Hernia: No hernia is present.      Comments: Surgical incision clean/dry/intact without redness, drainage or erythema      Genitourinary:     Pubic Area: No rash.    Musculoskeletal:         General: Normal range of motion.   Skin:     General: Skin is warm.      Findings: No erythema or rash.      Comments: Stage 2 sacral decub   Neurological:      General: No focal deficit present.      Mental Status: She is  alert and oriented to person, place, and time.   Psychiatric:         Mood and Affect: Mood normal.         Behavior: Behavior normal.         Performance Status:  Asymptomatic    Assessment/Plan     Oncology History Overview Note   3/25/25 XL BS RO with radical dissection omentectomy pelvic peritonectomy argon beam ablation clinical stage IIIB   HER2 3+MMRp   4/28/25  9  Pathology Carcinosarcoma of the ovary   [facility] EHR GIS negative, negative BRCA1/2   Foundation PALB2 mutation, HRD negativ, ROSETTE, TMB low, p53 mut     Ovarian cancer (Multi)   4/12/2025 Initial Diagnosis    Ovarian cancer (Multi)     4/30/2025 -  Chemotherapy    PACLitaxel / CARBOplatin, 21 Day Cycles - Gyn       Diagnoses and all orders for this visit:  Malignant neoplasm of ovary, unspecified laterality (Multi)    # Ovarian cancer  - We reviewed her imaging and pathology results, reviewed high risk histology  - We discussed the pathophysiology of ovarian, fallopian tube, and peritoneal cancers.   - We discussed my recommendation for adjuvant treatment with carboplatin AUC 5 and paclitaxel 110 mg/m2 today  - CT reviewed OH, in determinant lung nodules otherwise SD  - Labs reviewed and appropriate for treatment  - Referral to genetics, missed visit; encouraged to reschedule (daughter is BRCA1)  - OurVinyl reviewed with FOLR1 pending    # chemotherapy induced nausea  - Zyprexa 2.5 mg PO x 5 days, discussed extending to daily  - Continue PRN anti-emetics, refill zofran  - Discussed nutritional supplements, consider nutrition referral     # lung nodules   - Follow closely, evaluate interval change; stable    # PE   - DOAC ordered    # Depression   - Continue zyprexa, follows with supportive oncology    # vulvar lesions, 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

## 2025-07-01 LAB — CANCER AG125 SERPL-ACNC: 7.5 U/ML (ref 0–30.2)

## 2025-07-02 ENCOUNTER — INFUSION (OUTPATIENT)
Dept: HEMATOLOGY/ONCOLOGY | Facility: CLINIC | Age: 75
End: 2025-07-02
Payer: MEDICARE

## 2025-07-02 ENCOUNTER — OFFICE VISIT (OUTPATIENT)
Dept: PALLIATIVE MEDICINE | Facility: CLINIC | Age: 75
End: 2025-07-02
Payer: MEDICARE

## 2025-07-02 ENCOUNTER — OFFICE VISIT (OUTPATIENT)
Dept: GYNECOLOGIC ONCOLOGY | Facility: CLINIC | Age: 75
End: 2025-07-02
Payer: MEDICARE

## 2025-07-02 VITALS
HEART RATE: 99 BPM | TEMPERATURE: 97.3 F | BODY MASS INDEX: 19.96 KG/M2 | DIASTOLIC BLOOD PRESSURE: 72 MMHG | SYSTOLIC BLOOD PRESSURE: 144 MMHG | OXYGEN SATURATION: 98 % | WEIGHT: 109.13 LBS | RESPIRATION RATE: 17 BRPM

## 2025-07-02 VITALS — HEIGHT: 63 IN | BODY MASS INDEX: 19.53 KG/M2

## 2025-07-02 DIAGNOSIS — R91.8 LUNG NODULES: ICD-10-CM

## 2025-07-02 DIAGNOSIS — M54.31 SCIATIC PAIN, RIGHT: ICD-10-CM

## 2025-07-02 DIAGNOSIS — C56.9 MALIGNANT NEOPLASM OF OVARY, UNSPECIFIED LATERALITY (MULTI): ICD-10-CM

## 2025-07-02 DIAGNOSIS — Z51.5 PALLIATIVE CARE ENCOUNTER: ICD-10-CM

## 2025-07-02 DIAGNOSIS — Z51.11 CHEMOTHERAPY MANAGEMENT, ENCOUNTER FOR: ICD-10-CM

## 2025-07-02 DIAGNOSIS — R11.2 NAUSEA AND VOMITING, UNSPECIFIED VOMITING TYPE: ICD-10-CM

## 2025-07-02 DIAGNOSIS — I26.99 ACUTE PULMONARY EMBOLISM, UNSPECIFIED PULMONARY EMBOLISM TYPE, UNSPECIFIED WHETHER ACUTE COR PULMONALE PRESENT (MULTI): ICD-10-CM

## 2025-07-02 DIAGNOSIS — R63.0 DECREASED APPETITE: ICD-10-CM

## 2025-07-02 DIAGNOSIS — G89.3 CANCER RELATED PAIN: Primary | ICD-10-CM

## 2025-07-02 DIAGNOSIS — K59.03 DRUG-INDUCED CONSTIPATION: ICD-10-CM

## 2025-07-02 DIAGNOSIS — N83.8 OVARIAN MASS, RIGHT: ICD-10-CM

## 2025-07-02 DIAGNOSIS — C56.9 MALIGNANT NEOPLASM OF OVARY, UNSPECIFIED LATERALITY (MULTI): Primary | ICD-10-CM

## 2025-07-02 PROCEDURE — 2500000004 HC RX 250 GENERAL PHARMACY W/ HCPCS (ALT 636 FOR OP/ED): Performed by: STUDENT IN AN ORGANIZED HEALTH CARE EDUCATION/TRAINING PROGRAM

## 2025-07-02 PROCEDURE — 96367 TX/PROPH/DG ADDL SEQ IV INF: CPT

## 2025-07-02 PROCEDURE — 99215 OFFICE O/P EST HI 40 MIN: CPT | Performed by: STUDENT IN AN ORGANIZED HEALTH CARE EDUCATION/TRAINING PROGRAM

## 2025-07-02 PROCEDURE — 1126F AMNT PAIN NOTED NONE PRSNT: CPT | Performed by: STUDENT IN AN ORGANIZED HEALTH CARE EDUCATION/TRAINING PROGRAM

## 2025-07-02 PROCEDURE — 96415 CHEMO IV INFUSION ADDL HR: CPT

## 2025-07-02 PROCEDURE — 1159F MED LIST DOCD IN RCRD: CPT | Performed by: STUDENT IN AN ORGANIZED HEALTH CARE EDUCATION/TRAINING PROGRAM

## 2025-07-02 PROCEDURE — 99215 OFFICE O/P EST HI 40 MIN: CPT | Mod: 25 | Performed by: STUDENT IN AN ORGANIZED HEALTH CARE EDUCATION/TRAINING PROGRAM

## 2025-07-02 PROCEDURE — 3078F DIAST BP <80 MM HG: CPT | Performed by: STUDENT IN AN ORGANIZED HEALTH CARE EDUCATION/TRAINING PROGRAM

## 2025-07-02 PROCEDURE — 99214 OFFICE O/P EST MOD 30 MIN: CPT

## 2025-07-02 PROCEDURE — 96375 TX/PRO/DX INJ NEW DRUG ADDON: CPT | Mod: INF

## 2025-07-02 PROCEDURE — 96417 CHEMO IV INFUS EACH ADDL SEQ: CPT

## 2025-07-02 PROCEDURE — 1160F RVW MEDS BY RX/DR IN RCRD: CPT | Performed by: STUDENT IN AN ORGANIZED HEALTH CARE EDUCATION/TRAINING PROGRAM

## 2025-07-02 PROCEDURE — 99214 OFFICE O/P EST MOD 30 MIN: CPT | Mod: 25

## 2025-07-02 PROCEDURE — 2500000001 HC RX 250 WO HCPCS SELF ADMINISTERED DRUGS (ALT 637 FOR MEDICARE OP): Performed by: STUDENT IN AN ORGANIZED HEALTH CARE EDUCATION/TRAINING PROGRAM

## 2025-07-02 PROCEDURE — 96413 CHEMO IV INFUSION 1 HR: CPT

## 2025-07-02 PROCEDURE — 3077F SYST BP >= 140 MM HG: CPT | Performed by: STUDENT IN AN ORGANIZED HEALTH CARE EDUCATION/TRAINING PROGRAM

## 2025-07-02 RX ORDER — PALONOSETRON 0.05 MG/ML
0.25 INJECTION, SOLUTION INTRAVENOUS ONCE
Status: CANCELLED | OUTPATIENT
Start: 2025-07-02

## 2025-07-02 RX ORDER — FAMOTIDINE 10 MG/ML
40 INJECTION, SOLUTION INTRAVENOUS ONCE
Status: COMPLETED | OUTPATIENT
Start: 2025-07-02 | End: 2025-07-02

## 2025-07-02 RX ORDER — EPINEPHRINE 0.3 MG/.3ML
0.3 INJECTION SUBCUTANEOUS EVERY 5 MIN PRN
Status: DISCONTINUED | OUTPATIENT
Start: 2025-07-02 | End: 2025-07-02 | Stop reason: HOSPADM

## 2025-07-02 RX ORDER — PALONOSETRON 0.05 MG/ML
0.25 INJECTION, SOLUTION INTRAVENOUS ONCE
Status: COMPLETED | OUTPATIENT
Start: 2025-07-02 | End: 2025-07-02

## 2025-07-02 RX ORDER — FAMOTIDINE 10 MG/ML
40 INJECTION, SOLUTION INTRAVENOUS ONCE
Status: CANCELLED | OUTPATIENT
Start: 2025-07-02

## 2025-07-02 RX ORDER — PROCHLORPERAZINE MALEATE 10 MG
10 TABLET ORAL EVERY 6 HOURS PRN
Status: CANCELLED | OUTPATIENT
Start: 2025-07-02

## 2025-07-02 RX ORDER — ALBUTEROL SULFATE 0.83 MG/ML
3 SOLUTION RESPIRATORY (INHALATION) AS NEEDED
Status: CANCELLED | OUTPATIENT
Start: 2025-07-02

## 2025-07-02 RX ORDER — HEPARIN 100 UNIT/ML
500 SYRINGE INTRAVENOUS AS NEEDED
OUTPATIENT
Start: 2025-07-02

## 2025-07-02 RX ORDER — DIPHENHYDRAMINE HCL 25 MG
25 CAPSULE ORAL ONCE
Status: CANCELLED | OUTPATIENT
Start: 2025-07-02

## 2025-07-02 RX ORDER — DIPHENHYDRAMINE HYDROCHLORIDE 50 MG/ML
50 INJECTION, SOLUTION INTRAMUSCULAR; INTRAVENOUS AS NEEDED
Status: CANCELLED | OUTPATIENT
Start: 2025-07-02

## 2025-07-02 RX ORDER — PROCHLORPERAZINE EDISYLATE 5 MG/ML
10 INJECTION INTRAMUSCULAR; INTRAVENOUS EVERY 6 HOURS PRN
Status: CANCELLED | OUTPATIENT
Start: 2025-07-02

## 2025-07-02 RX ORDER — PROCHLORPERAZINE MALEATE 10 MG
10 TABLET ORAL EVERY 6 HOURS PRN
Status: DISCONTINUED | OUTPATIENT
Start: 2025-07-02 | End: 2025-07-02 | Stop reason: HOSPADM

## 2025-07-02 RX ORDER — HEPARIN SODIUM,PORCINE/PF 10 UNIT/ML
50 SYRINGE (ML) INTRAVENOUS AS NEEDED
OUTPATIENT
Start: 2025-07-02

## 2025-07-02 RX ORDER — EPINEPHRINE 0.3 MG/.3ML
0.3 INJECTION SUBCUTANEOUS EVERY 5 MIN PRN
Status: CANCELLED | OUTPATIENT
Start: 2025-07-02

## 2025-07-02 RX ORDER — FAMOTIDINE 10 MG/ML
20 INJECTION, SOLUTION INTRAVENOUS ONCE AS NEEDED
Status: DISCONTINUED | OUTPATIENT
Start: 2025-07-02 | End: 2025-07-02 | Stop reason: HOSPADM

## 2025-07-02 RX ORDER — ALBUTEROL SULFATE 0.83 MG/ML
3 SOLUTION RESPIRATORY (INHALATION) AS NEEDED
Status: DISCONTINUED | OUTPATIENT
Start: 2025-07-02 | End: 2025-07-02 | Stop reason: HOSPADM

## 2025-07-02 RX ORDER — PROCHLORPERAZINE EDISYLATE 5 MG/ML
10 INJECTION INTRAMUSCULAR; INTRAVENOUS EVERY 6 HOURS PRN
Status: DISCONTINUED | OUTPATIENT
Start: 2025-07-02 | End: 2025-07-02 | Stop reason: HOSPADM

## 2025-07-02 RX ORDER — DIPHENHYDRAMINE HCL 25 MG
25 CAPSULE ORAL ONCE
Status: COMPLETED | OUTPATIENT
Start: 2025-07-02 | End: 2025-07-02

## 2025-07-02 RX ORDER — DIPHENHYDRAMINE HYDROCHLORIDE 50 MG/ML
50 INJECTION, SOLUTION INTRAMUSCULAR; INTRAVENOUS AS NEEDED
Status: DISCONTINUED | OUTPATIENT
Start: 2025-07-02 | End: 2025-07-02 | Stop reason: HOSPADM

## 2025-07-02 RX ORDER — FAMOTIDINE 10 MG/ML
20 INJECTION, SOLUTION INTRAVENOUS ONCE AS NEEDED
Status: CANCELLED | OUTPATIENT
Start: 2025-07-02

## 2025-07-02 RX ADMIN — FAMOTIDINE 40 MG: 10 INJECTION INTRAVENOUS at 09:55

## 2025-07-02 RX ADMIN — DIPHENHYDRAMINE HYDROCHLORIDE 25 MG: 25 CAPSULE ORAL at 09:55

## 2025-07-02 RX ADMIN — PALONOSETRON 0.25 MG: 0.05 INJECTION, SOLUTION INTRAVENOUS at 09:55

## 2025-07-02 RX ADMIN — PACLITAXEL 162 MG: 6 INJECTION INTRAVENOUS at 11:07

## 2025-07-02 RX ADMIN — DEXAMETHASONE SODIUM PHOSPHATE 12 MG: 10 INJECTION, SOLUTION INTRAMUSCULAR; INTRAVENOUS at 09:55

## 2025-07-02 RX ADMIN — CARBOPLATIN 380 MG: 10 INJECTION INTRAVENOUS at 14:13

## 2025-07-02 RX ADMIN — FOSAPREPITANT 150 MG: 150 INJECTION, POWDER, LYOPHILIZED, FOR SOLUTION INTRAVENOUS at 10:18

## 2025-07-02 ASSESSMENT — PAIN SCALES - GENERAL: PAINLEVEL_OUTOF10: 0-NO PAIN

## 2025-07-02 NOTE — PROGRESS NOTES
SUPPORTIVE AND PALLIATIVE ONCOLOGY FOLLOW-UP - OUTPATIENT      SERVICE DATE: 7/2/2025    Referred by:  Jaki Treviño MD  Medical Oncologist: Jaki Treviño MD MPH   Radiation Oncologist: No care team member to display  Primary Physician: Jaki Treviño  [phone]    REASON FOR CONSULT/CHIEF CONSULT COMPLAINT: pain management, other symptom control  nausea, constipation, and Introduction to Supportive and Palliative Oncology Services    Subjective   HISTORY OF PRESENT ILLNESS: Yadira Davis is a 74 y.o. female who presents with a PMH of CAD, PVD, COPD, HTN, HLD and recently diagnosed ovarian cancer. She is s/p ex lap, BSRO with radical dissection, omentectomy, and pelvic peritonectomy 03/25/25. She was started on Carboplatin + Paclitaxel 4/30/25.     Pain Assessment:  Pain Score: 5/10  Location: legs and tailbone    Symptom Assessment:  Pain:somewhat cramping pain in her legs that is intermittent. She has aching, throbbing pain in her tailbone. She does occasionally have radiating pain from her back down through her legs. This pain has improved with taking oxycodone 5 mg in the morning and 10 mg at bedtime.   Headache: none  Dizziness:none  Lack of energy: somewhat more fatigued, weak and tired overall.   Difficulty sleeping: somewhat she is sleeping better with the addition of oxycodone 10 mg at bedtime, but still wakes up frequently.   Worrying: somewhat  Anxiety: somewhat  Depression: a little  Pain in mouth/swallowing: none  Dry mouth: none  Taste changes: somewhat   Shortness of breath: none  Lack of appetite: a little much improved with olanzapine nightly. She has gained weight.    Nausea: none   Vomiting: none   Constipation: none taking senna 2 tabs BID now with good relief.    Diarrhea: none  Sore muscles: none  Numbness or tingling in hands/feet/other: a little  Weight loss: a little  Other: none      Information obtained from: chart review, interview of patient, and interview of  family  ______________________________________________________________________     Oncology History Overview Note   3/25/25 XL BS RO with radical dissection omentectomy pelvic peritonectomy argon beam ablation clinical stage IIIB   HER2 3+MMRp   4/28/25  9  Pathology Carcinosarcoma of the ovary   [facility] GIS negative, negative BRCA1/2   Foundation PALB2 mutation, HRD negativ, RSOETTE, TMB low, p53 mut     Ovarian cancer (Multi)   4/12/2025 Initial Diagnosis    Ovarian cancer (Multi)     4/30/2025 -  Chemotherapy    PACLitaxel / CARBOplatin, 21 Day Cycles - Gyn         Medical History[1]  Surgical History[2]  Family History[3]     SOCIAL HISTORY  Children 2 daughters, Grandchildren 3 grand-daughters and 3 great grandkids, Support system daughters and grandkids, Employment worked as a caregiver and a [employer] , and [employer], watching TV   Social History:  reports that she has been smoking cigarettes. She started smoking about 55 years ago. She has a 83.2 pack-year smoking history. She has been exposed to tobacco smoke. She has never used smokeless tobacco. She reports that she does not currently use alcohol. She reports that she does not use drugs.    REVIEW OF SYSTEMS  Review of systems negative unless noted in HPI.       Objective     Current Outpatient Medications   Medication Instructions    albuterol (Ventolin HFA) 90 mcg/actuation inhaler 2 puffs, inhalation, Every 4 hours PRN    apixaban (Eliquis) 5 mg (74 tabs) tablet Take 2 tablets (10 mg) by mouth 2 times a day for 7 days, then take 1 tablet (5 mg) by mouth 2 times a day.    aspirin 81 mg, oral, Daily    carvedilol (COREG) 3.125 mg, oral, 2 times daily (morning and late afternoon)    dexAMETHasone (Decadron) 4 mg tablet TAKE 5 TABLETS BY MOUTH ONCE 12 HOURS PRIOR TO PACLITAXEL TREATMENT. THEN TAKE 2 TABLETS ONCE DAILY FOR 3 DAYS STARTING THE DAY AFTER TREATMENT.    diclofenac sodium (VOLTAREN) 4 g, 4 times daily PRN    empagliflozin (JARDIANCE)  10 mg, oral, Daily    gabapentin (NEURONTIN) 300 mg, oral, 2 times daily    ibuprofen (IBU) 600 mg, oral, Every 6 hours    ipratropium-albuteroL (Duo-Neb) 0.5-2.5 mg/3 mL nebulizer solution 3 mL, nebulization, 3 times daily RT    magnesium gluconate (MAGONATE) 54 mg, oral, 2 times daily    OLANZapine (ZYPREXA) 5 mg, oral, Nightly    OLANZapine (ZYPREXA) 5 mg, Nightly    OLANZapine (ZYPREXA) 2.5 mg, oral, Nightly    ondansetron (ZOFRAN) 8 mg, oral, Every 8 hours PRN    oxyCODONE (ROXICODONE) 5 mg, oral, Every 4 hours PRN    pantoprazole (PROTONIX) 40 mg, oral, Daily before breakfast, Do not crush, chew, or split.    polyethylene glycol (GLYCOLAX, MIRALAX) 17 g, Daily    prochlorperazine (COMPAZINE) 10 mg, oral, Every 6 hours PRN    roflumilast (DALIRESP) 500 mcg, oral, Daily    rosuvastatin (CRESTOR) 40 mg, oral, Daily    sennosides-docusate sodium (Senna-S) 8.6-50 mg tablet 2 tablets, oral, 2 times daily    umeclidinium-vilanteroL (Anoro Ellipta) 62.5-25 mcg/actuation blister with device 1 puff, inhalation, Daily    valACYclovir (VALTREX) 500 mg, oral, Daily       Allergies: RX Allergies[4]    Orders Only on 07/02/2025   Component Date Value Ref Range Status    WBC 06/30/2025 6.6  4.4 - 11.3 x10*3/uL Final    nRBC 06/30/2025 0.0  0.0 - 0.0 /100 WBCs Final    RBC 06/30/2025 3.64 (L)  4.00 - 5.20 x10*6/uL Final    Hemoglobin 06/30/2025 10.5 (L)  12.0 - 16.0 g/dL Final    Hematocrit 06/30/2025 33.8 (L)  36.0 - 46.0 % Final    MCV 06/30/2025 93  80 - 100 fL Final    MCH 06/30/2025 28.8  26.0 - 34.0 pg Final    MCHC 06/30/2025 31.1 (L)  32.0 - 36.0 g/dL Final    RDW 06/30/2025 19.5 (H)  11.5 - 14.5 % Final    Platelets 06/30/2025 194  150 - 450 x10*3/uL Final    Neutrophils % 06/30/2025 55.8  40.0 - 80.0 % Final    Immature Granulocytes %, Automated 06/30/2025 0.8  0.0 - 0.9 % Final    Immature Granulocyte Count (IG) includes promyelocytes, myelocytes and metamyelocytes but does not include bands. Percent differential  counts (%) should be interpreted in the context of the absolute cell counts (cells/UL).    Lymphocytes % 06/30/2025 33.6  13.0 - 44.0 % Final    Monocytes % 06/30/2025 7.5  2.0 - 10.0 % Final    Eosinophils % 06/30/2025 2.0  0.0 - 6.0 % Final    Basophils % 06/30/2025 0.3  0.0 - 2.0 % Final    Neutrophils Absolute 06/30/2025 3.67  1.60 - 5.50 x10*3/uL Final    Percent differential counts (%) should be interpreted in the context of the absolute cell counts (cells/uL).    Immature Granulocytes Absolute, Au* 06/30/2025 0.05  0.00 - 0.50 x10*3/uL Final    Lymphocytes Absolute 06/30/2025 2.21  0.80 - 3.00 x10*3/uL Final    Monocytes Absolute 06/30/2025 0.49  0.05 - 0.80 x10*3/uL Final    Eosinophils Absolute 06/30/2025 0.13  0.00 - 0.40 x10*3/uL Final    Basophils Absolute 06/30/2025 0.02  0.00 - 0.10 x10*3/uL Final    Glucose 06/30/2025 103 (H)  74 - 99 mg/dL Final    Sodium 06/30/2025 137  136 - 145 mmol/L Final    Potassium 06/30/2025 3.7  3.5 - 5.3 mmol/L Final    Chloride 06/30/2025 104  98 - 107 mmol/L Final    Bicarbonate 06/30/2025 28  21 - 32 mmol/L Final    Anion Gap 06/30/2025 9 (L)  10 - 20 mmol/L Final    Urea Nitrogen 06/30/2025 9  6 - 23 mg/dL Final    Creatinine 06/30/2025 0.62  0.50 - 1.05 mg/dL Final    eGFR 06/30/2025 >90  >60 mL/min/1.73m*2 Final    Calculations of estimated GFR are performed using the 2021 CKD-EPI Study Refit equation without the race variable for the IDMS-Traceable creatinine methods.  https://jasn.asnjournals.org/content/early/2021/09/22/ASN.8899359003    Calcium 06/30/2025 10.2  8.6 - 10.3 mg/dL Final    Albumin 06/30/2025 3.5  3.4 - 5.0 g/dL Final    Alkaline Phosphatase 06/30/2025 91  33 - 136 U/L Final    Total Protein 06/30/2025 5.7 (L)  6.4 - 8.2 g/dL Final    AST 06/30/2025 14  9 - 39 U/L Final    Bilirubin, Total 06/30/2025 0.2  0.0 - 1.2 mg/dL Final    ALT 06/30/2025 16  7 - 45 U/L Final    Patients treated with Sulfasalazine may generate falsely decreased results for ALT.  "   Cancer  06/30/2025 7.5  0.0 - 30.2 U/mL Final    Magnesium 06/30/2025 1.96  1.60 - 2.40 mg/dL Final       PHYSICAL EXAMINATION  Vital Signs:       6/15/2025     6:00 PM 6/15/2025     7:15 PM 6/15/2025     7:30 PM 6/15/2025     8:00 PM 6/15/2025     8:30 PM 7/2/2025     8:16 AM 7/2/2025     9:41 AM   Vitals   Systolic 139 132 136 136 129 144    Diastolic 50 57 58 65 61 72    BP Location  Right arm Right arm  Right arm     Heart Rate 81 79 76 78 72 99    Temp      36.3 °C (97.3 °F)    Resp  16 16  16 17    Height       1.592 m (5' 2.68\")   Weight (lb)      109.13    BMI      19.96 kg/m2 19.53 kg/m2   BSA (m2)      1.47 m2 1.48 m2   Visit Report      Report Report   Vital signs reviewed       Physical Exam  HENT:      Head: Normocephalic.      Nose: Nose normal.   Eyes:      Pupils: Pupils are equal, round, and reactive to light.   Pulmonary:      Effort: Pulmonary effort is normal.   Neurological:      Mental Status: She is alert and oriented to person, place, and time.   Psychiatric:         Mood and Affect: Mood normal.         Behavior: Behavior normal.         ASSESSMENT/PLAN    Pain  Pain is: cancer related pain  Type: somatic and neuropathic  Pain control: sub-optimally controlled  Home regimen:   - Continue Oxycodone 5 mg every 4 hours as needed - may take 10 mg at bedtime  - Continue Gabapentin 300 mg BID  - Tramadol was not effective for pain   - Could consider Duloxetine if needed    Opioid Use  Medication Management:   - OARRS report reviewed with no aberrant behavior; consistent with  prescriptions/records and patient history  - MED 15.  Overdose Risk Score 090.   This has been discussed with patient.   - We will continue to closely monitor the patient for signs of prescription misuse including UDS, OARRS review and subjective reports at each visit.  - No concurrent benzodiazepine use   - I am a provider who either is or has consulted and collaborated with a provider certified in Hospice and " Palliative Medicine and have conducted a face-face visit and examination for this patient.  - Routine Urine Drug Screen complete next visit  - Controlled Substance Agreement complete next visit   - Specifically discussed that controlled substance prescriptions will only be provided by our group as outlined in the completed agreement  - Prescribed naloxone n/a  - Red Flags: none     Nausea   Intermittent nausea with vomiting related to chemotherapy   - Continue Olanzapine 2.5 mg nightly   - Continue Ondansetron 8 mg every 8 hours as needed  - She may consider medical marijuana in the future as well    Constipation   At risk for constipation related to opioids,  currently constipated  - Continue Senna-S 2 tabs BID  - HOLD Miralax 17 g daily - if constipation is not well controlled with Senna- S, restart Miralax 17 g daily  - IF no BM within 48-72 hours, start MOM 8% every 6 hours as needed     Altered Mood  Acute anxiety and depression related to health concerns   uncontrolled with home regimen  Current regimen:   - Continue Olanzapine nightly as mentioned above  - Could consider an anti-depressant in the future if needed    Sleeping Difficulty:  Impaired sleep related to pain   Current regimen:    - Start Melatonin 5 mg nightly @ 8 pm  - Continue Gabapentin as mentioned above  - Continue Olanzapine nightly as mentioned above    Decreased appetite  Related to taste changes  Nutrition following  - Encouraged smaller, more frequent meals   - Encouraged at least 1 protein shake per day - can split and do half in the morning and half in the afternoon  - Encouraged adequate fluid intake   - Olanzapine may assist with appetite  - Discussed biotene, lemon heads, or lemon sorbet for taste changes     Weakness/fatigue  - Encouraged 20 minutes of activity per day  - Discussed getting up hourly if she has been sitting for at least 1 hour  - Declined PT today 7/2/25    Advance Directives  Existence of Advance Directives:No - has  interest  Decision maker: Surrogate decision maker is Daughter Randa Ying  Code Status: Full code    Next Follow-Up Visit:  Return to clinic in 6 weeks     Signature and billing  Thank you for allowing us to participate in the care of this patient. Recommendations will be communicated back to the consulting service by way of shared electronic medical record or face-to-face.    Medical complexity was high level due to due to complexity of problems, extensive data review, and high risk of management/treatment.  Time was spent on the following: Prep Time, Time Directly with Patient/Family/Caregiver, Documentation Time. Total time spent: 30      DATA   Diagnostic tests and information reviewed for today's visit:  Most recent labs, Most recent imaging, Medications       Some elements copied from oncology note on 25, the elements have been updated and all reflect current decision making from today, 2025.      Plan of Care discussed with: Patient and Family/Significant Other: daughter and grand-daughter       SIGNATURE: JOHN Nagy-CNP    Contact information:  [facility]ive and Palliative Oncology  Monday-Friday 8 AM-5 PM  Phone:  [phone], press option #5, then option #1.   Or Epic Secure Chat              [1]   Past Medical History:  Diagnosis Date    Breast cancer     R and L breast s/p surgery, chemotherapy and RT    CAD (coronary artery disease)     COPD (chronic obstructive pulmonary disease) (Multi)     Delayed emergence from general anesthesia     HFrEF (heart failure with reduced ejection fraction)     HL (hearing loss)     HLD (hyperlipidemia)     HTN (hypertension)     Pelvic mass     Pulmonary embolism     PVD (peripheral vascular disease)    [2]   Past Surgical History:  Procedure Laterality Date    ADENOIDECTOMY      APPENDECTOMY      BREAST BIOPSY       SECTION, CLASSIC      CHOLECYSTECTOMY      CT ANGIO AORTA AND BILATERAL ILIOFEMORAL RUN OFF INCLUDING WITHOUT CONTRAST IF  PERFORMED  03/09/2022    MASTECTOMY      SEPTOPLASTY      TONSILLECTOMY      TOTAL ABDOMINAL HYSTERECTOMY     [3]   Family History  Problem Relation Name Age of Onset    Breast cancer Mother      Hypertension Mother      Hyperlipidemia Mother      Esophageal cancer Mother      Other (Peripheral artery disease) Mother      Coronary artery disease Father     [4]   Allergies  Allergen Reactions    Codeine Nausea/vomiting

## 2025-07-06 PROBLEM — I50.22 CHRONIC HEART FAILURE WITH REDUCED EJECTION FRACTION (HFREF, <= 40%): Status: ACTIVE | Noted: 2025-07-06

## 2025-07-06 NOTE — PROGRESS NOTES
[facility] V[facility] Cardiology    Patient Name: Yadira Davis  Patient : [id number]    Scribe Attestation  By signing my name below, Maria L OLIVAS, Scribrigo attest that this documentation has been prepared under the direction and in the presence of Ike Brooks DO.    Physician Attestation  Ike OLIVAS DO, personally performed the services described in the documentation as scribed by Maria L Swain in my presence, and confirm it is both accurate and complete.    Reason for visit:  This is a 74-year-old female here for follow-up regarding coronary artery disease as seen on cardiac catheterization done in 2017, HFrEF with an ejection fraction of 35%, peripheral arterial disease followed by Vascular Surgery, hypertension, dyslipidemia, COPD/tobacco use.     HPI:  This is a 74-year-old female here for follow-up regarding coronary artery disease as seen on cardiac catheterization done in 2017, HFrEF with an ejection fraction of 35%, peripheral arterial disease followed by Vascular Surgery, hypertension, dyslipidemia, COPD/tobacco use.  The patient was last evaluated by me in 2023.  At that time I had recommended Holter monitor, lab work, and asked patient to follow-up in 6 months.  Patient was subsequently seen by the cardiology PA on multiple occasions.  Patient did have a hospitalization in 2025 with acute hypoxic respiratory failure and decompensated heart failure.  Patient subsequently found to have ovarian carcinoma followed by Oncology.  Patient last seen in the emergency department in 2025 secondary to an episode of unresponsiveness for which after evaluation she was discharged home.  Troponin done on 6/15/2025 was 15 x 2. CMP done 2025 showed serum sodium of 137, serum potassium of 3.7, serum creatinine of 0.62, normal ALT/AST, serum magnesium was 1.96, CBC showed hemoglobin 10.5.  Lipid panel done in 2025 showed an LDL  cholesterol of 90 with triglycerides of 168 while on rosuvastatin 40 mg daily.  CT scan of the head done 6/15/2025 showed no acute findings.  Nuclear stress done in January 2025 showed normal perfusion with a calculated ejection fraction of 33%.  Echocardiogram done in January 2025 showed moderately reduced left ventricular systolic function with an ejection fraction of 38% and global hypokinesis, grade 1 diastolic dysfunction, normal right ventricular systolic function, no significant valve abnormalities. CMP done on 7/21/2025 showed normal serum sodium and serum potassium with a serum creatinine of 0.64. Normal ALT and AST. Serum magnesium was 1.95. CBC done on 7/23/2025 showed a hemoglobin of 11.1. ECG done today showed sinus rhythm with a heart rate of 94 bpm, and LVH.  The patient reports random intermittent chest discomfort which lasts for a just a second but denies chest pain on exertion.  There are no specific aggravating or relieving factors for the chest discomfort.  She denies any new shortness of breath, palpitations and lightheadedness.  She does report tiredness/daytime sleepiness which she believes is related to chemotherapy. She states that she takes all of her medications as prescribed. During my exam, she was resting comfortably in a wheelchair.         Assessment/Plan:   1. Coronary artery disease  The patient has a history of coronary artery disease seen on cardiac catheterization in October 2017.  ECG done today showed sinus rhythm with a heart rate of 94 bpm, and LVH.     The patient reports random intermittent chest discomfort which lasts for a few seconds but  denies anginal chest discomfort.  Blood pressure appears controlled on exam today.  I will start her on spironolactone 12.5 mg daily.  She should continue other antihypertensive medications and antiplatelet therapy.  Nuclear stress done in January 2025 showed normal perfusion with a calculated ejection fraction of 33%.    Echocardiogram  done in January 2025 showed moderately reduced left ventricular systolic function with an ejection fraction of 38% and global hypokinesis, grade 1 diastolic dysfunction, normal right ventricular systolic function, no significant valve abnormalities.  Recent lab works as noted in the HPI.   Lipid panel done in January 2025 showed an LDL cholesterol of 90 with triglycerides of 168 while on rosuvastatin 40 mg daily.  Echocardiogram and lab works as noted below will be done in 6 months prior to his next visit.   Please see lifestyle recommendations below.   Follow up in 6 months and sooner if necessary.      2. HFrEF  The patient has HFrEF with an ejection fraction of 35%.  Nuclear stress done in January 2025 showed normal perfusion with a calculated ejection fraction of 33%.  Echocardiogram done in January 2025 showed moderately reduced left ventricular systolic function with an ejection fraction of 38% and global hypokinesis, grade 1 diastolic dysfunction, normal right ventricular systolic function, no significant valve abnormalities.   She does not appear significantly volume overloaded on exam today.  I will start her on spironolactone 12.5 mg daily.  She should continue her other cardiac medications.  Recent lab works as noted in the HPI.  Lab works will be done in 1-2 weeks.  Echocardiogram and lab works as noted below will be done in 6 months prior to his next visit.   I discussed with her the importance of following a low-sodium heart healthy diet.  Follow up in 6 months and sooner if necessary.     3. Peripheral vascular disease  The patient has a history of peripheral vascular disease followed by Vascular Surgery.     4. Hypertension  The patient has a history of hypertension and blood pressure appears controlled on exam today.   I will start her on spironolactone 12.5 mg daily.  She should continue her other antihypertensive medications and monitor her blood pressure at home.      5. Dyslipidemia  Lipid panel  done in January 2025 showed an LDL cholesterol of 90 with triglycerides of 168 while on rosuvastatin 40 mg daily.  Lipid panel will be updated in 6 months.  Please see lifestyle recommendations below.      6. COPD/tobacco use  The patient is a current smoker.  I discussed with her the importance of smoking cessation as well as several strategies to assist her in quitting smoking.     7. Tiredness/daytime sleepiness  She does report tiredness/daytime sleepiness which she believes is related to chemotherapy.    Patient should call the clinic if symptoms worsen.       Orders:   Start spironolactone 12.5 mg daily  BMP/BNP/Mg in 1-2 weeks  CMP/lipid/magnesium/CBC/TSH/BNP in 6 months,   Echocardiogram in 6 months,   Follow-up in 6 months.    Lifestyle Recommendations  I recommend a whole-food plant-based diet, an eating pattern that encourages the consumption of unrefined plant foods (such as fruits, vegetables, tubers, whole grains, legumes, nuts and seeds) and discourages meats, dairy products, eggs and processed foods.     The AHA/ACC recommends that the patient consume a dietary pattern that emphasizes intake of vegetables, fruits, and whole grains; includes low-fat dairy products, poultry, fish, legumes, non-tropical vegetable oils, and nuts; and limits intake of sodium, sweets, sugar-sweetened beverages, and red meats.  Adapt this dietary pattern to appropriate calorie requirements (a 500-750 kcal/day deficit to loose weight), personal and cultural food preferences, and nutrition therapy for other medical conditions (including diabetes).  Achieve this pattern by following plans such as the [place] Mediterranean, DASH dietary pattern, or AHA diet.     Engage in 2 hours and 30 minutes per week of moderate-intensity physical activity, or 1 hour and 15 minutes (75 minutes) per week of vigorous-intensity aerobic physical activity, or an equivalent combination of moderate and vigorous-intensity aerobic physical activity.  Aerobic activity should be performed in episodes of at least 10 minutes preferably spread throughout the week.     Adhering to a heart healthy diet, regular exercise habits, avoidance of tobacco products, and maintenance of a healthy weight are crucial components of their heart disease risk reduction.     Any positive review of systems not specifically addressed in the office visit today should be evaluated and treated by the patients primary care physician or in an emergency department if necessary     Patient was notified that results from ordered tests will be called to the patient if it changes current management; it will otherwise be discussed at a future appointment and available on  [employer].     Thank you for allowing me to participate in the care of this patient.        This document was generated using the assistance of voice recognition software. If there are any errors of spelling, grammar, syntax, or meaning; please feel free to contact me directly for clarification.    Past Medical History:  She has a past medical history of Breast cancer, CAD (coronary artery disease), COPD (chronic obstructive pulmonary disease) (Multi), Delayed emergence from general anesthesia, HFrEF (heart failure with reduced ejection fraction), HL (hearing loss), HLD (hyperlipidemia), HTN (hypertension), Pelvic mass, Pulmonary embolism, and PVD (peripheral vascular disease).    Past Surgical History:  She has a past surgical history that includes Mastectomy; Total abdominal hysterectomy; Breast biopsy;  section, classic; Septoplasty; Adenoidectomy; Tonsillectomy; Cholecystectomy; CT angio aorta and bilateral iliofemoral runoff including without contrast if performed (2022); and Appendectomy.      Social History:  She reports that she has been smoking cigarettes. She started smoking about 55 years ago. She has a 83.2 pack-year smoking history. She has been exposed to tobacco smoke. She has never used smokeless  tobacco. She reports that she does not currently use alcohol. She reports that she does not use drugs.    Family History:  Family History  Problem Relation Name Age of Onset    Breast cancer Mother      Hypertension Mother      Hyperlipidemia Mother      Esophageal cancer Mother      Other (Peripheral artery disease) Mother      Coronary artery disease Father          Allergies:  Codeine    Outpatient Medications:  Current Outpatient Medications   Medication Instructions    albuterol (Ventolin HFA) 90 mcg/actuation inhaler 2 puffs, inhalation, Every 4 hours PRN    apixaban (Eliquis) 5 mg (74 tabs) tablet Take 2 tablets (10 mg) by mouth 2 times a day for 7 days, then take 1 tablet (5 mg) by mouth 2 times a day.    aspirin 81 mg, oral, Daily    carvedilol (COREG) 3.125 mg, oral, 2 times daily (morning and late afternoon)    dexAMETHasone (Decadron) 4 mg tablet TAKE 5 TABLETS BY MOUTH ONCE 12 HOURS PRIOR TO PACLITAXEL TREATMENT. THEN TAKE 2 TABLETS ONCE DAILY FOR 3 DAYS STARTING THE DAY AFTER TREATMENT.    diclofenac sodium (VOLTAREN) 4 g, 4 times daily PRN    empagliflozin (JARDIANCE) 10 mg, oral, Daily    gabapentin (NEURONTIN) 300 mg, oral, 2 times daily    ibuprofen (IBU) 600 mg, oral, Every 6 hours    ipratropium-albuteroL (Duo-Neb) 0.5-2.5 mg/3 mL nebulizer solution 3 mL, nebulization, 3 times daily RT    magnesium gluconate (MAGONATE) 54 mg, oral, 2 times daily    OLANZapine (ZYPREXA) 5 mg, oral, Nightly    OLANZapine (ZYPREXA) 5 mg, Nightly    OLANZapine (ZYPREXA) 2.5 mg, oral, Nightly    ondansetron (ZOFRAN) 8 mg, oral, Every 8 hours PRN    oxyCODONE (ROXICODONE) 5 mg, oral, Every 4 hours PRN    pantoprazole (PROTONIX) 40 mg, oral, Daily before breakfast, Do not crush, chew, or split.    polyethylene glycol (GLYCOLAX, MIRALAX) 17 g, Daily    prochlorperazine (COMPAZINE) 10 mg, oral, Every 6 hours PRN    roflumilast (DALIRESP) 500 mcg, oral, Daily    rosuvastatin (CRESTOR) 40 mg, oral, Daily     "sennosides-docusate sodium (Senna-S) 8.6-50 mg tablet 2 tablets, oral, 2 times daily    umeclidinium-vilanteroL (Anoro Ellipta) 62.5-25 mcg/actuation blister with device 1 puff, inhalation, Daily    valACYclovir (VALTREX) 500 mg, oral, Daily        ROS:  A 14 point review of systems was done and is negative other than as stated in HPI    Vitals:      6/15/2025     6:00 PM 6/15/2025     7:15 PM 6/15/2025     7:30 PM 6/15/2025     8:00 PM 6/15/2025     8:30 PM 7/2/2025     8:16 AM 7/2/2025     9:41 AM   Vitals   Systolic 139 132 136 136 129 144    Diastolic 50 57 58 65 61 72    BP Location  Right arm Right arm  Right arm     Heart Rate 81 79 76 78 72 99    Temp      36.3 °C (97.3 °F)    Resp  16 16  16 17    Height       1.592 m (5' 2.68\")   Weight (lb)      109.13    BMI      19.96 kg/m2 19.53 kg/m2   BSA (m2)      1.47 m2 1.48 m2        Physical Exam:   Constitutional: Cooperative, in no acute distress, alert, appears stated age.   Skin: Skin color, texture, turgor normal. No rashes or lesions.   Head: Normocephalic. No masses, lesions, tenderness or abnormalities   Eyes: Extraocular movements are grossly intact.   Mouth and throat: Mucous membranes moist   Neck: Neck supple, no carotid bruits, no JVD   Respiratory: Lungs clear to auscultation, no wheezing or rhonchi, no use of accessory muscles   Chest wall: No scars, normal excursion with respiration   Cardiovascular: Regular rhythm without murmur, gallop, or rubs   Gastrointestinal: Abdomen soft, nontender. Bowel sounds normal.   Musculoskeletal: Strength equal in upper extremities   Extremities: No pitting edema   Neurologic: Sensation grossly intact, alert and oriented x3    Intake/Output:   No intake/output data recorded.    Outpatient Medications  Current Outpatient Medications on File Prior to Visit   Medication Sig Dispense Refill    albuterol (Ventolin HFA) 90 mcg/actuation inhaler Inhale 2 puffs every 4 hours if needed for shortness of breath. 18 g 3    " apixaban (Eliquis) 5 mg (74 tabs) tablet Take 2 tablets (10 mg) by mouth 2 times a day for 7 days, then take 1 tablet (5 mg) by mouth 2 times a day. 74 tablet 0    aspirin 81 mg EC tablet Take 1 tablet (81 mg) by mouth once daily. 90 tablet 3    carvedilol (Coreg) 3.125 mg tablet Take 1 tablet (3.125 mg) by mouth 2 times daily (morning and late afternoon). 180 tablet 3    dexAMETHasone (Decadron) 4 mg tablet TAKE 5 TABLETS BY MOUTH ONCE 12 HOURS PRIOR TO PACLITAXEL TREATMENT. THEN TAKE 2 TABLETS ONCE DAILY FOR 3 DAYS STARTING THE DAY AFTER TREATMENT. 66 tablet 0    diclofenac sodium (Voltaren) 1 % gel Apply 4.5 inches (4 g) topically 4 times a day as needed.      empagliflozin (Jardiance) 10 mg tablet Take 1 tablet (10 mg) by mouth once daily. 90 tablet 1    gabapentin (Neurontin) 300 mg capsule Take 1 capsule (300 mg) by mouth 2 times a day. 60 capsule 11    ibuprofen (IBU) 600 mg tablet Take 1 tablet (600 mg) by mouth every 6 hours. 120 tablet 0    ipratropium-albuteroL (Duo-Neb) 0.5-2.5 mg/3 mL nebulizer solution Take 3 mL by nebulization 3 times a day. 90 mL 3    magnesium gluconate (Magonate) 27 mg magnesium (500 mg) tablet Take 2 tablets (54 mg) by mouth 2 times a day. 120 tablet 5    OLANZapine (ZyPREXA) 2.5 mg tablet Take 2 tablets (5 mg) by mouth once daily at bedtime for 5 days. 10 tablet 11    OLANZapine (ZyPREXA) 2.5 mg tablet Take 2 tablets (5 mg) by mouth once daily at bedtime.      OLANZapine (ZyPREXA) 2.5 mg tablet Take 1 tablet (2.5 mg) by mouth once daily at bedtime. (Patient not taking: Reported on 7/2/2025) 30 tablet 3    ondansetron (Zofran) 8 mg tablet Take 1 tablet (8 mg) by mouth every 8 hours if needed for nausea or vomiting. 30 tablet 5    oxyCODONE (Roxicodone) 5 mg immediate release tablet Take 1 tablet (5 mg) by mouth every 4 hours if needed for severe pain (7 - 10). 120 tablet 0    pantoprazole (ProtoNix) 40 mg EC tablet Take 1 tablet (40 mg) by mouth once daily in the morning. Take  before meals. Do not crush, chew, or split. 30 tablet 3    polyethylene glycol (Glycolax, Miralax) 17 gram packet Take 17 g by mouth once daily.      prochlorperazine (Compazine) 10 mg tablet Take 1 tablet (10 mg) by mouth every 6 hours if needed for nausea or vomiting. 30 tablet 5    roflumilast (Daliresp) 500 mcg tablet TAKE ONE TABLET BY MOUTH EVERY DAY 21 tablet 0    rosuvastatin (Crestor) 40 mg tablet Take 1 tablet (40 mg) by mouth once daily. 90 tablet 3    sennosides-docusate sodium (Senna-S) 8.6-50 mg tablet Take 2 tablets by mouth 2 times a day. 120 tablet 3    umeclidinium-vilanteroL (Anoro Ellipta) 62.5-25 mcg/actuation blister with device Inhale 1 puff once daily. 1 each 3    valACYclovir (Valtrex) 500 mg tablet Take 1 tablet (500 mg) by mouth once daily. 30 tablet 11    [DISCONTINUED] acetaminophen (Tylenol) 325 mg tablet Take 2 tablets (650 mg) by mouth every 4 hours if needed for headaches, mild pain (1 - 3) or fever (temp greater than 38.0 C). (Patient not taking: Reported on 7/2/2025)      [DISCONTINUED] docusate sodium (Colace) 100 mg capsule Take 1 capsule (100 mg) by mouth 2 times a day. (Patient not taking: Reported on 7/2/2025)      [DISCONTINUED] furosemide (Lasix) 20 mg tablet Take 1 tablet (20 mg) by mouth every other day. (Patient not taking: Reported on 7/2/2025) 45 tablet 1    [DISCONTINUED] ondansetron ODT (Zofran-ODT) 4 mg disintegrating tablet Dissolve 1 tablet (4 mg) in the mouth every 8 hours if needed for nausea or vomiting. (Patient not taking: Reported on 7/2/2025) 30 tablet 0    [DISCONTINUED] potassium chloride CR (Klor-Con M20) 20 mEq ER tablet Take 1 tablet (20 mEq) by mouth once daily for 10 days. Do not crush or chew. (Patient not taking: Reported on 7/2/2025) 10 tablet 0     No current facility-administered medications on file prior to visit.       Labs: (past 26 weeks)  Recent Results (from the past 26 weeks)   Urinalysis with Reflex Culture and Microscopic    Collection  Time: 01/07/25 11:32 AM   Result Value Ref Range    Color, Urine Yellow Light-Yellow, Yellow, Dark-Yellow    Appearance, Urine Turbid (N) Clear    Specific Gravity, Urine 1.029 1.005 - 1.035    pH, Urine 5.5 5.0, 5.5, 6.0, 6.5, 7.0, 7.5, 8.0    Protein, Urine 30 (1+) (A) NEGATIVE, 10 (TRACE), 20 (TRACE) mg/dL    Glucose, Urine Normal Normal mg/dL    Blood, Urine 0.06 (1+) (A) NEGATIVE    Ketones, Urine NEGATIVE NEGATIVE mg/dL    Bilirubin, Urine NEGATIVE NEGATIVE    Urobilinogen, Urine 2 (1+) (A) Normal mg/dL    Nitrite, Urine NEGATIVE NEGATIVE    Leukocyte Esterase, Urine NEGATIVE NEGATIVE   Extra Urine Gray Tube    Collection Time: 01/07/25 11:32 AM   Result Value Ref Range    Extra Tube Hold for add-ons.    Urinalysis Microscopic    Collection Time: 01/07/25 11:32 AM   Result Value Ref Range    WBC, Urine 1-5 1-5, NONE /HPF    RBC, Urine 11-20 (A) NONE, 1-2, 3-5 /HPF    Squamous Epithelial Cells, Urine 10-25 (FEW) Reference range not established. /HPF    Mucus, Urine 2+ Reference range not established. /LPF   CBC and Auto Differential    Collection Time: 01/07/25 11:40 AM   Result Value Ref Range    WBC 9.7 4.4 - 11.3 x10*3/uL    nRBC 0.0 0.0 - 0.0 /100 WBCs    RBC 5.29 (H) 4.00 - 5.20 x10*6/uL    Hemoglobin 15.4 12.0 - 16.0 g/dL    Hematocrit 46.0 36.0 - 46.0 %    MCV 87 80 - 100 fL    MCH 29.1 26.0 - 34.0 pg    MCHC 33.5 32.0 - 36.0 g/dL    RDW 13.6 11.5 - 14.5 %    Platelets 209 150 - 450 x10*3/uL    Neutrophils % 67.3 40.0 - 80.0 %    Immature Granulocytes %, Automated 0.3 0.0 - 0.9 %    Lymphocytes % 23.7 13.0 - 44.0 %    Monocytes % 4.9 2.0 - 10.0 %    Eosinophils % 3.3 0.0 - 6.0 %    Basophils % 0.5 0.0 - 2.0 %    Neutrophils Absolute 6.51 (H) 1.60 - 5.50 x10*3/uL    Immature Granulocytes Absolute, Automated 0.03 0.00 - 0.50 x10*3/uL    Lymphocytes Absolute 2.29 0.80 - 3.00 x10*3/uL    Monocytes Absolute 0.47 0.05 - 0.80 x10*3/uL    Eosinophils Absolute 0.32 0.00 - 0.40 x10*3/uL    Basophils Absolute 0.05  0.00 - 0.10 x10*3/uL   Comprehensive metabolic panel    Collection Time: 01/07/25 11:40 AM   Result Value Ref Range    Glucose 95 74 - 99 mg/dL    Sodium 137 136 - 145 mmol/L    Potassium 4.0 3.5 - 5.3 mmol/L    Chloride 107 98 - 107 mmol/L    Bicarbonate 24 21 - 32 mmol/L    Anion Gap 10 10 - 20 mmol/L    Urea Nitrogen 20 6 - 23 mg/dL    Creatinine 0.72 0.50 - 1.05 mg/dL    eGFR 87 >60 mL/min/1.73m*2    Calcium 10.3 8.6 - 10.3 mg/dL    Albumin 3.8 3.4 - 5.0 g/dL    Alkaline Phosphatase 97 33 - 136 U/L    Total Protein 6.4 6.4 - 8.2 g/dL    AST 26 9 - 39 U/L    Bilirubin, Total 0.4 0.0 - 1.2 mg/dL    ALT 39 7 - 45 U/L   Lipase    Collection Time: 01/07/25 11:40 AM   Result Value Ref Range    Lipase 17 9 - 82 U/L   Lactate    Collection Time: 01/07/25 11:40 AM   Result Value Ref Range    Lactate 0.7 0.4 - 2.0 mmol/L   Cancer Antigen 125    Collection Time: 01/07/25  3:36 PM   Result Value Ref Range    Cancer  26.5 0.0 - 30.2 U/mL   Transthoracic echo (TTE) complete    Collection Time: 01/08/25 11:17 AM   Result Value Ref Range    LVOT diam 1.90 cm    MV E/A ratio 1.01     MV avg E/e' ratio 11.64     Tricuspid annular plane systolic excursion 2.1 cm    AV mn grad 4 mmHg    LA vol index A/L 24.0 ml/m2    AV pk terese 1.26 m/s    LV EF 38 %    RV free wall pk S' 10.99 cm/s    LV GLS 9.4 %    LVIDd 5.05 cm    AV pk grad 6 mmHg   Lipid Panel    Collection Time: 01/27/25 10:46 AM   Result Value Ref Range    CHOLESTEROL, TOTAL 157 <200 mg/dL    HDL CHOLESTEROL 40 (L) > OR = 50 mg/dL    TRIGLYCERIDES 168 (H) <150 mg/dL    LDL-CHOLESTEROL 90 mg/dL (calc)    CHOL/HDLC RATIO 3.9 <5.0 (calc)    NON HDL CHOLESTEROL 117 <130 mg/dL (calc)   ECG 12 lead (Clinic Performed)    Collection Time: 01/31/25  2:28 PM   Result Value Ref Range    Ventricular Rate 85 BPM    Atrial Rate 85 BPM    UT Interval 210 ms    QRS Duration 80 ms    QT Interval 350 ms    QTC Calculation(Bazett) 416 ms    P Axis 59 degrees    R Axis -4 degrees    T  Axis 106 degrees    QRS Count 14 beats    Q Onset 224 ms    P Onset 119 ms    P Offset 167 ms    T Offset 399 ms    QTC Fredericia 393 ms   CBC and Auto Differential    Collection Time: 02/16/25  2:40 AM   Result Value Ref Range    WBC 7.7 4.4 - 11.3 x10*3/uL    nRBC 0.0 0.0 - 0.0 /100 WBCs    RBC 5.17 4.00 - 5.20 x10*6/uL    Hemoglobin 14.7 12.0 - 16.0 g/dL    Hematocrit 46.0 36.0 - 46.0 %    MCV 89 80 - 100 fL    MCH 28.4 26.0 - 34.0 pg    MCHC 32.0 32.0 - 36.0 g/dL    RDW 13.2 11.5 - 14.5 %    Platelets 184 150 - 450 x10*3/uL    Neutrophils % 46.5 40.0 - 80.0 %    Immature Granulocytes %, Automated 0.1 0.0 - 0.9 %    Lymphocytes % 44.4 13.0 - 44.0 %    Monocytes % 3.9 2.0 - 10.0 %    Eosinophils % 4.5 0.0 - 6.0 %    Basophils % 0.6 0.0 - 2.0 %    Neutrophils Absolute 3.59 1.60 - 5.50 x10*3/uL    Immature Granulocytes Absolute, Automated 0.01 0.00 - 0.50 x10*3/uL    Lymphocytes Absolute 3.43 (H) 0.80 - 3.00 x10*3/uL    Monocytes Absolute 0.30 0.05 - 0.80 x10*3/uL    Eosinophils Absolute 0.35 0.00 - 0.40 x10*3/uL    Basophils Absolute 0.05 0.00 - 0.10 x10*3/uL   Magnesium    Collection Time: 02/16/25  2:40 AM   Result Value Ref Range    Magnesium 1.94 1.60 - 2.40 mg/dL   Comprehensive metabolic panel    Collection Time: 02/16/25  2:40 AM   Result Value Ref Range    Glucose 247 (H) 74 - 99 mg/dL    Sodium 136 136 - 145 mmol/L    Potassium 4.3 3.5 - 5.3 mmol/L    Chloride 106 98 - 107 mmol/L    Bicarbonate 23 21 - 32 mmol/L    Anion Gap 11 10 - 20 mmol/L    Urea Nitrogen 21 6 - 23 mg/dL    Creatinine 1.02 0.50 - 1.05 mg/dL    eGFR 57 (L) >60 mL/min/1.73m*2    Calcium 10.1 8.6 - 10.3 mg/dL    Albumin 4.1 3.4 - 5.0 g/dL    Alkaline Phosphatase 77 33 - 136 U/L    Total Protein 6.4 6.4 - 8.2 g/dL    AST 14 9 - 39 U/L    Bilirubin, Total 0.4 0.0 - 1.2 mg/dL    ALT 9 7 - 45 U/L   B-Type Natriuretic Peptide    Collection Time: 02/16/25  2:40 AM   Result Value Ref Range    BNP 1,008 (H) 0 - 99 pg/mL   Blood Gas Venous Full  Panel    Collection Time: 02/16/25  2:40 AM   Result Value Ref Range    POCT pH, Venous 7.15 (LL) 7.33 - 7.43 pH    POCT pCO2, Venous 78 (HH) 41 - 51 mm Hg    POCT pO2, Venous 27 (L) 35 - 45 mm Hg    POCT SO2, Venous 31 (L) 45 - 75 %    POCT Oxy Hemoglobin, Venous 30.1 (L) 45.0 - 75.0 %    POCT Hematocrit Calculated, Venous 45.0 36.0 - 46.0 %    POCT Sodium, Venous 133 (L) 136 - 145 mmol/L    POCT Potassium, Venous 4.3 3.5 - 5.3 mmol/L    POCT Chloride, Venous 105 98 - 107 mmol/L    POCT Ionized Calicum, Venous 1.42 (H) 1.10 - 1.33 mmol/L    POCT Glucose, Venous 267 (H) 74 - 99 mg/dL    POCT Lactate, Venous 1.5 0.4 - 2.0 mmol/L    POCT Base Excess, Venous -3.8 (L) -2.0 - 3.0 mmol/L    POCT HCO3 Calculated, Venous 27.2 (H) 22.0 - 26.0 mmol/L    POCT Hemoglobin, Venous 14.9 12.0 - 16.0 g/dL    POCT Anion Gap, Venous 5.0 (L) 10.0 - 25.0 mmol/L    Patient Temperature 37.0 degrees Celsius    FiO2 50 %   Troponin I, High Sensitivity, Initial    Collection Time: 02/16/25  2:40 AM   Result Value Ref Range    Troponin I, High Sensitivity 19 (H) 0 - 13 ng/L   Influenza A, and B PCR    Collection Time: 02/16/25  2:49 AM   Result Value Ref Range    Flu A Result Not Detected Not Detected    Flu B Result Not Detected Not Detected   Sars-CoV-2 PCR    Collection Time: 02/16/25  2:49 AM   Result Value Ref Range    Coronavirus 2019, PCR Not Detected Not Detected   RSV PCR    Collection Time: 02/16/25  2:49 AM   Result Value Ref Range    RSV PCR Not Detected Not Detected   ECG 12 lead    Collection Time: 02/16/25  2:50 AM   Result Value Ref Range    Ventricular Rate 94 BPM    Atrial Rate 95 BPM    AL Interval 233 ms    QRS Duration 90 ms    QT Interval 353 ms    QTC Calculation(Bazett) 442 ms    P Axis 57 degrees    R Axis 9 degrees    T Axis 107 degrees    QRS Count 15 beats    Q Onset 253 ms    T Offset 429 ms    QTC Fredericia 410 ms   Troponin, High Sensitivity, 1 Hour    Collection Time: 02/16/25  3:36 AM   Result Value Ref Range     Troponin I, High Sensitivity 29 (H) 0 - 13 ng/L   Blood Gas Venous Full Panel    Collection Time: 02/16/25  5:31 AM   Result Value Ref Range    POCT pH, Venous 7.28 (L) 7.33 - 7.43 pH    POCT pCO2, Venous 56 (H) 41 - 51 mm Hg    POCT pO2, Venous 46 (H) 35 - 45 mm Hg    POCT SO2, Venous 71 45 - 75 %    POCT Oxy Hemoglobin, Venous 69.2 45.0 - 75.0 %    POCT Hematocrit Calculated, Venous 42.0 36.0 - 46.0 %    POCT Sodium, Venous 135 (L) 136 - 145 mmol/L    POCT Potassium, Venous 4.0 3.5 - 5.3 mmol/L    POCT Chloride, Venous 106 98 - 107 mmol/L    POCT Ionized Calicum, Venous 1.41 (H) 1.10 - 1.33 mmol/L    POCT Glucose, Venous 78 74 - 99 mg/dL    POCT Lactate, Venous 0.8 0.4 - 2.0 mmol/L    POCT Base Excess, Venous -1.5 -2.0 - 3.0 mmol/L    POCT HCO3 Calculated, Venous 26.3 (H) 22.0 - 26.0 mmol/L    POCT Hemoglobin, Venous 14.0 12.0 - 16.0 g/dL    POCT Anion Gap, Venous 7.0 (L) 10.0 - 25.0 mmol/L    Patient Temperature 37.0 degrees Celsius    FiO2 50 %   BLOOD GAS VENOUS    Collection Time: 02/16/25  8:40 AM   Result Value Ref Range    POCT pH, Venous 7.34 7.33 - 7.43 pH    POCT pCO2, Venous 53 (H) 41 - 51 mm Hg    POCT pO2, Venous 38 35 - 45 mm Hg    POCT SO2, Venous 61 45 - 75 %    POCT Oxy Hemoglobin, Venous 60.2 45.0 - 75.0 %    POCT Base Excess, Venous 1.8 -2.0 - 3.0 mmol/L    POCT HCO3 Calculated, Venous 28.6 (H) 22.0 - 26.0 mmol/L    Patient Temperature 37.0 degrees Celsius    FiO2 100 %   Troponin I, High Sensitivity    Collection Time: 02/16/25  9:16 AM   Result Value Ref Range    Troponin I, High Sensitivity 53 (HH) 0 - 13 ng/L   Troponin I, High Sensitivity    Collection Time: 02/16/25  1:36 PM   Result Value Ref Range    Troponin I, High Sensitivity 51 (HH) 0 - 13 ng/L   CBC    Collection Time: 02/17/25  3:26 AM   Result Value Ref Range    WBC 6.8 4.4 - 11.3 x10*3/uL    nRBC 0.0 0.0 - 0.0 /100 WBCs    RBC 4.68 4.00 - 5.20 x10*6/uL    Hemoglobin 13.6 12.0 - 16.0 g/dL    Hematocrit 40.9 36.0 - 46.0 %    MCV  87 80 - 100 fL    MCH 29.1 26.0 - 34.0 pg    MCHC 33.3 32.0 - 36.0 g/dL    RDW 13.3 11.5 - 14.5 %    Platelets 173 150 - 450 x10*3/uL   Basic metabolic panel    Collection Time: 02/17/25  3:26 AM   Result Value Ref Range    Glucose 84 74 - 99 mg/dL    Sodium 139 136 - 145 mmol/L    Potassium 3.6 3.5 - 5.3 mmol/L    Chloride 104 98 - 107 mmol/L    Bicarbonate 25 21 - 32 mmol/L    Anion Gap 14 10 - 20 mmol/L    Urea Nitrogen 24 (H) 6 - 23 mg/dL    Creatinine 0.90 0.50 - 1.05 mg/dL    eGFR 66 >60 mL/min/1.73m*2    Calcium 10.2 8.6 - 10.3 mg/dL   Magnesium    Collection Time: 02/18/25  4:32 AM   Result Value Ref Range    Magnesium 1.87 1.60 - 2.40 mg/dL   CBC    Collection Time: 02/18/25  4:32 AM   Result Value Ref Range    WBC 6.9 4.4 - 11.3 x10*3/uL    nRBC 0.0 0.0 - 0.0 /100 WBCs    RBC 4.91 4.00 - 5.20 x10*6/uL    Hemoglobin 13.8 12.0 - 16.0 g/dL    Hematocrit 42.5 36.0 - 46.0 %    MCV 87 80 - 100 fL    MCH 28.1 26.0 - 34.0 pg    MCHC 32.5 32.0 - 36.0 g/dL    RDW 13.4 11.5 - 14.5 %    Platelets 171 150 - 450 x10*3/uL   Basic Metabolic Panel    Collection Time: 02/18/25  4:32 AM   Result Value Ref Range    Glucose 103 (H) 74 - 99 mg/dL    Sodium 138 136 - 145 mmol/L    Potassium 3.5 3.5 - 5.3 mmol/L    Chloride 103 98 - 107 mmol/L    Bicarbonate 26 21 - 32 mmol/L    Anion Gap 13 10 - 20 mmol/L    Urea Nitrogen 27 (H) 6 - 23 mg/dL    Creatinine 1.04 0.50 - 1.05 mg/dL    eGFR 56 (L) >60 mL/min/1.73m*2    Calcium 10.2 8.6 - 10.3 mg/dL   Blood Gas Arterial, COOX Unsolicited    Collection Time: 03/06/25  1:48 PM   Result Value Ref Range    POCT pH, Arterial 7.40 7.38 - 7.42 pH    POCT pCO2, Arterial 41 38 - 42 mm Hg    POCT pO2, Arterial 91 85 - 95 mm Hg    POCT SO2, Arterial 99 94 - 100 %    POCT Oxy Hemoglobin, Arterial 92.5 (L) 94.0 - 98.0 %    POCT Base Excess, Arterial 0.5 -2.0 - 3.0 mmol/L    POCT HCO3 Calculated, Arterial 25.4 22.0 - 26.0 mmol/L    POCT Hemoglobin, Arterial 15.1 12.0 - 16.0 g/dL    POCT  Carboxyhemoglobin, Arterial 6.1 %    POCT Methemoglobin, Arterial 0.5 0.0 - 1.5 %    POCT Deoxy Hemoglobin, Arterial 0.9 0.0 - 5.0 %    Patient Temperature 37.0 degrees Celsius   Exhaled Nitric Oxide (FeNO)    Collection Time: 03/06/25  3:30 PM   Result Value Ref Range    FVC - Predicted 2.46     FEV1 - Predicted 1.90     FVC - PRE 2.55     FEV1 - Pre 1.52     FVC - Post 2.64     FEV1 - Post 1.55    Complete Pulmonary Function Test Pre/Post Bronchodilator (Spirometry Pre/Post/DLCO/Lung Volumes)    Collection Time: 03/06/25  4:16 PM   Result Value Ref Range    FVC - Predicted 2.46     FEV1 - Predicted 1.90     FVC - PRE 2.55     FEV1 - Pre 1.52     FVC - Post 2.64     FEV1 - Post 1.55    Arterial Blood Gas (ABG)    Collection Time: 03/06/25  4:16 PM   Result Value Ref Range    FVC - Predicted 2.46     FEV1 - Predicted 1.90     FVC - PRE 2.55     FEV1 - Pre 1.52     FVC - Post 2.64     FEV1 - Post 1.55    Type And Screen Is this order related to pregnancy or an upcoming surgery? Yes; Where will this surgery/delivery be performed? [facility]; What is the date of the surgery? 3/25/2025; Has this patient ever had a transfusion? No; Has t...    Collection Time: 03/13/25 11:48 AM   Result Value Ref Range    ABO TYPE A     Rh TYPE POS     ANTIBODY SCREEN NEG    Staphylococcus aureus/MRSA colonization, Culture    Collection Time: 03/13/25 11:48 AM    Specimen: Nares/Axilla/Groin; Swab   Result Value Ref Range    Staph/MRSA Screen Culture No Staphylococcus aureus isolated    Type And Screen    Collection Time: 03/25/25 10:22 AM   Result Value Ref Range    ABO TYPE A     Rh TYPE POS     ANTIBODY SCREEN NEG    POCT GLUCOSE    Collection Time: 03/25/25 10:26 AM   Result Value Ref Range    POCT Glucose 93 74 - 99 mg/dL   Surgical Pathology Exam    Collection Time: 03/25/25 12:54 PM   Result Value Ref Range    Case Report       Surgical Pathology                                Case: [id number]                          "         Authorizing Provider:  Jaki Treviño MD MPH       Collected:           03/25/2025 1254              Ordering Location:     [facility]       Received:            03/25/2025 1303                                     [facility]                                                               Pathologist:           Michelle Marti MD                                                                           Specimens:   A) - PERITONEUM BIOPSY, left abdomen sidewall                                                       B) - OVARY OOPHORECTOMY RIGHT, right ovary                                                          C) - ADNEXA RESECTION LEFT, left adnaxa biopsy                                                      D) - APPENDIX, appendix                                                                              E) - OMENTUM RESECTION, Omentum                                                                     F) - MESENTERY, Mesentary Nodule                                                                    G) - PERITONEUM EXCISION, Right Pelvic Sidewall                                                     H) - PERITONEUM EXCISION, left pelvis sidewall                                                      I) - PERITONEUM BIOPSY, right pericolic nodule                                                      J) - PERITONEUM EXCISION, LEFT ABDOMINAL WALL PERITONEUM                                   FINAL DIAGNOSIS         A. Specimen labeled \"left abdomen side wall\":  -- Metastatic carcinosarcoma involving fibroadipose tissue.    B. 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  -- Immunohistochemical stains performed on formalin-fixed, paraffin embedded sections demonstrate neoplastic cells to be " "positive for AE1/AE3, CAM 5.2, PAX8, p16 (block pattern), WT1 (multifocal), desmin (multifocal), myogenin (focal), OH (multifocal) and ER (weak, focal), and negative for S100 and SOX10.    Note: The neoplasm is histologically composed of sarcoma with focal heterologous rhabdomyosarcomatous differentiation (30%) and high grade serous carcinoma (70%). Immunohistochemical staining for mismatch repair proteins, HER2 and p53 will be performed and reported in addenda.     C. Specimen labeled \"Left adnexa biopsy\":  -- Carcinosarcoma, involving fibroadipose periadnexal tissue and extending into ovary and fallopian tube; see note #2.    Note #2: The carcinoma is mostly composed of high grade serous carcinoma with a small focus of carcinosarcoma.    D. Specimen labeled \"Appendix\":  -- Metastatic carcinosarcoma involving appendiceal serosa and mesoappendix.    E. Specimen labeled \"Omentum\":  -- Metastatic carcinoma involving adipose tissue; see note #3.    F. Specimen labeled \"Mesentery nodule\":  -- Metastatic carcinosarcoma involving fibroadipose tissue.    G. Specimen labeled \"Right pelvic sidewall\":  -- Metastatic carcinosarcoma involving fibroadipose tissue.    H. Specimen labeled \"Left pelvic sidewall\":  -- Metastatic carcinosarcoma involving fibroadipose tissue.    I. Specimen labeled \"Right pericolic nodule\":  -- Metastatic carcinoma involving fibroadipose tissue; see note #3.    J. Specimen labeled \"Left abdominal wall peritoneum\":  -- Small foci of metastatic carcinoma involving fibroadipose tissue; see note #3.    Note #3: These foci of carcinoma are consistent with spread from the ovarian primary and are composed solely of the carcinomatous component.                By the signature on this report, the individual or group listed as making the Final Interpretation/Diagnosis certifies that they have reviewed this case.       Case Summary Report       OVARY or FALLOPIAN TUBE or PRIMARY PERITONEUM   OVARY OR FALLOPIAN " TUBE OR PRIMARY PERITONEUM - All Specimens   8th Edition - Protocol posted: 6/19/2024      SPECIMEN      Procedure:    Bilateral salpingo-oophorectomy       Procedure:    Omentectomy       Procedure:    Peritoneal biopsies       Procedure:    Appendectomy       Specimen Integrity:            Right Ovary Integrity:    Capsule intact       TUMOR      Tumor Site:    Right ovary       Tumor Size:    Greatest Dimension (Centimeters): 8.5 cm      Histologic Type:    Carcinosarcoma       Ovarian Surface Involvement:    Present, right       Fallopian Tube Surface Involvement:    Present, right       Other Tissue / Organ Involvement:    Left ovary       Other Tissue / Organ Involvement:    Pelvic peritoneum       Other Tissue / Organ Involvement:    Omentum       Other Tissue / Organ Involvement:    Appendix       Peritoneal / Ascitic Fluid Involvement:    Not submitted / unknown       REGIONAL LYMPH NODES      Regional Lymph Node Status:    Not applicable (no regional lymph nodes submitted or found)       pTNM CLASSIFICATION (AJCC 8th Edition)      Reporting of pT, pN, and (when applicable) pM categories is based on information available to the pathologist at the time the report is issued. As per the AJCC (Chapter 1, 8th Ed.) it is the managing physician's responsibility to establish the final pathologic stage based upon all pertinent information, including but potentially not limited to this pathology report.      pT Category:    pT2b       pN Category:    pN not assigned (no nodes submitted or found)       Block for Additional Biomarkers/Molecular Studies       Normal Block: J5  Tumor Block: B4      Addendum       MISMATCH REPAIR PROTEIN EXPRESSION    Date Reported: 4-11-25   Surgical Number: [id number]   Block Number: B4  Specimen Site: Ovary  Carcinoma Type: Carcinosarcoma      INTERPRETATION: Ovarian carcinosarcoma with normal mismatch repair protein expression.    TEST REULTS:  PROTEIN RESULT   MLH-1 :   POSITIVE        PMS-2 :  POSITIVE       MSH-2 : POSITIVE     MSH-6 : POSITIVE       : Not applicable.    NOTE:  The immunohistochemistry study of DNA mismatch repair protein expression reveals the normal presence of hMLH1, hPMS2, hMSH2, and hMSH6 in the tumor (normal internal controls stain appropriately). The findings do not exclude underlying Carter Syndrome because some mutations may result in intact protein expression.  In addition, rare alterations can exist in other mismatch proteins, which have not been tested.    REFERENCE RANGE:  A result is reported positive if tumor staining is found in > 1% of tumor nuclei.    IHC:  Immunohistochemical staining (IHC) is used to determine the presence or absence of protein expression MLH1, MSH2, MSH6 and PMS2.  Lymphocytes and normal epithelial cells exhibit strong nuclear staining and serve as positive internal controls for staining of these proteins. Infiltrating carcinoma cells exhibiting nuclear staining are considered POSITIVE.      The stated protein mouse and rabbit monoclonal antibodies (MLH1-clone M1([employer]), MSH2- clone [id number]([employer]), MSH6-clone [id number] ([employer]), and  PMS2- clone [id number]([employer])) staining are performed on formalin fixed paraffin embedded specimens.  The method employed was a standard peroxidase labeled-polymer detection system from [employer] (ultraView [place] DAB).  Each assay was performed using appropriate positive and negative controls, as well as evaluation of internal controls.    LDT:  One or more of the reagents used to perform assays on this specimen MAY have contained components considered to be Laboratory Developed Tests (LDT). LDT's have not been cleared or approved by the U.S. Food and Drug Administration. These assays/tests were developed by the Department of Pathology Immunohistochemistry Lab at [facility]. The FDA does not require this  test to go through premarket FDA review.  This test is used for clinical purposes. It should not be regarded as investigational for research. This laboratory is certified under the Clinical Laboratory Improvement Amendments (CLIA) as qualified to perform high complexity clinical laboratory testing.  The assay/tests were performed with appropriate positive and negative controls, which stained appropriately.    CAUTIONS:  Test results should be interpreted in context of clinical findings, family history, and other laboratory data.  If results obtained do not match other clinical or laboratory findings, please contact the laboratory for possible interpretation.  Misinterpretation of results may occur if the information provided is inaccurate or incomplete.    REFERENCES:  Doe BARRETT et al. Ovarian cancer in hereditary cancer susceptibility syndromes. Surg Pathol Clin 2016;9:189-199.   Alysha MENDIETA et al. Endometrial and ovarian cancer in women with Carter syndrome: update in screening and prevention. Fam Cancer 2013;12:273-277.  Carson CALIX et al. The histomorphology of Carter syndrome associated ovarian carcinomas; toward a subtype-specific screening strategy. Am J Surg Pathol 2014;38:7663-3519.   Loida et al. Cancer risk and survival in path_MMR carriers by gene and gender up to 75 years of age; a report from the Prospective Carter Syndrome Database. Gut 2018;67:7679-4927.  Nevina et al. Cancer risks associated with germline mutations in MLH1, MSH2, and MSH6 genes in Carter Syndrome. ANAY 2011;305:5045-7586.       Addendum       HER2 IMMUNOHISTOCHEMICAL STAINING : GYN SPECIAL REQUEST FOR GASTRIC SCORING    Date Reported: 4-11-25   Surgical Number: [id number]   Block Number: b4  Specimen Site: Ovary  Carcinoma Type: Carcinosarcoma      INTERPRETATION:   Positive (cancer cell cluster greater then or equal to 5 cells)     (Gastric Score 3+)    Disclaimer:   Gastric scoring was specifically requested for this tumor by GYN  oncology.  There are no established guidelines for interpreting HER2 immunohistochemistry results via Gastric Scoring methods for GYN malignancies.  Results should be interpreted with caution and only used clinically as intended by the requesting physician.     Gastric Scoring and interpretation of HER2 by IHC:  (College of American Pathologists, Version: [id number] 1.1.0.1, Posting Date: June 2017)    Negative, Score 0:  Surgical Specimen: No reactivity or membranous reactivity in <10% of cancer cells     Biopsy: No reactivity or no membranous reactivity in any cancer cell    Negative, Score 1+:  Surgical Specimen: Faint/barely perceptible membranous reactivity in >=10% of cancer cells; cells are reactive only in part of their membrane     Biopsy: Cancer cell cluster (consisting of >= 5 neoplastic cells) with a faint or barely perceptible membranous reactivity irrespective of percentage of cancer cells positive      Equivocal, Score 2+: Surgical Specimen: Weak to moderate complete, basolateral or lateral membranous reactivity in >= 10% of cancer cells     Biopsy: Cancer cell cluster (consisting of >= 5 neoplastic cells) with a weak to moderate complete, basolateral, or lateral membranous reactivity irrespective of percentage of cancer cells positive    Positive, Score 3+: Surgical Specimen: Strong complete, basolateral or lateral membranous reactivity in >= 10% of cancer cells     Biopsy: Cancer cell cluster (consisting of >= 5 neoplastic cells) with a strong complete, basolateral or lateral membranous reactivity irrespective of percentage of cancer cells positive    IHC:  All tests are performed using a [employer] HER-2/joanna (4B5) rabbit monoclonal primary antibody on formalin fixed, paraffin embedded tissue, unless otherwise noted.  Only invasive carcinoma is evaluated using the College of American Pathologists guidelines for HER2 testing and reporting in gastric and gastroesophageal carcinomas.  "    LDT:  One or more of the reagents used to perform assays on this specimen MAY have contained components considered to be Laboratory Developed Tests (LDT). LDT's have not been cleared or approved by the U.S. Food and Drug Administration. These assays/tests were developed by the Department of Pathology Immunohistochemistry Lab at [facility] [place]. The FDA does not require this test to go through premarket FDA review.  This test is used for clinical purposes. It should not be regarded as investigational for research. This laboratory is certified under the Clinical Laboratory Improvement Amendments (CLIA) as qualified to perform high complexity clinical laboratory testing.  The assay/tests were performed with appropriate positive and negative controls, which stained appropriately.    REFERENCES:   Ya M, Mónica O, Ana Maria D, et al. Assessment of a HER2 scoring system for gastric cancer: results from a validation study. Histopathology. 2008;52(7):797-805.  2.   Ibrahima BROWNE, Lluvia L, Charlene THOMASON. HER2 testing in gastric cancer. Adv [place]ina Pathol. 2011;18(1):53-9.  3.   Emir M, Srinivasa ML, Di García M, Claireno R, Valjeannettei AM. Are biopsy specimens predictive of HER2 status in gastric cancer patients? Digest Dis Sci. 2013;58(2):397-404.  4.   BORIS Gatica, NIECY Hanna, VERONA Zavaleta et al. HER2 Testing and Clinical Decision Making in Gastroesophageal Adenocarcinoma: Guideline From the College of American Pathologists, American Society for Clinical Pathology, and American Society of Clinical Oncology. Arch Pathol Lab Med. 2016 Dec; 140(12):5464-1780.       Clinical History       Pre-op diagnosis:  Ovarian mass, right [N83.8]      Gross Description       A: Received fresh for intraoperative consultation, labeled with the patient's name and hospital number and \"left abdomen side wall\", is a fragment of tan soft tissue measuring 2.2 x 1.5 x 0.6 cm.  On palpation 3 possible nodules are " "identified in the range of 0.3- 0.6 cm.  Representative sections of 2 possible nodule are submitted for frozen analysis in 1 cassette.  The remainder of the specimen is entirely submitted in additional 3 cassettes for the total of 4 cassettes.  RXH/SXG    B: Received fresh for intraoperative consultation, labeled with the patient's name and hospital number and \"right ovary\", is an intact but focally punctured ovary.  The ovary measures 8.5 x 6.2 x 1.6 cm, and weighs 72.84 g. The outer surface is tan-white and smooth, and is inked black. The cut surface reveals a unilocular cyst, 8.5 cm. The cyst lining is ragged and granular.  Papillary excrescences with solid area are identified measuring 4.5 x 2.6 x 2.5 cm.  Residual normal appearing ovarian tissue is not identified.  No discrete fallopian tube is grossly identified. Representative sections of solid and papillary excrescences are submitted in 2 cassettes for frozen evaluation. Photographs have been taken. 2.5 g of tumor is given to HTPF.  MDW/SXG/RXH(frozen residents)    Summary of Cassettes:  Specimen Label Site  B  FS1-2 representative sections of solid area and papillary excrescences    3-12 representative sections of cyst wall      C: Received in formalin, labeled with the patient's name and hospital number and \"left adnaxa (presumed adnexa) biopsy\", is a fallopian tube with tightly adherent possible ovary.  The fallopian tube measures 3.1 cm in length by 0.5 cm in diameter.  The serosa is pink-tan and ragged.  The fimbriated end is unremarkable.  The cut surface reveals a patent lumen. The possible ovary, 2.5 x 1.2 x 0.5 cm, is firm with a tan, ragged outer surface tightly adhered to the fallopian tube.  The cut surface is tan and lobulated.  No gross lesions are identified. The specimen is entirely submitted in 10 cassettes.  MDW    Summary of cassettes:   Specimen Label Site  C  1-3       entire fallopian tube     4-10 Entire possible ovary    D: Received " "in formalin, labeled with the patient´s name and hospital number and \"appendix\", is an appendix specimen, 6.5 x 0.8 x 0.8 cm. The appendiceal margin is inked blue. The mesoappendix measures 3.0 cm in length.  The serosal surface is pink-tan, smooth and glistening.  Cross sections reveal a dilated lumen, up to 0.5 cm. Adjacent to the appendiceal margin is a nodule measuring 0.8 x 0.8 x 0.7 cm, with white, firm cut surfaces.  Representative sections are submitted in 3 cassettes.  MDW    Summary of cassettes:  Specimen Label Site  D  1 perpendicular distal tip sections, proximal margin (blue ink)    2 representative sections of body    3 Entire nodule, trisected    E: Received in formalin, labeled with the patient's name and hospital number and \"omentum\", is are 2 segments of yellow fibroadipose tissue, aggregating to 12.0 x 10.0 x 2.0 cm.  Multiple firm, tan-yellow areas are identified ranging in size from 0.5 to 1.5 cm greatest dimension.  Representative sections of the firm areas with adjacent soft tissue are submitted in 4 cassettes.  MDW    F: Received in formalin, labeled with patient name and hospital number and \"mesentary (presumed mesentery) nodule\", are four tan-yellow soft tissue fragments ranging in size from 0.2 to 0.5 cm in greatest dimension, aggregating to 0.7 x 0.5 x 0.2 cm. Submitted in toto in one cassette.  MDW    G: Received in formalin, labeled with patient name and hospital number and \"right pelvic sidewall\", are multiple tan-brown to yellow soft tissue fragments with numerous areas of nodularity, aggregating to 5.0 x 4.0 x 1.2 cm.  Sectioning reveals tan-yellow, nodular cut surfaces. Representative sections to include nodular areas are submitted in 1 cassette.  ERIC.    H: , Received in formalin, labeled with patient name and hospital number and \"left pelvic sidewall\", are two tan-brown to yellow, fibroadipose soft tissue fragments aggregating to 4.4 x 2.5 x 0.6 cm.  Sectioning reveals tan areas " "of entirety ranging in size from 0.2 to 0.4 cm greatest dimension.  Representative sections to include areas of nodularity are submitted in 1 cassette.  MDW    I: Received in formalin, labeled with patient name and hospital number and \"right pericolic nodule\", is a yellow-brown fibromembranous soft tissue fragment, measuring 2.5 x 1.2 x 0.2 cm. Submitted in toto in one cassette.  MDW    J: Received in formalin, labeled with patient name and hospital number and \"left abdominal wall peritoneum\" are multiple tan-round, ragged soft tissue fragments aggregating to 6.0 x 4.0 x 0.5 cm.  No discrete nodules are grossly identified.  The specimen is submitted entirely in 5 cassettes.  MDW        Intraoperative Consultation       A: Received Date and Time: 03/25/25    1301    Called Date and Time: 03/25/25 1340  Reported to: [place] [id number]    Intraoperative Diagnosis:  FSD A1) Multiple nodules consistent with carcinoma, possible mucinous features. Necrosis present. Defer to permanent section to determine site of origin.    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 The tumor looks morphologically similar to the peritoneal biopsy (part A)    Intraoperative Consult Pathologist(s):  Dina Betancur DO         Disclaimer       One or more of the reagents used to perform assays on this specimen MAY have contained components considered to be analyte specific reagents (ASR's).  ASR's have not been cleared or approved by the U.S. Food and Drug Administration.  These assays were developed and their performance characteristics determined by the Department of Pathology at [facility]. The FDA does not require this test to go through premarket FDA review. This test is " used for clinical purposes. It should not be regarded as investigational or for research. This laboratory is certified under the Clinical Laboratory Improvement Amendments (CLIA) as qualified to perform high complexity clinical laboratory testing.  The assays were performed with appropriate positive and negative controls which stained appropriately.     Blood Gas Arterial Full Panel    Collection Time: 03/25/25 12:57 PM   Result Value Ref Range    POCT pH, Arterial 7.33 (L) 7.38 - 7.42 pH    POCT pCO2, Arterial 44 (H) 38 - 42 mm Hg    POCT pO2, Arterial 137 (H) 85 - 95 mm Hg    POCT SO2, Arterial 100 94 - 100 %    POCT Oxy Hemoglobin, Arterial 95.5 94.0 - 98.0 %    POCT Hematocrit Calculated, Arterial 41.0 36.0 - 46.0 %    POCT Sodium, Arterial 137 136 - 145 mmol/L    POCT Potassium, Arterial 4.1 3.5 - 5.3 mmol/L    POCT Chloride, Arterial 106 98 - 107 mmol/L    POCT Ionized Calcium, Arterial 1.38 (H) 1.10 - 1.33 mmol/L    POCT Glucose, Arterial 119 (H) 74 - 99 mg/dL    POCT Lactate, Arterial 1.0 0.4 - 2.0 mmol/L    POCT Base Excess, Arterial -2.8 (L) -2.0 - 3.0 mmol/L    POCT HCO3 Calculated, Arterial 23.2 22.0 - 26.0 mmol/L    POCT Hemoglobin, Arterial 13.7 12.0 - 16.0 g/dL    POCT Anion Gap, Arterial 12 10 - 25 mmo/L    Patient Temperature 37.0 degrees Celsius    FiO2 40 %   [facility] ONE CDX    Collection Time: 03/25/25  1:23 PM   Result Value Ref Range    Scan Result See Scanned Result    MY CHOICE    Collection Time: 03/25/25  1:23 PM   Result Value Ref Range    Scan Result See Scanned Result    CBC    Collection Time: 03/26/25  5:27 AM   Result Value Ref Range    WBC 12.8 (H) 4.4 - 11.3 x10*3/uL    nRBC 0.0 0.0 - 0.0 /100 WBCs    RBC 4.58 4.00 - 5.20 x10*6/uL    Hemoglobin 13.2 12.0 - 16.0 g/dL    Hematocrit 40.0 36.0 - 46.0 %    MCV 87 80 - 100 fL    MCH 28.8 26.0 - 34.0 pg    MCHC 33.0 32.0 - 36.0 g/dL    RDW 13.5 11.5 - 14.5 %    Platelets 182 150 - 450 x10*3/uL   Basic Metabolic Panel    Collection Time:  03/26/25  5:27 AM   Result Value Ref Range    Glucose 124 (H) 74 - 99 mg/dL    Sodium 137 136 - 145 mmol/L    Potassium 4.2 3.5 - 5.3 mmol/L    Chloride 105 98 - 107 mmol/L    Bicarbonate 25 21 - 32 mmol/L    Anion Gap 11 10 - 20 mmol/L    Urea Nitrogen 24 (H) 6 - 23 mg/dL    Creatinine 0.99 0.50 - 1.05 mg/dL    eGFR 59 (L) >60 mL/min/1.73m*2    Calcium 9.7 8.6 - 10.6 mg/dL   Magnesium    Collection Time: 03/26/25  5:27 AM   Result Value Ref Range    Magnesium 1.96 1.60 - 2.40 mg/dL   CBC    Collection Time: 03/27/25  5:33 AM   Result Value Ref Range    WBC 8.1 4.4 - 11.3 x10*3/uL    nRBC 0.0 0.0 - 0.0 /100 WBCs    RBC 4.06 4.00 - 5.20 x10*6/uL    Hemoglobin 11.7 (L) 12.0 - 16.0 g/dL    Hematocrit 36.1 36.0 - 46.0 %    MCV 89 80 - 100 fL    MCH 28.8 26.0 - 34.0 pg    MCHC 32.4 32.0 - 36.0 g/dL    RDW 13.5 11.5 - 14.5 %    Platelets 157 150 - 450 x10*3/uL   Basic Metabolic Panel    Collection Time: 03/27/25  5:33 AM   Result Value Ref Range    Glucose 90 74 - 99 mg/dL    Sodium 136 136 - 145 mmol/L    Potassium 3.7 3.5 - 5.3 mmol/L    Chloride 104 98 - 107 mmol/L    Bicarbonate 26 21 - 32 mmol/L    Anion Gap 10 10 - 20 mmol/L    Urea Nitrogen 24 (H) 6 - 23 mg/dL    Creatinine 1.02 0.50 - 1.05 mg/dL    eGFR 57 (L) >60 mL/min/1.73m*2    Calcium 9.7 8.6 - 10.6 mg/dL   Magnesium    Collection Time: 03/27/25  5:33 AM   Result Value Ref Range    Magnesium 2.19 1.60 - 2.40 mg/dL   Troponin I, High Sensitivity    Collection Time: 03/27/25  5:23 PM   Result Value Ref Range    Troponin I, High Sensitivity (CMC) 11 0 - 34 ng/L   CBC    Collection Time: 03/28/25  5:44 AM   Result Value Ref Range    WBC 9.1 4.4 - 11.3 x10*3/uL    nRBC 0.0 0.0 - 0.0 /100 WBCs    RBC 4.64 4.00 - 5.20 x10*6/uL    Hemoglobin 13.2 12.0 - 16.0 g/dL    Hematocrit 40.8 36.0 - 46.0 %    MCV 88 80 - 100 fL    MCH 28.4 26.0 - 34.0 pg    MCHC 32.4 32.0 - 36.0 g/dL    RDW 13.6 11.5 - 14.5 %    Platelets 197 150 - 450 x10*3/uL   Basic Metabolic Panel     Collection Time: 03/28/25  5:44 AM   Result Value Ref Range    Glucose 99 74 - 99 mg/dL    Sodium 137 136 - 145 mmol/L    Potassium 4.1 3.5 - 5.3 mmol/L    Chloride 103 98 - 107 mmol/L    Bicarbonate 26 21 - 32 mmol/L    Anion Gap 12 10 - 20 mmol/L    Urea Nitrogen 24 (H) 6 - 23 mg/dL    Creatinine 0.65 0.50 - 1.05 mg/dL    eGFR >90 >60 mL/min/1.73m*2    Calcium 10.2 8.6 - 10.6 mg/dL   Magnesium    Collection Time: 03/28/25  5:44 AM   Result Value Ref Range    Magnesium 2.03 1.60 - 2.40 mg/dL   ANATOMIC PATHOLOGY TEST REQUESTS    Collection Time: 04/16/25 10:00 AM   Result Value Ref Range    Test Comment       The test request has been received by the lab and will be reviewed.   ANATOMIC PATHOLOGY TEST REQUESTS    Collection Time: 04/16/25 10:00 AM   Result Value Ref Range    Test Comment       The test request has been received by the lab and will be reviewed.   CBC and Auto Differential    Collection Time: 04/28/25 12:03 PM   Result Value Ref Range    WBC 10.3 4.4 - 11.3 x10*3/uL    nRBC 0.0 0.0 - 0.0 /100 WBCs    RBC 4.88 4.00 - 5.20 x10*6/uL    Hemoglobin 13.5 12.0 - 16.0 g/dL    Hematocrit 42.5 36.0 - 46.0 %    MCV 87 80 - 100 fL    MCH 27.7 26.0 - 34.0 pg    MCHC 31.8 (L) 32.0 - 36.0 g/dL    RDW 14.2 11.5 - 14.5 %    Platelets 282 150 - 450 x10*3/uL    Neutrophils % 45.1 40.0 - 80.0 %    Immature Granulocytes %, Automated 0.1 0.0 - 0.9 %    Lymphocytes % 28.5 13.0 - 44.0 %    Monocytes % 5.9 2.0 - 10.0 %    Eosinophils % 20.1 0.0 - 6.0 %    Basophils % 0.3 0.0 - 2.0 %    Neutrophils Absolute 4.64 1.60 - 5.50 x10*3/uL    Immature Granulocytes Absolute, Automated 0.01 0.00 - 0.50 x10*3/uL    Lymphocytes Absolute 2.93 0.80 - 3.00 x10*3/uL    Monocytes Absolute 0.61 0.05 - 0.80 x10*3/uL    Eosinophils Absolute 2.07 (H) 0.00 - 0.40 x10*3/uL    Basophils Absolute 0.03 0.00 - 0.10 x10*3/uL   Comprehensive metabolic panel    Collection Time: 04/28/25 12:03 PM   Result Value Ref Range    Glucose 83 74 - 99 mg/dL     Sodium 141 136 - 145 mmol/L    Potassium 4.0 3.5 - 5.3 mmol/L    Chloride 106 98 - 107 mmol/L    Bicarbonate 28 21 - 32 mmol/L    Anion Gap 11 10 - 20 mmol/L    Urea Nitrogen 14 6 - 23 mg/dL    Creatinine 0.72 0.50 - 1.05 mg/dL    eGFR 87 >60 mL/min/1.73m*2    Calcium 10.2 8.6 - 10.3 mg/dL    Albumin 4.0 3.4 - 5.0 g/dL    Alkaline Phosphatase 85 33 - 136 U/L    Total Protein 6.1 (L) 6.4 - 8.2 g/dL    AST 12 9 - 39 U/L    Bilirubin, Total 0.3 0.0 - 1.2 mg/dL    ALT 11 7 - 45 U/L   Hepatitis B surface antigen    Collection Time: 04/28/25 12:03 PM   Result Value Ref Range    Hepatitis B Surface AG Nonreactive Nonreactive   Hepatitis B Core Antibody, Total    Collection Time: 04/28/25 12:03 PM   Result Value Ref Range    Hepatitis B Core AB- Total Nonreactive Nonreactive   Hepatitis B surface antibody    Collection Time: 04/28/25 12:03 PM   Result Value Ref Range    Hepatitis B Surface AB <3.1 <10.0 mIU/mL   Ca 125    Collection Time: 04/28/25 12:03 PM   Result Value Ref Range    Cancer  9.9 0.0 - 30.2 U/mL   Magnesium    Collection Time: 04/28/25 12:03 PM   Result Value Ref Range    Magnesium 2.12 1.60 - 2.40 mg/dL   CBC    Collection Time: 05/02/25  1:46 PM   Result Value Ref Range    WBC 13.1 (H) 4.4 - 11.3 x10*3/uL    nRBC 0.2 (H) 0.0 - 0.0 /100 WBCs    RBC 4.46 4.00 - 5.20 x10*6/uL    Hemoglobin 12.5 12.0 - 16.0 g/dL    Hematocrit 39.4 36.0 - 46.0 %    MCV 88 80 - 100 fL    MCH 28.0 26.0 - 34.0 pg    MCHC 31.7 (L) 32.0 - 36.0 g/dL    RDW 14.4 11.5 - 14.5 %    Platelets 291 150 - 450 x10*3/uL   Basic Metabolic Panel    Collection Time: 05/02/25  1:46 PM   Result Value Ref Range    Glucose 125 (H) 74 - 99 mg/dL    Sodium 137 136 - 145 mmol/L    Potassium 4.3 3.5 - 5.3 mmol/L    Chloride 106 98 - 107 mmol/L    Bicarbonate 24 21 - 32 mmol/L    Anion Gap 11 10 - 20 mmol/L    Urea Nitrogen 31 (H) 6 - 23 mg/dL    Creatinine 0.76 0.50 - 1.05 mg/dL    eGFR 81 >60 mL/min/1.73m*2    Calcium 10.1 8.6 - 10.6 mg/dL    Cancer Antigen 19-9    Collection Time: 05/02/25  1:48 PM   Result Value Ref Range    Cancer AG 19-9 9.37 <35.00 U/mL   Carcinoembryonic Antigen    Collection Time: 05/02/25  1:48 PM   Result Value Ref Range    Carcinoembryonic AG 2.0 ug/L   CBC and Auto Differential    Collection Time: 05/10/25  8:45 PM   Result Value Ref Range    WBC 1.4 (L) 4.4 - 11.3 x10*3/uL    nRBC 0.0 0.0 - 0.0 /100 WBCs    RBC 4.39 4.00 - 5.20 x10*6/uL    Hemoglobin 12.3 12.0 - 16.0 g/dL    Hematocrit 36.1 36.0 - 46.0 %    MCV 82 80 - 100 fL    MCH 28.0 26.0 - 34.0 pg    MCHC 34.1 32.0 - 36.0 g/dL    RDW 13.2 11.5 - 14.5 %    Platelets 123 (L) 150 - 450 x10*3/uL    Neutrophils % 1.5 40.0 - 80.0 %    Immature Granulocytes %, Automated 1.4 (H) 0.0 - 0.9 %    Lymphocytes % 67.1 13.0 - 44.0 %    Monocytes % 25.7 2.0 - 10.0 %    Eosinophils % 3.6 0.0 - 6.0 %    Basophils % 0.7 0.0 - 2.0 %    Neutrophils Absolute 0.02 (L) 1.60 - 5.50 x10*3/uL    Immature Granulocytes Absolute, Automated 0.02 0.00 - 0.50 x10*3/uL    Lymphocytes Absolute 0.94 0.80 - 3.00 x10*3/uL    Monocytes Absolute 0.36 0.05 - 0.80 x10*3/uL    Eosinophils Absolute 0.05 0.00 - 0.40 x10*3/uL    Basophils Absolute 0.01 0.00 - 0.10 x10*3/uL   Comprehensive metabolic panel    Collection Time: 05/10/25  8:45 PM   Result Value Ref Range    Glucose 121 (H) 74 - 99 mg/dL    Sodium 130 (L) 136 - 145 mmol/L    Potassium 3.6 3.5 - 5.3 mmol/L    Chloride 97 (L) 98 - 107 mmol/L    Bicarbonate 24 21 - 32 mmol/L    Anion Gap 13 10 - 20 mmol/L    Urea Nitrogen 23 6 - 23 mg/dL    Creatinine 0.83 0.50 - 1.05 mg/dL    eGFR 73 >60 mL/min/1.73m*2    Calcium 10.7 (H) 8.6 - 10.3 mg/dL    Albumin 3.8 3.4 - 5.0 g/dL    Alkaline Phosphatase 70 33 - 136 U/L    Total Protein 6.4 6.4 - 8.2 g/dL    AST 9 9 - 39 U/L    Bilirubin, Total 0.5 0.0 - 1.2 mg/dL    ALT 13 7 - 45 U/L   Magnesium    Collection Time: 05/10/25  8:45 PM   Result Value Ref Range    Magnesium 1.75 1.60 - 2.40 mg/dL   Phosphorus     Collection Time: 05/10/25  8:45 PM   Result Value Ref Range    Phosphorus 2.0 (L) 2.5 - 4.9 mg/dL   Blood Culture    Collection Time: 05/10/25  8:45 PM    Specimen: Peripheral Venipuncture; Blood culture   Result Value Ref Range    Blood Culture No growth at 4 days -  FINAL REPORT    Morphology    Collection Time: 05/10/25  8:45 PM   Result Value Ref Range    RBC Morphology See Below     Ovalocytes Few    Blood Culture    Collection Time: 05/10/25  8:48 PM    Specimen: Peripheral Venipuncture; Blood culture   Result Value Ref Range    Blood Culture No growth at 4 days -  FINAL REPORT    Urinalysis with Reflex Culture and Microscopic    Collection Time: 05/10/25 11:41 PM   Result Value Ref Range    Color, Urine Orange (N) Straw, Yellow    Appearance, Urine Clear Clear    Specific Gravity, Urine 1.020 1.005 - 1.035    pH, Urine 5.5 5.0, 5.5, 6.0, 6.5, 7.0, 7.5, 8.0    Protein, Urine 30 (1+) (A) NEGATIVE, 10 (TRACE) mg/dL    Glucose, Urine 70 (1+) (A) NEGATIVE mg/dL    Blood, Urine 0.06 (1+) (A) NEGATIVE mg/dL    Ketones, Urine 5 (TRACE) (A) NEGATIVE mg/dL    Bilirubin, Urine NEGATIVE NEGATIVE mg/dL    Urobilinogen, Urine NORM NORM mg/dL    Nitrite, Urine NEGATIVE NEGATIVE    Leukocyte Esterase, Urine NEGATIVE NEGATIVE   Extra Urine Gray Tube    Collection Time: 05/10/25 11:41 PM   Result Value Ref Range    Extra Tube Hold for add-ons.    Urinalysis Microscopic    Collection Time: 05/10/25 11:41 PM   Result Value Ref Range    WBC, Urine 6-10 (A) 1-5, NONE /HPF    RBC, Urine >20 (A) NONE, 1-2, 3-5 /HPF    Squamous Epithelial Cells, Urine 10-25 (FEW) Reference range not established. /HPF    Transitional Epithelial Cells, Urine 1-2 (FEW) Reference range not established. /HPF    Mucus, Urine 1+ Reference range not established. /LPF   CBC and Auto Differential    Collection Time: 05/20/25  2:10 PM   Result Value Ref Range    WBC 5.9 4.4 - 11.3 x10*3/uL    nRBC 0.0 0.0 - 0.0 /100 WBCs    RBC 4.26 4.00 - 5.20 x10*6/uL     Hemoglobin 11.7 (L) 12.0 - 16.0 g/dL    Hematocrit 37.6 36.0 - 46.0 %    MCV 88 80 - 100 fL    MCH 27.5 26.0 - 34.0 pg    MCHC 31.1 (L) 32.0 - 36.0 g/dL    RDW 14.8 (H) 11.5 - 14.5 %    Platelets 319 150 - 450 x10*3/uL    Neutrophils % 54.5 40.0 - 80.0 %    Immature Granulocytes %, Automated 0.5 0.0 - 0.9 %    Lymphocytes % 37.0 13.0 - 44.0 %    Monocytes % 7.2 2.0 - 10.0 %    Eosinophils % 0.5 0.0 - 6.0 %    Basophils % 0.3 0.0 - 2.0 %    Neutrophils Absolute 3.20 1.60 - 5.50 x10*3/uL    Immature Granulocytes Absolute, Automated 0.03 0.00 - 0.50 x10*3/uL    Lymphocytes Absolute 2.17 0.80 - 3.00 x10*3/uL    Monocytes Absolute 0.42 0.05 - 0.80 x10*3/uL    Eosinophils Absolute 0.03 0.00 - 0.40 x10*3/uL    Basophils Absolute 0.02 0.00 - 0.10 x10*3/uL   Comprehensive metabolic panel    Collection Time: 05/20/25  2:10 PM   Result Value Ref Range    Glucose 88 74 - 99 mg/dL    Sodium 137 136 - 145 mmol/L    Potassium 3.4 (L) 3.5 - 5.3 mmol/L    Chloride 101 98 - 107 mmol/L    Bicarbonate 28 21 - 32 mmol/L    Anion Gap 11 10 - 20 mmol/L    Urea Nitrogen 9 6 - 23 mg/dL    Creatinine 0.58 0.50 - 1.05 mg/dL    eGFR >90 >60 mL/min/1.73m*2    Calcium 10.5 8.6 - 10.6 mg/dL    Albumin 3.8 3.4 - 5.0 g/dL    Alkaline Phosphatase 79 33 - 136 U/L    Total Protein 6.1 (L) 6.4 - 8.2 g/dL    AST 15 9 - 39 U/L    Bilirubin, Total 0.3 0.0 - 1.2 mg/dL    ALT 13 7 - 45 U/L   Ca 125    Collection Time: 05/20/25  2:10 PM   Result Value Ref Range    Cancer  5.9 0.0 - 30.2 U/mL   Magnesium    Collection Time: 05/20/25  2:10 PM   Result Value Ref Range    Magnesium 2.25 1.60 - 2.40 mg/dL   Urine culture    Collection Time: 05/21/25 11:55 AM    Specimen: Clean Catch/Voided; Urine   Result Value Ref Range    Urine Culture       Growth indicates contamination with mixed bacterial gill. Repeat culture if clinically indicated.   Urinalysis with Reflex Culture and Microscopic    Collection Time: 05/21/25 11:55 AM   Result Value Ref Range     Color, Urine Light-Yellow Light-Yellow, Yellow, Dark-Yellow    Appearance, Urine Clear Clear    Specific Gravity, Urine 1.020 1.005 - 1.035    pH, Urine 6.5 5.0, 5.5, 6.0, 6.5, 7.0, 7.5, 8.0    Protein, Urine NEGATIVE NEGATIVE, 10 (TRACE), 20 (TRACE) mg/dL    Glucose, Urine OVER (4+) (A) Normal mg/dL    Blood, Urine NEGATIVE NEGATIVE mg/dL    Ketones, Urine NEGATIVE NEGATIVE mg/dL    Bilirubin, Urine NEGATIVE NEGATIVE mg/dL    Urobilinogen, Urine Normal Normal mg/dL    Nitrite, Urine NEGATIVE NEGATIVE    Leukocyte Esterase, Urine NEGATIVE NEGATIVE   CBC and Auto Differential    Collection Time: 06/09/25 11:59 AM   Result Value Ref Range    WBC 4.6 4.4 - 11.3 x10*3/uL    nRBC 0.0 0.0 - 0.0 /100 WBCs    RBC 4.17 4.00 - 5.20 x10*6/uL    Hemoglobin 11.9 (L) 12.0 - 16.0 g/dL    Hematocrit 36.0 36.0 - 46.0 %    MCV 86 80 - 100 fL    MCH 28.5 26.0 - 34.0 pg    MCHC 33.1 32.0 - 36.0 g/dL    RDW 16.1 (H) 11.5 - 14.5 %    Platelets 151 150 - 450 x10*3/uL    Neutrophils % 49.9 40.0 - 80.0 %    Immature Granulocytes %, Automated 0.2 0.0 - 0.9 %    Lymphocytes % 41.8 13.0 - 44.0 %    Monocytes % 7.5 2.0 - 10.0 %    Eosinophils % 0.4 0.0 - 6.0 %    Basophils % 0.2 0.0 - 2.0 %    Neutrophils Absolute 2.31 1.60 - 5.50 x10*3/uL    Immature Granulocytes Absolute, Automated 0.01 0.00 - 0.50 x10*3/uL    Lymphocytes Absolute 1.94 0.80 - 3.00 x10*3/uL    Monocytes Absolute 0.35 0.05 - 0.80 x10*3/uL    Eosinophils Absolute 0.02 0.00 - 0.40 x10*3/uL    Basophils Absolute 0.01 0.00 - 0.10 x10*3/uL   Comprehensive metabolic panel    Collection Time: 06/09/25 11:59 AM   Result Value Ref Range    Glucose 102 (H) 74 - 99 mg/dL    Sodium 139 136 - 145 mmol/L    Potassium 3.1 (L) 3.5 - 5.3 mmol/L    Chloride 102 98 - 107 mmol/L    Bicarbonate 26 21 - 32 mmol/L    Anion Gap 14 10 - 20 mmol/L    Urea Nitrogen 10 6 - 23 mg/dL    Creatinine 0.57 0.50 - 1.05 mg/dL    eGFR >90 >60 mL/min/1.73m*2    Calcium 10.5 (H) 8.6 - 10.3 mg/dL    Albumin 3.8 3.4 -  5.0 g/dL    Alkaline Phosphatase 86 33 - 136 U/L    Total Protein 5.9 (L) 6.4 - 8.2 g/dL    AST 21 9 - 39 U/L    Bilirubin, Total 0.3 0.0 - 1.2 mg/dL    ALT 17 7 - 45 U/L   Ca 125    Collection Time: 06/09/25 11:59 AM   Result Value Ref Range    Cancer  5.3 0.0 - 30.2 U/mL   Magnesium    Collection Time: 06/09/25 11:59 AM   Result Value Ref Range    Magnesium 1.59 (L) 1.60 - 2.40 mg/dL   Morphology    Collection Time: 06/09/25 11:59 AM   Result Value Ref Range    RBC Morphology See Below     [place] Cells Few    ECG 12 lead    Collection Time: 06/15/25  4:18 PM   Result Value Ref Range    Ventricular Rate 77 BPM    Atrial Rate 77 BPM    MS Interval 183 ms    QRS Duration 88 ms    QT Interval 410 ms    QTC Calculation(Bazett) 465 ms    P Axis 51 degrees    R Axis -15 degrees    T Axis 91 degrees    QRS Count 12 beats    Q Onset 249 ms    T Offset 454 ms    QTC Fredericia 445 ms   CBC with Differential    Collection Time: 06/15/25  4:19 PM   Result Value Ref Range    WBC 6.0 4.4 - 11.3 x10*3/uL    nRBC 0.0 0.0 - 0.0 /100 WBCs    RBC 4.27 4.00 - 5.20 x10*6/uL    Hemoglobin 12.4 12.0 - 16.0 g/dL    Hematocrit 36.4 36.0 - 46.0 %    MCV 85 80 - 100 fL    MCH 29.0 26.0 - 34.0 pg    MCHC 34.1 32.0 - 36.0 g/dL    RDW 17.1 (H) 11.5 - 14.5 %    Platelets 302 150 - 450 x10*3/uL    Neutrophils % 67.1 40.0 - 80.0 %    Immature Granulocytes %, Automated 0.5 0.0 - 0.9 %    Lymphocytes % 31.3 13.0 - 44.0 %    Monocytes % 0.7 2.0 - 10.0 %    Eosinophils % 0.2 0.0 - 6.0 %    Basophils % 0.2 0.0 - 2.0 %    Neutrophils Absolute 4.02 1.60 - 5.50 x10*3/uL    Immature Granulocytes Absolute, Automated 0.03 0.00 - 0.50 x10*3/uL    Lymphocytes Absolute 1.87 0.80 - 3.00 x10*3/uL    Monocytes Absolute 0.04 (L) 0.05 - 0.80 x10*3/uL    Eosinophils Absolute 0.01 0.00 - 0.40 x10*3/uL    Basophils Absolute 0.01 0.00 - 0.10 x10*3/uL   Comprehensive Metabolic Panel    Collection Time: 06/15/25  4:19 PM   Result Value Ref Range    Glucose 104 (H) 74  - 99 mg/dL    Sodium 134 (L) 136 - 145 mmol/L    Potassium 2.8 (LL) 3.5 - 5.3 mmol/L    Chloride 97 (L) 98 - 107 mmol/L    Bicarbonate 28 21 - 32 mmol/L    Anion Gap 12 10 - 20 mmol/L    Urea Nitrogen 24 (H) 6 - 23 mg/dL    Creatinine 0.67 0.50 - 1.05 mg/dL    eGFR >90 >60 mL/min/1.73m*2    Calcium 9.9 8.6 - 10.3 mg/dL    Albumin 3.9 3.4 - 5.0 g/dL    Alkaline Phosphatase 65 33 - 136 U/L    Total Protein 6.2 (L) 6.4 - 8.2 g/dL    AST 25 9 - 39 U/L    Bilirubin, Total 0.4 0.0 - 1.2 mg/dL    ALT 25 7 - 45 U/L   Troponin I, High Sensitivity    Collection Time: 06/15/25  4:19 PM   Result Value Ref Range    Troponin I, High Sensitivity 15 (H) 0 - 13 ng/L   Magnesium    Collection Time: 06/15/25  4:19 PM   Result Value Ref Range    Magnesium 1.82 1.60 - 2.40 mg/dL   Morphology    Collection Time: 06/15/25  4:19 PM   Result Value Ref Range    RBC Morphology No significant RBC morphology present    Urinalysis with Reflex Culture and Microscopic    Collection Time: 06/15/25  4:20 PM   Result Value Ref Range    Color, Urine Colorless (N) Light-Yellow, Yellow, Dark-Yellow    Appearance, Urine Clear Clear    Specific Gravity, Urine 1.006 1.005 - 1.035    pH, Urine 6.5 5.0, 5.5, 6.0, 6.5, 7.0, 7.5, 8.0    Protein, Urine NEGATIVE NEGATIVE, 10 (TRACE), 20 (TRACE) mg/dL    Glucose, Urine 500 (3+) (A) Normal mg/dL    Blood, Urine NEGATIVE NEGATIVE mg/dL    Ketones, Urine NEGATIVE NEGATIVE mg/dL    Bilirubin, Urine NEGATIVE NEGATIVE mg/dL    Urobilinogen, Urine Normal Normal mg/dL    Nitrite, Urine NEGATIVE NEGATIVE    Leukocyte Esterase, Urine NEGATIVE NEGATIVE   Extra Urine Gray Tube    Collection Time: 06/15/25  4:20 PM   Result Value Ref Range    Extra Tube Hold for add-ons.    Troponin I, High Sensitivity    Collection Time: 06/15/25  6:34 PM   Result Value Ref Range    Troponin I, High Sensitivity 15 (H) 0 - 13 ng/L   CBC and Auto Differential    Collection Time: 06/30/25 11:08 AM   Result Value Ref Range    WBC 6.6 4.4 - 11.3  x10*3/uL    nRBC 0.0 0.0 - 0.0 /100 WBCs    RBC 3.64 (L) 4.00 - 5.20 x10*6/uL    Hemoglobin 10.5 (L) 12.0 - 16.0 g/dL    Hematocrit 33.8 (L) 36.0 - 46.0 %    MCV 93 80 - 100 fL    MCH 28.8 26.0 - 34.0 pg    MCHC 31.1 (L) 32.0 - 36.0 g/dL    RDW 19.5 (H) 11.5 - 14.5 %    Platelets 194 150 - 450 x10*3/uL    Neutrophils % 55.8 40.0 - 80.0 %    Immature Granulocytes %, Automated 0.8 0.0 - 0.9 %    Lymphocytes % 33.6 13.0 - 44.0 %    Monocytes % 7.5 2.0 - 10.0 %    Eosinophils % 2.0 0.0 - 6.0 %    Basophils % 0.3 0.0 - 2.0 %    Neutrophils Absolute 3.67 1.60 - 5.50 x10*3/uL    Immature Granulocytes Absolute, Automated 0.05 0.00 - 0.50 x10*3/uL    Lymphocytes Absolute 2.21 0.80 - 3.00 x10*3/uL    Monocytes Absolute 0.49 0.05 - 0.80 x10*3/uL    Eosinophils Absolute 0.13 0.00 - 0.40 x10*3/uL    Basophils Absolute 0.02 0.00 - 0.10 x10*3/uL   Comprehensive metabolic panel    Collection Time: 06/30/25 11:08 AM   Result Value Ref Range    Glucose 103 (H) 74 - 99 mg/dL    Sodium 137 136 - 145 mmol/L    Potassium 3.7 3.5 - 5.3 mmol/L    Chloride 104 98 - 107 mmol/L    Bicarbonate 28 21 - 32 mmol/L    Anion Gap 9 (L) 10 - 20 mmol/L    Urea Nitrogen 9 6 - 23 mg/dL    Creatinine 0.62 0.50 - 1.05 mg/dL    eGFR >90 >60 mL/min/1.73m*2    Calcium 10.2 8.6 - 10.3 mg/dL    Albumin 3.5 3.4 - 5.0 g/dL    Alkaline Phosphatase 91 33 - 136 U/L    Total Protein 5.7 (L) 6.4 - 8.2 g/dL    AST 14 9 - 39 U/L    Bilirubin, Total 0.2 0.0 - 1.2 mg/dL    ALT 16 7 - 45 U/L   Ca 125    Collection Time: 06/30/25 11:08 AM   Result Value Ref Range    Cancer  7.5 0.0 - 30.2 U/mL   Magnesium    Collection Time: 06/30/25 11:08 AM   Result Value Ref Range    Magnesium 1.96 1.60 - 2.40 mg/dL       ECG  Encounter Date: 06/15/25   ECG 12 lead   Result Value    Ventricular Rate 77    Atrial Rate 77    OH Interval 183    QRS Duration 88    QT Interval 410    QTC Calculation(Bazett) 465    P Axis 51    R Axis -15    T Axis 91    QRS Count 12    Q Onset 249    T  Offset 454    QTC Fredericia 445    Narrative    Sinus rhythm  Probable left atrial enlargement  Borderline left axis deviation  Low voltage, precordial leads  Borderline repolarization abnormality  Baseline wander in lead(s) V3       Echocardiogram  No results found for this or any previous visit from the past 1095 days.      CV Studies:  EKG:  Encounter Date: 06/15/25   ECG 12 lead   Result Value    Ventricular Rate 77    Atrial Rate 77    KS Interval 183    QRS Duration 88    QT Interval 410    QTC Calculation(Bazett) 465    P Axis 51    R Axis -15    T Axis 91    QRS Count 12    Q Onset 249    T Offset 454    QTC Fredericia 445    Narrative    Sinus rhythm  Probable left atrial enlargement  Borderline left axis deviation  Low voltage, precordial leads  Borderline repolarization abnormality  Baseline wander in lead(s) V3     Echocardiogram:   Echocardiogram     Narrative  [place] [id number]  Phone [phone] Fax [phone]    TRANSTHORACIC ECHOCARDIOGRAM REPORT      Patient Name:     VLADIMIR BROWNE SOCRATES Reading Physician:   [id number] Xavi Worthington MD  Study Date:       9/30/2022       Referring Physician: [id number] TAVARES  MRN/PID:          [id number]        PCP:  Accession/Order#: [id number]    Department Location: [facility]  YOB: 1950       Fellow:  Gender:           F               Nurse:  Admit Date:                       Sonographer:         Allison Snow  Admission Status: Outpatient      Additional Staff:  Height:           160.02 cm       CC Report to:  Weight:           60.33 kg        Study Type:          Echocardiogram  BSA:              1.63 m2  Blood Pressure: 144 /78 mmHg    Diagnosis/ICD: I25.10-Atherosclerotic heart disease of native coronary artery  without angina pectoris; I10-Essential (primary) hypertension  Indication:    CAD, COPD, HTN, PVD  Procedure/CPT: Echo Complete w Full [id number]    Patient  History:  Pertinent History: CAD and COPD.    Study Detail: The following Echo studies were performed: 2D, M-Mode, Doppler and  color flow.      PHYSICIAN INTERPRETATION:  Left Ventricle: Left ventricular systolic function is normal, with an estimated ejection fraction of 60-65%. There are no regional wall motion abnormalities. The left ventricular cavity size is normal. Spectral Doppler shows a restrictive pattern of left ventricular diastolic filling.  Left Atrium: The left atrium is normal in size.  Right Ventricle: The right ventricle is normal in size. There is normal right ventricular global systolic function.  Right Atrium: The right atrium is normal in size.  Aortic Valve: The aortic valve appears structurally normal. There is no evidence of aortic valve regurgitation. The peak instantaneous gradient of the aortic valve is 5.5 mmHg. The mean gradient of the aortic valve is 3.0 mmHg.  Mitral Valve: The mitral valve is normal in structure. There is no evidence of mitral valve regurgitation.  Tricuspid Valve: The tricuspid valve is structurally normal. No evidence of tricuspid regurgitation.  Pulmonic Valve: The pulmonic valve is structurally normal. There is no indication of pulmonic valve regurgitation.  Pericardium: There is no pericardial effusion noted.  Aorta: The aortic root is normal.      CONCLUSIONS:  1. Left ventricular systolic function is normal with a 60-65% estimated ejection fraction.  2. Spectral Doppler shows a restrictive pattern of left ventricular diastolic filling.    QUANTITATIVE DATA SUMMARY:  2D MEASUREMENTS:  Normal Ranges:  Ao Root d:     2.70 cm   (2.0-3.7cm)  LAs:           3.10 cm   (2.7-4.0cm)  IVSd:          1.05 cm   (0.6-1.1cm)  LVPWd:         0.91 cm   (0.6-1.1cm)  LVIDd:         4.01 cm   (3.9-5.9cm)  LVIDs:         2.52 cm  LV Mass Index: 76.1 g/m2  LV % FS        37.2 %    LA VOLUME:  Normal Ranges:  LA Vol A4C:        15.8 ml    (22+/-6mL/m2)  LA Vol A2C:        24.3  ml  LA Vol BP:         20.6 ml  LA Vol Index A4C:  9.7ml/m2  LA Vol Index A2C:  14.9 ml/m2  LA Vol Index BP:   12.6 ml/m2  LA Area A4C:       8.7 cm2  LA Area A2C:       11.4 cm2  LA Major Axis A4C: 4.1 cm  LA Major Axis A2C: 4.6 cm  LA Volume Index:   11.7 ml/m2    RA VOLUME BY A/L METHOD:  Normal Ranges:  RA Area A4C: 7.8 cm2    AORTA MEASUREMENTS:  Normal Ranges:  Ao Sinus, d: 2.70 cm (2.1-3.5cm)  Ao STJ, d:   1.70 cm (1.7-3.4cm)  Asc Ao, d:   2.80 cm (2.1-3.4cm)    LV SYSTOLIC FUNCTION BY 2D PLANIMETRY (MOD):  Normal Ranges:  EF-A4C View: 52.5 % (>55%)  EF-A2C View: 70.1 %  EF-Biplane:  60.0 %    LV DIASTOLIC FUNCTION:  Normal Ranges:  MV Peak E:    1.00 m/s    (0.7-1.2 m/s)  MV Peak A:    0.49 m/s    (0.42-0.7 m/s)  E/A Ratio:    2.05        (1.0-2.2)  MV e'         0.06 m/s    (>8.0)  MV lateral e' 0.07 m/s  MV medial e'  0.04 m/s  MV A Dur:     108.00 msec  E/e' Ratio:   18.18       (<8.0)    MITRAL VALVE:  Normal Ranges:  MV DT: 98 msec (150-240msec)    AORTIC VALVE:  Normal Ranges:  AoV Vmax:                1.17 m/s (<1.7m/s)  AoV Peak P.5 mmHg (<20mmHg)  AoV Mean PG:             3.0 mmHg (1.7-11.5mmHg)  LVOT Max Miguel Ángel:            0.65 m/s (<1.1m/s)  AoV VTI:                 27.30 cm (18-25cm)  LVOT VTI:                12.70 cm  LVOT Diameter:           2.00 cm  (1.8-2.4cm)  AoV Area, VTI:           1.46 cm2 (2.5-5.5cm2)  AoV Area,Vmax:           1.73 cm2 (2.5-4.5cm2)  AoV Dimensionless Index: 0.47    RIGHT VENTRICLE:  RV 1   2.82 cm  RV 2   2.09 cm  RV 3   5.99 cm  TAPSE: 19.9 mm  RV s'  0.11 m/s    TRICUSPID VALVE/RVSP:  Normal Ranges:  TV E Vmax: 0.54 m/s (0.3-0.7m/s)  TV A Vmax: 0.47 m/s  IVC Diam:  1.38 cm      [id number] Xavi Worthington MD  Electronically signed on 2022 at 6:39:45 PM         Final     Stress Testing IMGRESULT(MYT7557:1:1825):   NM cardiac stress rest (myocardial perfusion MIBI) 2022    Narrative  MRN: [id number]  Patient Name: VLADIMIR CROWELL    STUDY:  CARDIAC  STRESS/REST INJECTION;  9/30/2022 11:41 am    INDICATION:  SOBOE  Z72.0: Tobacco abuse I10: Essential hypertension J44.9: COPD  (chronic obstructive pulmonary disease).    COMPARISON:  None.    ACCESSION NUMBER(S):  [id number]    ORDERING CLINICIAN:  ISAAC TAVARES    TECHNIQUE:  DIVISION OF NUCLEAR MEDICINE  PHARMACOLOGIC STRESS MYOCARDIAL PERFUSION SCAN, ONE DAY PROTOCOL    The patient received an intravenous dose of  10.9 mCi of Tc-99m  tetrofosmin and resting emission tomographic (SPECT) images of the  myocardium were acquired. The patient then received an intravenous  infusion of 0.4mg regadenoson (Lexiscan) followed by an additional  dose of  36 mCi of Tc-99m tetrofosmin. Stress phase SPECT images of  the myocardium were then acquired. These included ECG-gated images to  assess and quantify ventricular function.    FINDINGS:  Stress and rest images both demonstrate a normal distribution of  perfusion throughout all LV segments with no sign of ischemia.    ECG-gated images demonstrate normal LV size and myocardial  contractility with an LV ejection fraction of   58 % (normal above 50  percent).    Impression  1. Normal stress myocardial perfusion imaging in response to  pharmacologic stress.  2. Well-maintained left ventricular function.    Cardiac Catheterization: No results found for this or any previous visit from the past 1825 days.  No results found for this or any previous visit from the past 3650 days.     Cardiac Scoring: No results found for this or any previous visit from the past 1825 days.    AAA : No results found for this or any previous visit from the past 1825 days.    OTHER: No results found for this or any previous visit from the past 1825 days.    LAST IMAGING RESULTS  CT chest abdomen pelvis w IV contrast  Narrative: Interpreted By:  Joe Alejandre,   STUDY:  CT CHEST ABDOMEN PELVIS W IV CONTRAST;  6/26/2025 9:15 am      INDICATION:  Signs/Symptoms:restaging, ovarian cancer.      COMPARISON:  CT  chest abdomen and pelvis 04/17/2025      ACCESSION NUMBER(S):  [id number]      ORDERING CLINICIAN:  RAZA FINNEY      TECHNIQUE:  CT of the chest, abdomen, and pelvis was performed.  Contiguous axial  images were obtained at 3 mm slice thickness through the chest,  abdomen and pelvis. Coronal and sagittal reconstructions at 3 mm  slice thickness were performed.      75 ML of Omnipaque 350were administered intravenously without  immediate complication.      FINDINGS:  CHEST:      LOWER NECK, MEDIASTINUM/SURY, AND AXILLA:      No thoracic lymphadenopathy by CT criteria.      Diffusely enlarged multinodular thyroid with dominant peripherally  calcified nodule in the isthmus measuring 2.6 cm (series 2, image  25), unchanged.      HEART AND VESSELS:  Normal cardiac size. No evidence of pericardial effusion.      No coronary artery calcifications. The study is not optimized for  evaluation of coronary arteries.      Main pulmonary artery and its branches are unremarkable in caliber.      No thoracic aortic aneurysm. Moderate to severe calcific  atherosclerosis of the thoracic aorta      Filling defects within basilar left interlobar and bilateral lower  lobe segmental pulmonary arteries (series 2, image 53 for example).  Nonocclusive filling defect anterior segmental left upper lobe  pulmonary artery.      LUNGS AND AIRWAYS:  Trachea and central airways are patent. No endobronchial lesion.      Negative for pneumonia, pulmonary edema or mass. No pneumothorax or  pleural effusion. New nodular consolidative opacities within the  basilar left lower lobe measuring 1.2 cm medially  and 1.8 cm  posteriorly(series 3, image 237). New nodular opacity posterior right  upper lobe measuring 5 mm. Nodule within the posterior right lower  lobe abutting the pleural surface is decreased in size measuring 6 mm  (image 161), previously 9 mm. Bilateral upper lobe pulmonary nodules  are stable in size measuring 6 mm on the left and 4 mm on  the right.          Moderate centrilobular emphysema.  ABDOMEN:  LIVER:  Unremarkable.      GALLBLADDER/BILE DUCTS:  Cholecystectomy.No significant biliary dilatation.      SPLEEN:  Unremarkable.      PANCREAS:  Unremarkable.      ADRENAL GLANDS:  Unremarkable.      KIDNEYS AND URETERS:  Symmetric renal parenchymal enhancement. No hydroureteronephrosis or  radiopaque urolithiasis.  Calcifications in the central portions of  both kidneys are favored vascular. Subcentimeter low-attenuation  renal lesions too small to characterize statistically likely benign.      PERITONEUM/RETROPERITONEUM/LYMPH NODES:  Surgical changes of omental resection. Enhancing peritoneal nodules  posterior to the vaginal cuff are decreased in conspicuity and  measure up to 5 mm at the midline (series 2, image 172), previously 9  mm. Nonspecific nodularity along the anti mesenteric aspect of the  descending colon is similar (series 2, image 119 for example). Ovoid  low-attenuation collection within the deep left pelvis is decreased  in size measuring 1.6 x 0.6 cm (series 2, image 169). The collection  within the deep right pelvis described on the comparison CT is no  longer evident. No enlarged mesenteric lymph nodes.      BOWEL:  No bowel obstruction. Low-attenuation mural thickening of the gastric  antrum and pylorus with adjacent conspicuous along the pathologically  enlarged lymph nodes similar to comparison. Nonspecific mural  thickening of the ascending and transverse colon may relate to  incomplete distention or colitis. Few distal colonic diverticula  without evidence of acute diverticulitis. Multiple loops of small  bowel are closely applied to the ventral abdominal wall suggesting  adhesive disease.      PELVIS:      BLADDER:  Unremarkable.      REPRODUCTIVE ORGANS:  Hysterectomy and bilateral salpingo-oophorectomy.      VESSELS:  Severe aorto bi-iliac atherosclerosis. Focal aneurysmal dilatation of  the infrarenal abdominal aorta  measuring 3 cm (series 2, image 105),  unchanged. Stents within the bilateral common iliac arteries with  possible occlusion bilaterally (series 2, image 137 on the right and  138 on the left. Chronic occlusion of the right superficial femoral  artery.      BONE AND SOFT TISSUE:  No acute osseous abnormality. No aggressive lytic or blastic osseous  lesions. Multilevel degenerative changes throughout the imaged spine.  No acute abnormality of the abdominal wall soft tissues. Surgical  changes of left axillary lymph node dissection. Surgical clips within  the left breast and left chest and abdominal wall.      Impression: Ovarian cancer restaging CT compared to CT chest abdomen pelvis  04/17/2025  1. New pulmonary nodules within the basilar left lower lobe and  posterior right upper lobe are nonspecific and may relate to  metastatic disease. Continued attention on follow-up is warranted.  Previously described pulmonary nodules are stable to decreased in  size.  2. Enhancing peritoneal nodules along the posterior aspect of the  vaginal cuff and low-attenuation collection along the left pelvic  sidewall are decreased in size. Nodularity along the anti mesenteric  aspect of the descending colon is not significantly changed. These  findings remain concerning for peritoneal carcinomatosis.  3. Mild burden of acute pulmonary thromboembolic disease bilaterally.  4. Multinodular thyroid with dominant peripherally calcified lesion  in the isthmus.  5. Severe aorto bi-iliac atherosclerosis with bilateral common iliac  artery stents, chronically occluded on the right and likely also  occluded on the left although suboptimally evaluated. Chronic  occlusion of the right superficial femoral artery again noted.  6. Additional chronic and/or ancillary findings detailed above.      MACRO:  Joe Alejandre discussed the significance and urgency of this critical  finding by EPIC secure chat with  RAZA FINNEY on 6/27/2025 at 9:29  am.  (**-RCF-**) Findings:  There are multiple critical findings. See  findings.      Signed by: Joe Alejandre 6/27/2025 9:30 AM  Dictation workstation:   [id number]      Problem List Items Addressed This Visit       Chronic obstructive pulmonary disease (Multi)    Atherosclerosis of native coronary artery of native heart without angina pectoris - Primary    Dyslipidemia    Primary hypertension    PVD (peripheral vascular disease)    Tobacco use    Chronic heart failure with reduced ejection fraction (HFrEF, <= 40%)            Ike Brooks DO, FACC, FACOI

## 2025-07-09 ENCOUNTER — LAB (OUTPATIENT)
Dept: LAB | Facility: HOSPITAL | Age: 75
End: 2025-07-09
Payer: MEDICARE

## 2025-07-09 DIAGNOSIS — C56.9 MALIGNANT NEOPLASM OF OVARY, UNSPECIFIED LATERALITY (MULTI): ICD-10-CM

## 2025-07-09 DIAGNOSIS — C56.9 MALIGNANT NEOPLASM OF UNSPECIFIED OVARY (MULTI): Primary | ICD-10-CM

## 2025-07-09 LAB
ALBUMIN SERPL BCP-MCNC: 3.5 G/DL (ref 3.4–5)
ALP SERPL-CCNC: 60 U/L (ref 33–136)
ALT SERPL W P-5'-P-CCNC: 16 U/L (ref 7–45)
ANION GAP SERPL CALC-SCNC: 9 MMOL/L (ref 10–20)
AST SERPL W P-5'-P-CCNC: 16 U/L (ref 9–39)
BILIRUB SERPL-MCNC: 0.3 MG/DL (ref 0–1.2)
BUN SERPL-MCNC: 12 MG/DL (ref 6–23)
CALCIUM SERPL-MCNC: 10.1 MG/DL (ref 8.6–10.3)
CHLORIDE SERPL-SCNC: 103 MMOL/L (ref 98–107)
CO2 SERPL-SCNC: 29 MMOL/L (ref 21–32)
CREAT SERPL-MCNC: 0.51 MG/DL (ref 0.5–1.05)
EGFRCR SERPLBLD CKD-EPI 2021: >90 ML/MIN/1.73M*2
GLUCOSE SERPL-MCNC: 94 MG/DL (ref 74–99)
MAGNESIUM SERPL-MCNC: 1.73 MG/DL (ref 1.6–2.4)
POTASSIUM SERPL-SCNC: 3.9 MMOL/L (ref 3.5–5.3)
PROT SERPL-MCNC: 5.4 G/DL (ref 6.4–8.2)
SODIUM SERPL-SCNC: 137 MMOL/L (ref 136–145)

## 2025-07-09 PROCEDURE — 36415 COLL VENOUS BLD VENIPUNCTURE: CPT

## 2025-07-09 PROCEDURE — 83735 ASSAY OF MAGNESIUM: CPT

## 2025-07-09 PROCEDURE — 80053 COMPREHEN METABOLIC PANEL: CPT

## 2025-07-18 LAB
ATRIAL RATE: 77 BPM
P AXIS: 51 DEGREES
PR INTERVAL: 183 MS
Q ONSET: 249 MS
QRS COUNT: 12 BEATS
QRS DURATION: 88 MS
QT INTERVAL: 410 MS
QTC CALCULATION(BAZETT): 465 MS
QTC FREDERICIA: 445 MS
R AXIS: -15 DEGREES
T AXIS: 91 DEGREES
T OFFSET: 454 MS
VENTRICULAR RATE: 77 BPM

## 2025-07-19 NOTE — PROGRESS NOTES
Patient ID: Yadira Davis is a 75 y.o. female.  Referring Physician: No referring provider defined for this encounter.  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 Feeling well. Ambulating with cane. Denies NV. Gained weight which she is excited about. Mood is appropriate. Swelling in bilateral LE. Worried about restarting lasix due to history of hypokalemia with severe symptoms. Has follow up with cardiology and will discuss further.     Objective    BSA: 1.49 meters squared  /83   Pulse 102   Temp 36.6 °C (97.9 °F)   Resp 17   Wt 50.5 kg (111 lb 5.3 oz)   SpO2 98%   BMI 19.93 kg/m²      Physical Exam  Vitals and nursing note reviewed. Exam conducted with a chaperone present.   Constitutional:       Appearance: Normal appearance. She is normal weight.   HENT:      Head: Normocephalic and atraumatic.      Mouth/Throat:      Mouth: Mucous membranes are moist.      Pharynx: No oropharyngeal exudate or posterior oropharyngeal erythema.     Eyes:      Extraocular Movements: Extraocular movements intact.      Conjunctiva/sclera: Conjunctivae normal.      Pupils: Pupils are equal, round, and reactive to light.       Cardiovascular:      Rate and Rhythm: Normal rate.   Pulmonary:      Effort: Pulmonary effort is normal.      Breath sounds: No wheezing, rhonchi or rales.   Abdominal:      General: A surgical scar is present.      Palpations: Abdomen is soft. There is no mass.      Tenderness: There is no guarding or rebound.      Hernia: No hernia is present.   Genitourinary:     Pubic Area: No rash.      Musculoskeletal:         General: Normal range of motion.     Skin:     General: Skin is warm.      Findings: No erythema or rash.      Comments: Stage 2 sacral decub     Neurological:      General: No focal deficit present.      Mental Status: She is alert and oriented to person, place, and time.     Psychiatric:         Mood and  Affect: Mood normal.         Behavior: Behavior normal.         Performance Status:  Asymptomatic    Assessment/Plan     Oncology History Overview Note   3/25/25 XL BS RO with radical dissection omentectomy pelvic peritonectomy argon beam ablation clinical stage IIIB   HER2 3+MMRp   4/28/25  9  Pathology Carcinosarcoma of the ovary   Amara GIS negative, negative BRCA1/2   Foundation PALB2 mutation, HRD negativ, ROSETTE, TMB low, p53 mut     Ovarian cancer (Multi)   4/12/2025 Initial Diagnosis    Ovarian cancer (Multi)     4/30/2025 -  Chemotherapy    PACLitaxel / CARBOplatin, 21 Day Cycles - Gyn       Diagnoses and all orders for this visit:  Malignant neoplasm of ovary, unspecified laterality (Multi)    # Ovarian cancer  - We reviewed her imaging and pathology results, reviewed high risk histology  - We discussed the pathophysiology of ovarian, fallopian tube, and peritoneal cancers.   - We discussed my recommendation for adjuvant treatment with carboplatin AUC 5 and paclitaxel 110 mg/m2 today  - CT reviewed WA, in determinant lung nodules otherwise SD  - Labs reviewed, thrombocytopenic; repeat labs today  - Referral to genetics, missed visit; encouraged to reschedule (daughter is BRCA1)  - [employer] reviewed with FOLR1 pending    # chemotherapy induced nausea  - Zyprexa 2.5 mg PO x 5 days, discussed extending to daily  - Continue PRN anti-emetics  - Discussed nutritional supplements, consider nutrition referral     # lung nodules   - Follow closely, evaluate interval change; stable    # PE   - DOAC refilled    # Depression   - Continue zyprexa, follows with supportive oncology    # vulvar lesions, 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

## 2025-07-21 ENCOUNTER — LAB (OUTPATIENT)
Dept: LAB | Facility: HOSPITAL | Age: 75
End: 2025-07-21
Payer: MEDICARE

## 2025-07-21 DIAGNOSIS — R06.09 DYSPNEA ON EXERTION: ICD-10-CM

## 2025-07-21 DIAGNOSIS — C56.9 MALIGNANT NEOPLASM OF OVARY, UNSPECIFIED LATERALITY (MULTI): ICD-10-CM

## 2025-07-21 DIAGNOSIS — C56.9 MALIGNANT NEOPLASM OF UNSPECIFIED OVARY (MULTI): Primary | ICD-10-CM

## 2025-07-21 DIAGNOSIS — J44.1 COPD EXACERBATION (MULTI): ICD-10-CM

## 2025-07-21 LAB
ACANTHOCYTES BLD QL SMEAR: NORMAL
ALBUMIN SERPL BCP-MCNC: 3.8 G/DL (ref 3.4–5)
ALP SERPL-CCNC: 89 U/L (ref 33–136)
ALT SERPL W P-5'-P-CCNC: 11 U/L (ref 7–45)
ANION GAP SERPL CALC-SCNC: 10 MMOL/L (ref 10–20)
AST SERPL W P-5'-P-CCNC: 14 U/L (ref 9–39)
BASOPHILS # BLD AUTO: 0.01 X10*3/UL (ref 0–0.1)
BASOPHILS NFR BLD AUTO: 0.2 %
BILIRUB SERPL-MCNC: 0.3 MG/DL (ref 0–1.2)
BUN SERPL-MCNC: 11 MG/DL (ref 6–23)
BURR CELLS BLD QL SMEAR: NORMAL
CALCIUM SERPL-MCNC: 10.3 MG/DL (ref 8.6–10.3)
CHLORIDE SERPL-SCNC: 106 MMOL/L (ref 98–107)
CO2 SERPL-SCNC: 26 MMOL/L (ref 21–32)
CREAT SERPL-MCNC: 0.64 MG/DL (ref 0.5–1.05)
DACRYOCYTES BLD QL SMEAR: NORMAL
EGFRCR SERPLBLD CKD-EPI 2021: >90 ML/MIN/1.73M*2
EOSINOPHIL # BLD AUTO: 0.03 X10*3/UL (ref 0–0.4)
EOSINOPHIL NFR BLD AUTO: 0.6 %
ERYTHROCYTE [DISTWIDTH] IN BLOOD BY AUTOMATED COUNT: 22.5 % (ref 11.5–14.5)
GLUCOSE SERPL-MCNC: 119 MG/DL (ref 74–99)
HCT VFR BLD AUTO: 34.7 % (ref 36–46)
HGB BLD-MCNC: 10.9 G/DL (ref 12–16)
IMM GRANULOCYTES # BLD AUTO: 0.01 X10*3/UL (ref 0–0.5)
IMM GRANULOCYTES NFR BLD AUTO: 0.2 % (ref 0–0.9)
LYMPHOCYTES # BLD AUTO: 1.7 X10*3/UL (ref 0.8–3)
LYMPHOCYTES NFR BLD AUTO: 36.6 %
MAGNESIUM SERPL-MCNC: 1.95 MG/DL (ref 1.6–2.4)
MCH RBC QN AUTO: 30.4 PG (ref 26–34)
MCHC RBC AUTO-ENTMCNC: 31.4 G/DL (ref 32–36)
MCV RBC AUTO: 97 FL (ref 80–100)
MONOCYTES # BLD AUTO: 0.39 X10*3/UL (ref 0.05–0.8)
MONOCYTES NFR BLD AUTO: 8.4 %
NEUTROPHILS # BLD AUTO: 2.5 X10*3/UL (ref 1.6–5.5)
NEUTROPHILS NFR BLD AUTO: 54 %
NRBC BLD-RTO: 0 /100 WBCS (ref 0–0)
OVALOCYTES BLD QL SMEAR: NORMAL
PLATELET # BLD AUTO: 88 X10*3/UL (ref 150–450)
POTASSIUM SERPL-SCNC: 3.7 MMOL/L (ref 3.5–5.3)
PROT SERPL-MCNC: 5.7 G/DL (ref 6.4–8.2)
RBC # BLD AUTO: 3.58 X10*6/UL (ref 4–5.2)
RBC MORPH BLD: NORMAL
SODIUM SERPL-SCNC: 138 MMOL/L (ref 136–145)
WBC # BLD AUTO: 4.6 X10*3/UL (ref 4.4–11.3)

## 2025-07-21 PROCEDURE — 80053 COMPREHEN METABOLIC PANEL: CPT

## 2025-07-21 PROCEDURE — 36415 COLL VENOUS BLD VENIPUNCTURE: CPT

## 2025-07-21 PROCEDURE — 86304 IMMUNOASSAY TUMOR CA 125: CPT

## 2025-07-21 PROCEDURE — 85025 COMPLETE CBC W/AUTO DIFF WBC: CPT

## 2025-07-21 PROCEDURE — 83735 ASSAY OF MAGNESIUM: CPT

## 2025-07-21 RX ORDER — ROFLUMILAST 500 UG/1
500 TABLET ORAL DAILY
Qty: 21 TABLET | Refills: 0 | Status: SHIPPED | OUTPATIENT
Start: 2025-07-21

## 2025-07-22 LAB — CANCER AG125 SERPL-ACNC: 3.4 U/ML (ref 0–30.2)

## 2025-07-23 ENCOUNTER — OFFICE VISIT (OUTPATIENT)
Dept: GYNECOLOGIC ONCOLOGY | Facility: CLINIC | Age: 75
End: 2025-07-23
Payer: MEDICARE

## 2025-07-23 ENCOUNTER — INFUSION (OUTPATIENT)
Dept: HEMATOLOGY/ONCOLOGY | Facility: CLINIC | Age: 75
End: 2025-07-23
Payer: MEDICARE

## 2025-07-23 ENCOUNTER — NUTRITION (OUTPATIENT)
Dept: HEMATOLOGY/ONCOLOGY | Facility: CLINIC | Age: 75
End: 2025-07-23

## 2025-07-23 VITALS — WEIGHT: 111.33 LBS | HEIGHT: 63 IN | BODY MASS INDEX: 19.73 KG/M2

## 2025-07-23 VITALS
HEART RATE: 102 BPM | WEIGHT: 111.33 LBS | OXYGEN SATURATION: 98 % | RESPIRATION RATE: 17 BRPM | SYSTOLIC BLOOD PRESSURE: 163 MMHG | DIASTOLIC BLOOD PRESSURE: 83 MMHG | TEMPERATURE: 97.9 F | BODY MASS INDEX: 19.93 KG/M2

## 2025-07-23 DIAGNOSIS — R11.0 CHEMOTHERAPY-INDUCED NAUSEA: ICD-10-CM

## 2025-07-23 DIAGNOSIS — I26.99 ACUTE PULMONARY EMBOLISM, UNSPECIFIED PULMONARY EMBOLISM TYPE, UNSPECIFIED WHETHER ACUTE COR PULMONALE PRESENT (MULTI): ICD-10-CM

## 2025-07-23 DIAGNOSIS — C56.9 MALIGNANT NEOPLASM OF OVARY, UNSPECIFIED LATERALITY (MULTI): ICD-10-CM

## 2025-07-23 DIAGNOSIS — Z51.11 CHEMOTHERAPY MANAGEMENT, ENCOUNTER FOR: ICD-10-CM

## 2025-07-23 DIAGNOSIS — T45.1X5A CHEMOTHERAPY-INDUCED NAUSEA: ICD-10-CM

## 2025-07-23 DIAGNOSIS — G89.29 OTHER CHRONIC PAIN: ICD-10-CM

## 2025-07-23 DIAGNOSIS — R91.8 LUNG NODULES: ICD-10-CM

## 2025-07-23 DIAGNOSIS — C56.9 MALIGNANT NEOPLASM OF OVARY, UNSPECIFIED LATERALITY (MULTI): Primary | ICD-10-CM

## 2025-07-23 LAB
BASOPHILS # BLD AUTO: 0.01 X10*3/UL (ref 0–0.1)
BASOPHILS NFR BLD AUTO: 0.1 %
EOSINOPHIL # BLD AUTO: 0 X10*3/UL (ref 0–0.4)
EOSINOPHIL NFR BLD AUTO: 0 %
ERYTHROCYTE [DISTWIDTH] IN BLOOD BY AUTOMATED COUNT: 22 % (ref 11.5–14.5)
HCT VFR BLD AUTO: 34 % (ref 36–46)
HGB BLD-MCNC: 11.1 G/DL (ref 12–16)
IMM GRANULOCYTES # BLD AUTO: 0.01 X10*3/UL (ref 0–0.5)
IMM GRANULOCYTES NFR BLD AUTO: 0.1 % (ref 0–0.9)
LYMPHOCYTES # BLD AUTO: 1.67 X10*3/UL (ref 0.8–3)
LYMPHOCYTES NFR BLD AUTO: 17.9 %
MCH RBC QN AUTO: 31 PG (ref 26–34)
MCHC RBC AUTO-ENTMCNC: 32.6 G/DL (ref 32–36)
MCV RBC AUTO: 95 FL (ref 80–100)
MONOCYTES # BLD AUTO: 0.47 X10*3/UL (ref 0.05–0.8)
MONOCYTES NFR BLD AUTO: 5 %
NEUTROPHILS # BLD AUTO: 7.15 X10*3/UL (ref 1.6–5.5)
NEUTROPHILS NFR BLD AUTO: 76.9 %
NRBC BLD-RTO: ABNORMAL /100{WBCS}
PLATELET # BLD AUTO: 116 X10*3/UL (ref 150–450)
RBC # BLD AUTO: 3.58 X10*6/UL (ref 4–5.2)
WBC # BLD AUTO: 9.3 X10*3/UL (ref 4.4–11.3)

## 2025-07-23 PROCEDURE — 99215 OFFICE O/P EST HI 40 MIN: CPT | Performed by: STUDENT IN AN ORGANIZED HEALTH CARE EDUCATION/TRAINING PROGRAM

## 2025-07-23 PROCEDURE — 96375 TX/PRO/DX INJ NEW DRUG ADDON: CPT | Mod: INF

## 2025-07-23 PROCEDURE — 2500000004 HC RX 250 GENERAL PHARMACY W/ HCPCS (ALT 636 FOR OP/ED): Performed by: STUDENT IN AN ORGANIZED HEALTH CARE EDUCATION/TRAINING PROGRAM

## 2025-07-23 PROCEDURE — 3077F SYST BP >= 140 MM HG: CPT | Performed by: STUDENT IN AN ORGANIZED HEALTH CARE EDUCATION/TRAINING PROGRAM

## 2025-07-23 PROCEDURE — 1126F AMNT PAIN NOTED NONE PRSNT: CPT | Performed by: STUDENT IN AN ORGANIZED HEALTH CARE EDUCATION/TRAINING PROGRAM

## 2025-07-23 PROCEDURE — 96417 CHEMO IV INFUS EACH ADDL SEQ: CPT

## 2025-07-23 PROCEDURE — 3079F DIAST BP 80-89 MM HG: CPT | Performed by: STUDENT IN AN ORGANIZED HEALTH CARE EDUCATION/TRAINING PROGRAM

## 2025-07-23 PROCEDURE — 85025 COMPLETE CBC W/AUTO DIFF WBC: CPT

## 2025-07-23 PROCEDURE — 2500000001 HC RX 250 WO HCPCS SELF ADMINISTERED DRUGS (ALT 637 FOR MEDICARE OP): Performed by: STUDENT IN AN ORGANIZED HEALTH CARE EDUCATION/TRAINING PROGRAM

## 2025-07-23 PROCEDURE — 96413 CHEMO IV INFUSION 1 HR: CPT

## 2025-07-23 PROCEDURE — 96367 TX/PROPH/DG ADDL SEQ IV INF: CPT

## 2025-07-23 PROCEDURE — 1159F MED LIST DOCD IN RCRD: CPT | Performed by: STUDENT IN AN ORGANIZED HEALTH CARE EDUCATION/TRAINING PROGRAM

## 2025-07-23 PROCEDURE — 96415 CHEMO IV INFUSION ADDL HR: CPT

## 2025-07-23 PROCEDURE — 99215 OFFICE O/P EST HI 40 MIN: CPT | Mod: 25 | Performed by: STUDENT IN AN ORGANIZED HEALTH CARE EDUCATION/TRAINING PROGRAM

## 2025-07-23 RX ORDER — PALONOSETRON 0.05 MG/ML
0.25 INJECTION, SOLUTION INTRAVENOUS ONCE
Status: COMPLETED | OUTPATIENT
Start: 2025-07-23 | End: 2025-07-23

## 2025-07-23 RX ORDER — FAMOTIDINE 10 MG/ML
20 INJECTION, SOLUTION INTRAVENOUS ONCE AS NEEDED
Status: DISCONTINUED | OUTPATIENT
Start: 2025-07-23 | End: 2025-07-23 | Stop reason: HOSPADM

## 2025-07-23 RX ORDER — DIPHENHYDRAMINE HCL 25 MG
25 CAPSULE ORAL ONCE
Status: CANCELLED | OUTPATIENT
Start: 2025-07-23

## 2025-07-23 RX ORDER — EPINEPHRINE 0.3 MG/.3ML
0.3 INJECTION SUBCUTANEOUS EVERY 5 MIN PRN
Status: CANCELLED | OUTPATIENT
Start: 2025-07-23

## 2025-07-23 RX ORDER — ALBUTEROL SULFATE 0.83 MG/ML
3 SOLUTION RESPIRATORY (INHALATION) AS NEEDED
Status: DISCONTINUED | OUTPATIENT
Start: 2025-07-23 | End: 2025-07-23 | Stop reason: HOSPADM

## 2025-07-23 RX ORDER — HEPARIN 100 UNIT/ML
500 SYRINGE INTRAVENOUS AS NEEDED
OUTPATIENT
Start: 2025-07-23

## 2025-07-23 RX ORDER — FAMOTIDINE 10 MG/ML
40 INJECTION, SOLUTION INTRAVENOUS ONCE
Status: CANCELLED | OUTPATIENT
Start: 2025-07-23

## 2025-07-23 RX ORDER — DIPHENHYDRAMINE HYDROCHLORIDE 50 MG/ML
50 INJECTION, SOLUTION INTRAMUSCULAR; INTRAVENOUS AS NEEDED
Status: CANCELLED | OUTPATIENT
Start: 2025-07-23

## 2025-07-23 RX ORDER — PROCHLORPERAZINE EDISYLATE 5 MG/ML
10 INJECTION INTRAMUSCULAR; INTRAVENOUS EVERY 6 HOURS PRN
Status: CANCELLED | OUTPATIENT
Start: 2025-07-23

## 2025-07-23 RX ORDER — ALBUTEROL SULFATE 0.83 MG/ML
3 SOLUTION RESPIRATORY (INHALATION) AS NEEDED
Status: CANCELLED | OUTPATIENT
Start: 2025-07-23

## 2025-07-23 RX ORDER — DIPHENHYDRAMINE HCL 25 MG
25 CAPSULE ORAL ONCE
Status: COMPLETED | OUTPATIENT
Start: 2025-07-23 | End: 2025-07-23

## 2025-07-23 RX ORDER — DIPHENHYDRAMINE HYDROCHLORIDE 50 MG/ML
50 INJECTION, SOLUTION INTRAMUSCULAR; INTRAVENOUS AS NEEDED
Status: DISCONTINUED | OUTPATIENT
Start: 2025-07-23 | End: 2025-07-23 | Stop reason: HOSPADM

## 2025-07-23 RX ORDER — FAMOTIDINE 10 MG/ML
40 INJECTION, SOLUTION INTRAVENOUS ONCE
Status: COMPLETED | OUTPATIENT
Start: 2025-07-23 | End: 2025-07-23

## 2025-07-23 RX ORDER — PROCHLORPERAZINE MALEATE 10 MG
10 TABLET ORAL EVERY 6 HOURS PRN
Status: CANCELLED | OUTPATIENT
Start: 2025-07-23

## 2025-07-23 RX ORDER — FAMOTIDINE 10 MG/ML
20 INJECTION, SOLUTION INTRAVENOUS ONCE AS NEEDED
Status: CANCELLED | OUTPATIENT
Start: 2025-07-23

## 2025-07-23 RX ORDER — HEPARIN SODIUM,PORCINE/PF 10 UNIT/ML
50 SYRINGE (ML) INTRAVENOUS AS NEEDED
OUTPATIENT
Start: 2025-07-23

## 2025-07-23 RX ORDER — PROCHLORPERAZINE EDISYLATE 5 MG/ML
10 INJECTION INTRAMUSCULAR; INTRAVENOUS EVERY 6 HOURS PRN
Status: DISCONTINUED | OUTPATIENT
Start: 2025-07-23 | End: 2025-07-23 | Stop reason: HOSPADM

## 2025-07-23 RX ORDER — PROCHLORPERAZINE MALEATE 10 MG
10 TABLET ORAL EVERY 6 HOURS PRN
Status: DISCONTINUED | OUTPATIENT
Start: 2025-07-23 | End: 2025-07-23 | Stop reason: HOSPADM

## 2025-07-23 RX ORDER — PALONOSETRON 0.05 MG/ML
0.25 INJECTION, SOLUTION INTRAVENOUS ONCE
Status: CANCELLED | OUTPATIENT
Start: 2025-07-23

## 2025-07-23 RX ORDER — EPINEPHRINE 0.3 MG/.3ML
0.3 INJECTION SUBCUTANEOUS EVERY 5 MIN PRN
Status: DISCONTINUED | OUTPATIENT
Start: 2025-07-23 | End: 2025-07-23 | Stop reason: HOSPADM

## 2025-07-23 RX ADMIN — PALONOSETRON HYDROCHLORIDE 0.25 MG: 0.25 INJECTION INTRAVENOUS at 10:42

## 2025-07-23 RX ADMIN — DIPHENHYDRAMINE HYDROCHLORIDE 25 MG: 25 CAPSULE ORAL at 10:42

## 2025-07-23 RX ADMIN — PACLITAXEL 162 MG: 6 INJECTION, SOLUTION INTRAVENOUS at 11:49

## 2025-07-23 RX ADMIN — FOSAPREPITANT 150 MG: 150 INJECTION, POWDER, LYOPHILIZED, FOR SOLUTION INTRAVENOUS at 11:06

## 2025-07-23 RX ADMIN — DEXAMETHASONE SODIUM PHOSPHATE 12 MG: 10 INJECTION, SOLUTION INTRAMUSCULAR; INTRAVENOUS at 10:42

## 2025-07-23 RX ADMIN — CARBOPLATIN 380 MG: 10 INJECTION, SOLUTION INTRAVENOUS at 15:01

## 2025-07-23 RX ADMIN — FAMOTIDINE 40 MG: 10 INJECTION INTRAVENOUS at 10:43

## 2025-07-23 ASSESSMENT — PAIN SCALES - GENERAL: PAINLEVEL_OUTOF10: 0-NO PAIN

## 2025-07-23 NOTE — PROGRESS NOTES
"NUTRITION FOLLOW UP NOTE    Reason for Visit:  Yadira Davis is a 75 y.o. female with Stage 3B Ovarian Ca (dx'd 3/2025).    Started treatment with Paclitaxel/Carboplatin on 4/30/25    Pt seen today in infusion.  Here for C5 treatment.     Problem List[1]    Nutrition Significant labs:  Lab Results   Component Value Date/Time    GLUCOSE 119 (H) 07/21/2025 1253     07/21/2025 1253    K 3.7 07/21/2025 1253     07/21/2025 1253    CO2 26 07/21/2025 1253    ANIONGAP 10 07/21/2025 1253    BUN 11 07/21/2025 1253    CREATININE 0.64 07/21/2025 1253    EGFR >90 07/21/2025 1253    CALCIUM 10.3 07/21/2025 1253    ALBUMIN 3.8 07/21/2025 1253    ALKPHOS 89 07/21/2025 1253    PROT 5.7 (L) 07/21/2025 1253    AST 14 07/21/2025 1253    BILITOT 0.3 07/21/2025 1253    ALT 11 07/21/2025 1253    MG 1.95 07/21/2025 1253    PHOS 2.0 (L) 05/10/2025 2045     Lab Results   Component Value Date    WBC 9.3 07/23/2025    HGB 11.1 (L) 07/23/2025    HCT 34.0 (L) 07/23/2025    MCV 95 07/23/2025     (L) 07/23/2025       No results found for: \"VITD25\"      Anthropometrics:  Height: 159.2 cm (5' 2.68\")   Weight: 50.5 kg (111 lb 5.3 oz)   BMI (Calculated): 19.93    IBW/kg (Dietitian Calculated): 51.3 kg   Percent of IBW: 98 %        *Wt up 1 kg x 3 weeks, however, pt with overall wt loss of 8 kg (14%) x < 6 mos    Weight History:   Wt Readings from Last 20 Encounters:   07/23/25 50.5 kg (111 lb 5.3 oz)   07/23/25 50.5 kg (111 lb 5.3 oz)   07/02/25 49.5 kg (109 lb 2 oz)   06/15/25 52.4 kg (115 lb 8.3 oz)   06/11/25 48 kg (105 lb 13.1 oz)   06/11/25 48 kg (105 lb 13.1 oz)   05/29/25 51.5 kg (113 lb 9.6 oz)   05/21/25 50.1 kg (110 lb 7.2 oz)   05/10/25 54.4 kg (120 lb)   04/30/25 54.8 kg (120 lb 13 oz)   04/16/25 56.7 kg (125 lb)   04/10/25 54.9 kg (121 lb)   03/31/25 56.2 kg (124 lb)   03/25/25 59 kg (130 lb 1.1 oz)   03/13/25 58.1 kg (128 lb 1.6 oz)   03/05/25 58.5 kg (129 lb)   03/05/25 55.8 kg (123 lb)   02/20/25 57.7 kg (127 lb " "4.8 oz)   02/18/25 55.7 kg (122 lb 12.8 oz)   02/04/25 58.6 kg (129 lb 3.2 oz)         Nutrition History:     Pt did go to ED on 6/15 due to having period of unconsciousness lasting ~10 min.  Found to be due to low K and dehydration following chemo.     Appetite much better; now eating smaller amounts more often.  Family confirm she is eating much better as well and that they took several of the suggestions made at last visit.   Does get feeling of being hungry and snacking more.   Went to [place] for her birthday day--ate full meal and then had pie for desert.   Admits that she isn't drinking as much as she had been previously; doesn't drink water but will drink juices, root beer, Luiz Aid, cream soda and tea.  Has one cup of coffee every day.   Still with dysgeusia for certain foods (ie mashed potatoes, things) but other foods takes like they should.  Seems to do best foods that are seasoned or spicy.   Not a big \"meat eater\" but dtr (who she lives with) makes sure she has other sources of protein (ie mac and cheese, sloppy joes)  Tried, but dislikes, protein shakes.   No further N/V; takes Zofran every morning which she feels helps.   Denies diarrhea or constipation; takes stool softeners in morning and at night which helps keep her regular.   Has slight edema in feet.  Energy levels okay.     This am ate a sausage and egg mcmuffin and felipe chip cookie.     On Olanzapine daily.                      Current Medications[2]     Nutrition Focused Physical Exam Findings:    Deferred but appears thin with muscle and fat wasting--like combination of age and disease related wasting.     Subcutaneous Fat Loss  Orbital Fat Pads: Mild-Moderate (slight dark circles and slight hollowing)  Buccal Fat Pads: Mild-Moderate (flat cheeks, minimal bounce)  Triceps: Mild-Moderate (less than ample fat tissue)    Muscle Wasting  Temporalis: Mild-Moderate (slight depression)  Pectoralis (Clavicular Region): Mild-Moderate (some " protrusion of clavicle)  Deltoid/Trapezius: Mild-Moderate (slight protrusion of acromion process)  Trapezius/Infraspinatus/Supraspinatus (Scapular Region): Mild-Moderate (slight protrusion of scapula)  Quadriceps: Mild-Moderate (mild depression on inner and outer thigh)  Gastrocnemius: Mild-Moderate (not well developed muscle)              Estimated Needs:       Estimated Energy Needs  Total Energy Estimated Needs in 24 hours (kCal):  (1090-9908)  Energy Estimated Needs per kg Body Weight in 24 hours (kCal/kg):  (30-35)  Estimated Protein Needs  Total Protein Estimated Needs in 24 Hours (g):  (60-65)  Protein Estimated Needs per kg Body Weight in 24 Hours (g/kg):  (1.2-1.3)  Estimated Fluid Needs  Total Fluid Estimated Needs in 24 Hours (mL):  (1500+)  Total Fluid Estimated Needs in 24 hours (mL/kg):  (30+)             Nutrition Diagnosis   Malnutrition Diagnosis  Patient has Malnutrition Diagnosis: Yes  Diagnosis Status: Active  Malnutrition Diagnosis: Mild malnutrition related to acute disease or injury  Related to: (dx of ovarian cancer and start of chemotherapy)  As Evidenced by: changes in appetite and taste changes with significant wt loss since dx with noted muscle/fat  wasting    Appetite and intakes and wt have increased.  Denies GI issues.  Remains mildly malnourished due to wt loss since dx with noted muscle/fat losses.            Nutrition Interventions/Recommendations   Nutrition Prescription:   High protein, high calorie    Recommendations:    Continue with smaller, more frequent meals and snacks every ~3 hrs.  Encourage adequate fluid intakes; prefer calorie containing beverages vs water.  Goal fluid intakes: 50+ oz daily  Consider high protein, high calorie supplements, shakes, smoothies, etc daily  Add seasoning, spices, condiments, etc to help increase palatability of foods.           Follow Up:  Will continue to f/up and monitor during treatment course.               [1]   Patient Active Problem  List  Diagnosis    Aortoiliac stenosis    Stenosis of iliac artery    Chronic obstructive pulmonary disease (Multi)    Atherosclerosis of native coronary artery of native heart without angina pectoris    Dependence on supplemental oxygen    Dyslipidemia    Primary hypertension    Weakness    Generalized anxiety disorder    Hypoxia    Intermittent claudication    Malignant neoplasm of breast    PAD (peripheral artery disease)    Perforation of tympanic membrane    Severe uncontrolled hypertension    Tachycardia    Dizziness    Aspirin long-term use    Chest pain    Hearing loss    Mixed hyperlipidemia    PVD (peripheral vascular disease)    Dyspnea on exertion    Tobacco use    Palpitations    Acute bronchitis    Bilateral hearing loss    Cigarette smoker    Cough    Decrease in appetite    Chest pain on exertion    Pure hypercholesterolemia    Pain of right lower extremity    Otitis media    Mixed conductive and sensorineural hearing loss    Asymmetrical sensorineural hearing loss    Low oxygen saturation    History of malignant neoplasm of uterine body    History of malignant neoplasm of breast    History of hypertension    History of elevated lipids    Disease due to severe acute respiratory syndrome coronavirus 2 (SARS-CoV-2)    Falling episodes    Decreased breath sounds    Adnexal mass    Ovarian mass, right    COPD exacerbation (Multi)    HFrEF (heart failure with reduced ejection fraction)    S/P right oophorectomy    Ovarian cancer (Multi)    Chronic heart failure with reduced ejection fraction (HFrEF, <= 40%)   [2]   Current Outpatient Medications:     albuterol (Ventolin HFA) 90 mcg/actuation inhaler, Inhale 2 puffs every 4 hours if needed for shortness of breath., Disp: 18 g, Rfl: 3    apixaban (Eliquis) 5 mg (74 tabs) tablet, Take 2 tablets (10 mg) by mouth 2 times a day for 7 days, then take 1 tablet (5 mg) by mouth 2 times a day., Disp: 74 tablet, Rfl: 3    aspirin 81 mg EC tablet, Take 1 tablet (81  mg) by mouth once daily., Disp: 90 tablet, Rfl: 3    carvedilol (Coreg) 3.125 mg tablet, Take 1 tablet (3.125 mg) by mouth 2 times daily (morning and late afternoon)., Disp: 180 tablet, Rfl: 3    dexAMETHasone (Decadron) 4 mg tablet, TAKE 5 TABLETS BY MOUTH ONCE 12 HOURS PRIOR TO PACLITAXEL TREATMENT. THEN TAKE 2 TABLETS ONCE DAILY FOR 3 DAYS STARTING THE DAY AFTER TREATMENT., Disp: 66 tablet, Rfl: 0    diclofenac sodium (Voltaren) 1 % gel, Apply 4.5 inches (4 g) topically 4 times a day as needed., Disp: , Rfl:     empagliflozin (Jardiance) 10 mg tablet, Take 1 tablet (10 mg) by mouth once daily., Disp: 90 tablet, Rfl: 1    gabapentin (Neurontin) 300 mg capsule, Take 1 capsule (300 mg) by mouth 2 times a day., Disp: 60 capsule, Rfl: 11    ibuprofen (IBU) 600 mg tablet, Take 1 tablet (600 mg) by mouth every 6 hours., Disp: 120 tablet, Rfl: 2    ipratropium-albuteroL (Duo-Neb) 0.5-2.5 mg/3 mL nebulizer solution, Take 3 mL by nebulization 3 times a day., Disp: 90 mL, Rfl: 3    magnesium gluconate (Magonate) 27 mg magnesium (500 mg) tablet, Take 2 tablets (54 mg) by mouth 2 times a day., Disp: 120 tablet, Rfl: 5    OLANZapine (ZyPREXA) 5 mg tablet, Take 1 tablet (5 mg) by mouth once daily at bedtime., Disp: 30 tablet, Rfl: 2    ondansetron (Zofran) 8 mg tablet, Take 1 tablet (8 mg) by mouth every 8 hours if needed for nausea or vomiting., Disp: 30 tablet, Rfl: 5    oxyCODONE (Roxicodone) 5 mg immediate release tablet, Take 1 tablet (5 mg) by mouth every 4 hours if needed for severe pain (7 - 10). Do not fill before July 22, 2025., Disp: 120 tablet, Rfl: 0    pantoprazole (ProtoNix) 40 mg EC tablet, Take 1 tablet (40 mg) by mouth once daily in the morning. Take before meals. Do not crush, chew, or split., Disp: 30 tablet, Rfl: 3    polyethylene glycol (Glycolax, Miralax) 17 gram packet, Take 17 g by mouth once daily., Disp: , Rfl:     prochlorperazine (Compazine) 10 mg tablet, Take 1 tablet (10 mg) by mouth every 6 hours  if needed for nausea or vomiting., Disp: 30 tablet, Rfl: 5    roflumilast (Daliresp) 500 mcg tablet, TAKE ONE TABLET BY MOUTH EVERY DAY, Disp: 21 tablet, Rfl: 0    rosuvastatin (Crestor) 40 mg tablet, Take 1 tablet (40 mg) by mouth once daily., Disp: 90 tablet, Rfl: 3    umeclidinium-vilanteroL (Anoro Ellipta) 62.5-25 mcg/actuation blister with device, Inhale 1 puff once daily., Disp: 1 each, Rfl: 3    valACYclovir (Valtrex) 500 mg tablet, Take 1 tablet (500 mg) by mouth once daily., Disp: 30 tablet, Rfl: 11

## 2025-07-28 ENCOUNTER — OFFICE VISIT (OUTPATIENT)
Dept: CARDIOLOGY | Facility: HOSPITAL | Age: 75
End: 2025-07-28
Payer: MEDICARE

## 2025-07-28 VITALS
HEIGHT: 62 IN | SYSTOLIC BLOOD PRESSURE: 116 MMHG | HEART RATE: 94 BPM | WEIGHT: 111 LBS | BODY MASS INDEX: 20.43 KG/M2 | DIASTOLIC BLOOD PRESSURE: 54 MMHG

## 2025-07-28 DIAGNOSIS — I25.10 ATHEROSCLEROSIS OF NATIVE CORONARY ARTERY OF NATIVE HEART WITHOUT ANGINA PECTORIS: Primary | ICD-10-CM

## 2025-07-28 DIAGNOSIS — J44.9 CHRONIC OBSTRUCTIVE PULMONARY DISEASE, UNSPECIFIED COPD TYPE (MULTI): ICD-10-CM

## 2025-07-28 DIAGNOSIS — I10 PRIMARY HYPERTENSION: ICD-10-CM

## 2025-07-28 DIAGNOSIS — I73.9 PVD (PERIPHERAL VASCULAR DISEASE): ICD-10-CM

## 2025-07-28 DIAGNOSIS — Z01.818 PRE-OPERATIVE CLEARANCE: ICD-10-CM

## 2025-07-28 DIAGNOSIS — I50.22 CHRONIC HEART FAILURE WITH REDUCED EJECTION FRACTION (HFREF, <= 40%): ICD-10-CM

## 2025-07-28 DIAGNOSIS — E78.5 DYSLIPIDEMIA: ICD-10-CM

## 2025-07-28 DIAGNOSIS — Z72.0 TOBACCO USE: ICD-10-CM

## 2025-07-28 LAB
ATRIAL RATE: 94 BPM
P AXIS: 67 DEGREES
P OFFSET: 175 MS
P ONSET: 138 MS
PR INTERVAL: 168 MS
Q ONSET: 222 MS
QRS COUNT: 15 BEATS
QRS DURATION: 82 MS
QT INTERVAL: 322 MS
QTC CALCULATION(BAZETT): 402 MS
QTC FREDERICIA: 374 MS
R AXIS: 15 DEGREES
T AXIS: 163 DEGREES
T OFFSET: 383 MS
VENTRICULAR RATE: 94 BPM

## 2025-07-28 PROCEDURE — 3078F DIAST BP <80 MM HG: CPT | Performed by: INTERNAL MEDICINE

## 2025-07-28 PROCEDURE — 99212 OFFICE O/P EST SF 10 MIN: CPT | Mod: 25 | Performed by: INTERNAL MEDICINE

## 2025-07-28 PROCEDURE — 99214 OFFICE O/P EST MOD 30 MIN: CPT | Performed by: INTERNAL MEDICINE

## 2025-07-28 PROCEDURE — 3074F SYST BP LT 130 MM HG: CPT | Performed by: INTERNAL MEDICINE

## 2025-07-28 PROCEDURE — 1159F MED LIST DOCD IN RCRD: CPT | Performed by: INTERNAL MEDICINE

## 2025-07-28 PROCEDURE — 93010 ELECTROCARDIOGRAM REPORT: CPT | Performed by: INTERNAL MEDICINE

## 2025-07-28 PROCEDURE — 93005 ELECTROCARDIOGRAM TRACING: CPT | Performed by: INTERNAL MEDICINE

## 2025-07-28 RX ORDER — SPIRONOLACTONE 25 MG/1
12.5 TABLET ORAL DAILY
Qty: 15 TABLET | Refills: 11 | Status: SHIPPED | OUTPATIENT
Start: 2025-07-28 | End: 2026-07-28

## 2025-07-31 DIAGNOSIS — C56.9 MALIGNANT NEOPLASM OF OVARY, UNSPECIFIED LATERALITY (MULTI): ICD-10-CM

## 2025-08-02 DIAGNOSIS — I26.99 ACUTE PULMONARY EMBOLISM, UNSPECIFIED PULMONARY EMBOLISM TYPE, UNSPECIFIED WHETHER ACUTE COR PULMONALE PRESENT (MULTI): ICD-10-CM

## 2025-08-04 DIAGNOSIS — Z72.0 TOBACCO USE: ICD-10-CM

## 2025-08-04 DIAGNOSIS — I10 PRIMARY HYPERTENSION: ICD-10-CM

## 2025-08-04 DIAGNOSIS — I73.9 PVD (PERIPHERAL VASCULAR DISEASE): ICD-10-CM

## 2025-08-04 DIAGNOSIS — E78.5 DYSLIPIDEMIA: ICD-10-CM

## 2025-08-04 DIAGNOSIS — J44.9 CHRONIC OBSTRUCTIVE PULMONARY DISEASE, UNSPECIFIED COPD TYPE (MULTI): ICD-10-CM

## 2025-08-04 DIAGNOSIS — I25.10 ATHEROSCLEROSIS OF NATIVE CORONARY ARTERY OF NATIVE HEART WITHOUT ANGINA PECTORIS: ICD-10-CM

## 2025-08-04 DIAGNOSIS — Z01.818 PRE-OPERATIVE CLEARANCE: ICD-10-CM

## 2025-08-04 DIAGNOSIS — I50.22 CHRONIC HEART FAILURE WITH REDUCED EJECTION FRACTION (HFREF, <= 40%): ICD-10-CM

## 2025-08-09 DIAGNOSIS — R06.09 DYSPNEA ON EXERTION: ICD-10-CM

## 2025-08-09 DIAGNOSIS — J44.1 COPD EXACERBATION (MULTI): ICD-10-CM

## 2025-08-11 ENCOUNTER — TELEMEDICINE CLINICAL SUPPORT (OUTPATIENT)
Dept: GENETICS | Facility: HOSPITAL | Age: 75
End: 2025-08-11
Payer: MEDICARE

## 2025-08-11 ENCOUNTER — LAB (OUTPATIENT)
Dept: LAB | Facility: HOSPITAL | Age: 75
End: 2025-08-11
Payer: MEDICARE

## 2025-08-11 DIAGNOSIS — Z71.83 ENCOUNTER FOR NONPROCREATIVE GENETIC COUNSELING AND TESTING: ICD-10-CM

## 2025-08-11 DIAGNOSIS — Z85.3 HISTORY OF MALIGNANT NEOPLASM OF BREAST: ICD-10-CM

## 2025-08-11 DIAGNOSIS — Z13.71 ENCOUNTER FOR NONPROCREATIVE GENETIC COUNSELING AND TESTING: ICD-10-CM

## 2025-08-11 DIAGNOSIS — C56.9 MALIGNANT NEOPLASM OF UNSPECIFIED OVARY (MULTI): Primary | ICD-10-CM

## 2025-08-11 DIAGNOSIS — C56.9 MALIGNANT NEOPLASM OF OVARY, UNSPECIFIED LATERALITY (MULTI): ICD-10-CM

## 2025-08-11 LAB
ALBUMIN SERPL BCP-MCNC: 3.8 G/DL (ref 3.4–5)
ALP SERPL-CCNC: 87 U/L (ref 33–136)
ALT SERPL W P-5'-P-CCNC: 11 U/L (ref 7–45)
ANION GAP SERPL CALC-SCNC: 11 MMOL/L (ref 10–20)
AST SERPL W P-5'-P-CCNC: 13 U/L (ref 9–39)
BASOPHILS # BLD AUTO: 0.01 X10*3/UL (ref 0–0.1)
BASOPHILS NFR BLD AUTO: 0.2 %
BILIRUB SERPL-MCNC: 0.3 MG/DL (ref 0–1.2)
BUN SERPL-MCNC: 8 MG/DL (ref 6–23)
CALCIUM SERPL-MCNC: 10.5 MG/DL (ref 8.6–10.3)
CANCER AG125 SERPL-ACNC: 3 U/ML (ref 0–30.2)
CHLORIDE SERPL-SCNC: 105 MMOL/L (ref 98–107)
CO2 SERPL-SCNC: 27 MMOL/L (ref 21–32)
CREAT SERPL-MCNC: 0.59 MG/DL (ref 0.5–1.05)
EGFRCR SERPLBLD CKD-EPI 2021: >90 ML/MIN/1.73M*2
EOSINOPHIL # BLD AUTO: 0.02 X10*3/UL (ref 0–0.4)
EOSINOPHIL NFR BLD AUTO: 0.4 %
ERYTHROCYTE [DISTWIDTH] IN BLOOD BY AUTOMATED COUNT: 20.9 % (ref 11.5–14.5)
GLUCOSE SERPL-MCNC: 117 MG/DL (ref 74–99)
HCT VFR BLD AUTO: 34.6 % (ref 36–46)
HGB BLD-MCNC: 11.4 G/DL (ref 12–16)
IMM GRANULOCYTES # BLD AUTO: 0.02 X10*3/UL (ref 0–0.5)
IMM GRANULOCYTES NFR BLD AUTO: 0.4 % (ref 0–0.9)
LYMPHOCYTES # BLD AUTO: 1.82 X10*3/UL (ref 0.8–3)
LYMPHOCYTES NFR BLD AUTO: 40.6 %
MAGNESIUM SERPL-MCNC: 1.81 MG/DL (ref 1.6–2.4)
MCH RBC QN AUTO: 32.3 PG (ref 26–34)
MCHC RBC AUTO-ENTMCNC: 32.9 G/DL (ref 32–36)
MCV RBC AUTO: 98 FL (ref 80–100)
MONOCYTES # BLD AUTO: 0.35 X10*3/UL (ref 0.05–0.8)
MONOCYTES NFR BLD AUTO: 7.8 %
NEUTROPHILS # BLD AUTO: 2.26 X10*3/UL (ref 1.6–5.5)
NEUTROPHILS NFR BLD AUTO: 50.6 %
NRBC BLD-RTO: 0 /100 WBCS (ref 0–0)
PLATELET # BLD AUTO: 107 X10*3/UL (ref 150–450)
POTASSIUM SERPL-SCNC: 3.7 MMOL/L (ref 3.5–5.3)
PROT SERPL-MCNC: 5.7 G/DL (ref 6.4–8.2)
RBC # BLD AUTO: 3.53 X10*6/UL (ref 4–5.2)
SODIUM SERPL-SCNC: 139 MMOL/L (ref 136–145)
WBC # BLD AUTO: 4.5 X10*3/UL (ref 4.4–11.3)

## 2025-08-11 PROCEDURE — 86304 IMMUNOASSAY TUMOR CA 125: CPT

## 2025-08-11 PROCEDURE — 36415 COLL VENOUS BLD VENIPUNCTURE: CPT

## 2025-08-11 PROCEDURE — 80053 COMPREHEN METABOLIC PANEL: CPT

## 2025-08-11 PROCEDURE — GENMD PR GENETICS VISIT (MEDICAID/MEDICARE): Performed by: GENETIC COUNSELOR, MS

## 2025-08-11 PROCEDURE — 83735 ASSAY OF MAGNESIUM: CPT

## 2025-08-11 PROCEDURE — 85025 COMPLETE CBC W/AUTO DIFF WBC: CPT

## 2025-08-11 RX ORDER — ROFLUMILAST 500 UG/1
500 TABLET ORAL DAILY
Qty: 21 TABLET | Refills: 0 | Status: SHIPPED | OUTPATIENT
Start: 2025-08-11

## 2025-08-13 ENCOUNTER — INFUSION (OUTPATIENT)
Dept: HEMATOLOGY/ONCOLOGY | Facility: CLINIC | Age: 75
End: 2025-08-13
Payer: MEDICARE

## 2025-08-13 ENCOUNTER — OFFICE VISIT (OUTPATIENT)
Dept: PALLIATIVE MEDICINE | Facility: CLINIC | Age: 75
End: 2025-08-13
Payer: MEDICARE

## 2025-08-13 ENCOUNTER — DOCUMENTATION (OUTPATIENT)
Dept: GYNECOLOGIC ONCOLOGY | Facility: HOSPITAL | Age: 75
End: 2025-08-13
Payer: MEDICARE

## 2025-08-13 ENCOUNTER — DOCUMENTATION (OUTPATIENT)
Dept: PALLIATIVE MEDICINE | Facility: HOSPITAL | Age: 75
End: 2025-08-13

## 2025-08-13 ENCOUNTER — OFFICE VISIT (OUTPATIENT)
Dept: GYNECOLOGIC ONCOLOGY | Facility: CLINIC | Age: 75
End: 2025-08-13
Payer: MEDICARE

## 2025-08-13 VITALS
WEIGHT: 112.19 LBS | SYSTOLIC BLOOD PRESSURE: 157 MMHG | TEMPERATURE: 97.3 F | DIASTOLIC BLOOD PRESSURE: 74 MMHG | OXYGEN SATURATION: 97 % | BODY MASS INDEX: 20.52 KG/M2 | HEART RATE: 107 BPM | RESPIRATION RATE: 16 BRPM

## 2025-08-13 DIAGNOSIS — Z51.81 ENCOUNTER FOR MONITORING OPIOID MAINTENANCE THERAPY: Primary | ICD-10-CM

## 2025-08-13 DIAGNOSIS — I26.99 ACUTE PULMONARY EMBOLISM, UNSPECIFIED PULMONARY EMBOLISM TYPE, UNSPECIFIED WHETHER ACUTE COR PULMONALE PRESENT (MULTI): ICD-10-CM

## 2025-08-13 DIAGNOSIS — T45.1X5A CHEMOTHERAPY-INDUCED NEUROPATHY (MULTI): ICD-10-CM

## 2025-08-13 DIAGNOSIS — G89.3 CANCER RELATED PAIN: ICD-10-CM

## 2025-08-13 DIAGNOSIS — C56.9 MALIGNANT NEOPLASM OF OVARY, UNSPECIFIED LATERALITY (MULTI): ICD-10-CM

## 2025-08-13 DIAGNOSIS — N83.8 OVARIAN MASS, RIGHT: ICD-10-CM

## 2025-08-13 DIAGNOSIS — R10.2 PELVIC PAIN IN FEMALE: ICD-10-CM

## 2025-08-13 DIAGNOSIS — K59.03 DRUG-INDUCED CONSTIPATION: ICD-10-CM

## 2025-08-13 DIAGNOSIS — G62.0 CHEMOTHERAPY-INDUCED NEUROPATHY (MULTI): ICD-10-CM

## 2025-08-13 DIAGNOSIS — Z79.891 ENCOUNTER FOR MONITORING OPIOID MAINTENANCE THERAPY: Primary | ICD-10-CM

## 2025-08-13 DIAGNOSIS — Z51.81 ENCOUNTER FOR MONITORING OPIOID MAINTENANCE THERAPY: ICD-10-CM

## 2025-08-13 DIAGNOSIS — Z51.11 CHEMOTHERAPY MANAGEMENT, ENCOUNTER FOR: Primary | ICD-10-CM

## 2025-08-13 DIAGNOSIS — Z51.5 PALLIATIVE CARE ENCOUNTER: ICD-10-CM

## 2025-08-13 DIAGNOSIS — Z79.891 ENCOUNTER FOR MONITORING OPIOID MAINTENANCE THERAPY: ICD-10-CM

## 2025-08-13 DIAGNOSIS — G47.00 INSOMNIA, UNSPECIFIED TYPE: ICD-10-CM

## 2025-08-13 DIAGNOSIS — R91.8 LUNG NODULES: ICD-10-CM

## 2025-08-13 LAB
AMPHETAMINES UR QL SCN: ABNORMAL
BARBITURATES UR QL SCN: ABNORMAL
BENZODIAZ UR QL SCN: ABNORMAL
BZE UR QL SCN: ABNORMAL
CANNABINOIDS UR QL SCN: ABNORMAL
FENTANYL+NORFENTANYL UR QL SCN: ABNORMAL
METHADONE UR QL SCN: ABNORMAL
OPIATES UR QL SCN: ABNORMAL
OXYCODONE+OXYMORPHONE UR QL SCN: ABNORMAL
PCP UR QL SCN: ABNORMAL

## 2025-08-13 PROCEDURE — 96375 TX/PRO/DX INJ NEW DRUG ADDON: CPT | Mod: INF

## 2025-08-13 PROCEDURE — 99215 OFFICE O/P EST HI 40 MIN: CPT | Mod: 25 | Performed by: STUDENT IN AN ORGANIZED HEALTH CARE EDUCATION/TRAINING PROGRAM

## 2025-08-13 PROCEDURE — 96367 TX/PROPH/DG ADDL SEQ IV INF: CPT

## 2025-08-13 PROCEDURE — 96413 CHEMO IV INFUSION 1 HR: CPT

## 2025-08-13 PROCEDURE — 1160F RVW MEDS BY RX/DR IN RCRD: CPT | Performed by: STUDENT IN AN ORGANIZED HEALTH CARE EDUCATION/TRAINING PROGRAM

## 2025-08-13 PROCEDURE — 96415 CHEMO IV INFUSION ADDL HR: CPT

## 2025-08-13 PROCEDURE — 2500000001 HC RX 250 WO HCPCS SELF ADMINISTERED DRUGS (ALT 637 FOR MEDICARE OP): Performed by: STUDENT IN AN ORGANIZED HEALTH CARE EDUCATION/TRAINING PROGRAM

## 2025-08-13 PROCEDURE — 99215 OFFICE O/P EST HI 40 MIN: CPT | Performed by: STUDENT IN AN ORGANIZED HEALTH CARE EDUCATION/TRAINING PROGRAM

## 2025-08-13 PROCEDURE — 80365 DRUG SCREENING OXYCODONE: CPT

## 2025-08-13 PROCEDURE — 3077F SYST BP >= 140 MM HG: CPT | Performed by: STUDENT IN AN ORGANIZED HEALTH CARE EDUCATION/TRAINING PROGRAM

## 2025-08-13 PROCEDURE — 1159F MED LIST DOCD IN RCRD: CPT | Performed by: STUDENT IN AN ORGANIZED HEALTH CARE EDUCATION/TRAINING PROGRAM

## 2025-08-13 PROCEDURE — 2500000004 HC RX 250 GENERAL PHARMACY W/ HCPCS (ALT 636 FOR OP/ED): Performed by: STUDENT IN AN ORGANIZED HEALTH CARE EDUCATION/TRAINING PROGRAM

## 2025-08-13 PROCEDURE — 96417 CHEMO IV INFUS EACH ADDL SEQ: CPT

## 2025-08-13 PROCEDURE — 80307 DRUG TEST PRSMV CHEM ANLYZR: CPT

## 2025-08-13 PROCEDURE — 1125F AMNT PAIN NOTED PAIN PRSNT: CPT | Performed by: STUDENT IN AN ORGANIZED HEALTH CARE EDUCATION/TRAINING PROGRAM

## 2025-08-13 PROCEDURE — 3078F DIAST BP <80 MM HG: CPT | Performed by: STUDENT IN AN ORGANIZED HEALTH CARE EDUCATION/TRAINING PROGRAM

## 2025-08-13 PROCEDURE — 99214 OFFICE O/P EST MOD 30 MIN: CPT

## 2025-08-13 PROCEDURE — 99214 OFFICE O/P EST MOD 30 MIN: CPT | Mod: 25

## 2025-08-13 RX ORDER — DIPHENHYDRAMINE HYDROCHLORIDE 50 MG/ML
50 INJECTION, SOLUTION INTRAMUSCULAR; INTRAVENOUS AS NEEDED
Status: DISCONTINUED | OUTPATIENT
Start: 2025-08-13 | End: 2025-08-13 | Stop reason: HOSPADM

## 2025-08-13 RX ORDER — ALBUTEROL SULFATE 0.83 MG/ML
3 SOLUTION RESPIRATORY (INHALATION) AS NEEDED
Status: CANCELLED | OUTPATIENT
Start: 2025-08-13

## 2025-08-13 RX ORDER — FAMOTIDINE 10 MG/ML
20 INJECTION, SOLUTION INTRAVENOUS ONCE AS NEEDED
Status: DISCONTINUED | OUTPATIENT
Start: 2025-08-13 | End: 2025-08-13 | Stop reason: HOSPADM

## 2025-08-13 RX ORDER — DIPHENHYDRAMINE HYDROCHLORIDE 50 MG/ML
50 INJECTION, SOLUTION INTRAMUSCULAR; INTRAVENOUS AS NEEDED
Status: CANCELLED | OUTPATIENT
Start: 2025-08-13

## 2025-08-13 RX ORDER — PROCHLORPERAZINE EDISYLATE 5 MG/ML
10 INJECTION INTRAMUSCULAR; INTRAVENOUS EVERY 6 HOURS PRN
Status: DISCONTINUED | OUTPATIENT
Start: 2025-08-13 | End: 2025-08-13 | Stop reason: HOSPADM

## 2025-08-13 RX ORDER — FAMOTIDINE 10 MG/ML
40 INJECTION, SOLUTION INTRAVENOUS ONCE
Status: COMPLETED | OUTPATIENT
Start: 2025-08-13 | End: 2025-08-13

## 2025-08-13 RX ORDER — DIPHENHYDRAMINE HCL 25 MG
25 CAPSULE ORAL ONCE
Status: CANCELLED | OUTPATIENT
Start: 2025-08-13

## 2025-08-13 RX ORDER — AMOXICILLIN 250 MG
2 CAPSULE ORAL 2 TIMES DAILY
Qty: 120 TABLET | Refills: 3 | Status: SHIPPED | OUTPATIENT
Start: 2025-08-13 | End: 2025-09-12

## 2025-08-13 RX ORDER — ALBUTEROL SULFATE 0.83 MG/ML
3 SOLUTION RESPIRATORY (INHALATION) AS NEEDED
Status: DISCONTINUED | OUTPATIENT
Start: 2025-08-13 | End: 2025-08-13 | Stop reason: HOSPADM

## 2025-08-13 RX ORDER — FAMOTIDINE 10 MG/ML
40 INJECTION, SOLUTION INTRAVENOUS ONCE
Status: CANCELLED | OUTPATIENT
Start: 2025-08-13

## 2025-08-13 RX ORDER — PROCHLORPERAZINE MALEATE 10 MG
10 TABLET ORAL EVERY 6 HOURS PRN
Status: CANCELLED | OUTPATIENT
Start: 2025-08-13

## 2025-08-13 RX ORDER — EPINEPHRINE 0.3 MG/.3ML
0.3 INJECTION SUBCUTANEOUS EVERY 5 MIN PRN
Status: DISCONTINUED | OUTPATIENT
Start: 2025-08-13 | End: 2025-08-13 | Stop reason: HOSPADM

## 2025-08-13 RX ORDER — PROCHLORPERAZINE EDISYLATE 5 MG/ML
10 INJECTION INTRAMUSCULAR; INTRAVENOUS EVERY 6 HOURS PRN
Status: CANCELLED | OUTPATIENT
Start: 2025-08-13

## 2025-08-13 RX ORDER — DIPHENHYDRAMINE HCL 25 MG
25 CAPSULE ORAL ONCE
Status: COMPLETED | OUTPATIENT
Start: 2025-08-13 | End: 2025-08-13

## 2025-08-13 RX ORDER — PALONOSETRON 0.05 MG/ML
0.25 INJECTION, SOLUTION INTRAVENOUS ONCE
Status: CANCELLED | OUTPATIENT
Start: 2025-08-13

## 2025-08-13 RX ORDER — PALONOSETRON 0.05 MG/ML
0.25 INJECTION, SOLUTION INTRAVENOUS ONCE
Status: COMPLETED | OUTPATIENT
Start: 2025-08-13 | End: 2025-08-13

## 2025-08-13 RX ORDER — HEPARIN SODIUM,PORCINE/PF 10 UNIT/ML
50 SYRINGE (ML) INTRAVENOUS AS NEEDED
OUTPATIENT
Start: 2025-08-13

## 2025-08-13 RX ORDER — FAMOTIDINE 10 MG/ML
20 INJECTION, SOLUTION INTRAVENOUS ONCE AS NEEDED
Status: CANCELLED | OUTPATIENT
Start: 2025-08-13

## 2025-08-13 RX ORDER — EPINEPHRINE 0.3 MG/.3ML
0.3 INJECTION SUBCUTANEOUS EVERY 5 MIN PRN
Status: CANCELLED | OUTPATIENT
Start: 2025-08-13

## 2025-08-13 RX ORDER — PROCHLORPERAZINE MALEATE 10 MG
10 TABLET ORAL EVERY 6 HOURS PRN
Status: DISCONTINUED | OUTPATIENT
Start: 2025-08-13 | End: 2025-08-13 | Stop reason: HOSPADM

## 2025-08-13 RX ORDER — HEPARIN 100 UNIT/ML
500 SYRINGE INTRAVENOUS AS NEEDED
OUTPATIENT
Start: 2025-08-13

## 2025-08-13 RX ORDER — OXYCODONE HYDROCHLORIDE 5 MG/1
5 TABLET ORAL EVERY 4 HOURS PRN
Qty: 120 TABLET | Refills: 0 | Status: SHIPPED | OUTPATIENT
Start: 2025-08-20 | End: 2025-09-19

## 2025-08-13 RX ADMIN — FAMOTIDINE 40 MG: 10 INJECTION INTRAVENOUS at 10:37

## 2025-08-13 RX ADMIN — CARBOPLATIN 380 MG: 10 INJECTION, SOLUTION INTRAVENOUS at 14:54

## 2025-08-13 RX ADMIN — PALONOSETRON 0.25 MG: 0.05 INJECTION, SOLUTION INTRAVENOUS at 10:32

## 2025-08-13 RX ADMIN — DIPHENHYDRAMINE HYDROCHLORIDE 25 MG: 25 CAPSULE ORAL at 10:31

## 2025-08-13 RX ADMIN — DEXAMETHASONE SODIUM PHOSPHATE 12 MG: 10 INJECTION, SOLUTION INTRAMUSCULAR; INTRAVENOUS at 10:27

## 2025-08-13 RX ADMIN — PACLITAXEL 162 MG: 6 INJECTION, SOLUTION INTRAVENOUS at 11:44

## 2025-08-13 RX ADMIN — FOSAPREPITANT 150 MG: 150 INJECTION, POWDER, LYOPHILIZED, FOR SOLUTION INTRAVENOUS at 11:00

## 2025-08-13 ASSESSMENT — PAIN SCALES - GENERAL: PAINLEVEL_OUTOF10: 3

## 2025-08-15 LAB
6MAM UR CFM-MCNC: <25 NG/ML
CODEINE UR CFM-MCNC: <50 NG/ML
HYDROCODONE CTO UR CFM-MCNC: <25 NG/ML
HYDROMORPHONE UR CFM-MCNC: <25 NG/ML
MORPHINE UR CFM-MCNC: <50 NG/ML
NORHYDROCODONE UR CFM-MCNC: <25 NG/ML
NOROXYCODONE UR CFM-MCNC: 392 NG/ML
OXYCODONE UR CFM-MCNC: 79 NG/ML
OXYMORPHONE UR CFM-MCNC: 276 NG/ML

## 2025-08-25 DIAGNOSIS — C56.9 MALIGNANT NEOPLASM OF OVARY, UNSPECIFIED LATERALITY (MULTI): ICD-10-CM

## 2025-08-25 DIAGNOSIS — Z85.3 HISTORY OF MALIGNANT NEOPLASM OF BREAST: ICD-10-CM

## 2025-08-25 LAB — SCAN RESULT: NORMAL

## 2025-08-26 ENCOUNTER — TELEPHONE (OUTPATIENT)
Dept: GYNECOLOGIC ONCOLOGY | Facility: HOSPITAL | Age: 75
End: 2025-08-26
Payer: MEDICARE

## 2025-08-27 ENCOUNTER — LAB (OUTPATIENT)
Dept: LAB | Facility: HOSPITAL | Age: 75
End: 2025-08-27
Payer: MEDICARE

## 2025-08-27 ENCOUNTER — HOSPITAL ENCOUNTER (OUTPATIENT)
Dept: RADIOLOGY | Facility: HOSPITAL | Age: 75
Discharge: HOME | End: 2025-08-27
Payer: MEDICARE

## 2025-08-27 DIAGNOSIS — I26.99 ACUTE PULMONARY EMBOLISM, UNSPECIFIED PULMONARY EMBOLISM TYPE, UNSPECIFIED WHETHER ACUTE COR PULMONALE PRESENT (MULTI): ICD-10-CM

## 2025-08-27 DIAGNOSIS — C56.9 MALIGNANT NEOPLASM OF OVARY, UNSPECIFIED LATERALITY (MULTI): ICD-10-CM

## 2025-08-27 DIAGNOSIS — Z51.11 CHEMOTHERAPY MANAGEMENT, ENCOUNTER FOR: ICD-10-CM

## 2025-08-27 DIAGNOSIS — C56.9 MALIGNANT NEOPLASM OF UNSPECIFIED OVARY (MULTI): Primary | ICD-10-CM

## 2025-08-27 DIAGNOSIS — R91.8 LUNG NODULES: ICD-10-CM

## 2025-08-27 PROCEDURE — 71260 CT THORAX DX C+: CPT | Performed by: INTERNAL MEDICINE

## 2025-08-27 PROCEDURE — 74177 CT ABD & PELVIS W/CONTRAST: CPT | Performed by: INTERNAL MEDICINE

## 2025-08-27 PROCEDURE — 74177 CT ABD & PELVIS W/CONTRAST: CPT

## 2025-08-27 PROCEDURE — 2550000001 HC RX 255 CONTRASTS: Performed by: STUDENT IN AN ORGANIZED HEALTH CARE EDUCATION/TRAINING PROGRAM

## 2025-08-27 RX ADMIN — IOHEXOL 75 ML: 350 INJECTION, SOLUTION INTRAVENOUS at 14:28

## 2025-08-29 ENCOUNTER — APPOINTMENT (OUTPATIENT)
Dept: HEMATOLOGY/ONCOLOGY | Facility: HOSPITAL | Age: 75
End: 2025-08-29
Payer: MEDICARE

## 2025-09-02 DIAGNOSIS — R06.09 DYSPNEA ON EXERTION: ICD-10-CM

## 2025-09-02 DIAGNOSIS — J44.1 COPD EXACERBATION (MULTI): ICD-10-CM

## 2025-09-02 RX ORDER — ROFLUMILAST 500 UG/1
500 TABLET ORAL DAILY
Qty: 21 TABLET | Refills: 0 | Status: SHIPPED | OUTPATIENT
Start: 2025-09-02

## 2025-09-03 ENCOUNTER — TELEMEDICINE (OUTPATIENT)
Dept: GYNECOLOGIC ONCOLOGY | Facility: CLINIC | Age: 75
End: 2025-09-03
Payer: MEDICARE

## 2025-09-03 DIAGNOSIS — C56.9 MALIGNANT NEOPLASM OF OVARY, UNSPECIFIED LATERALITY (MULTI): Primary | ICD-10-CM

## 2025-09-03 DIAGNOSIS — I26.99 ACUTE PULMONARY EMBOLISM, UNSPECIFIED PULMONARY EMBOLISM TYPE, UNSPECIFIED WHETHER ACUTE COR PULMONALE PRESENT (MULTI): ICD-10-CM

## 2025-09-03 DIAGNOSIS — B00.9 HSV INFECTION: ICD-10-CM

## 2025-09-03 DIAGNOSIS — R91.8 LUNG NODULES: ICD-10-CM

## 2025-09-03 DIAGNOSIS — F32.A DEPRESSION, UNSPECIFIED DEPRESSION TYPE: ICD-10-CM

## 2025-09-03 DIAGNOSIS — G89.29 OTHER CHRONIC PAIN: ICD-10-CM

## 2025-09-03 PROCEDURE — 1160F RVW MEDS BY RX/DR IN RCRD: CPT | Performed by: STUDENT IN AN ORGANIZED HEALTH CARE EDUCATION/TRAINING PROGRAM

## 2025-09-03 PROCEDURE — 99214 OFFICE O/P EST MOD 30 MIN: CPT | Performed by: STUDENT IN AN ORGANIZED HEALTH CARE EDUCATION/TRAINING PROGRAM

## 2025-09-03 PROCEDURE — 1159F MED LIST DOCD IN RCRD: CPT | Performed by: STUDENT IN AN ORGANIZED HEALTH CARE EDUCATION/TRAINING PROGRAM

## 2025-09-04 ENCOUNTER — TELEPHONE (OUTPATIENT)
Dept: GYNECOLOGIC ONCOLOGY | Facility: HOSPITAL | Age: 75
End: 2025-09-04
Payer: MEDICARE

## 2025-09-04 DIAGNOSIS — R06.09 DYSPNEA ON EXERTION: ICD-10-CM

## 2025-09-04 DIAGNOSIS — J44.1 COPD EXACERBATION (MULTI): ICD-10-CM

## 2025-09-04 RX ORDER — IPRATROPIUM BROMIDE AND ALBUTEROL SULFATE 2.5; .5 MG/3ML; MG/3ML
3 SOLUTION RESPIRATORY (INHALATION)
Qty: 90 ML | Refills: 3 | Status: SHIPPED | OUTPATIENT
Start: 2025-09-04

## 2025-09-12 ENCOUNTER — APPOINTMENT (OUTPATIENT)
Dept: GENETICS | Facility: HOSPITAL | Age: 75
End: 2025-09-12
Payer: MEDICARE

## (undated) DEVICE — SYRINGE, 20 CC, LUER LOCK, MONOJECT, W/O CAP, LF

## (undated) DEVICE — Device

## (undated) DEVICE — TROCAR, KII OPTICAL BLADELESS 5MM Z THREAD 100MM LNGTH

## (undated) DEVICE — SUTURE, VICRYL 0, TAPER POINT, CT-1 VIOLET 27 INCH

## (undated) DEVICE — SUTURE, VICRYL, 0, 18 IN, UNDYED

## (undated) DEVICE — SEALANT, HEMOSTATIC, FLOSEAL, 10 ML

## (undated) DEVICE — DRESSING, ADHESIVE, ISLAND, TELFA, 2 X 3.75 IN, LF

## (undated) DEVICE — PREP TRAY, SKIN, DRY, W/GLOVES

## (undated) DEVICE — SUTURE, VICRYL, 2-0, 27 IN, BR/SH 27, VIOLET

## (undated) DEVICE — TRAP, SPECIMEN, 40 ML

## (undated) DEVICE — LOOP, VESSEL, MAXI, BLUE

## (undated) DEVICE — SUTURE, PDSII, 1, TP-1, VIL, MONO, 48LP

## (undated) DEVICE — COVER, CART, 45 X 27 X 48 IN, CLEAR

## (undated) DEVICE — TUBE SET, PNEUMOCLEAR, SMOKE EVACU, HIGH-FLOW

## (undated) DEVICE — TOWEL, SURGICAL, NEURO, O/R, 16 X 26, BLUE, STERILE

## (undated) DEVICE — RETRIEVAL SYSTEM, MONARCH, 10MM DISP ENDOSCOPIC

## (undated) DEVICE — TROCAR SYSTEM, BALLOON, KII GELPORT, 12 X 100MM

## (undated) DEVICE — SYSTEM, FIOS FIRST ENTRY, 5 X 100MM, KII ADVANCED FIXATION

## (undated) DEVICE — APPLICATOR, CHLORAPREP, W/ORANGE TINT, 26ML

## (undated) DEVICE — PROBE, ELECTROSURGICAL, ABC, HANDSWITCH, W/10 FT CORD, 5 MM X 28 CM, LF

## (undated) DEVICE — HOLSTER, JET SAFETY

## (undated) DEVICE — LIGASURE IMPACT, 18CM

## (undated) DEVICE — SYRINGE, 60 CC, IRRIGATION, BULB, CONTRO-BULB, PAPER POUCH

## (undated) DEVICE — SUTURE, VICRYL PLUS 3-0, SH, 27IN

## (undated) DEVICE — LIGASURE, V SEALER/DIVIDER  5MM BLUNT TIP

## (undated) DEVICE — DRAPE, MAGENTIC INSTRUMENT, 12X16

## (undated) DEVICE — SUTURE, VICRYL, 0, 27 IN, UR-6, VIOLET

## (undated) DEVICE — SYRINGE, 10 CC, SLIP TIP

## (undated) DEVICE — MANIFOLD, 4 PORT NEPTUNE STANDARD

## (undated) DEVICE — SUTURE, MONOCRYL, 4-0, 18 IN, PS2, UNDYED

## (undated) DEVICE — SPONGE, HEMOSTATIC, CELLULOSE, SURGICEL, FIBRILLAR, 2 X 4 IN